# Patient Record
Sex: FEMALE | Employment: OTHER | URBAN - METROPOLITAN AREA
[De-identification: names, ages, dates, MRNs, and addresses within clinical notes are randomized per-mention and may not be internally consistent; named-entity substitution may affect disease eponyms.]

---

## 2017-02-02 ENCOUNTER — OFFICE VISIT (OUTPATIENT)
Dept: FAMILY MEDICINE | Facility: CLINIC | Age: 52
End: 2017-02-02

## 2017-02-02 VITALS
HEART RATE: 80 BPM | SYSTOLIC BLOOD PRESSURE: 135 MMHG | TEMPERATURE: 98.3 F | DIASTOLIC BLOOD PRESSURE: 85 MMHG | OXYGEN SATURATION: 97 % | WEIGHT: 197 LBS | BODY MASS INDEX: 32.78 KG/M2

## 2017-02-02 DIAGNOSIS — R53.83 PROFOUND FATIGUE: ICD-10-CM

## 2017-02-02 DIAGNOSIS — R68.89 FEELING UNWELL: Primary | ICD-10-CM

## 2017-02-02 DIAGNOSIS — L91.0 KELOID SCAR: ICD-10-CM

## 2017-02-02 DIAGNOSIS — T78.40XD ALLERGIC STATE, SUBSEQUENT ENCOUNTER: ICD-10-CM

## 2017-02-02 DIAGNOSIS — Z13.220 SCREENING FOR HYPERLIPIDEMIA: ICD-10-CM

## 2017-02-02 DIAGNOSIS — G89.29 OTHER CHRONIC PAIN: ICD-10-CM

## 2017-02-02 LAB
ALBUMIN SERPL-MCNC: 4.6 G/DL (ref 3.3–4.6)
ALP SERPL-CCNC: 51 U/L (ref 40–150)
ALT SERPL-CCNC: 33 U/L (ref 0–50)
AST SERPL-CCNC: 27 U/L (ref 0–45)
BASOPHILS # BLD AUTO: 0 10E9/L (ref 0–0.2)
BASOPHILS NFR BLD AUTO: 0.2 %
BILIRUB SERPL-MCNC: 0.7 MG/DL (ref 0.2–1.3)
BUN SERPL-MCNC: 7 MG/DL (ref 7–30)
CALCIUM SERPL-MCNC: 9.8 MG/DL (ref 8.5–10.4)
CHLORIDE SERPLBLD-SCNC: 101 MMOL/L (ref 94–109)
CHOLEST SERPL-MCNC: 293 MG/DL (ref 0–200)
CHOLEST/HDLC SERPL: 4 {RATIO} (ref 0–5)
CO2 SERPL-SCNC: 29 MMOL/L (ref 20–32)
CORTIS SERPL-MCNC: 18.2 UG/DL (ref 4–22)
CREAT SERPL-MCNC: 0.6 MG/DL (ref 0.6–1.3)
DEPRECATED CALCIDIOL+CALCIFEROL SERPL-MC: 105 UG/L (ref 20–75)
DIFFERENTIAL METHOD BLD: ABNORMAL
EGFR CALCULATED (BLACK REFERENCE): 135
EGFR CALCULATED (NON BLACK REFERENCE): 111.6
EOSINOPHIL # BLD AUTO: 0.2 10E9/L (ref 0–0.7)
EOSINOPHIL NFR BLD AUTO: 3.9 %
ERYTHROCYTE [DISTWIDTH] IN BLOOD BY AUTOMATED COUNT: 13.6 % (ref 10–15)
ESTRADIOL SERPL-MCNC: NORMAL PG/ML
FASTING SPECIMEN: YES
FOLATE SERPL-MCNC: 84.8 NG/ML
FSH SERPL-ACNC: 43.4 IU/L
GLUCOSE SERPL-MCNC: 105 MG/DL (ref 60–109)
HCT VFR BLD AUTO: 39.5 % (ref 35–47)
HDLC SERPL-MCNC: 72 MG/DL
HGB BLD-MCNC: 13.5 G/DL (ref 11.7–15.7)
IMM GRANULOCYTES # BLD: 0 10E9/L (ref 0–0.4)
IMM GRANULOCYTES NFR BLD: 0.2 %
INSULIN SERPL-ACNC: 7.9 MU/L (ref 3–25)
LDLC SERPL CALC-MCNC: 199 MG/DL (ref 0–129)
LH SERPL-ACNC: 12.5 IU/L
LYMPHOCYTES # BLD AUTO: 1.3 10E9/L (ref 0.8–5.3)
LYMPHOCYTES NFR BLD AUTO: 25.4 %
MCH RBC QN AUTO: 29.3 PG (ref 26.5–33)
MCHC RBC AUTO-ENTMCNC: 34.2 G/DL (ref 31.5–36.5)
MCV RBC AUTO: 86 FL (ref 78–100)
MONOCYTES # BLD AUTO: 0.5 10E9/L (ref 0–1.3)
MONOCYTES NFR BLD AUTO: 9 %
NEUTROPHILS # BLD AUTO: 3.1 10E9/L (ref 1.6–8.3)
NEUTROPHILS NFR BLD AUTO: 61.3 %
NRBC # BLD AUTO: 0 10*3/UL
NRBC BLD AUTO-RTO: 1 /100
PLATELET # BLD AUTO: 344 10E9/L (ref 150–450)
POTASSIUM SERPL-SCNC: 3.8 MMOL/L (ref 3.4–5.3)
PROGEST SERPL-MCNC: 1.2 NG/ML
PROLACTIN SERPL-MCNC: 6 UG/L (ref 3–27)
PROT SERPL-MCNC: 7.6 G/DL (ref 6.8–8.8)
RBC # BLD AUTO: 4.6 10E12/L (ref 3.8–5.2)
SODIUM SERPL-SCNC: 140 MMOL/L (ref 133–144)
T3 SERPL-MCNC: 106 NG/DL (ref 60–181)
T4 FREE SERPL-MCNC: 1.03 NG/DL (ref 0.76–1.46)
TRIGL SERPL-MCNC: 108 MG/DL (ref 0–150)
TSH SERPL DL<=0.05 MIU/L-ACNC: 2.52 MU/L (ref 0.4–4)
VIT B12 SERPL-MCNC: 2668 PG/ML (ref 193–986)
VLDL-CHOLESTEROL: 22 (ref 7–32)
WBC # BLD AUTO: 5.1 10E9/L (ref 4–11)

## 2017-02-02 RX ORDER — DESONIDE 0.5 MG/G
OINTMENT TOPICAL 2 TIMES DAILY
Qty: 60 G | Refills: 1 | Status: SHIPPED | OUTPATIENT
Start: 2017-02-02 | End: 2017-03-02

## 2017-02-02 ASSESSMENT — PAIN SCALES - GENERAL: PAINLEVEL: NO PAIN (0)

## 2017-02-02 NOTE — MR AVS SNAPSHOT
After Visit Summary   2/2/2017    Fannie Jeter    MRN: [id number]           Patient Information     Date Of Birth          1965        Visit Information        Provider Department      2/2/2017 9:40 AM Vikas Acharya MD [facility]        Today's Diagnoses     Feeling unwell    -  1     Profound fatigue         Other chronic pain         Allergic state, subsequent encounter         Screening for hyperlipidemia         Keloid scar            Follow-ups after your visit        Who to contact     Please call your clinic at [phone] to:    Ask questions about your health    Make or cancel appointments    Discuss your medicines    Learn about your test results    Speak to your doctor   If you have compliments or concerns about an experience at your clinic, or if you wish to file a complaint, please contact [facility] Patient Relations at [phone] or email us at [email or web address]         Additional Information About Your Visit        MyChart Information     [employer] gives you secure access to your electronic health record. If you see a primary care provider, you can also send messages to your care team and make appointments. If you have questions, please call your primary care clinic.  If you do not have a primary care provider, please call [phone] and they will assist you.      Highlight is an electronic gateway that provides easy, online access to your medical records. With High[employer], you can request a clinic appointment, read your test results, renew a prescription or communicate with your care team.     To access your existing account, please contact your [facility] or call [phone] for assistance.        Care EveryWhere ID     This is your Care EveryWhere ID. This could be used by other organizations to access your [place] medical records  [id number]        Your Vitals Were     Pulse Temperature  Pulse Oximetry Last Period          80 98.3  F (36.8  C) (Oral) 97% 07/03/2014         Blood Pressure from Last 3 Encounters:   02/02/17 135/85   03/03/16 120/84   02/12/16 114/80    Weight from Last 3 Encounters:   02/02/17 197 lb (89.359 kg)   03/03/16 213 lb (96.616 kg)   02/12/16 215 lb 1.9 oz (97.578 kg)              We Performed the Following     Anti thyroglobulin antibody     CBC with platelets differential     Comprehensive Metabolic Panel ([place])     Cortisol     Dehydroepiandrosterone     Estradiol     Folate     Follicle stimulating hormone     HCL VITAMIN B-1/THIAMINE     Immunoglobulins A/E/G/M  Serum ([facility])     Insulin level     Lipid Panel ([place])     Lutropin     Nuclear Antibody BOB by IFA IgG     Progesterone     Prolactin     T3 total     T3 uptake     T4 free     Testosterone Free and Total     Thyroid stimulating immunoglobulin     TSH     Vitamin B12     Vitamin B6     Vitamin D Deficiency     Vitamin K          Today's Medication Changes          These changes are accurate as of: 2/2/17  1:13 PM.  If you have any questions, ask your nurse or doctor.               Start taking these medicines.        Dose/Directions    desonide 0.05 % ointment   Commonly known as:  DESOWEN   Used for:  Keloid scar   Started by:  Vikas Acharya MD        Apply topically 2 times daily   Quantity:  60 g   Refills:  1            Where to get your medicines      These medications were sent to [id number] - [place] - [address]  [address] [address]     Phone:  [phone]    - desonide 0.05 % ointment             Primary Care Provider Office Phone # Fax #    Vikas Acharya [phone]       [address] [facility] [address]        Thank you!     Thank you for choosing [facility]  for your care. Our goal is always to provide you with excellent care. Hearing back from our patients is one way we can continue to improve our  services. Please take a few minutes to complete the written survey that you may receive in the mail after your visit with us. Thank you!             Your Updated Medication List - Protect others around you: Learn how to safely use, store and throw away your medicines at www.disposemymeds.org.          This list is accurate as of: 2/2/17  1:13 PM.  Always use your most recent med list.                   Brand Name Dispense Instructions for use    ACETAMINOPHEN PO      Take 600 mg by mouth 3 times daily       albuterol 108 (90 BASE) MCG/ACT Inhaler   Generic drug:  albuterol          aspirin 81 MG tablet      Take 1 tablet by mouth daily       AVEENO ECZEMA THERAPY 1 % Crea   Generic drug:  Colloidal Oatmeal      Externally apply topically daily       BLACK COHOSH PO      Take by mouth daily       carboxymethylcellulose 1 % ophthalmic solution    CELLUVISC/REFRESH LIQUIGEL    90 each    Place 1 drop Into the left eye 4 times daily Dispose of dropperette within 24 hours after opening or if the tip is contaminated.       cefUROXime 500 MG tablet    CEFTIN         cetirizine 10 MG tablet    zyrTEC     Take 5 mg by mouth daily       chlorophyll 100 MG Tabs tablet      Take 3,000 mg by mouth daily       CoQ10 100 MG Caps      Take by mouth daily       desonide 0.05 % ointment    DESOWEN    60 g    Apply topically 2 times daily       dimethicone 1.3 % Lotn lotion          FIBER PO          klonoPIN 0.5 MG tablet   Generic drug:  clonazePAM      1 TABLET 3 TIMES DAILY       lidocaine 5 % Patch    LIDODERM    30 patch    Cut patch to size and apply to affected skin.  Remove after 12 hours.       melatonin 3 MG tablet      Take by mouth nightly as needed       MULTIVITAMINS PO      Take 1 tablet by mouth daily       OMEGA-3 FISH OIL PO      Take 1,000 mg by mouth daily       prednisoLONE acetate 1 % ophthalmic susp    PRED FORTE    1 Bottle    Place 1 drop Into the left eye daily       PROBIOTIC DAILY PO      Take by mouth  daily       PROGESTERONE 100MG/G CREAM      Apply 0.5 g topically 2 times daily       PROGESTERONE EX          RITALIN 20 MG tablet   Generic drug:  methylphenidate      Take 20 mg by mouth 2 times daily       valACYclovir 1000 mg tablet    VALTREX    30 tablet    Take 1 tablet (1,000 mg) by mouth daily       vitamin B complex with vitamin C Tabs tablet      Take 1 tablet by mouth daily       VITAMIN C PO      Take 1,000 mg by mouth       VITAMIN D (CHOLECALCIFEROL) PO      Take 5,000 Units by mouth daily

## 2017-02-02 NOTE — NURSING NOTE
Chief Complaint   Patient presents with     Results     Patient would like to discuss lab test.     51 year old      Blood pressure 135/85, pulse 80, temperature 98.3  F (36.8  C), temperature source Oral, weight 197 lb (89.359 kg), last menstrual period 07/03/2014, SpO2 97 %. Body mass index is 32.78 kg/(m^2).    Wt Readings from Last 2 Encounters:   02/02/17 197 lb (89.359 kg)   03/03/16 213 lb (96.616 kg)     BP Readings from Last 3 Encounters:   02/02/17 135/85   03/03/16 120/84   02/12/16 114/80       Patient Active Problem List   Diagnosis     Scoliosis     Dysfunctional uterine bleeding     Central serous retinopathy     Autism spectrum disorder     Adult ADHD       Current Outpatient Prescriptions   Medication Sig Dispense Refill     PROGESTERONE 100MG/G CREAM Apply 0.5 g topically 2 times daily       Dimethicone 1.3 % LOTN        lidocaine (LIDODERM) 5 % patch Cut patch to size and apply to affected skin.  Remove after 12 hours. 30 patch 0     ALBUTEROL 108 (90 BASE) MCG/ACT inhaler   0     cefUROXime (CEFTIN) 500 MG tablet   0     FIBER PO        Misc Natural Products (PROGESTERONE EX)        valACYclovir (VALTREX) 1000 mg tablet Take 1 tablet (1,000 mg) by mouth daily 30 tablet 0     carboxymethylcellulose (CELLUVISC/REFRESH LIQUIGEL) 1 % ophthalmic solution Place 1 drop Into the left eye 4 times daily Dispose of dropperette within 24 hours after opening or if the tip is contaminated. 90 each 4     prednisoLONE acetate (PRED FORTE) 1 % ophthalmic suspension Place 1 drop Into the left eye daily 1 Bottle 11     Ascorbic Acid (VITAMIN C PO) Take 1,000 mg by mouth       cetirizine (ZYRTEC) 10 MG tablet Take 5 mg by mouth daily       vitamin  B complex with vitamin C (VITAMIN  B COMPLEX) TABS Take 1 tablet by mouth daily       Probiotic Product (PROBIOTIC DAILY PO) Take by mouth daily       melatonin 3 MG tablet Take by mouth nightly as needed       methylphenidate (RITALIN) 20 MG tablet Take 20 mg by  mouth 2 times daily       Colloidal Oatmeal (AVEENO ECZEMA THERAPY) 1 % CREA Externally apply topically daily        ACETAMINOPHEN PO Take 600 mg by mouth 3 times daily        BLACK COHOSH PO Take by mouth daily        Omega-3 Fatty Acids (OMEGA-3 FISH OIL PO) Take 1,000 mg by mouth daily       chlorophyll 100 MG TABS Take 3,000 mg by mouth daily       VITAMIN D, CHOLECALCIFEROL, PO Take 5,000 Units by mouth daily       Coenzyme Q10 (COQ10) 100 MG CAPS Take by mouth daily       aspirin 81 MG tablet Take 1 tablet by mouth daily       Multiple Vitamin (MULTIVITAMINS PO) Take 1 tablet by mouth daily       KLONOPIN 0.5 MG PO TABS 1 TABLET 3 TIMES DAILY         Social History   Substance Use Topics     Smoking status: Former Smoker     Types: Cigarettes     Quit date: 08/11/2009     Smokeless tobacco: Never Used     Alcohol Use: No         Health Maintenance Due   Topic Date Due     LIPID MONITORING Q5 YEARS (NO INBASKET)  10/25/1965     HEPATITIS C SCREENING  10/25/1982     MAMMO Q1 YR INBASKET MESSAGE  10/03/2014     COLON CANCER SCREEN (SYSTEM ASSIGNED)  10/25/2014     PAP Q3 YR  08/15/2016     INFLUENZA VACCINE (SYSTEM ASSIGNED)  09/01/2016       KIM QUILES CMA  February 2, 2017 9:35 AM

## 2017-02-03 LAB
T3RU NFR SERPL: 35 %
THYROGLOB AB SERPL IA-ACNC: <20 IU/ML (ref 0–40)

## 2017-02-04 LAB
SHBG SERPL-SCNC: 43 NMOL/L (ref 30–135)
TESTOST FREE SERPL-MCNC: 0.23 NG/DL (ref 0.06–0.38)
TESTOST SERPL-MCNC: 16 NG/DL (ref 8–60)

## 2017-02-05 LAB — DHEA SERPL-MCNC: 3.83

## 2017-02-06 DIAGNOSIS — R68.89 FEELING UNWELL: Primary | ICD-10-CM

## 2017-02-06 DIAGNOSIS — R53.83 PROFOUND FATIGUE: ICD-10-CM

## 2017-02-06 DIAGNOSIS — T78.40XD ALLERGY, UNSPECIFIED, SUBSEQUENT ENCOUNTER: ICD-10-CM

## 2017-02-06 LAB — TSI SER-ACNC: NORMAL

## 2017-02-06 PROCEDURE — 99000 SPECIMEN HANDLING OFFICE-LAB: CPT | Performed by: FAMILY MEDICINE

## 2017-02-06 PROCEDURE — 82784 ASSAY IGA/IGD/IGG/IGM EACH: CPT | Performed by: FAMILY MEDICINE

## 2017-02-06 PROCEDURE — 84207 ASSAY OF VITAMIN B-6: CPT | Mod: 90 | Performed by: FAMILY MEDICINE

## 2017-02-06 PROCEDURE — 86039 ANTINUCLEAR ANTIBODIES (ANA): CPT | Mod: 90 | Performed by: FAMILY MEDICINE

## 2017-02-06 PROCEDURE — 84597 ASSAY OF VITAMIN K: CPT | Mod: 90 | Performed by: FAMILY MEDICINE

## 2017-02-06 PROCEDURE — 84425 ASSAY OF VITAMIN B-1: CPT | Mod: 90 | Performed by: FAMILY MEDICINE

## 2017-02-06 PROCEDURE — 82785 ASSAY OF IGE: CPT | Performed by: FAMILY MEDICINE

## 2017-02-06 PROCEDURE — 36415 COLL VENOUS BLD VENIPUNCTURE: CPT | Performed by: FAMILY MEDICINE

## 2017-02-07 LAB
IGA SERPL-MCNC: 100 MG/DL (ref 70–380)
IGG SERPL-MCNC: 720 MG/DL (ref 695–1620)
IGM SERPL-MCNC: 60 MG/DL (ref 60–265)

## 2017-02-08 LAB
IGE SERPL-ACNC: 39 KIU/L (ref 0–114)
NUCLEAR IGG TITR SER IF: NORMAL {TITER}

## 2017-02-09 LAB
VIT B1 BLD-MCNC: 206 UG/DL
VIT B6 SERPL-MCNC: 346.1 NG/ML

## 2017-02-09 NOTE — PROGRESS NOTES
"CHIEF COMPLAINT:  Discussion of results and feeling unwell.      HISTORY OF PRESENT ILLNESS:  Fannie is a 51-year-old who has been feeling unwell for 20 years.  She states that she is \"sick of being sick\".  With this in mind, she has taken it upon herself to pursue things on her own.      [facility] and through something called [facility]\" and genetic testing performed.  As a result, she is concerned that she has \"active mutations\" for at least 2 dozen different conditions including [place]'s, [place]'s, celiac disease, Crohn's disease, Hashimoto's, lupus, MS, myasthenia gravis, rheumatoid arthritis, systemic sclerosis, type 1 and type 2 diabetes, vitiligo, ALS, autosomal recessive polycystic kidney disease, breast cancer, bladder cancer, cystic fibrosis, hypothyroidism, lung cancer, neuroblastoma, ovarian cancer, pancreatic cancer, Parkinson's disease, psoriasis, irritable bowel syndrome, thyroid cancer, ulcerative colitis, vitamin D deficiency, chronic fatigue syndrome and fibromyalgia.  She acknowledges that most of these are unlikely, but she is worried that she might have any one of those conditions.  At this point in time, she is taking 38 different supplements.  She gave me a list and they are too numerous to mention here.  They range from vitamin C to kelp to lithium orotate to colloidal copper as needed.        She has been monitoring her morning body temperature since 12/30.  She has calculated her normal body temperature to be 97.8 to 98.2.  Six days during the month she was checking she was in the normal range, 4 days she was above and 22 days she was below 97.8.  She feels that all of these are signs that she has hypothyroidism that we have checked in the past.      She has tried over 20 different modalities to try and help her feel better.  These have ranged from a total body cleanse, parasite cleanse, switching vapor to home distilled water, multiple dietary changes, exercise, " "meditation, fasting, cool mist humidifier, essential oils, multiple combinations of dietary supplements, an air purifier, retina exam, optometrist exam, genetic testing and counseling (via phone), starting to remove mercury fillings from her teeth, going to a chiropractor, cutting out TV and decreasing negativity and listening to soothing music only, using ceramic cookware but no Joshua, wooden spoons (no plastic), BPA free plastics when avoidable, glass containers for storage, not using a microwave, wearing a magnetic bracelet for pain management and doing only organic GMO free food.      Her symptoms have included everything from whiplash, swollen lymph nodes in her neck and underarms, pain in her lungs and chronic cough, scar on breast reduction tissue site on the right, breast pain, bladder urgency, chronic swelling and cold hands and feet, eye dryness and blurred vision, crusty eyes, chronically getting pain, feeling tired, not having much sleep, chronic insomnia, severe stiffness, trouble regulating her temperature, TMJ, GI disturbances, shifting numbness, chronic inflammation, food sensitivities, light sensitivity, sound sensitivity, tinnitus (\"really bad\"), sinus pain, migraines, running/stuffy nose, sore/scratchy throat almost all the time, multiple skin issues (dryness, adult acne, cradle cap), heat rash with slight perspiration, painful scar from breast surgery, raw skin on face and her eyes, hemorrhoids, easy bruisability (lump on her left leg), low body temperature, persistent weight issue, brain fog and restless legs symptoms.  With all that in mind, Jessica gave me a list of all the labs that she would like (i.e., is insisting upon) getting.  I will lay these out below.  It was hard today to add more to the conversation as she was just very frustrated and concerned.  Though she appreciates my care, she is frustrated that we have not been able to figure out what is wrong with her.      OBJECTIVE:  " "Fannie is in no distress.  Good eye contact.  I can see that she is frustrated.  No diaphoresis.  She is not tremulous.  Speech is of normal rate and quality.  /85 with a pulse of 80 and regular.  Temperature is afebrile at 98.3.  She He weighs 197 pounds, giving her a BMI of 32.78.  Weight is down from 213 a year ago.  Certainly heading in the right direction.  O2 sats are 97% on room air.  Physical exam was minimal today.  She does have a little keloid formation on the lateral aspect of her right breast where the breast reduction surgery was done.  She really does not want to go back and see her plastic surgeon.  We certainly talked about the fact that she could see a different plastic surgeon for a possible intralesional steroid injection to see if that would help \"melt\" that down a bit.      LABORATORY DATA:  Labs today will consist of the following:  TSH, T3, total T4, thyroid stimulating immunoglobulin, T3 uptake, antithyroglobulin antibody, CBC with differential, comprehensive metabolic panel, lipid panel, cortisol level, folate, B12, vitamin D, testosterone (free and total), estradiol, DHEA, FSH, LH, prolactin, insulin level, progesterone, vitamin K, antinuclear antibody, vitamin B6, immunoglobulin levels and B1-thiamin.  It is possible that some of these will not be covered by insurance.  I did my best to link them to her symptoms that would be appropriate.      ASSESSMENT AND PLAN:   1. Feeling unwell and experiencing chronic/profound fatigue as well as chronic pain.  She has a bit of a keloid scar.  I am not going to reiterate everything here that was presented above.   2. Our plan at this point is to simply get the lab results.  She will come back for a followup appointment.  We can try to make sense of everything.  Anything that is abnormal will be addressed.  It is quite possible that everything will be fine.  If that is the case, then dealing with some kind of a chronic condition such as " fibromyalgia or chronic fatigue syndrome is likely the way to go.  We can make some recommendations of people to see.  I am happy to work with her if she is willing to try a medication like nortriptyline at bedtime and take one thing at a time and go slow.  I will certainly do everything I can to support her.     3. For the keloid scar formation, given that there is some pain and erythema (but no sign of infection) she can try some desonide 0.05% ointment applied twice daily to the area.  We will follow up with either Plastic Surgery or Dermatology.     4. Call with any problems or questions.  The total time spent with Fannie was over 45 minutes.  almost all that time was spent in care coordination and counseling.

## 2017-02-10 LAB — PHYTONADIONE SERPL-MCNC: 0.43 NG/L

## 2017-03-02 ENCOUNTER — OFFICE VISIT (OUTPATIENT)
Dept: FAMILY MEDICINE | Facility: CLINIC | Age: 52
End: 2017-03-02

## 2017-03-02 VITALS
HEIGHT: 65 IN | HEART RATE: 87 BPM | SYSTOLIC BLOOD PRESSURE: 126 MMHG | BODY MASS INDEX: 32.99 KG/M2 | OXYGEN SATURATION: 95 % | DIASTOLIC BLOOD PRESSURE: 89 MMHG | TEMPERATURE: 99.7 F | WEIGHT: 198 LBS

## 2017-03-02 DIAGNOSIS — R82.998 DARK URINE: ICD-10-CM

## 2017-03-02 DIAGNOSIS — J32.4 CHRONIC PANSINUSITIS: ICD-10-CM

## 2017-03-02 DIAGNOSIS — M79.7 FIBROMYALGIA: ICD-10-CM

## 2017-03-02 DIAGNOSIS — R05.3 CHRONIC COUGH: Primary | ICD-10-CM

## 2017-03-02 DIAGNOSIS — M65.311 TRIGGER FINGER OF RIGHT THUMB: ICD-10-CM

## 2017-03-02 LAB
BILIRUBIN UR: NEGATIVE
BLOOD UR: NEGATIVE
GLUCOSE URINE: NEGATIVE
KETONES UR QL: NEGATIVE
LEUKOCYTE ESTERASE UR: NEGATIVE
NITRITE UR QL STRIP: NEGATIVE
PH UR STRIP: 5 [PH] (ref 5–7)
PROTEIN UR: NEGATIVE
SP GR UR STRIP: 1.01
UROBILINOGEN UR STRIP-ACNC: NORMAL

## 2017-03-02 RX ORDER — NORTRIPTYLINE HCL 10 MG
10 CAPSULE ORAL AT BEDTIME
Qty: 30 CAPSULE | Refills: 1 | Status: SHIPPED | OUTPATIENT
Start: 2017-03-02 | End: 2018-03-11

## 2017-03-02 NOTE — PROGRESS NOTES
"CHIEF COMPLAINT:  Followup for symptoms of feeling unwell.      HISTORY OF PRESENT ILLNESS:  Fannie is a 51-year-old who was here recently to have multiple tests run.  Please refer to my previous note from 1 month ago, 02/02, for details.  We checked nearly 2 dozen different labs due to the way that she had been feeling.  Everything came back either negative or normal.  She is a bit frustrated by the fact that nothing showed up indicating an obvious cause for some of her symptoms.  She is willing to entertain diagnoses like fibromyalgia, which do not really have any positive tests associated with them.  We had a good conversation about that today.      Today, she is especially bothered by the fact that she has had a cough that has been present since 2014.  As a little context, she smoked for 25 years.  She quit in 2009 and did some vaping for about a year before officially quitting.  When she was a child, she was diagnosed with different forms of asthma and has a long bronchitis history.  She is frustrated and states that \"no one helps\" to try and figure out her situation.  At the same time, she is adamant about not using any steroid inhalers.  Albuterol is listed as one of hers and she thought that it was a steroid inhaler and I reminded her that it is not.  We also talked about the difference between anatomy (what shows up a chest x-ray) and her physiology (which is not working quite right).  Therefore, in that spirit, she agrees to go see someone like Dr. Fab Emery who can do testing for allergies and also come up with some useful information and plans for her.  She can also make it very clear that she is not interested in being on steroids and we can work around that.      She had a chest x-ray in 12/2015 which was clear.  She understands that she will likely need a new chest x-ray which she goes to his office.      Related to this is that she has had some significant sinus symptoms for weeks.  She has " "been using 2 homeopathic medications including Sinusalia sinus homeopathic medication and Boira herbal medication.  She has thought about doing a neti pot but actually has not done that.  She is not interested in antibiotics at this point.  Of course, the two could be intimately related.      She has some thumb discomfort on the right.  Pain, at its worst, can be approximately 10/10.  She can hear it and feel it \"snap.\"  It seems to get stuck.  She is right-hand dominant.  She has been doing a lot of online research and is worried about autoimmune conditions and inflammation.  However, I pointed out that we had checked a number of inflammatory markers and markers for autoimmune disease and none were present.  She is not diabetic.  She took some comfort in that.      ACTIVE MEDICAL PROBLEMS:  Reviewed.      MEDICATIONS:  Medications list was extensive and we have pared this down to all but the few that she is currently on.  Her list now accurately reflects what she is on.      OBJECTIVE:  Fannie is in no distress.  She did have a productive sounding cough that occurred in occasional spasms during our encounter today.  Vital signs are as follows:  /89 with a pulse of 87 and regular.  Temperature is 99.7.  She is 5 feet 5 and weighs 198, essentially the same as a month ago.  O2 sats are 95% on room air.  BMI 32.95.  She is moving air well.  Cough, as mentioned.  She has no reproducible pain with palpation of the frontal or maxillary sinuses.  Eyes are free of any discharge.  No icterus or conjunctivitis seen.  Nose is normal in appearance.  Ears look fine.  There is no cerumen in the canals.  The TMs look good.  Throat looks benign.  No tonsillar hypertrophy or exudate.  No obvious postnasal drip.  Palpation of the neck reveals no lymphadenopathy anteriorly or posteriorly.  Auscultation of the lungs reveals \"musical\" breath sounds throughout lung fields.  No crackles are heard, however.  Rather, it sounds like " she has just a lot of mucus in her bronchial tubes.  This cleared a little bit with coughing.  Chest x-ray not obtained by me today.  Examination of the right thumb shows no erythema, increased warmth or deformity.  Muscle strength is 5/5.  Negative de Quervain's.  I could not reproduce any of the clicking or sticking of the thumb today.        Finally, she had reached out to me previously through a [employer] message that her morning urine looked a little bit darker than it does the rest of the day.  I pointed out this is likely just a physiologic response.  She possibly over hydrates during the course of the day, but I checked a comprehensive metabolic panel which is entirely normal.  We will go ahead and check a urinalysis today just to make sure that there is no microscopic hematuria present that would give it a darker color.  This result has been run and was completely normal top to bottom.      ASSESSMENT AND PLAN:   1.  Years of feeling unwell.  Multiple laboratory results were reviewed.  I think fibromyalgia is very likely somewhere along the spectrum of what she is suffering from.  She has non-refreshing sleep and has had this for years.  Therefore, I strongly recommend that she consider taking a medication like nortriptyline 10 mg nightly.  She is slightly willing to do so.  I printed off a prescription and handed to her.  She is going to do some research and decide if this is something she might want to try.   2.  Chronic cough for at least 3 years.  She has had negative chest x-rays.  She does have a history of childhood asthma, adult bronchitis, 25 years of smoking, having quit in 2009.  Therefore, she could have some chronic bronchitis.  However, given the asthma history, I am going to refer her to Dr. Fab Emery and [facility] for a full and comprehensive workup.  The last time she had patch testing done was as a child.  I suspect she has an allergy trigger of some kind.   3.   "Possible sinusitis.  This seems to be a pansinusitis.  Though I might recommend antibiotics for her, she is not interested.  Strongly recommended that she do a neti pot and continue with her homeopathic medications.   4.  Dark urine in the morning.  Likely slightly concentrated compared to the way it is the rest of the day.  She reports drinking \"a gallon\" of water a day.  I would back off that.  When we did her comprehensive metabolic panel, her BUN and creatinine were at the very low end of normal.  Her GFR was nearly 120.  She can certainly drink fewer fluids.  Again, I suspect that she is just noticing some mild concentration to her urine in the morning.  The urinalysis was completely normal and very reassuring.     5.  Probable fibromyalgia.  Options were discussed.  I am going to recommend that she try nortriptyline 10 mg at night.  Printed out the prescription.  She will do some research on this and potentially try it.     She will call with any problems or questions.      Total time spent with Jessica, 45 minutes, almost all that time was spent in care, coordination and counseling.       "

## 2017-03-02 NOTE — MR AVS SNAPSHOT
After Visit Summary   3/2/2017    Fannie Jeter    MRN: [id number]           Patient Information     Date Of Birth          1965        Visit Information        Provider Department      3/2/2017 11:20 AM Vikas Acharya MD [facility]        Today's Diagnoses     Chronic cough    -  1    Trigger finger of right thumb        Fibromyalgia        Dark urine           Follow-ups after your visit        Additional Services     Allergy/Asthma Adult Referral - [facility]       Your provider has referred you to:     [facility] - Dr. Fab Emery - [phone]    Please be aware that coverage of these services is subject to the terms and limitations of your health insurance plan.  Call member services at your health plan with any benefit or coverage questions.      Please bring the following to your appointment:  >>   Any x-rays, CTs or MRIs which have been performed.  Contact the facility where they were done to arrange for  prior to your scheduled appointment.  Any new CT, MRI or other procedures ordered by your specialist must be performed at a [facility] facility or coordinated by your clinic's referral office.  >>   List of current medications  >>   This referral request   >>   Any documents/labs given to you for this referral            [facility] PT, HAND, AND CHIROPRACTIC REFERRAL       **This order will print in the [facility] Scheduling Office**    Physical Therapy, Hand Therapy and Chiropractic Care are available through:    *[facility]  *[facility]  *[facility]    Call one number to schedule at any of the above locations: [phone].    Your provider has referred you to: Hand Therapy    Indication/Reason for Referral: Right trigger thumb  Onset of Illness: ~1 month  Therapy Orders: Evaluate and Treat  Special Programs: None  Special Request: None    Lisa Gonzalez   "    Additional Comments for the Therapist or Chiropractor:     Please be aware that coverage of these services is subject to the terms and limitations of your health insurance plan.  Call member services at your health plan with any benefit or coverage questions.      Please bring the following to your appointment:    *Your personal calendar for scheduling future appointments  *Comfortable clothing                  Who to contact     Please call your clinic at [phone] to:    Ask questions about your health    Make or cancel appointments    Discuss your medicines    Learn about your test results    Speak to your doctor   If you have compliments or concerns about an experience at your clinic, or if you wish to file a complaint, please contact [facility] Patient Relations at [phone] or email us at [email or web address]         Additional Information About Your Visit        [employer] Interactive Information     [employer] gives you secure access to your electronic health record. If you see a primary care provider, you can also send messages to your care team and make appointments. If you have questions, please call your primary care clinic.  If you do not have a primary care provider, please call [phone] and they will assist you.      [employer] is an electronic gateway that provides easy, online access to your medical records. With [employer], you can request a clinic appointment, read your test results, renew a prescription or communicate with your care team.     To access your existing account, please contact your [facility] or call [phone] for assistance.        Care EveryWhere ID     This is your Care EveryWhere ID. This could be used by other organizations to access your [place] medical records  [id number]        Your Vitals Were     Pulse Temperature Height Last Period Pulse Oximetry BMI (Body Mass Index)    87 99.7  F (37.6  C) 5' 5\" (165.1 cm) " 07/03/2014 95% 32.95 kg/m2       Blood Pressure from Last 3 Encounters:   03/02/17 126/89   02/02/17 135/85   03/03/16 120/84    Weight from Last 3 Encounters:   03/02/17 198 lb (89.8 kg)   02/02/17 197 lb (89.4 kg)   03/03/16 213 lb (96.6 kg)              We Performed the Following     Allergy/Asthma Adult Referral - [facility]     [facility] PT, HAND, AND CHIROPRACTIC REFERRAL     Urinalysis ([place])          Today's Medication Changes          These changes are accurate as of: 3/2/17 12:44 PM.  If you have any questions, ask your nurse or doctor.               Start taking these medicines.        Dose/Directions    nortriptyline 10 MG capsule   Commonly known as:  PAMELOR   Used for:  Fibromyalgia   Started by:  Vikas Acharya MD        Dose:  10 mg   Take 1 capsule (10 mg) by mouth At Bedtime   Quantity:  30 capsule   Refills:  1            Where to get your medicines      Some of these will need a paper prescription and others can be bought over the counter.  Ask your nurse if you have questions.     Bring a paper prescription for each of these medications     nortriptyline 10 MG capsule                Primary Care Provider Office Phone # Fax #    Vikas Acharya [phone]       [address] [address]        Thank you!     Thank you for choosing [facility]  for your care. Our goal is always to provide you with excellent care. Hearing back from our patients is one way we can continue to improve our services. Please take a few minutes to complete the written survey that you may receive in the mail after your visit with us. Thank you!             Your Updated Medication List - Protect others around you: Learn how to safely use, store and throw away your medicines at www.disposemymeds.org.          This list is accurate as of: 3/2/17 12:44 PM.  Always use your most recent med list.                   Brand Name Dispense  Instructions for use    nortriptyline 10 MG capsule    PAMELOR    30 capsule    Take 1 capsule (10 mg) by mouth At Bedtime

## 2017-03-02 NOTE — NURSING NOTE
"51 year old  Chief Complaint   Patient presents with     Follow Up For     cough that hasn't gone away since 2014.     Thumb Discomfort     right thumb pain. Pain scale is about a 10.        Blood pressure 126/89, pulse 87, temperature 99.7  F (37.6  C), height 5' 5\" (165.1 cm), weight 198 lb (89.8 kg), last menstrual period 07/03/2014, SpO2 95 %. Body mass index is 32.95 kg/(m^2).  Patient Active Problem List   Diagnosis     Scoliosis     Dysfunctional uterine bleeding     Central serous retinopathy     Autism spectrum disorder     Adult ADHD       Wt Readings from Last 2 Encounters:   03/02/17 198 lb (89.8 kg)   02/02/17 197 lb (89.4 kg)     BP Readings from Last 3 Encounters:   03/02/17 126/89   02/02/17 135/85   03/03/16 120/84         Current Outpatient Prescriptions   Medication     desonide (DESOWEN) 0.05 % ointment     PROGESTERONE 100MG/G CREAM     Dimethicone 1.3 % LOTN     lidocaine (LIDODERM) 5 % patch     ALBUTEROL 108 (90 BASE) MCG/ACT inhaler     cefUROXime (CEFTIN) 500 MG tablet     FIBER PO     Misc Natural Products (PROGESTERONE EX)     valACYclovir (VALTREX) 1000 mg tablet     carboxymethylcellulose (CELLUVISC/REFRESH LIQUIGEL) 1 % ophthalmic solution     prednisoLONE acetate (PRED FORTE) 1 % ophthalmic suspension     Ascorbic Acid (VITAMIN C PO)     cetirizine (ZYRTEC) 10 MG tablet     vitamin  B complex with vitamin C (VITAMIN  B COMPLEX) TABS     Probiotic Product (PROBIOTIC DAILY PO)     melatonin 3 MG tablet     methylphenidate (RITALIN) 20 MG tablet     Colloidal Oatmeal (AVEENO ECZEMA THERAPY) 1 % CREA     ACETAMINOPHEN PO     BLACK COHOSH PO     Omega-3 Fatty Acids (OMEGA-3 FISH OIL PO)     chlorophyll 100 MG TABS     VITAMIN D, CHOLECALCIFEROL, PO     Coenzyme Q10 (COQ10) 100 MG CAPS     aspirin 81 MG tablet     Multiple Vitamin (MULTIVITAMINS PO)     KLONOPIN 0.5 MG PO TABS     No current facility-administered medications for this visit.        Social History   Substance Use Topics     " Smoking status: Former Smoker     Types: Cigarettes     Quit date: 8/11/2009     Smokeless tobacco: Never Used     Alcohol use No       There are no preventive care reminders to display for this patient.    Lab Results   Component Value Date    PAP ASC-US 08/15/2013         Judi Hernandez CMA  March 2, 2017 11:20 AM

## 2017-03-10 ENCOUNTER — THERAPY VISIT (OUTPATIENT)
Dept: OCCUPATIONAL THERAPY | Facility: CLINIC | Age: 52
End: 2017-03-10
Payer: MEDICARE

## 2017-03-10 DIAGNOSIS — M65.311 TRIGGER THUMB OF RIGHT HAND: Primary | ICD-10-CM

## 2017-03-10 PROCEDURE — G8984 CARRY CURRENT STATUS: HCPCS | Mod: GO | Performed by: OCCUPATIONAL THERAPIST

## 2017-03-10 PROCEDURE — 97165 OT EVAL LOW COMPLEX 30 MIN: CPT | Mod: GO | Performed by: OCCUPATIONAL THERAPIST

## 2017-03-10 PROCEDURE — 97110 THERAPEUTIC EXERCISES: CPT | Mod: GO | Performed by: OCCUPATIONAL THERAPIST

## 2017-03-10 PROCEDURE — 97140 MANUAL THERAPY 1/> REGIONS: CPT | Mod: GO | Performed by: OCCUPATIONAL THERAPIST

## 2017-03-10 PROCEDURE — G8985 CARRY GOAL STATUS: HCPCS | Mod: GO | Performed by: OCCUPATIONAL THERAPIST

## 2017-03-10 PROCEDURE — 29130 APPL FINGER SPLINT STATIC: CPT | Mod: GO | Performed by: OCCUPATIONAL THERAPIST

## 2017-03-10 NOTE — LETTER
DEPARTMENT OF HEALTH AND HUMAN SERVICES  CENTERS FOR MEDICARE & MEDICAID SERVICES    PLAN/UPDATED PLAN OF PROGRESS FOR OUTPATIENT REHABILITATION    PATIENTS NAME:  Fannie Jeter   : 1965  PROVIDER NUMBER:  [id number]  [facility]  [id number]  PROVIDER NAME: [facility]  MEDICAL RECORD NUMBER: [id number]   START OF CARE DATE:    SOC Date: 03/10/17   TYPE:  OT  PRIMARY/TREATMENT DIAGNOSIS: (Pertinent Medical Diagnosis)  Trigger thumb of right hand  VISITS FROM START OF CARE: 1 Rxs Used: 1     Hand Therapy Initial Evaluation  Current Date:  3/10/2017  Diagnosis:  Right trigger thumb  DOI: 2017    Subjective:  Fannie Jeter is a 51 year old right hand dominant female.  Patient reports symptoms of pain, stiffness/loss of motion, weakness/loss of strength and triggering of the right thumb which occurred due to unknown. Since onset symptoms are Gradually getting worse.  Special tests:  none.  Previous treatment: none.    General health as reported by patient is good.  Pertinent medical history includes:osteoarthritis, history of fractures, overweight, fibromyalgia, thyroid problems, asthma, migraines/headaches, smoking, menopausal, numbness/tingling, pain at rest/night  Medical allergies:penicillian and Haldol.  Surgical history: other: na.  Medication history: none listed.  Current occupation is none  Currently not working due to being on disability  Job Tasks: computer work  Barriers include:none  Prior functional level:  independent-shared household chores  Additional Occupational Profile Information (patterns of daily living, interests, values and needs): computer  Upper Extremity Functional Index Score:  SCORE:   Column Totals: /80: 5   (A lower score indicates greater disability.)    Objective:  Pain Report:  VAS(0-10) 3/10/2017   At Rest: 10/10   With Use: 10/10   Location:  thumb  PATIENTS NAME:  Fannie Jeter   : 1965    Pain Quality:  Burning and Shooting  Frequency: constant     Pain is worst:  daytime  Exacerbated by:  use  Relieved by:  stretch  Progression:  worsening  ROM:  Pain Report:  - none    + mild    ++ moderate    +++ severe   Right Thumb 3/10/17    (PROM) Left Right   MP /50 /45   IP /80 /80     Stage of Stenosing Tenosynovitis (SST):   3/10/2017   Triggering of right thumb 4   Stage 1:  Normal  Stage 2:  Uneven motion of tendon  Stage 3:  Triggering, clicking, catching  Stage 4:  Locking in extension or flexion; unlocked by active motion  Stage 5:  Locking in extension or flexion; unlocked by passive motion  Stage 6:  Finger locked in extension or flexion  Palpation:   3/10/17   A1 pulley right finger +++   Sensation:  WNL throughout all nerve distributions; per patient report  Strength:   (Measured in pounds)  Pain Report:  - none    + mild    ++ moderate    +++ severe   NT due to trigger    Assessment:  Patient presents with symptoms consistent with diagnosis of right trigger finger,  with conservative intervention.   Patient's limitations or Problem List includes:  Pain, Decreased ROM/motion, Weakness and Adherence in connective tissue of the right thumb which interferes with the patient's ability to perform Self Care Tasks (dressing, eating, bathing, hygiene/toileting), Work Tasks, Sleep Patterns, Recreational Activities, Household Chores and Driving  as compared to previous level of function.  Rehab Potential:  Good - Return to full activity, some limitations  Patient will benefit from skilled Occupational Therapy to increase flexibility and overall strength and decrease pain, edema and adherence of scarring to return to previous activity level and resume normal daily tasks and to reach their rehab potential.  Barriers to Learning:  No barrier  Communication Issues:  Patient appears to be able to clearly communicate and understand verbal and written communication and follow directions correctly.        PATIENTS NAME:  Fannie Jeter   : 1965    Assessment of  "Occupational Performance:  5 or more Performance Deficits  Identified Performance Deficits: bathing/showering, toileting, dressing, feeding, health management and maintenance, home establishment and management, meal  preparation and cleanup, shopping, sleep and leisure activities    Clinical Decision Making (Complexity): Low complexity  Treatment Explanation:  The following has been discussed with the patient:  RX ordered/plan of care  Anticipated outcomes  Possible risks and side effects    Plan:  Frequency:  2 X week, once daily  Duration:  for 3 months  Treatment Plan:    Modalities:  US  Therapeutic Exercise:  PROM  Neuromuscular re-education:  Nerve Gliding  Manual Techniques:  Joint mobilization, Friction massage and Myofascial release  Orthotic Fabrication:  Hand based orthosis  Discharge Plan:  Achieve all LTG.  Independent in home treatment program.  Reach maximal therapeutic benefit.  Home Exercise Program:  Warmth for stiffness  Ice to A1 pulley/palm for inflammation  Decongestive and TFM to palm and A1 pulley  PROM finger flexion  [place] massage to thenar  Small ball massage to volar fa  HBTS with IP included    Next Visit:  US  MFR  FM  PROM   Stretches      Caregiver Signature/Credentials ______________________________ Date ________       Treating Provider: Nenita Castellon, SHONDAR, CHT    I have reviewed and certified the need for these services and plan of treatment while under my care.        PHYSICIAN'S SIGNATURE:   ____________________________________  Date___________      Vikas Acharya     PATIENTS NAME:  Fannie Jeter   : 1965      Certification period: Beginning of Cert date period: 03/10/17 End of Cert period date: 17     Functional Level Progress Report: Please see attached \"Goal Flow sheet for Functional level.\"    ___X_____ Continue Services or       ________ DC Services                Service dates: SOC Date: 03/10/17  to present                                               "

## 2017-03-10 NOTE — PROGRESS NOTES
Hand Therapy Initial Evaluation    Current Date:  3/10/2017    Diagnosis:  Right trigger thumb  DOI: February 9, 2017    Subjective:  Fannie Jeter is a 51 year old right hand dominant female.    Patient reports symptoms of pain, stiffness/loss of motion, weakness/loss of strength and triggering of the right thumb which occurred due to unknown. Since onset symptoms are Gradually getting worse.  Special tests:  none.  Previous treatment: none.    General health as reported by patient is good.  Pertinent medical history includes:osteoarthritis, history of fractures, overweight, fibromyalgia, thyroid problems, asthma, migraines/headaches, smoking, menopausal, numbness/tingling, pain at rest/night  Medical allergies:penicillian and Haldol.  Surgical history: other: na.  Medication history: none listed.    Current occupation is none  Currently not working due to being on disability  Job Tasks: computer work  Barriers include:none  Prior functional level:  independent-shared household chores    Additional Occupational Profile Information (patterns of daily living, interests, values and needs): [employer]    Upper Extremity Functional Index Score:  SCORE:   Column Totals: /80: 5   (A lower score indicates greater disability.)    Objective:  Pain Report:  VAS(0-10) 3/10/2017   At Rest: 10/10   With Use: 10/10   Location:  thumb  Pain Quality:  Burning and Shooting  Frequency: constant    Pain is worst:  daytime  Exacerbated by:  use  Relieved by:  stretch  Progression:  worsening  ROM:  Pain Report:  - none    + mild    ++ moderate    +++ severe   Right Thumb 3/10/17    (PROM) Left Right   MP /50 /45   IP /80 /80       Stage of Stenosing Tenosynovitis (SST):   3/10/2017   Triggering of right thumb 4   Stage 1:  Normal  Stage 2:  Uneven motion of tendon  Stage 3:  Triggering, clicking, catching  Stage 4:  Locking in extension or flexion; unlocked by active motion  Stage 5:  Locking in extension or flexion; unlocked by passive  motion  Stage 6:  Finger locked in extension or flexion    Palpation:   3/10/17   A1 pulley right finger +++     Sensation:  WNL throughout all nerve distributions; per patient report    Strength:   (Measured in pounds)  Pain Report:  - none    + mild    ++ moderate    +++ severe     NT due to trigger    Assessment:  Patient presents with symptoms consistent with diagnosis of right trigger finger,  with conservative intervention.     Patient's limitations or Problem List includes:  Pain, Decreased ROM/motion, Weakness and Adherence in connective tissue of the right thumb which interferes with the patient's ability to perform Self Care Tasks (dressing, eating, bathing, hygiene/toileting), Work Tasks, Sleep Patterns, Recreational Activities, Household Chores and Driving  as compared to previous level of function.    Rehab Potential:  Good - Return to full activity, some limitations    Patient will benefit from skilled Occupational Therapy to increase flexibility and overall strength and decrease pain, edema and adherence of scarring to return to previous activity level and resume normal daily tasks and to reach their rehab potential.    Barriers to Learning:  No barrier    Communication Issues:  Patient appears to be able to clearly communicate and understand verbal and written communication and follow directions correctly.    Assessment of Occupational Performance:  5 or more Performance Deficits  Identified Performance Deficits: bathing/showering, toileting, dressing, feeding, health management and maintenance, home establishment and management, meal preparation and cleanup, shopping, sleep and leisure activities      Clinical Decision Making (Complexity): Low complexity    Treatment Explanation:  The following has been discussed with the patient:  RX ordered/plan of care  Anticipated outcomes  Possible risks and side effects    Plan:  Frequency:  2 X week, once daily  Duration:  for 3 months    Treatment Plan:     Modalities:  US  Therapeutic Exercise:  PROM  Neuromuscular re-education:  Nerve Gliding  Manual Techniques:  Joint mobilization, Friction massage and Myofascial release  Orthotic Fabrication:  Hand based orthosis    Discharge Plan:  Achieve all LTG.  Independent in home treatment program.  Reach maximal therapeutic benefit.    Home Exercise Program:  Warmth for stiffness  Ice to A1 pulley/palm for inflammation  Decongestive and TFM to palm and A1 pulley  PROM finger flexion  [place] massage to thenar  Small ball massage to volar fa  HBTS with IP included    Next Visit:  US  MFR  FM  PROM   Stretches

## 2017-03-10 NOTE — MR AVS SNAPSHOT
After Visit Summary   3/10/2017    Fannie Jeter    MRN: [id number]           Patient Information     Date Of Birth          1965        Visit Information        Provider Department      3/10/2017 9:00 AM Shari Castellon [facility] [place]        Today's Diagnoses     Trigger thumb of right hand    -  1       Follow-ups after your visit        Your next 10 appointments already scheduled     Mar 15, 2017  2:30 PM CDT   [facility] with Nurys E Snnitin, OT   [facility] ([facility])    [address] [address] [phone]            Mar 22, 2017  2:30 PM CDT   [facility] with Nurys E Snorteland, OT   [facility]    [address] [id number] [phone]            Mar 29, 2017  2:30 PM CDT   [facility] with Nurys E Snortruss, OT   [facility] ([facility])    [address] [id number] [phone]              Who to contact     If you have questions or need follow up information about today's clinic visit or your schedule please contact [facility] directly at [phone].  Normal or non-critical lab and imaging results will be communicated to you by [employer], letter or phone within 4 business days after the clinic has received the results. If you do not hear from us within 7 days, please contact the clinic through [employer] or phone. If you have a critical or abnormal lab result, we will notify you by phone as soon as possible.  Submit refill requests through [employer] or call your pharmacy and they will forward the refill request to us. Please allow 3 business days for your refill to be completed.          Additional Information About Your Visit        [employer] Information     [employer] gives you secure access to your electronic health record. If you see a primary care provider, you can also send messages to your care team and make appointments. If  you have questions, please call your primary care clinic.  If you do not have a primary care provider, please call [phone] and they will assist you.        Care EveryWhere ID     This is your Care EveryWhere ID. This could be used by other organizations to access your [place] medical records  [id number]        Your Vitals Were     Last Period                   07/03/2014            Blood Pressure from Last 3 Encounters:   03/02/17 126/89   02/02/17 135/85   03/03/16 120/84    Weight from Last 3 Encounters:   03/02/17 89.8 kg (198 lb)   02/02/17 89.4 kg (197 lb)   03/03/16 96.6 kg (213 lb)              We Performed the Following     APPLY FINGER SPLINT STATIC     HC OT EVAL, LOW COMPLEXITY     [facility] CERT REPORT     MANUAL THER TECH,1+REGIONS,EA 15 MIN     THERAPEUTIC EXERCISES        Primary Care Provider Office Phone # Fax #    Vikas Acharya [phone]       [address] [address]        Thank you!     Thank you for choosing [facility]  for your care. Our goal is always to provide you with excellent care. Hearing back from our patients is one way we can continue to improve our services. Please take a few minutes to complete the written survey that you may receive in the mail after your visit with us. Thank you!             Your Updated Medication List - Protect others around you: Learn how to safely use, store and throw away your medicines at www.disposemymeds.org.          This list is accurate as of: 3/10/17  2:35 PM.  Always use your most recent med list.                   Brand Name Dispense Instructions for use    nortriptyline 10 MG capsule    PAMELOR    30 capsule    Take 1 capsule (10 mg) by mouth At Bedtime

## 2017-03-16 DIAGNOSIS — R05.9 COUGH: Primary | ICD-10-CM

## 2017-03-16 RX ORDER — ALBUTEROL SULFATE 90 UG/1
2 AEROSOL, METERED RESPIRATORY (INHALATION) EVERY 6 HOURS PRN
Qty: 1 INHALER | Refills: 1 | Status: SHIPPED | OUTPATIENT
Start: 2017-03-16 | End: 2018-03-11

## 2017-04-06 DIAGNOSIS — Z12.39 SCREENING FOR BREAST CANCER: Primary | ICD-10-CM

## 2017-04-28 PROBLEM — M65.311 TRIGGER THUMB OF RIGHT HAND: Status: RESOLVED | Noted: 2017-03-10 | Resolved: 2017-04-28

## 2017-04-28 NOTE — PROGRESS NOTES
Discharge Note -Hand Therapy    Initial Evaluation Date:  3/10/17  Current Date: 4/28/2017  Number of Visits: 1    S:    Pt did not follow up for scheduled visits.    O:  Objective information is not available as pt has not returned for therapy.  Last visit or last objective information will serve as final entry.      A: Pt did not return for further treatment.    Status of goals is unknown due to lack of followup of patient.      P:  Discharge from Hand Therapy.

## 2017-05-10 DIAGNOSIS — M19.90 OSTEOARTHRITIS: Primary | ICD-10-CM

## 2017-05-10 DIAGNOSIS — J30.2 SEASONAL ALLERGIC RHINITIS, UNSPECIFIED ALLERGIC RHINITIS TRIGGER: ICD-10-CM

## 2017-05-10 RX ORDER — CETIRIZINE HYDROCHLORIDE 10 MG/1
10 TABLET ORAL DAILY
COMMUNITY
End: 2019-08-19

## 2017-05-10 RX ORDER — PETROLATUM,WHITE/LANOLIN
OINTMENT (GRAM) TOPICAL
COMMUNITY
Start: 2017-05-10 | End: 2018-03-11

## 2017-05-10 RX ORDER — ASPIRIN 325 MG
325 TABLET ORAL EVERY 6 HOURS PRN
Status: ON HOLD | COMMUNITY
End: 2020-06-24

## 2017-05-10 RX ORDER — SENNOSIDES 8.6 MG
650 CAPSULE ORAL EVERY 8 HOURS PRN
Status: ON HOLD | COMMUNITY
End: 2019-07-04

## 2017-05-10 RX ORDER — OMEGA-3-ACID ETHYL ESTERS 1 G/1
1 CAPSULE, LIQUID FILLED ORAL DAILY
COMMUNITY
End: 2018-03-11

## 2018-02-16 ENCOUNTER — HOSPITAL ENCOUNTER (INPATIENT)
Facility: CLINIC | Age: 53
LOS: 10 days | Discharge: HOME | DRG: 885 | End: 2018-02-26
Attending: EMERGENCY MEDICINE | Admitting: PSYCHIATRY & NEUROLOGY
Payer: MEDICARE

## 2018-02-16 ENCOUNTER — TELEPHONE (OUTPATIENT)
Dept: BEHAVIORAL HEALTH | Facility: CLINIC | Age: 53
End: 2018-02-16

## 2018-02-16 ENCOUNTER — APPOINTMENT (OUTPATIENT)
Dept: CT IMAGING | Facility: CLINIC | Age: 53
DRG: 885 | End: 2018-02-16
Attending: EMERGENCY MEDICINE
Payer: MEDICARE

## 2018-02-16 DIAGNOSIS — R46.89 DISORGANIZED BEHAVIOR: ICD-10-CM

## 2018-02-16 DIAGNOSIS — J45.20 MILD INTERMITTENT ASTHMA WITHOUT COMPLICATION: Primary | ICD-10-CM

## 2018-02-16 DIAGNOSIS — F30.10 MANIC BEHAVIOR (H): ICD-10-CM

## 2018-02-16 PROBLEM — F29 PSYCHOSIS (H): Status: ACTIVE | Noted: 2018-02-16

## 2018-02-16 LAB
AMPHETAMINES UR QL SCN: NEGATIVE
ANION GAP SERPL CALCULATED.3IONS-SCNC: 9 MMOL/L (ref 3–14)
APAP SERPL-MCNC: <2 MG/L (ref 10–20)
BARBITURATES UR QL: NEGATIVE
BASOPHILS # BLD AUTO: 0 10E9/L (ref 0–0.2)
BASOPHILS NFR BLD AUTO: 0.1 %
BENZODIAZ UR QL: NEGATIVE
BUN SERPL-MCNC: 10 MG/DL (ref 7–30)
CALCIUM SERPL-MCNC: 9.4 MG/DL (ref 8.5–10.1)
CANNABINOIDS UR QL SCN: NEGATIVE
CHLORIDE SERPL-SCNC: 103 MMOL/L (ref 94–109)
CO2 SERPL-SCNC: 27 MMOL/L (ref 20–32)
COCAINE UR QL: NEGATIVE
CREAT SERPL-MCNC: 0.56 MG/DL (ref 0.52–1.04)
DIFFERENTIAL METHOD BLD: NORMAL
EOSINOPHIL # BLD AUTO: 0 10E9/L (ref 0–0.7)
EOSINOPHIL NFR BLD AUTO: 0.4 %
ERYTHROCYTE [DISTWIDTH] IN BLOOD BY AUTOMATED COUNT: 14.1 % (ref 10–15)
ETHANOL SERPL-MCNC: <0.01 G/DL
GFR SERPL CREATININE-BSD FRML MDRD: >90 ML/MIN/1.7M2
GLUCOSE SERPL-MCNC: 111 MG/DL (ref 70–99)
HCT VFR BLD AUTO: 40.3 % (ref 35–47)
HGB BLD-MCNC: 14.1 G/DL (ref 11.7–15.7)
IMM GRANULOCYTES # BLD: 0 10E9/L (ref 0–0.4)
IMM GRANULOCYTES NFR BLD: 0.1 %
LYMPHOCYTES # BLD AUTO: 1.8 10E9/L (ref 0.8–5.3)
LYMPHOCYTES NFR BLD AUTO: 18.6 %
MAGNESIUM SERPL-MCNC: 2.3 MG/DL (ref 1.6–2.3)
MCH RBC QN AUTO: 30.1 PG (ref 26.5–33)
MCHC RBC AUTO-ENTMCNC: 35 G/DL (ref 31.5–36.5)
MCV RBC AUTO: 86 FL (ref 78–100)
MONOCYTES # BLD AUTO: 0.8 10E9/L (ref 0–1.3)
MONOCYTES NFR BLD AUTO: 7.9 %
NEUTROPHILS # BLD AUTO: 6.9 10E9/L (ref 1.6–8.3)
NEUTROPHILS NFR BLD AUTO: 72.9 %
NRBC # BLD AUTO: 0 10*3/UL
NRBC BLD AUTO-RTO: 0 /100
OPIATES UR QL SCN: NEGATIVE
PCP UR QL SCN: NEGATIVE
PLATELET # BLD AUTO: 359 10E9/L (ref 150–450)
POTASSIUM SERPL-SCNC: 3.1 MMOL/L (ref 3.4–5.3)
RBC # BLD AUTO: 4.69 10E12/L (ref 3.8–5.2)
SODIUM SERPL-SCNC: 139 MMOL/L (ref 133–144)
TROPONIN I SERPL-MCNC: <0.015 UG/L (ref 0–0.04)
TSH SERPL DL<=0.005 MIU/L-ACNC: 1.54 MU/L (ref 0.4–4)
WBC # BLD AUTO: 9.5 10E9/L (ref 4–11)

## 2018-02-16 PROCEDURE — 84484 ASSAY OF TROPONIN QUANT: CPT | Performed by: EMERGENCY MEDICINE

## 2018-02-16 PROCEDURE — 96372 THER/PROPH/DIAG INJ SC/IM: CPT

## 2018-02-16 PROCEDURE — 99285 EMERGENCY DEPT VISIT HI MDM: CPT | Mod: 25

## 2018-02-16 PROCEDURE — 25000132 ZZH RX MED GY IP 250 OP 250 PS 637: Mod: GY | Performed by: EMERGENCY MEDICINE

## 2018-02-16 PROCEDURE — 80048 BASIC METABOLIC PNL TOTAL CA: CPT | Performed by: EMERGENCY MEDICINE

## 2018-02-16 PROCEDURE — 12400006 ZZH R&B MH INTERMEDIATE

## 2018-02-16 PROCEDURE — 84443 ASSAY THYROID STIM HORMONE: CPT | Performed by: EMERGENCY MEDICINE

## 2018-02-16 PROCEDURE — 85025 COMPLETE CBC W/AUTO DIFF WBC: CPT | Performed by: EMERGENCY MEDICINE

## 2018-02-16 PROCEDURE — 70450 CT HEAD/BRAIN W/O DYE: CPT

## 2018-02-16 PROCEDURE — A9270 NON-COVERED ITEM OR SERVICE: HCPCS | Mod: GY | Performed by: EMERGENCY MEDICINE

## 2018-02-16 PROCEDURE — 83735 ASSAY OF MAGNESIUM: CPT | Performed by: EMERGENCY MEDICINE

## 2018-02-16 PROCEDURE — 25000125 ZZHC RX 250: Performed by: EMERGENCY MEDICINE

## 2018-02-16 PROCEDURE — 80329 ANALGESICS NON-OPIOID 1 OR 2: CPT | Performed by: EMERGENCY MEDICINE

## 2018-02-16 PROCEDURE — 90791 PSYCH DIAGNOSTIC EVALUATION: CPT

## 2018-02-16 PROCEDURE — 80320 DRUG SCREEN QUANTALCOHOLS: CPT | Performed by: EMERGENCY MEDICINE

## 2018-02-16 PROCEDURE — 80307 DRUG TEST PRSMV CHEM ANLYZR: CPT | Performed by: EMERGENCY MEDICINE

## 2018-02-16 RX ORDER — OLANZAPINE 10 MG/1
10 TABLET, ORALLY DISINTEGRATING ORAL EVERY 4 HOURS PRN
Status: DISCONTINUED | OUTPATIENT
Start: 2018-02-16 | End: 2018-02-26 | Stop reason: HOSPADM

## 2018-02-16 RX ORDER — OLANZAPINE 10 MG/1
10 TABLET, ORALLY DISINTEGRATING ORAL AT BEDTIME
Status: DISCONTINUED | OUTPATIENT
Start: 2018-02-16 | End: 2018-02-16

## 2018-02-16 RX ORDER — WATER 10 ML/10ML
INJECTION INTRAMUSCULAR; INTRAVENOUS; SUBCUTANEOUS
Status: DISCONTINUED
Start: 2018-02-16 | End: 2018-02-16 | Stop reason: HOSPADM

## 2018-02-16 RX ORDER — OLANZAPINE 10 MG/2ML
10 INJECTION, POWDER, FOR SOLUTION INTRAMUSCULAR EVERY 4 HOURS PRN
Status: DISCONTINUED | OUTPATIENT
Start: 2018-02-16 | End: 2018-02-26 | Stop reason: HOSPADM

## 2018-02-16 RX ORDER — LORAZEPAM 2 MG/ML
1 INJECTION INTRAMUSCULAR ONCE
Status: DISCONTINUED | OUTPATIENT
Start: 2018-02-16 | End: 2018-02-16

## 2018-02-16 RX ORDER — OLANZAPINE 10 MG/2ML
10 INJECTION, POWDER, FOR SOLUTION INTRAMUSCULAR ONCE
Status: COMPLETED | OUTPATIENT
Start: 2018-02-16 | End: 2018-02-16

## 2018-02-16 RX ORDER — HYDROXYZINE HYDROCHLORIDE 25 MG/1
25 TABLET, FILM COATED ORAL EVERY 4 HOURS PRN
Status: DISCONTINUED | OUTPATIENT
Start: 2018-02-16 | End: 2018-02-26 | Stop reason: HOSPADM

## 2018-02-16 RX ORDER — OLANZAPINE 10 MG/1
10 TABLET, ORALLY DISINTEGRATING ORAL ONCE
Status: COMPLETED | OUTPATIENT
Start: 2018-02-16 | End: 2018-02-16

## 2018-02-16 RX ORDER — HALOPERIDOL 5 MG/ML
5 INJECTION INTRAMUSCULAR ONCE
Status: DISCONTINUED | OUTPATIENT
Start: 2018-02-16 | End: 2018-02-16

## 2018-02-16 RX ADMIN — OLANZAPINE 10 MG: 10 TABLET, ORALLY DISINTEGRATING ORAL at 14:54

## 2018-02-16 RX ADMIN — OLANZAPINE 10 MG: 10 INJECTION, POWDER, FOR SOLUTION INTRAMUSCULAR at 05:12

## 2018-02-16 NOTE — ED NOTES
"ED course:  Patient received as a handoff from my partner Dr. Morrow.  On face-to-face evaluation patient remains manic and is singing \"purple rain\" and having racing/grandiose thoughts and pressured speech.  Evaluated by DEC. Placed on 72 hour hold.  Provided additional Zyprexa for manic behavior.  Will be admitted to [facility] unit.    Impression:    ICD-10-CM    1. Disorganized behavior R46.89 Basic metabolic panel     Drug abuse screen urine     Alcohol level blood     Acetaminophen level     Troponin I     Magnesium   2. Manic behavior (H) F30.10        Disposition:  Admit to inpatient psychiatry     [place],   02/16/18 1513    "

## 2018-02-16 NOTE — ED NOTES
Release of information completed and signed by patient to allow telephone and other communication to her mother and step-father.

## 2018-02-16 NOTE — ED NOTES
Bed: [facility]  Expected date:   Expected time:   Means of arrival:   Comments:  Triage

## 2018-02-16 NOTE — PLAN OF CARE
Problem: General Plan of Care (Inpatient Behavioral)  Goal: Individualization/Patient Specific Goal (IP Behavioral)  The patient and/or their representative will achieve their patient-specific goals related to the plan of care.    The patient-specific goals include:  1. Orientate self to place, date, and time.  2. Share with staff if feeling anxious  3. Work on developing some coping skills  4. Put new coping skill in to practice and demonstrate use  5. Attend some unit programing  6. Participate in unit unit programing  7. Compliant with ordered medications     Comments: Pt was initially withdrawn and made poor eye contact with staff but then she became very agitated and started to scream at staff.  She was making paranoid comments about the medications that ED staff was attempting to offer her,  there is a tracker in the med, I saw that shit on TV .   She ultimately compliant with staff and took PO pill after first saying  Just get the shot .  After giving her 10 min to calm she was then apologetic and cooperative with staff.  She was able to complete the unit 77 admission assessment to the best of her ability. Pt reports 20+ MH inpatient stays in the last 25years, she also reported to staff she was committed at one point but would not share more.  Patient also reports she has not been taking meds for the last 12 months or more.   Once on the unit she was calm and her 72hr hold papers were with patient, education was provided in the ED about her hold.  She was orientated to the unit and all questions answered and she was content resting in bed.  During visiting hours she did have visitors they looked to get along well and there were not behavioral outburst.

## 2018-02-16 NOTE — ED NOTES
Patient placed on 72 hour hold by Dr. Mills. Patient received a copy of her patient rights at this time,  witnessed by 2 RNs Faye and Shanae.

## 2018-02-16 NOTE — ED NOTES
Pt woke up when Mental health RN came to talk to her and was very agitated. MD notified and zyprexa ordered

## 2018-02-16 NOTE — ED NOTES
"Writer visualized patient walking into the bathroom with her blanket,  Writer approached patient and asked her to relinquish the blanket while in the bathroom.  Patient stated to writer standing at the threshold of the bathroom, \"Dismissed, Dismissed, you are dismissed.\"  Writer then entered bathroom and removed the blanket from the sink and put it on the couch in the commons area.  Pt verbalized anger at the action writer took however writer was able to give emotional support and patient calmed down  "

## 2018-02-16 NOTE — ED NOTES
"Went to give pt. Medications and she says \"I don't know what you have to give me, but I'm allergic to haldol\".    "

## 2018-02-16 NOTE — PROGRESS NOTES
02/16/18 1707   Patient Belongings   Did you bring any home meds/supplements to the hospital?  No   Disposition of Belongings Other (see comment)   Belongings Search Yes   Clothing Search Yes   Second Staff Lexy Peguero  Sweatpants  Sweater  Underwear    Admission:  I am responsible for any personal items that are not sent to the safe or pharmacy. [facility] is not responsible for loss, theft or damage of any property in my possession.        Patient Signature: ___________________________________________      Date/Time:__________________________     Staff Signature: __________________________________      Date/Time:__________________________     2nd Staff person, if patient is unable/unwilling to sign:      __________________________________________________________      Date/Time: __________________________        Discharge:  [facility] has returned all of my personal belongings:     Patient Signature: ________________________________________     Date/Time: ____________________________________     Staff Signature: ______________________________________     Date/Time:_____________________________________

## 2018-02-16 NOTE — ED NOTES
Pt. Did give us a name that happened to be an alias, and birth date, so registration was able to figure out her real name.

## 2018-02-16 NOTE — PROGRESS NOTES
Welcome packet reviewed with patient. Information reviewed includes getting emergency help, preventing infections, understanding your care, using medication safely, reducing falls, preventing pressure ulcers, smoking cessation, powerful choices and Patients Bill of Rights. Pt. given tour of the unit and instruction on use of facility including emergency call light. Program schedule reviewed with patient. Questions regarding the unit addressed. Pt. Search completed and belongings inventoried.        Nursing assessment complete including patient and medication profiles. Risk assessments completed addressing suicide,fall,skin,nutrition and safety issues. Care plan initiated. Assessments reviewed with physician and admit orders received.

## 2018-02-16 NOTE — TELEPHONE ENCOUNTER
S:  Pt seen in the [facility] ED due to manic sx and  Psychosis.    B:  All info per Joann [facility] DEC  :  Pt seen in the ED initially saying she was having a heart attack.  She had knocked on a stranger's window stating she was having a heart attack and requested a ride to the ED.  In the ED she is medically stable but quite manic, requiring meds to help calm her down.  She is very hyper verbal, tangential, flight of idea, very disorganized.  She is talking about satan, jenelle and Booth Moon.  She appears to be having visual hallucinations.  No aggression.  Poor historian.  Pt cooperative but yelling at one point.  Calmed down.  Mom confirmed hx of schizoaffective, bipolar.  States she's probably been off her meds for about 1 yr.  Pt was unclear about her ID upon admission... She has an older MRN of [id number].  Charts will be combined after DC.  Please see chart for further info.    A:  Needing hospitalization for safety and stabilization.    R:  Admit to [facility]    (accepted by  )       HOLD for emergency admit.    -  Andreina, Dania, informed 1250  -  ED, DEC Joann informed 12:55    Awaiting DC.   To check with unit around 2:30

## 2018-02-16 NOTE — ED NOTES
PT vitals were taken with the exception of temperature.  When asked to get a temperature, PT refused.

## 2018-02-16 NOTE — IP AVS SNAPSHOT
MRN:[id number]                      After Visit Summary   2/16/2018    Fannie Jeter    MRN: [id number]           Thank you!     Thank you for choosing [facility] for your care. Our goal is always to provide you with excellent care.        Patient Information     Date Of Birth          1965        Designated Caregiver       Most Recent Value    Caregiver    Will someone help with your care after discharge? no      About your hospital stay     You were admitted on:  February 16, 2018 You last received care in the:  [facility]    You were discharged on:  February 26, 2018       Who to Call     For medical emergencies, please call 911.  For non-urgent questions about your medical care, please call your primary care provider or clinic, [phone]          Attending Provider     Provider Specialty    Shari Morrow MD Emergency Medicine    Gene, Wilton Patricia DO Emergency Medicine    Nicole Lujan MD Psychiatry       Primary Care Provider Office Phone # Fax #    [facility] [phone]      Further instructions from your care team       Behavioral Discharge Planning and Instructions    Main Diagnosis: Unspecified bipolar and related disorder, rule out bipolar disorder type 1, current episode manic with psychotic features versus schizoaffective disorder, bipolar type, current episode manic with psychotic features.      Psychiatry Follow-up:     [facility]  [address]  [phone]  Fax [phone]  Intake appointment for Intensive Outpatient Program with Rola Ribeiro on Tuesday, February 27 at 1:30 PM, Please bring completed assessment packet with you to this appointment                       With PARISH Mckenzie, on Wednesday, March 7 at 3:30 PM    Resources:   Crisis Intervention: [phone] or [phone] (TTY: [phone]).  Call anytime for  "help.  National [place] on Mental Illness (www.[place].rula.org): 568.264.8094 or 198-936-8086.  Alcoholics Anonymous (www.alcoholics-anonymous.org): Check your phone book for your local chapter.  Suicide Awareness Voices of Education (SAVE) (www.save.org): 032-063-LXRV (0497)  National Suicide Prevention Line (www.mentalhealthmn.org): 365-359-IZTN (2328)  Mental Health Consumer/Survivor Network of [place] (www.mhcsn.net): 195.319.5817 or 152-274-5267  Mental Health Association of [place] (www.mentalhealth.org): 641.123.5220 or 213-066-4457    General Medication Instructions:   See your medication sheet(s) for instructions.   Take all medicines as directed.  Make no changes unless your doctor suggests them.   Go to all your doctor visits.  Be sure to have all your required lab tests. This way, your medicines can be refilled on time.  Do not use any drugs not prescribed by your doctor.  Avoid alcohol.      Pending Results     No orders found from 2/14/2018 to 2/17/2018.            Statement of Approval     Ordered          02/26/18 0929  I have reviewed and agree with all the recommendations and orders detailed in this document.  EFFECTIVE NOW     Approved and electronically signed by:  Nicole Lujan MD             Admission Information     Date & Time Provider Department Dept. Phone    2/16/2018 Nicole Lujan MD [facility] [phone]      Your Vitals Were     Blood Pressure Pulse Temperature Respirations Height Weight    129/77 100 99.8  F (37.7  C) (Oral) 16 1.676 m (5' 6\") 87.2 kg (192 lb 3.2 oz)    Pulse Oximetry BMI (Body Mass Index)                95% 31.02 kg/m2          MyChart Information     [employer] lets you send messages to your doctor, view your test results, renew your prescriptions, schedule appointments and more. To sign up, go to [email or web address] . Click on \"Log in\" on the left side of the screen, which will take you to the Welcome page. Then click on " "\"Sign up Now\" on the right side of the page.     You will be asked to enter the access code listed below, as well as some personal information. Please follow the directions to create your username and password.     Your access code is: [email or web address]  Expires: 2018  2:12 PM     Your access code will  in 90 days. If you need help or a new code, please call your [facility] clinic or [phone].        Care EveryWhere ID     This is your Care EveryWhere ID. This could be used by other organizations to access your [facility] medical records  [id number]        Equal Access to Services     [facility]: Al Richter, whit west, dewayne mera, nelsy robledo . So [facility] [phone].    ATENCIÓN: Si habla español, tiene a lucas disposición servicios gratuitos de asistencia lingüística. Llame al [phone].    We comply with applicable federal civil rights laws and [place] laws. We do not discriminate on the basis of race, color, national origin, age, disability, sex, sexual orientation, or gender identity.               Review of your medicines      START taking        Dose / Directions    albuterol 108 (90 BASE) MCG/ACT Inhaler   Commonly known as:  PROAIR HFA/PROVENTIL HFA/VENTOLIN HFA   Used for:  Mild intermittent asthma without complication        Dose:  2 puff   Inhale 2 puffs into the lungs 4 times daily as needed for other (dyspnea)   Quantity:  1 Inhaler   Refills:  0       divalproex sodium extended-release 500 MG 24 hr tablet   Commonly known as:  DEPAKOTE ER        Dose:  1000 mg   Take 2 tablets (1,000 mg) by mouth At Bedtime   Quantity:  60 tablet   Refills:  0       LORazepam 0.5 MG tablet   Commonly known as:  ATIVAN        Dose:  1 mg   Take 2 tablets (1 mg) by mouth 3 times daily as needed for anxiety (Agitation, or insomnia)   Quantity:  30 tablet   Refills:  0       OLANZapine 20 MG tablet   Commonly known as:  zyPREXA        Dose:  20 " mg   Take 1 tablet (20 mg) by mouth At Bedtime   Quantity:  30 tablet   Refills:  0            Where to get your medicines      These medications were sent to [facility] [place], [place] - [address]  [address] [place] [address]     Phone:  [phone]     albuterol 108 (90 BASE) MCG/ACT Inhaler    divalproex sodium extended-release 500 MG 24 hr tablet    OLANZapine 20 MG tablet         Some of these will need a paper prescription and others can be bought over the counter. Ask your nurse if you have questions.     Bring a paper prescription for each of these medications     LORazepam 0.5 MG tablet                Protect others around you: Learn how to safely use, store and throw away your medicines at www.disposemymeds.org.             Medication List: This is a list of all your medications and when to take them. Check marks below indicate your daily home schedule. Keep this list as a reference.      Medications           Morning Afternoon Evening Bedtime As Needed    albuterol 108 (90 BASE) MCG/ACT Inhaler   Commonly known as:  PROAIR HFA/PROVENTIL HFA/VENTOLIN HFA   Inhale 2 puffs into the lungs 4 times daily as needed for other (dyspnea)   Last time this was given:  2 puffs on 2/18/2018  1:19 PM                                   divalproex sodium extended-release 500 MG 24 hr tablet   Commonly known as:  DEPAKOTE ER   Take 2 tablets (1,000 mg) by mouth At Bedtime   Last time this was given:  500 mg on 2/26/2018  7:49 AM                                   LORazepam 0.5 MG tablet   Commonly known as:  ATIVAN   Take 2 tablets (1 mg) by mouth 3 times daily as needed for anxiety (Agitation, or insomnia)   Last time this was given:  1 mg on 2/18/2018  1:41 AM                                   OLANZapine 20 MG tablet   Commonly known as:  zyPREXA   Take 1 tablet (20 mg) by mouth At Bedtime

## 2018-02-16 NOTE — ED NOTES
PT requested water.  PT claimed they were pregnant and began to cry.  PT was told that some water would be brought to them.  Upon returning with the water, PT didn't acknowledge.

## 2018-02-16 NOTE — ED NOTES
Pt. Sitting in hallway of common area in behavioral health by exit door.  When asked to walk back to room she does not comply.

## 2018-02-16 NOTE — ED PROVIDER NOTES
"  History     Chief Complaint:  Manic Behavior    HPI   The patient presents to the ED with manic behavior and is unable to participate in the history secondary to her chief complaint.    Lilly Sewell is a 52 year old female who presents with manic behavior. The patient was dropped off by a stranger at the ED after she knocked on his car window and said she was having a heart attack, so he drove her herre and dropped her off. She presented to the  hyperventilating and pulling at her mouth with her fingers. She is unable to answer questions and provide her name or birth date on examination. She states having a concern for cardiac arrest because she had a tickle but is otherwise speaking nonsensically. She began tapping on the walls with her fingers and screaming at her reflection in the window.    Allergies:  No known drug allergies      Medications:    The patient is currently on no known regular medications.      Past Medical History:    No known past medical history    Past Surgical History:    History reviewed. No pertinent surgical history.     Family History:    History review. No contributing family history.     Social History:  PCP: No primary care provider on file.   Patient presents alone     Review of Systems   Unable to perform ROS: Mental status change     Physical Exam     Patient Vitals for the past 24 hrs:   BP Heart Rate Resp SpO2 Height Weight   02/16/18 0535 133/70 85 16 95 % 1.676 m (5' 6\") 92.5 kg (204 lb)      Physical Exam  Physical Exam   General:  Sitting on window sill  Head:  No obvious trauma to head  Ears, Nose:  External ears normal.  Nose normal.   Eyes:  Conjunctivae clear.  Pupils round and symmetry.    Neck:  Normal range of motion. Neck supple and symmetric.    Pulm/Chest:  No respiratory distress.  Breathing comfortably.    CV: Regular rate and rhythm.    MSK:  Normal range of motion.   Neuro:  Alert. Moving all extremities.    Skin:  Skin is warm and dry.    Psych: Labile " affect, depressed appearing mood.  Patient is very erratic and disorganized.  She has some Islam fixation.  Unable to determine if having suicidal or homicidal ideation but does appear to be responding to stimuli    Emergency Department Course     Imaging:  Radiographic findings were communicated with the patient who voiced understanding of the findings.    CT-scan Head w/o contrast:  pending  Result per radiology.      Laboratory:  Magnesium: pending  Troponin: pending  CBC: pending  BMP: pending  Alcohol level: pending  Acetaminophen: pending    Drug abuse: pending    Interventions:  0512: Zyprexa 10 mg IM    Emergency Department Course:  Past medical records, nursing notes, and vitals reviewed.  0423: I performed an exam of the patient and obtained history, as documented above. GCS 15.   IV inserted and blood drawn.   The patient was taken for CT, see imaging results above.   0710: I signed the patient out to my colleague, Dr. Mills, for further care and management.  The patient will be evaluated by DEC.    Impression & Plan      Medical Decision Making:  Fannie Jeter is a 52 year old female presenting with erratic behavior. Vital signs unremarkable. Broad differential diagnosis includes but not limited to ingestion, alcohol abuse, mental illness, etc. Overall, patient is very difficult historian. She is very erratic. In reviewing her chart once we were able to obtain her name, it appears she is quite eccentric in her normal life. There is no known mental health disorder but she is on some mental health medications including Klonopin and ritalin. She also has a known allergy to Haldol. In review of her records, though there is no known diagnoses of mental health. Tonight, she presents quite erratic, talking about Satan, Stoney, screaming at a window. She appears to be a threat to self and others. I fear she is unable to keep herself safe. Decision was made to medicte her with 10 mg Zyprexa. She did not  take this willingly but did not put up much of a fight either. After the medication, she was able to sleep somewhat soundly. EKG was obtained given her erratic behavior and the vague chest discomfort disclosure. It shows normal sinus rhythm, no acute ST wave changes, there are some new flipped non specific T waves in aVL in V2. Her QVC is mildly prolonged as well and we will avoid any further QTC prolonging agents. Otherwise, there are no acute changes in QRS morphology. Nothing to suggest ACS at this time. Labs were added on to further investigate her behaviors and odd chest discomfort that she stated to a stranger. Patient is otherwise ambulatory in the ED, there seems to be no evidence of trauma on examination. Attempts were made to get better history, but unable to. After the medication, patient will need DEC assessment. If this is a new onset of mental illness, patient will likely need some medical evaluation, therefore labs and imaging was obtained as well. Patient is otherwise well appearing and nontoxic. Patient was signed out to my partner Dr. Bower at 0700 pending DEC assessment and lab follow up. Patient was improved under my care.    Diagnosis:    ICD-10-CM    1. Disorganized behavior R46.89        Disposition:  Signed out to Dr. Cheli Read  2/16/2018    EMERGENCY DEPARTMENT  I, Aparna Read, am serving as a scribe at 5:02 AM on 2/16/2018 to document services personally performed by Shari Morrow MD based on my observations and the provider's statements to me.        Shari Morrow MD  02/16/18 1404       Shari Morrow MD  02/28/18 0040

## 2018-02-16 NOTE — ED NOTES
Pt. Apparently arrived at triage desk hyperventilating and pulling at mouth with fingers.  Would not answer questions.  Brought back to behavioral area in wheelchair.  She has had episodes of staring into space and at staff along with hollering out.  When asked questions she answers with ramblings not related to questioning.  She has made references to Satan, Trtom, Booth Moon, and Jasiel in her ranting.  She also uses many different sounding voices when speaking.  No ID in belongings and pt. Not giving her name and birth date when asked.

## 2018-02-16 NOTE — IP AVS SNAPSHOT
[place] [address] [place] [place] [address]    Phone:  [phone]                                       After Visit Summary   2/16/2018    Fannie Jeter    MRN: [id number]           After Visit Summary Signature Page     I have received my discharge instructions, and my questions have been answered. I have discussed any challenges I see with this plan with the nurse or doctor.    ..........................................................................................................................................  Patient/Patient Representative Signature      ..........................................................................................................................................  Patient Representative Print Name and Relationship to Patient    ..................................................               ................................................  Date                                            Time    ..........................................................................................................................................  Reviewed by Signature/Title    ...................................................              ..............................................  Date                                                            Time

## 2018-02-17 LAB
ANION GAP SERPL CALCULATED.3IONS-SCNC: 7 MMOL/L (ref 3–14)
BUN SERPL-MCNC: 6 MG/DL (ref 7–30)
CALCIUM SERPL-MCNC: 9.3 MG/DL (ref 8.5–10.1)
CHLORIDE SERPL-SCNC: 105 MMOL/L (ref 94–109)
CO2 SERPL-SCNC: 28 MMOL/L (ref 20–32)
CREAT SERPL-MCNC: 0.52 MG/DL (ref 0.52–1.04)
GFR SERPL CREATININE-BSD FRML MDRD: >90 ML/MIN/1.7M2
GLUCOSE SERPL-MCNC: 91 MG/DL (ref 70–99)
POTASSIUM SERPL-SCNC: 4 MMOL/L (ref 3.4–5.3)
SODIUM SERPL-SCNC: 140 MMOL/L (ref 133–144)

## 2018-02-17 PROCEDURE — A9270 NON-COVERED ITEM OR SERVICE: HCPCS | Mod: GY | Performed by: PSYCHIATRY & NEUROLOGY

## 2018-02-17 PROCEDURE — A9270 NON-COVERED ITEM OR SERVICE: HCPCS | Mod: GY | Performed by: INTERNAL MEDICINE

## 2018-02-17 PROCEDURE — 25000132 ZZH RX MED GY IP 250 OP 250 PS 637: Mod: GY | Performed by: PSYCHIATRY & NEUROLOGY

## 2018-02-17 PROCEDURE — 12400006 ZZH R&B MH INTERMEDIATE

## 2018-02-17 PROCEDURE — 36415 COLL VENOUS BLD VENIPUNCTURE: CPT | Performed by: PSYCHIATRY & NEUROLOGY

## 2018-02-17 PROCEDURE — 80048 BASIC METABOLIC PNL TOTAL CA: CPT | Performed by: PSYCHIATRY & NEUROLOGY

## 2018-02-17 PROCEDURE — 25000132 ZZH RX MED GY IP 250 OP 250 PS 637: Mod: GY | Performed by: INTERNAL MEDICINE

## 2018-02-17 RX ORDER — LORAZEPAM 1 MG/1
1 TABLET ORAL EVERY 4 HOURS PRN
Status: DISCONTINUED | OUTPATIENT
Start: 2018-02-17 | End: 2018-02-26 | Stop reason: HOSPADM

## 2018-02-17 RX ORDER — POTASSIUM CHLORIDE 1500 MG/1
40 TABLET, EXTENDED RELEASE ORAL ONCE
Status: COMPLETED | OUTPATIENT
Start: 2018-02-17 | End: 2018-02-17

## 2018-02-17 RX ORDER — OLANZAPINE 15 MG/1
15 TABLET, ORALLY DISINTEGRATING ORAL AT BEDTIME
Status: DISCONTINUED | OUTPATIENT
Start: 2018-02-17 | End: 2018-02-18

## 2018-02-17 RX ADMIN — POTASSIUM CHLORIDE 40 MEQ: 1500 TABLET, EXTENDED RELEASE ORAL at 05:02

## 2018-02-17 RX ADMIN — HYDROXYZINE HYDROCHLORIDE 25 MG: 25 TABLET ORAL at 05:02

## 2018-02-17 RX ADMIN — OLANZAPINE 10 MG: 10 TABLET, ORALLY DISINTEGRATING ORAL at 05:02

## 2018-02-17 RX ADMIN — HYDROXYZINE HYDROCHLORIDE 25 MG: 25 TABLET ORAL at 01:06

## 2018-02-17 RX ADMIN — OLANZAPINE 15 MG: 15 TABLET, ORALLY DISINTEGRATING ORAL at 21:49

## 2018-02-17 RX ADMIN — OLANZAPINE 10 MG: 10 TABLET, ORALLY DISINTEGRATING ORAL at 12:20

## 2018-02-17 RX ADMIN — OLANZAPINE 10 MG: 10 TABLET, ORALLY DISINTEGRATING ORAL at 01:06

## 2018-02-17 ASSESSMENT — ACTIVITIES OF DAILY LIVING (ADL)
LAUNDRY: WITH SUPERVISION
DRESS: STREET CLOTHES
GROOMING: INDEPENDENT;SHOWER
ORAL_HYGIENE: INDEPENDENT

## 2018-02-17 NOTE — PROGRESS NOTES
"[facility]  Hospitalist Progress Note  Jose Xiao MD 02/17/2018    Reason for Stay (Diagnosis): psychosis          Assessment and Plan:      Summary of Stay: Fannie Jeter is a 52 year old female admitted on 2/16/2018 with acute psycosis, cyndi     Problem List:   1. Presented with reported chest pain and acute cyndi .  Lab work is normal, except for hypokalemia, that has been corrected.   Admitted to psychiatry     No ongoing medical problems   Hospitalist will sign off         Interval History (Subjective):      No reported chest pain today                   Physical Exam:      Last Vital Signs:  /74  Temp 99  F (37.2  C) (Oral)  Resp 16  Ht 1.676 m (5' 6\")  Wt 87.2 kg (192 lb 3.2 oz)  SpO2 96%  BMI 31.02 kg/m2    No intake or output data in the 24 hours ending 02/17/18 1343    Constitutional: Awake, alert, cooperative, no apparent distress   Respiratory: Clear to auscultation bilaterally, no crackles or wheezing   Cardiovascular: Regular rate and rhythm, normal S1 and S2, and no murmur noted   Abdomen: Normal bowel sounds, soft, non-distended, non-tender   Skin: No rashes, no cyanosis, dry to touch   Neuro: Alert and oriented x3, no weakness, numbness, memory loss   Extremities: No edema, normal range of motion   Other(s):        All other systems: Negative          Medications:      All current medications were reviewed with changes reflected in problem list.         Data:      All new lab and imaging data was reviewed.   Labs:    Recent Labs  Lab 02/16/18  0739   WBC 9.5   HGB 14.1   HCT 40.3   MCV 86          Recent Labs  Lab 02/17/18  0935 02/16/18  0739    139   POTASSIUM 4.0 3.1*   CHLORIDE 105 103   CO2 28 27   ANIONGAP 7 9   GLC 91 111*   BUN 6* 10   CR 0.52 0.56   GFRESTIMATED >90 >90   GFRESTBLACK >90 >90   SARAY 9.3 9.4   MAG  --  2.3       Recent Labs  Lab 02/16/18  0739   TROPI <0.015      Imaging:   No results found for this or any previous visit " (from the past 24 hour(s)).

## 2018-02-17 NOTE — PLAN OF CARE
"Problem: General Plan of Care (Inpatient Behavioral)  Goal: Individualization/Patient Specific Goal (IP Behavioral)  The patient and/or their representative will achieve their patient-specific goals related to the plan of care.    The patient-specific goals include:     1.Provide safe and secure environment.  2.Encourage group attendance and milieu therapy.  3. Monitor medication compliance and adverse reactions.  4. Involve Pt in discharge planning.  5. Assess need for PRN medications.   6. Adherence to Dr's recommendations.   Pt did not sleep throughout the night. Mood is labile and thoughts are scattered and disorganized. One minute, she's listening to music and singing and another, she's crying often times very loudly. When writer checked on her following period of screaming, Pt states: \"I'm a big fan of late , I've not been able to function since he .\" Pt was visibly tearful following which Writer provided Kleenex to wipe her tears and Pt thanked Writer. PRN medications of Zyprexa and Vistaril given at 0100 and 0500 to help Pt calm down but little did the medications do to relieve Pt's symptoms. One time dose of Potassium ordered due to Low K of 3.1 was given orally and Pt was cooperative with taking meds. Will continue to monitor and document Pt's disorganized thought process.      "

## 2018-02-17 NOTE — H&P
"Admitted:     02/16/2018      TIME OF NOTE:  8:59 a.m.      CHIEF COMPLAINT:  Aixa.      HISTORY OF PRESENT ILLNESS:  Jessica Jeter is a 52-year-old woman with self-reported history of bipolar disorder, schizoaffective disorder, schizophrenia (states she has received all 3 diagnoses), who presented to the Emergency Department at [facility], reporting that she was believing that she was having a heart attack.  In the Emergency Department, she was observed to be demonstrating manic symptomatology.  After medical and psychiatric evaluation, she was approved for admission to [facility] on a 72-hour hold.  Last night, she started on Olanzapine Zydis at bedtime.      On interview this morning, Jessica is quite paranoid.  She was initially very resistant to psychiatric evaluation.  She is highly tangential, often getting off track in conversation.  She is essentially unable to fully cooperate with the psychiatric evaluation.  She does describe that she has been diagnosed with bipolar disorder, schizoaffective disorder and then later, schizophrenia.  She does not believe she has any mental illness.  She states that instead, \"I have developmental disability.\"  She denies that she has \"psychiatric\" issues.  She denies any mood issues.  She acknowledges she has not been sleeping well.  She acknowledges she has not been taking her medications.  In the EMR, it says she has not been taking her medications for perhaps the last year.  She states that her last psychiatrist was through [facility], although describes that \"she let me go\" and describes that she was let go without any diagnosis.      She denies any suicidal ideation or thoughts of harming others.  Denies any history of suicide attempts or history of being violent toward others.  Denies any anxiety.      PAST PSYCHIATRIC HISTORY:  She is unable to give a coherent psychiatric history.  She has had several " psychiatrists in the past; most recently, through [facility], per her report.  She reports that she has been on many antipsychotic medications, including typical antipsychotics and atypical antipsychotics.  She feels the best medication for her was Geodon, though cannot recall why she did not stay on it.  She has never tried Latuda.      PAST MEDICAL HISTORY:  We are unaware of any significant past medical history.  She reports that she is healthy.       In the Emergency Department, she was medically cleared after having her chest pain.  EKG had shown normal sinus rhythm, no acute ST changes with some nonspecific T waves in, aVL and V2.  She was ruled out for acute coronary syndrome.       PRIOR TO ADMISSION MEDICATIONS:  Unknown.  The patient reports she has not been taking her medications for psychiatric issues, perhaps in the last year.      REVIEW OF SYSTEMS:  Given the patient's lack of cooperation with evaluation, a full 10-point review of systems not possible at this time.  She denies current pain or chest pain or shortness of breath.  Denies any current physical issues.      SOCIAL HISTORY:  She reports that she lives in [place] alone in an apartment.  She reports that she is single.  Denies having any friends that are supportive or any close family members that are supportive.  Denies that she uses alcohol, recreational drugs or cigarettes.  She reports that she has been on disability since her 20s for mental illness.  She reports in the past, she was in a graduate program for psychology, though I was unable to follow her to determine if she ever completed it.  She is not working.        FAMILY HISTORY:  Not assessed today due to the patient's overall lack of cooperation with the psychiatric assessment.      PHYSICAL EXAMINATION:   VITAL SIGNS:  Temperature 99.0, heart rate 93, blood pressure 114/74, SpO2 96% on room air, respiratory rate 16.      MENTAL STATUS EXAMINATION:  She is dressed in hospital  scrubs, seated upright in the common area in the psychiatric hospital.  She is unwilling to go to her room for a private evaluation, stating that she had childhood trauma and is uncomfortable being alone in her room with a male.  She is highly suspicious and paranoid during the encounter.  Initially, she responded to most questions with paranoid responses.  During the course of the evaluation, she eventually settled in to some extent and was more cooperative by the completion of the interview.  She is highly tangential and thought disorganized.  She struggles to answer questions directly.  Speech is somewhat pressured, quick and voluminous.  Use of language is normal and articulate.  There are no involuntary movements appreciated.  No tremors.  Kinetics are a bit intense.  Thought content notable for absence of hallucinations or obsessions.  She has denied homicidal or suicidal ideation.  However, she has strong evidence of paranoia.  There is no fluctuation in cognition or deficits in cognitive processing noted.  Attention, concentration impaired due to her psychosis.  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  Insight is moderate, although she denied have a mental illness.  She is cooperating with his 72-hour hold evaluation and took medications last night.  Judgment poor, given her difficulty with maintaining social functioning in the midst of her current cyndi and psychosis.      ASSESSMENT:   1.  Unspecified bipolar and related disorder, rule out bipolar disorder type 1, current episode manic with psychotic features versus schizoaffective disorder, bipolar type, current episode manic with psychotic features.   2.  Formulation:  This is a 52-year-old woman with self-reported history of bipolar disorder or primary psychotic disorder, though has been off her medications for perhaps the last year and is no longer following with her  outpatient psychiatrist.  She presented in a psychotic and manic state to the Emergency Department, reporting chest pain.  She was medically cleared in the Emergency Department with no evidence of acute coronary syndrome.  She is currently admitted for stabilization of a manic episode on a 72-hour hold.  She did take Olanzapine Zydis last night, but despite that, did not sleep soundly.  Today, she is acknowledging a willingness to perhaps try Latuda as a long-term medicine for her bipolar disorder.      PLAN:   1.  For now, will optimize Zyprexa Zydis 15 mg at bedtime to try to reduce the acute cyndi at present.  Also add Ativan 1 mg q. 4 hours for agitation or insomnia in hopes of also reestablishing sleep as soon as possible.   2.  We will consider Latuda after her acute cyndi course is stabilized.  For now, again, we will focus on the Zyprexa Zydis to stabilize sleep and cyndi and agitation and psychosis, but Latuda likely will be a better long-term option for her and something that she is at least, at this point, willing to consider as a medication to remain on more long-term.   3.  The patient remains on a 72-hour hold and does meet criteria based on severity of her psychosis and cyndi with inability to care for herself at this time, putting herself in immediate danger if she were to leave the hospital.   4.  She did consent for neuroleptics today with review of risks of neuroleptics, including neuroleptic malignant syndrome and tardive dyskinesia as well as weight gain, diabetes, cholesterol changes.         AMY HENSLEY MD             D: 2018   T: 2018   MT: NTS      Name:     FLORY JAQUEZ   MRN:      [id number]        Account:      [id number]   :      1965        Admitted:     2018                   Document: [id number]       cc: Amy Hensley MD

## 2018-02-17 NOTE — PLAN OF CARE
"Problem: Manic Symptoms  Goal: Manic Symptoms  Signs and symptoms of listed problems will be absent or manageable.   Outcome: No Change  Pt was mostly calm and cooperative this evening. Declined to fill out menu, stating \"I have Aspergers which makes this very difficult to do.\" Also requested a weighted blanket as it \"helps individuals with my particular form of aspergers.\" Pt made some paranoid comments, saying she \"knows the staff is always watching her from behind the glass and it makes me anxious\" and that she \"heard a woman's voice talking to her in the radio headset and it was terrifying.\" PT had no outbursts on the unit.      "

## 2018-02-17 NOTE — PLAN OF CARE
Problem: Manic Symptoms  Goal: Manic Symptoms  Signs and symptoms of listed problems will be absent or manageable.   Outcome: Improving  Pt has been cooperative with staff and was able to make her needs known this morning.  She took interest in her personal hygiene and was looking more put togeather as the day progressed.  Speech is still rapid and jumps topic, she has spent a bulk of the day in her room or up watching some westerns in the lounge.    Around 1200 she was getting more worked up with the extra activity on the unit, she was accepting of the offered PRN medications.  This afternoon staff was able to clarify her diet allergies with family.

## 2018-02-17 NOTE — PLAN OF CARE
"Problem: Manic Symptoms  Goal: Manic Symptoms  Signs and symptoms of listed problems will be absent or manageable.   Outcome: Improving  Visible, social, pleasant and friendly. Could be hyperverbal at times when given attention and mildly tangential. No delusional or paranoid sounding talk. Relaxed and full range of affect .Was cooperative and would easily smile and engage in conversation. Stated \" I don't need to be here . I am not mentally ill but I am willing to stay until Tuesday . The food is good and it feels like a safe place to stay. \" . Chatted about various places that she has lived in the [place] , the change of seasons  That she enjoys.  visited for an hour and this went well.       "

## 2018-02-17 NOTE — PROGRESS NOTES
Writer paged hospitalist regarding Potassium which was Low at 3.1 yesterday but was not addressed. Dr Palumbo ordered Potassium 40mEq Once, will give medication as soon as it's verified by Pharmacy.

## 2018-02-18 LAB
ALBUMIN SERPL-MCNC: 4.1 G/DL (ref 3.4–5)
ALP SERPL-CCNC: 55 U/L (ref 40–150)
ALT SERPL W P-5'-P-CCNC: 35 U/L (ref 0–50)
AST SERPL W P-5'-P-CCNC: 20 U/L (ref 0–45)
BILIRUB DIRECT SERPL-MCNC: 0.1 MG/DL (ref 0–0.2)
BILIRUB SERPL-MCNC: 0.5 MG/DL (ref 0.2–1.3)
CHOLEST SERPL-MCNC: 204 MG/DL
GLUCOSE SERPL-MCNC: 167 MG/DL (ref 70–99)
HDLC SERPL-MCNC: 63 MG/DL
LDLC SERPL CALC-MCNC: 122 MG/DL
NONHDLC SERPL-MCNC: 141 MG/DL
PROT SERPL-MCNC: 7.1 G/DL (ref 6.8–8.8)
TRIGL SERPL-MCNC: 95 MG/DL

## 2018-02-18 PROCEDURE — 25000132 ZZH RX MED GY IP 250 OP 250 PS 637: Mod: GY | Performed by: PSYCHIATRY & NEUROLOGY

## 2018-02-18 PROCEDURE — A9270 NON-COVERED ITEM OR SERVICE: HCPCS | Mod: GY | Performed by: PSYCHIATRY & NEUROLOGY

## 2018-02-18 PROCEDURE — 12400006 ZZH R&B MH INTERMEDIATE

## 2018-02-18 PROCEDURE — 82947 ASSAY GLUCOSE BLOOD QUANT: CPT | Performed by: PSYCHIATRY & NEUROLOGY

## 2018-02-18 PROCEDURE — 36415 COLL VENOUS BLD VENIPUNCTURE: CPT | Performed by: PSYCHIATRY & NEUROLOGY

## 2018-02-18 PROCEDURE — 80076 HEPATIC FUNCTION PANEL: CPT | Performed by: PSYCHIATRY & NEUROLOGY

## 2018-02-18 PROCEDURE — 80061 LIPID PANEL: CPT | Performed by: PSYCHIATRY & NEUROLOGY

## 2018-02-18 RX ORDER — OLANZAPINE 10 MG/1
20 TABLET, ORALLY DISINTEGRATING ORAL AT BEDTIME
Status: DISCONTINUED | OUTPATIENT
Start: 2018-02-18 | End: 2018-02-26 | Stop reason: HOSPADM

## 2018-02-18 RX ORDER — ALBUTEROL SULFATE 90 UG/1
2 AEROSOL, METERED RESPIRATORY (INHALATION) 4 TIMES DAILY PRN
Status: DISCONTINUED | OUTPATIENT
Start: 2018-02-18 | End: 2018-02-26 | Stop reason: HOSPADM

## 2018-02-18 RX ORDER — DIVALPROEX SODIUM 500 MG/1
500 TABLET, EXTENDED RELEASE ORAL
Status: DISCONTINUED | OUTPATIENT
Start: 2018-02-18 | End: 2018-02-26 | Stop reason: HOSPADM

## 2018-02-18 RX ADMIN — OLANZAPINE 10 MG: 10 TABLET, ORALLY DISINTEGRATING ORAL at 00:48

## 2018-02-18 RX ADMIN — HYDROXYZINE HYDROCHLORIDE 25 MG: 25 TABLET ORAL at 00:39

## 2018-02-18 RX ADMIN — DIVALPROEX SODIUM 500 MG: 500 TABLET, EXTENDED RELEASE ORAL at 19:15

## 2018-02-18 RX ADMIN — ALBUTEROL SULFATE 2 PUFF: 90 AEROSOL, METERED RESPIRATORY (INHALATION) at 13:19

## 2018-02-18 RX ADMIN — LORAZEPAM 1 MG: 1 TABLET ORAL at 01:41

## 2018-02-18 RX ADMIN — OLANZAPINE 20 MG: 10 TABLET, ORALLY DISINTEGRATING ORAL at 21:34

## 2018-02-18 RX ADMIN — DIVALPROEX SODIUM 500 MG: 500 TABLET, EXTENDED RELEASE ORAL at 10:00

## 2018-02-18 ASSESSMENT — ACTIVITIES OF DAILY LIVING (ADL)
DRESS: INDEPENDENT
GROOMING: INDEPENDENT
LAUNDRY: WITH SUPERVISION
ORAL_HYGIENE: INDEPENDENT

## 2018-02-18 NOTE — PROGRESS NOTES
"At start of shift, patient was offered PRN Ativan but declined, saying she was allergic to it (not true). Patient was given PRN Zyprexa and PRN Atarax instead.  At around 0100, patient was heard coughing in her room and then loudly shrieking. Staff went to check on her and patient screamed \"Help me; I'm dying!\" Patient was given PRN Ativan.  Around an hour later, patient began attempting to open several locked doors and was mumbling to herself. Patient then went in the bathroom. Staff checked on patient and patient angrily told them to go away. 15 minutes later, patient was still in bathroom. Staff opened the door and patient was sitting on the floor, mumbling and digging through the garbage bag. Patient was assisted to her bed and finally went to sleep. Patient slept 3 hours total during the night.  "

## 2018-02-18 NOTE — PROGRESS NOTES
[facility] Psychiatric Progress Note      Interval History:   Pt seen, chart reviewed. Nursing reports she was labile yesterday, at times psychotic, manic, wailing out. She slept very poorly, becoming agitated overnight. She has been adherent with oral medications.     On interview, she reports that she continues to have racing thoughts and difficulty sleeping. Mood subjectively good. She appears in bright spirits despite not sleeping much. She is still tangential and disorganized albiet slightly improved from yesterday. She is denying safety concerns . Some acknowledged paranoia recalling fearing that staff might poison her, but she is able to reason through this today.     She remains motivated by desire to keep her admission brief. We reviewed options of optimizing medications and she is interested in trying Depakote.      Review of systems:   10 point Review of Systems conducted by Dr Dc is negative, other than noted in the HPI     Medications:       OLANZapine zydis  20 mg Oral At Bedtime     divalproex sodium extended-release  500 mg Oral BID IS     albuterol, LORazepam, hydrOXYzine, OLANZapine **OR** OLANZapine zydis, magnesium hydroxide    Mental Status Examination:     Appearance:  awake, alert and adequately groomed  Eye Contact:  good  Speech:  clear, coherent and pressured, quick  Language:Normal  Psychomotor Behavior:  no evidence of tardive dyskinesia, dystonia, or tics  Mood:  Expansive  Affect:  intensity is heightened  Thought Process:  tangental and circumstantial no loose associations  Thought Content:  no evidence of suicidal ideation or homicidal ideation and no auditory hallucinations present  Oriented to:  time, person, and place  Attention Span and Concentration:  limited  Recent and Remote Memory:  fair  Fund of Knowledge: appropriate  Muscle Strength and Tone: normal  Gait and Station: Normal  Insight:  fair  Judgment:  fair        Labs/Vitals:     Recent Results (from  the past 24 hour(s))   Glucose    Collection Time: 02/18/18  8:35 AM   Result Value Ref Range    Glucose 167 (H) 70 - 99 mg/dL   Lipid panel reflex to direct LDL    Collection Time: 02/18/18  8:35 AM   Result Value Ref Range    Cholesterol 204 (H) <200 mg/dL    Triglycerides 95 <150 mg/dL    HDL Cholesterol 63 >49 mg/dL    LDL Cholesterol Calculated 122 (H) <100 mg/dL    Non HDL Cholesterol 141 (H) <130 mg/dL   Hepatic panel    Collection Time: 02/18/18  8:35 AM   Result Value Ref Range    Bilirubin Direct 0.1 0.0 - 0.2 mg/dL    Bilirubin Total 0.5 0.2 - 1.3 mg/dL    Albumin 4.1 3.4 - 5.0 g/dL    Protein Total 7.1 6.8 - 8.8 g/dL    Alkaline Phosphatase 55 40 - 150 U/L    ALT 35 0 - 50 U/L    AST 20 0 - 45 U/L     B/P: 134/80, T: 97.9, P: 106, R: 17    Impression:   Formulation:  This is a 52-year-old woman with self-reported history of bipolar disorder or primary psychotic disorder, though has been off her medications for perhaps the last year and is no longer following with her outpatient psychiatrist.  She presented in a psychotic and manic state to the Emergency Department, reporting chest pain.  She was medically cleared in the Emergency Department with no evidence of acute coronary syndrome.  She is currently admitted for stabilization of a manic episode on a 72-hour hold.  She did take Olanzapine Zydis last night, but despite that, did not sleep soundly.  Today, she is acknowledging a willingness to perhaps try Latuda as a long-term medicine for her bipolar disorder.     2/18/18\  Still manic but improving a bit. She only slept a few hours and was agitated overnight with psychotic cyndi.            DIagnoses:   Unspecified bipolar and related disorder, rule out bipolar disorder type 1, current episode manic with psychotic features versus schizoaffective disorder, bipolar type, current episode manic with psychotic features.          Plan:   1. side effects, risks, benefits reviewed, she was warned of rare but  potentially life thretening risks of liver and pancreas damage. Nonetheless, she would like to try Depakote ER for conintued symptoms. We will start 500 mg BID. ALso will optimize olanzapine to 20 mg and continue Ativan PRN in hopes of getting her to get sufficient sleep   2. Continue admission in acute care area. Requires close monitoring of her behavior at this time given lability and continued cyndi    Jefferson Dc MD      Attestation:  Patient has been seen and evaluated by me,  Jefferson Dc MD

## 2018-02-18 NOTE — PLAN OF CARE
"Problem: Manic Symptoms  Goal: Manic Symptoms  Signs and symptoms of listed problems will be absent or manageable.   Outcome: No Change  Pt spent the beginning of the shift in her room, writing, reading, resting and listening to music. Pt was calm and cooperative throughout the evening, making few requests. Described herself as having \"difficulty socializing with others so I keep to myself.\" Despite being tense with this writer, the pt socialized, smiled and laughed periodically while filling out menu with staff assistance. Pt's room is very organized which she stated \"helps her mood and thoughts.\"    Pt showered this evening. Following shower, pt slipped and nearly fell while returning to room. Pt was visibly upset because she \"almost fell and  but nobody seemed to care.\" pt returned to her room and assured by this writer that staff takes falls and near falls very seriously and that patient safety is a top priority. Pt was able to calm down before turning in to sleep for the night.  "

## 2018-02-18 NOTE — PLAN OF CARE
Problem: Manic Symptoms  Goal: Manic Symptoms  Signs and symptoms of listed problems will be absent or manageable.   Outcome: No Change  Pt has been isolative to her room throughout the day shift in the [facility]. Respectful to peers and staff, but can become irritable if she feels she has been violated. Pt ate most of her meals for breakfast and lunch and has been present in the lounge listening to headphones and watching TV throughout the shift as well. Pt will go back to her room at certain points and bed rest. Pt had an asthma attack earlier in the shift and nursing staff ordered an inhaler for her. Wants to attend some groups, but was unable to due to her current situation.

## 2018-02-19 PROCEDURE — 12400006 ZZH R&B MH INTERMEDIATE

## 2018-02-19 PROCEDURE — A9270 NON-COVERED ITEM OR SERVICE: HCPCS | Mod: GY | Performed by: PSYCHIATRY & NEUROLOGY

## 2018-02-19 PROCEDURE — 25000132 ZZH RX MED GY IP 250 OP 250 PS 637: Mod: GY | Performed by: PSYCHIATRY & NEUROLOGY

## 2018-02-19 RX ADMIN — DIVALPROEX SODIUM 500 MG: 500 TABLET, EXTENDED RELEASE ORAL at 08:41

## 2018-02-19 RX ADMIN — DIVALPROEX SODIUM 500 MG: 500 TABLET, EXTENDED RELEASE ORAL at 17:33

## 2018-02-19 RX ADMIN — OLANZAPINE 20 MG: 10 TABLET, ORALLY DISINTEGRATING ORAL at 21:46

## 2018-02-19 NOTE — PROGRESS NOTES
Spoke with staff about meeting with patient for social history.  It was suggested to wait at least a day patient would not be able to complete an interview.
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
## 2018-02-19 NOTE — PLAN OF CARE
Problem: Patient Care Overview  Goal: Team Discussion  Team Plan:   Outcome: No Change  BEHAVIORAL TEAM DISCUSSION    Participants: Dr. Lujan, Nursing staff, Social workers and PA's  Progress: No change  Continued Stay Criteria/Rationale: Delusional, labile, disorganized, needs time to stabilize  Medical/Physical:   Precautions:   Behavioral Orders   Procedures     Code 1 - Restrict to Unit     Routine Programming     As clinically indicated     Status 15     Every 15 minutes.     Plan: See how she settles and responds to meds; needs to stabilize.   Rationale for change in precautions or plan: Not at baseline

## 2018-02-19 NOTE — PROGRESS NOTES
"[facility] Psychiatric Progress Note      Interval History:   Pt seen, chart reviewed. Nursing reports that she is hyperverbal and disorganized. She remains paranoid and believes she just reconnected with God and believes she is in the hospital because \"I chose the wrong path. I just need all the pain to end. I have had a rough life. When I was a child I overdosed on orange flavored baby aspirin and my parents did not take me to they hospital. They were just waiting for me to die. They were just kids themselves raising a baby. They were 17 & 18 living in grandma and grandpa's basement. I was first diagnosed with mental illness at 27 and I'm now 52, you do the math.\" She laughed inappropriately and also cried openly. She has been compliant with prescribed medications. Denies suicidal or homicidal ideation.      Review of systems:   10 point Review of Systems conducted by Dr Dc is negative, other than noted in the HPI     Medications:       OLANZapine zydis  20 mg Oral At Bedtime     divalproex sodium extended-release  500 mg Oral BID IS     albuterol, LORazepam, hydrOXYzine, OLANZapine **OR** OLANZapine zydis, magnesium hydroxide    Mental Status Examination:     Appearance:  awake, alert and adequately groomed  Eye Contact:  good  Speech:  clear, coherent and pressured speech  Language: Normal  Psychomotor Behavior:  no evidence of tardive dyskinesia, dystonia, or tics  Mood:  Expansive  Affect:  intensity is heightened and labile  Thought Process:  tangental and circumstantial loosening of associations present  Thought Content:  no evidence of suicidal ideation or homicidal ideation, no auditory hallucinations present and patient appears to be responding to internal stimuli  Oriented to:  time, person, and place  Attention Span and Concentration:  limited  Recent and Remote Memory:  fair  Fund of Knowledge: appropriate  Muscle Strength and Tone: normal  Gait and Station: Normal  Insight:  " fair  Judgment:  fair        Labs/Vitals:     No results found for this or any previous visit (from the past 24 hour(s)).  B/P: 134/80, T: 97.9, P: 106, R: 17    Impression:   Formulation:  This is a 52-year-old woman with self-reported history of bipolar disorder or primary psychotic disorder, though has been off her medications for perhaps the last year and is no longer following with her outpatient psychiatrist.  She presented in a psychotic and manic state to the Emergency Department, reporting chest pain.  She was medically cleared in the Emergency Department with no evidence of acute coronary syndrome.  She is currently admitted for stabilization of a manic episode on a 72-hour hold.  She did take Olanzapine Zydis last night, but despite that, did not sleep soundly.  Today, she is acknowledging a willingness to perhaps try Latuda as a long-term medicine for her bipolar disorder.     2/18/18  Still manic but improving a bit. She only slept a few hours and was agitated overnight with psychotic cyndi.            Diagnoses:   1. Unspecified bipolar and related disorder, rule out bipolar disorder type 1, current episode manic with psychotic features versus schizoaffective disorder,   bipolar type, current episode manic with psychotic features.          Plan:   1. Continue hospitalization.  2. Maintain current medications without changes.      Attestation:  Patient has been seen and evaluated by me,  Nicole Lujan MD

## 2018-02-19 NOTE — PLAN OF CARE
Problem: Manic Symptoms  Goal: Manic Symptoms  Signs and symptoms of listed problems will be absent or manageable.   Outcome: No Change  Pt has been present in the lounge of the ITC throughout the day shift. Ate most of her meals for breakfast and lunch. Presents a flat, tense affect and can become irritable depending on if staff has worked with her before or not. Pt can be bright upon approach and communication.

## 2018-02-20 PROCEDURE — 12400006 ZZH R&B MH INTERMEDIATE

## 2018-02-20 PROCEDURE — 25000132 ZZH RX MED GY IP 250 OP 250 PS 637: Mod: GY | Performed by: PSYCHIATRY & NEUROLOGY

## 2018-02-20 PROCEDURE — A9270 NON-COVERED ITEM OR SERVICE: HCPCS | Mod: GY | Performed by: PSYCHIATRY & NEUROLOGY

## 2018-02-20 RX ADMIN — DIVALPROEX SODIUM 500 MG: 500 TABLET, EXTENDED RELEASE ORAL at 08:03

## 2018-02-20 RX ADMIN — DIVALPROEX SODIUM 500 MG: 500 TABLET, EXTENDED RELEASE ORAL at 17:46

## 2018-02-20 RX ADMIN — OLANZAPINE 20 MG: 10 TABLET, ORALLY DISINTEGRATING ORAL at 21:29

## 2018-02-20 NOTE — PLAN OF CARE
Problem: General Rehab Plan of Care  Goal: Occupational Therapy Goals  The patient and/or their representative will achieve their patient-specific goals related to the plan of care.  The patient-specific goals include:  INITIAL O.T. ASSESSMENT   Details:  Pt declined invitation to OT group today. Affect was tense. Pt asked this writer if she could come to the group and only use the computer. She was informed that she could use the computer for part of the time. Pt was unable to accept the limits that were set. She became upset and tearful, refused to attend the group. Communication was concrete and guarded. Staff were alerted to pt's behavior.

## 2018-02-20 NOTE — PLAN OF CARE
Problem: Manic Symptoms  Goal: Manic Symptoms  Signs and symptoms of listed problems will be absent or manageable.   Outcome: No Change  Pt has been present and pleasant in the ITC and SDU throughout the evening shift. Ate most of her dinner. Presents an incongruent, flat, tense and irritable affect, but remains calm and respectful to peers and staff. Pt has some odd requests at some points of the shift, but nothing out of the ordinary. Pt was seen around 2145 PM without pants on her room and told staff that it was none of their business why she had no pants on in her room and to get out. Pt came out of her room shortly after with top and bottom on to go to the restroom.

## 2018-02-20 NOTE — PROGRESS NOTES
[facility] Psychiatric Progress Note      Interval History:   Pt seen, chart reviewed. Staff report that she is more organized. She states she is a little worried that she drinks an average of 6 L of water a day.  She states she is not trying to water down her medications but she wants to know how much water she can safely drink each day.  She denies experiencing any auditory or visual hallucinations.  Staff reports she is tolerating medications very side effects.  We will obtain her valproic acid level tomorrow morning along with her BMP.     Review of systems:   10 point Review of Systems conducted by Dr Lujan is negative, other than noted in the HPI     Medications:       OLANZapine zydis  20 mg Oral At Bedtime     divalproex sodium extended-release  500 mg Oral BID IS     albuterol, LORazepam, hydrOXYzine, OLANZapine **OR** OLANZapine zydis, magnesium hydroxide    Mental Status Examination:     Appearance:  awake, alert and adequately groomed  Eye Contact:  good  Speech:  clear, coherent and pressured speech  Language: Normal  Psychomotor Behavior:  no evidence of tardive dyskinesia, dystonia, or tics  Mood:  Expansive  Affect:  intensity is heightened and labile  Thought Process:  tangental and circumstantial loosening of associations present  Thought Content:  no evidence of suicidal ideation or homicidal ideation and no evidence of psychotic thought  Oriented to:  time, person, and place  Attention Span and Concentration:  limited  Recent and Remote Memory:  fair  Fund of Knowledge: appropriate  Muscle Strength and Tone: normal  Gait and Station: Normal  Insight:  fair  Judgment:  fair        Labs/Vitals:     No results found for this or any previous visit (from the past 24 hour(s)).  B/P: 134/80, T: 97.9, P: 106, R: 17    Impression:   Formulation:  This is a 52-year-old woman with self-reported history of bipolar disorder or primary psychotic disorder, though has been off her medications  for perhaps the last year and is no longer following with her outpatient psychiatrist.  She presented in a psychotic and manic state to the Emergency Department, reporting chest pain.  She was medically cleared in the Emergency Department with no evidence of acute coronary syndrome.  She is currently admitted for stabilization of a manic episode on a 72-hour hold.  She did take Olanzapine Zydis last night, but despite that, did not sleep soundly.  Today, she is acknowledging a willingness to perhaps try Latuda as a long-term medicine for her bipolar disorder.     2/18/18  Still manic but improving a bit. She only slept a few hours and was agitated overnight with psychotic cyndi.            Diagnoses:   1. Unspecified bipolar and related disorder, rule out bipolar disorder type 1, current episode manic with psychotic features versus schizoaffective disorder,   bipolar type, current episode manic with psychotic features.          Plan:   1. Continue hospitalization.  2. Maintain current medications without changes.      Attestation:  Patient has been seen and evaluated by me,  Nicole Lujan MD

## 2018-02-20 NOTE — PLAN OF CARE
"Problem: Manic Symptoms  Goal: Manic Symptoms  Signs and symptoms of listed problems will be absent or manageable.   Outcome: No Change  Pleasant and cooperative. Less hyperverbal and tangential, but still labile at times. During 1:1, pt apologized for \"inappropriate behavior at times while here\", admits to having some paranoia, especially about having people read things in her journal. Tearful at times but can't explain why. Did laundry, no groups. Med compliant      "

## 2018-02-21 LAB — VALPROATE SERPL-MCNC: 83 MG/L (ref 50–100)

## 2018-02-21 PROCEDURE — 36415 COLL VENOUS BLD VENIPUNCTURE: CPT | Performed by: PSYCHIATRY & NEUROLOGY

## 2018-02-21 PROCEDURE — 12400006 ZZH R&B MH INTERMEDIATE

## 2018-02-21 PROCEDURE — 25000132 ZZH RX MED GY IP 250 OP 250 PS 637: Mod: GY | Performed by: PSYCHIATRY & NEUROLOGY

## 2018-02-21 PROCEDURE — A9270 NON-COVERED ITEM OR SERVICE: HCPCS | Mod: GY | Performed by: PSYCHIATRY & NEUROLOGY

## 2018-02-21 PROCEDURE — 80164 ASSAY DIPROPYLACETIC ACD TOT: CPT | Performed by: PSYCHIATRY & NEUROLOGY

## 2018-02-21 PROCEDURE — 97150 GROUP THERAPEUTIC PROCEDURES: CPT | Mod: GO

## 2018-02-21 RX ADMIN — OLANZAPINE 20 MG: 10 TABLET, ORALLY DISINTEGRATING ORAL at 21:30

## 2018-02-21 RX ADMIN — DIVALPROEX SODIUM 500 MG: 500 TABLET, EXTENDED RELEASE ORAL at 08:02

## 2018-02-21 RX ADMIN — DIVALPROEX SODIUM 500 MG: 500 TABLET, EXTENDED RELEASE ORAL at 18:23

## 2018-02-21 ASSESSMENT — ACTIVITIES OF DAILY LIVING (ADL)
GROOMING: INDEPENDENT
ORAL_HYGIENE: INDEPENDENT
DRESS: STREET CLOTHES
LAUNDRY: WITH SUPERVISION

## 2018-02-21 NOTE — PLAN OF CARE
"Problem: General Plan of Care (Inpatient Behavioral)  Goal: Individualization/Patient Specific Goal (IP Behavioral)  The patient and/or their representative will achieve their patient-specific goals related to the plan of care.    The patient-specific goals include:     1.Provide safe and secure environment.  2.Encourage group attendance and milieu therapy.  3. Monitor medication compliance and adverse reactions.  4. Involve Pt in discharge planning.  5. Assess need for PRN medications.   6. Adherence to Dr's recommendations.   Outcome: No Change  Present on unit, hesitant to interact with staff. Did not offer much in verbal exchange. Voodoo delusions are voiced by patient. She is hesitant to talk about medications or their effects, simply states \"I take my medications and that is that\" -then walks away. Spending time pacing unit. Mood is tense, multiple minor verbal outbursts. She attended group but was not able to participate appropriately. Interacted with peers with no problems in ITC.       "

## 2018-02-21 NOTE — PROGRESS NOTES
[facility] Psychiatric Progress Note      Interval History:   Pt seen, chart reviewed. Staff report that she is more organized. She denies experiencing any auditory or visual hallucinations.  Staff reports she is tolerating medications very side effects.  She said she thought that group therapy was voluntary only, but agree to start participating at groups. She want to discharge home, because she is moving to second floor from seventh floor of the apartment building, and voiced interest in out patient care     Review of systems:   10 point Review of Systems conducted by Dr Lujan is negative, other than noted in the HPI     Medications:       OLANZapine zydis  20 mg Oral At Bedtime     divalproex sodium extended-release  500 mg Oral BID IS     albuterol, LORazepam, hydrOXYzine, OLANZapine **OR** OLANZapine zydis, magnesium hydroxide    Mental Status Examination:     Appearance:  awake, alert and adequately groomed  Eye Contact:  good  Speech:  clear, coherent and pressured speech  Language: Normal  Psychomotor Behavior:  no evidence of tardive dyskinesia, dystonia, or tics  Mood:  better  Affect:  mood congruent and intensity is dramatic  Thought Process:  logical, linear and goal oriented no loose associations  Thought Content:  no evidence of suicidal ideation or homicidal ideation and no evidence of psychotic thought  Oriented to:  time, person, and place  Attention Span and Concentration:  limited  Recent and Remote Memory:  fair  Fund of Knowledge: appropriate  Muscle Strength and Tone: normal  Gait and Station: Normal  Insight:  fair  Judgment:  fair        Labs/Vitals:     Recent Results (from the past 24 hour(s))   Valproic acid    Collection Time: 02/21/18  8:10 AM   Result Value Ref Range    Valproic Acid Level 83 50 - 100 mg/L     B/P: 134/80, T: 97.9, P: 106, R: 17    Impression:   Formulation:  This is a 52-year-old woman with self-reported history of bipolar disorder or primary  psychotic disorder, though has been off her medications for perhaps the last year and is no longer following with her outpatient psychiatrist.  She is currently admitted for stabilization of a manic episode on a 72-hour hold.  Valproic acid level therapeutic.    2/18/18  Still manic but improving a bit. She only slept a few hours and was agitated overnight with psychotic cyndi.            Diagnoses:   1. Unspecified bipolar and related disorder, rule out bipolar disorder type 1, current episode manic with psychotic features versus schizoaffective disorder,   bipolar type, current episode manic with psychotic features.          Plan:   1. Continue hospitalization.  2. Maintain current medications without changes.  Start participating in groups.      Attestation:  Patient has been seen and evaluated by me,  Nicole Lujan MD

## 2018-02-21 NOTE — PLAN OF CARE
Problem: Manic Symptoms  Goal: Manic Symptoms  Signs and symptoms of listed problems will be absent or manageable.   Pt. Slightly less labile in mood tonight. Slammed door a couple times for unknown reason but no irritable outbursts. More isolative tonight-less hyperverbal and intrusive with peers. Continues to negate the diagnosis of Bipolar disorder. Claims the only MH issue she struggles with is a mild form of autism. Compliant with medications. Attended 1/2 of OT.

## 2018-02-21 NOTE — PLAN OF CARE
"Problem: General Rehab Plan of Care  Goal: Occupational Therapy Goals  The patient and/or their representative will achieve their patient-specific goals related to the plan of care.  The patient-specific goals include:  Pt will engage in group activities to improve social skills.    Pt attended OT group today. She expressed interest in beginning an activity but refused offers to help her start. Pt found some yarn and spent time untangling it but did not initiate a project. She asked to use the computer. 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 Pt was observed watching videos on the computer intently. Behavior was disorganized and not goal directed. Pt asked this writer to use the yarn later . She was told the yarn was not permitted outside of the OT room and accepted that. Mood was more stable today as compared to yesterday.       "

## 2018-02-22 PROCEDURE — A9270 NON-COVERED ITEM OR SERVICE: HCPCS | Mod: GY | Performed by: PSYCHIATRY & NEUROLOGY

## 2018-02-22 PROCEDURE — 90853 GROUP PSYCHOTHERAPY: CPT

## 2018-02-22 PROCEDURE — 25000132 ZZH RX MED GY IP 250 OP 250 PS 637: Mod: GY | Performed by: PSYCHIATRY & NEUROLOGY

## 2018-02-22 PROCEDURE — 12400006 ZZH R&B MH INTERMEDIATE

## 2018-02-22 RX ORDER — ACETAMINOPHEN 325 MG/1
650 TABLET ORAL EVERY 4 HOURS PRN
Status: DISCONTINUED | OUTPATIENT
Start: 2018-02-22 | End: 2018-02-26 | Stop reason: HOSPADM

## 2018-02-22 RX ADMIN — DIVALPROEX SODIUM 500 MG: 500 TABLET, EXTENDED RELEASE ORAL at 07:46

## 2018-02-22 RX ADMIN — OLANZAPINE 20 MG: 10 TABLET, ORALLY DISINTEGRATING ORAL at 21:00

## 2018-02-22 RX ADMIN — DIVALPROEX SODIUM 500 MG: 500 TABLET, EXTENDED RELEASE ORAL at 17:55

## 2018-02-22 ASSESSMENT — ACTIVITIES OF DAILY LIVING (ADL)
LAUNDRY: WITH SUPERVISION
DRESS: INDEPENDENT
ORAL_HYGIENE: INDEPENDENT
GROOMING: INDEPENDENT;SHOWER

## 2018-02-22 NOTE — PLAN OF CARE
"Problem: Manic Symptoms  Goal: Manic Symptoms  Signs and symptoms of listed problems will be absent or manageable.   Outcome: No Change  Pleasant, cooperative, anxious and labile at times. Visible and social with staff and peers. Attended groups but was off topic and needed redirection. During 1:1 she admitted to still having paranoid thoughts, mentioned that she has been throwing away her toothbrush daily due to it being\" contaminated from the fluoride in the toothpaste\". When describing this, she started crying and stated that she knows that she shouldn't be doing it but she can't stop herself. Also spent some time in her room working on writing in her notebook and listening to headphones. Med compliant        "

## 2018-02-22 NOTE — PROGRESS NOTES
[facility] Psychiatric Progress Note      Interval History:   Pt seen, chart reviewed.  Staff report that the patient is displaying improved boundaries when relating with other patients.  She is appropriately groomed and her room is much better organized.  She states that Depakote is helping her and she is glad that it was restarted.  However, she still displays flight of ideas during conversations.  She spoke about wanting to start a video blog once she gets out of the hospital as a way of occupying herself.  She had different clippings of ideas that she wants to discuss and how video blog.  She had very bizarre ideas but she claims she was catering to different interests out in the community.  Otherwise she denies expressing any self-harm thoughts plans or intent.  She does not endorse any auditory or visual hallucinations.  She denies experiencing any medication side effects.     Review of systems:   10 point Review of Systems conducted by Dr Lujan is negative, other than noted in the HPI     Medications:       OLANZapine zydis  20 mg Oral At Bedtime     divalproex sodium extended-release  500 mg Oral BID IS     acetaminophen, albuterol, LORazepam, hydrOXYzine, OLANZapine **OR** OLANZapine zydis, magnesium hydroxide    Mental Status Examination:     Appearance:  awake, alert and adequately groomed  Eye Contact:  good  Speech:  clear, coherent and pressured speech  Language: Normal  Psychomotor Behavior:  no evidence of tardive dyskinesia, dystonia, or tics  Mood:  better  Affect:  mood congruent and intensity is dramatic  Thought Process:  circumstantial Displays flight of ideas  Thought Content:  no evidence of suicidal ideation or homicidal ideation  Oriented to:  time, person, and place  Attention Span and Concentration:  limited  Recent and Remote Memory:  fair  Fund of Knowledge: appropriate  Muscle Strength and Tone: normal  Gait and Station: Normal  Insight:  fair  Judgment:  fair         Labs/Vitals:     No results found for this or any previous visit (from the past 24 hour(s)).  B/P: 134/80, T: 97.9, P: 106, R: 17    Impression:   Formulation:  This is a 52-year-old woman with self-reported history of bipolar disorder or primary psychotic disorder, though has been off her medications for perhaps the last year and is no longer following with her outpatient psychiatrist.  She is currently admitted for stabilization of a manic episode on a 72-hour hold.  Valproic acid level therapeutic.    2/18/18  Still manic but improving a bit. She only slept a few hours and was agitated overnight with psychotic cyndi.            Diagnoses:   1. Unspecified bipolar and related disorder, rule out bipolar disorder type 1, current episode manic with psychotic features versus schizoaffective disorder,   bipolar type, current episode manic with psychotic features.          Plan:   1. Continue  current medications and hospitalization.  2. Move patient to stepdown unit when a bed becomes available.    Attestation:  Patient has been seen and evaluated by Nicole short MD

## 2018-02-23 ENCOUNTER — APPOINTMENT (OUTPATIENT)
Dept: GENERAL RADIOLOGY | Facility: CLINIC | Age: 53
DRG: 885 | End: 2018-02-23
Attending: PHYSICIAN ASSISTANT
Payer: MEDICARE

## 2018-02-23 PROCEDURE — A9270 NON-COVERED ITEM OR SERVICE: HCPCS | Mod: GY | Performed by: PSYCHIATRY & NEUROLOGY

## 2018-02-23 PROCEDURE — 90853 GROUP PSYCHOTHERAPY: CPT

## 2018-02-23 PROCEDURE — 12400006 ZZH R&B MH INTERMEDIATE

## 2018-02-23 PROCEDURE — 73630 X-RAY EXAM OF FOOT: CPT | Mod: LT

## 2018-02-23 PROCEDURE — 25000132 ZZH RX MED GY IP 250 OP 250 PS 637: Mod: GY | Performed by: PSYCHIATRY & NEUROLOGY

## 2018-02-23 RX ADMIN — ACETAMINOPHEN 650 MG: 325 TABLET, FILM COATED ORAL at 19:53

## 2018-02-23 RX ADMIN — DIVALPROEX SODIUM 500 MG: 500 TABLET, EXTENDED RELEASE ORAL at 19:18

## 2018-02-23 RX ADMIN — ACETAMINOPHEN 650 MG: 325 TABLET, FILM COATED ORAL at 05:27

## 2018-02-23 RX ADMIN — DIVALPROEX SODIUM 500 MG: 500 TABLET, EXTENDED RELEASE ORAL at 07:45

## 2018-02-23 RX ADMIN — OLANZAPINE 20 MG: 10 TABLET, ORALLY DISINTEGRATING ORAL at 21:10

## 2018-02-23 NOTE — PROGRESS NOTES
[facility] Psychiatric Progress Note      Interval History:   Pt seen, chart reviewed.  Staff report that the patient is displaying improved boundaries when relating with other patients.  She is nicely dressed and appropriately groomed and her room is much better organized.  She states that Depakote is helping her and she is glad that it was restarted.  She mentioned that she has a daughter who is in her thirties. She claims she does not talk about her because she had tried to harm her in the past. Otherwise she denies experiencing any self-harm thoughts plans or intent.  She does not endorse any auditory or visual hallucinations.  She denies experiencing any medication side effects.     Review of systems:   10 point Review of Systems conducted by Dr Lujan is negative, other than noted in the HPI     Medications:       OLANZapine zydis  20 mg Oral At Bedtime     divalproex sodium extended-release  500 mg Oral BID IS     acetaminophen, albuterol, LORazepam, hydrOXYzine, OLANZapine **OR** OLANZapine zydis, magnesium hydroxide    Mental Status Examination:     Appearance:  neatly groomed and casually dressed  Eye Contact:  good  Speech:  clear, coherent and normal prosody  Language: Normal  Psychomotor Behavior:  no evidence of tardive dyskinesia, dystonia, or tics  Mood:  better  Affect:  mood congruent and intensity is dramatic  Thought Process:  circumstantial no loose associations  Thought Content:  no evidence of suicidal ideation or homicidal ideation  Oriented to:  time, person, and place  Attention Span and Concentration:  limited  Recent and Remote Memory:  fair  Fund of Knowledge: appropriate  Muscle Strength and Tone: normal  Gait and Station: Normal  Insight:  fair  Judgment:  fair        Labs/Vitals:     No results found for this or any previous visit (from the past 24 hour(s)).  B/P: 134/80, T: 97.9, P: 106, R: 17    Impression:   Formulation:  This is a 52-year-old woman with  self-reported history of bipolar disorder or primary psychotic disorder, though has been off her medications for perhaps the last year and is no longer following with her outpatient psychiatrist.  She is currently admitted for stabilization of a manic episode on a 72-hour hold.  Valproic acid level therapeutic.    2/18/18  Still manic but improving a bit. She only slept a few hours and was agitated overnight with psychotic cyndi.            Diagnoses:   1. Unspecified bipolar and related disorder, rule out bipolar disorder type 1, current episode manic with psychotic features versus schizoaffective disorder,   bipolar type, current episode manic with psychotic features.          Plan:   1. Continue  current medications and hospitalization.  2. Move patient to stepdown unit when a bed becomes available.    Attestation:  Patient has been seen and evaluated by me,  Nicole Lujan MD

## 2018-02-23 NOTE — PLAN OF CARE
"Problem: Manic Symptoms  Goal: Manic Symptoms  Signs and symptoms of listed problems will be absent or manageable.   Pt is active and extremely labile in ITC and on SDU. Pt presents with a full range affect with an irritable, labile, depressed, calm, grandiose, and elated mood. Pt spent most of the day in the ITC lounge watching TV and interacting with staff and peers. Pt is pleasant and social, but will ramble tangentially during 1 to 1s. Pt stated she gets very worried about going home because she may not have the apartment she loves anymore and she wants to make sure her plants and cat are okay. Pt states she doesn't have any friends, which makes her very guarded and not very trusting in other people. Pt stated she got her drivers license in 2013 because she relied on men her whole life to take her places and take care of her. Pt is pleasant and warm upon approach, but due to her being labile, she can become tearful or irritated very easily. Pt stared down staff for giving her two pairs of underwear instead of five. Pt stated later, \"I'm treating myself to cereal right now because I did not blow up today. I mean I did stare down that nurse, but didn't blow up.\" Pt attended OT and was tearful that the computer was being used, but apologized to the staff member, conducting OT, later. Pt has a low patience and would not wait in wrap up group, while the staff tried had problems working the tv. Pt showered, ate all of her food, and was med compliant.      "

## 2018-02-23 NOTE — PLAN OF CARE
"Problem: Manic Symptoms  Goal: Manic Symptoms  Signs and symptoms of listed problems will be absent or manageable.   Outcome: Improving  Presents with a full range affect, can be tense and anxious at times.When asked if medication was helping racing thoughts and mood, pt replied that she didn't want to give the medication all the credit and that it was really her \"strenghtening relationship with God that was helping her more\". Pleasant and cooperative when interacting with staff. Social with peers, attended groups and OT and was appropriate. Med compliant      "

## 2018-02-24 PROCEDURE — 90853 GROUP PSYCHOTHERAPY: CPT

## 2018-02-24 PROCEDURE — 12400006 ZZH R&B MH INTERMEDIATE

## 2018-02-24 PROCEDURE — 25000132 ZZH RX MED GY IP 250 OP 250 PS 637: Mod: GY | Performed by: PSYCHIATRY & NEUROLOGY

## 2018-02-24 PROCEDURE — A9270 NON-COVERED ITEM OR SERVICE: HCPCS | Mod: GY | Performed by: PSYCHIATRY & NEUROLOGY

## 2018-02-24 RX ADMIN — OLANZAPINE 20 MG: 10 TABLET, ORALLY DISINTEGRATING ORAL at 21:56

## 2018-02-24 RX ADMIN — DIVALPROEX SODIUM 500 MG: 500 TABLET, EXTENDED RELEASE ORAL at 09:29

## 2018-02-24 RX ADMIN — DIVALPROEX SODIUM 500 MG: 500 TABLET, EXTENDED RELEASE ORAL at 17:34

## 2018-02-24 ASSESSMENT — ACTIVITIES OF DAILY LIVING (ADL)
LAUNDRY: WITH SUPERVISION
DRESS: INDEPENDENT
ORAL_HYGIENE: INDEPENDENT
GROOMING: INDEPENDENT

## 2018-02-24 NOTE — PROGRESS NOTES
Pt c/o left foot pain. She was dancing in her room 2days ago and introverted her foot. MIld swelling of the left foot noted and tender to plapation at the site. Xray foot was negative for any fractures. Tylenol PRN for pain and icing and elevation recommended when not walking on it. Hospitalist will sign off please call with any further Qs  Kerry Ortiz MD

## 2018-02-24 NOTE — PROGRESS NOTES
"Pt c/o swelling in her left and right lower extremity.  LLE is visibly swollen.  Appears to be edema, non-pitting.  Slightly red on the anterior surface, some bruising evident.  Initially denied any injuries, later stated in was from falling and running into things after being put on antipsychotic medications.  C/o pain that she describes as \"pinching, like sciatica,\" denied any shooting pains though.  She believes that her foot is broken but denied being aware of any injuries to her foot.  Crying.  Reports it gets worse later in the day and with walking.  Given ice, which has provided some relief.  Encouraged pt to elevate her leg.  Reports that she was aware this starting 3 days ago.  Progress note from internal medicine showed no edema present in her extremities on 2/17.  Hospitalist notified.  "

## 2018-02-24 NOTE — PLAN OF CARE
"Problem: Manic Symptoms  Goal: Manic Symptoms  Signs and symptoms of listed problems will be absent or manageable.     Pt presents with a full range affect with a labile mood, and flight of ideas.  Pt stated that she does not believe her diagnosis of bipolar disorder and stated that \"all symptoms could be explained by aspergers.\" Pt stated that she got her master's degree in Psychotherapy from [employer] in 2013.  In group settings, pt is pleasant and cooperative.  During 1:1, pt was irritable at times and rude to staff.  Pt expressed many complaints about staff and the unit during 1:1. Denies SI.       "

## 2018-02-24 NOTE — PLAN OF CARE
Problem: Manic Symptoms  Goal: Manic Symptoms  Signs and symptoms of listed problems will be absent or manageable.   Outcome: Improving  Patient complains of right foot swelling. Outer aspect of left foot swollen but did not show anything significant on  X-ray. Hospitalist here to evaluate foot and signed off. Pt instructed to elevate her foot and apply ice. Also pt requested ASA to be given at HS to help her sleep. Pt has tangential slightly pressured speech and talks about her Neighbor on the 7th floor would knock on her door after 10 pm and accuse her of abusing children.  This was very disturbing to her so she wanted to move to the second floor because a room became available. She also wanted to move to the second floor so she could be closer to the earth. She stated she has Autism and would feel better if her computer was in the locker so it would be near to her even thought she knows she cannot use it. Pt became teary when we had her friend take the computer home when it was brought in.  Pt Labile in mood but redirectable

## 2018-02-24 NOTE — PROGRESS NOTES
Asked to evaluate for left foot pain and swelling. Denies injury but was manic on presentation. No erythema or warmth. No calf pain or swelling.    Tylenol and ice for pain  Will get Xray to [facility]  Hospitalist to follow tomorrow      Justa Hall PA-C  Hospitalist Service  [phone]

## 2018-02-25 PROCEDURE — A9270 NON-COVERED ITEM OR SERVICE: HCPCS | Mod: GY | Performed by: PSYCHIATRY & NEUROLOGY

## 2018-02-25 PROCEDURE — 25000132 ZZH RX MED GY IP 250 OP 250 PS 637: Mod: GY | Performed by: PSYCHIATRY & NEUROLOGY

## 2018-02-25 PROCEDURE — 12400006 ZZH R&B MH INTERMEDIATE

## 2018-02-25 RX ADMIN — NICOTINE POLACRILEX 2 MG: 2 GUM, CHEWING ORAL at 06:44

## 2018-02-25 RX ADMIN — DIVALPROEX SODIUM 500 MG: 500 TABLET, EXTENDED RELEASE ORAL at 08:17

## 2018-02-25 RX ADMIN — DIVALPROEX SODIUM 500 MG: 500 TABLET, EXTENDED RELEASE ORAL at 17:25

## 2018-02-25 RX ADMIN — OLANZAPINE 20 MG: 10 TABLET, ORALLY DISINTEGRATING ORAL at 20:19

## 2018-02-25 RX ADMIN — NICOTINE POLACRILEX 2 MG: 2 GUM, CHEWING ORAL at 11:00

## 2018-02-25 RX ADMIN — ACETAMINOPHEN 650 MG: 325 TABLET, FILM COATED ORAL at 21:27

## 2018-02-25 ASSESSMENT — ACTIVITIES OF DAILY LIVING (ADL)
GROOMING: INDEPENDENT
LAUNDRY: WITH SUPERVISION
ORAL_HYGIENE: INDEPENDENT
DRESS: STREET CLOTHES

## 2018-02-25 NOTE — PROGRESS NOTES
HOSPITALIST CHART CHECK:      Hospitalist service asked to see pt for foot pain, left sided.    No prn doses acetaminophen required in last 24h.   Pt is Icing, elevating affected extremity as per psych associate notes.   Hospitalist team will sign off, no further chart checks will be done.   Any questions, please page hospitalist staff.    Tere Potter CNP hospitalist service  2/25/2018 929am  Pager [phone]

## 2018-02-25 NOTE — PLAN OF CARE
Problem: Manic Symptoms  Goal: Manic Symptoms  Signs and symptoms of listed problems will be absent or manageable.   Patient has a calmer affect today, less demands,appears less anxious. Denies SI. Pt states she talked to The MD and the plan is to go home tomorrow and then she will go to Out Pt . Pt states she is in process of moving to the second floor in her complex and feels good about this. She states she will sleep much better at home

## 2018-02-25 NOTE — PROGRESS NOTES
[facility] Psychiatric Progress Note      Interval History:   Pt seen on stepdown unit.  She was dysphoric and tearful on account of concerns about her utility bills.  However, on direct interview she reports that she was able to sort through the problems with her telephone and utilities and now has everything switched to a new apartment.  She had concerns about being discharged without appointments and was advised that she would benefit from participating in intensive outpatient program at [facility] which is only a block from her apartment.  She requested assistance with transportation back to her apartment.  She does not endorse the presence of auditory or visual hallucinations.    Review of systems:   10 point Review of Systems conducted by Dr Lujan is negative, other than noted in the HPI     Medications:       OLANZapine zydis  20 mg Oral At Bedtime     divalproex sodium extended-release  500 mg Oral BID IS     nicotine polacrilex, sore throat lozenge, acetaminophen, albuterol, LORazepam, hydrOXYzine, OLANZapine **OR** OLANZapine zydis, magnesium hydroxide    Mental Status Examination:     Appearance:  neatly groomed and casually dressed  Eye Contact:  good  Speech:  clear, coherent and normal prosody  Language: Normal  Psychomotor Behavior:  no evidence of tardive dyskinesia, dystonia, or tics  Mood:  better  Affect:  mood congruent and intensity is dramatic  Thought Process:  logical, linear and goal oriented no loose associations  Thought Content:  no evidence of suicidal ideation or homicidal ideation and no evidence of psychotic thought  Oriented to:  time, person, and place  Attention Span and Concentration:  limited  Recent and Remote Memory:  fair  Fund of Knowledge: appropriate  Muscle Strength and Tone: normal  Gait and Station: Normal  Insight:  fair  Judgment:  fair        Labs/Vitals:     No results found for this or any previous visit (from the past 24  hour(s)).  B/P: 134/80, T: 97.9, P: 106, R: 17    Impression:   Formulation:  This is a 52-year-old woman with self-reported history of bipolar disorder or primary psychotic disorder, though has been off her medications for perhaps the last year and is no longer following with her outpatient psychiatrist.  She is currently admitted for stabilization of a manic episode on a 72-hour hold.  Valproic acid level therapeutic.    2/18/18  Still manic but improving a bit. She only slept a few hours and was agitated overnight with psychotic cyndi.            Diagnoses:   1. Unspecified bipolar and related disorder, rule out bipolar disorder type 1, current episode manic with psychotic features versus schizoaffective disorder,   bipolar type, current episode manic with psychotic features.          Plan:   1. Continue  current medications and hospitalization.  2. Move patient to stepdown unit when a bed becomes available.    Attestation:  Patient has been seen and evaluated by Nicole short MD

## 2018-02-25 NOTE — PLAN OF CARE
Problem: Manic Symptoms  Goal: Manic Symptoms  Signs and symptoms of listed problems will be absent or manageable.   Pt has been labile in mood. Mainly pleasant, but can get irritated easily. She got upset over the Early Sunday paper having tomorrow's date printed on it. Elated in mood, talking about wanting to dance, singing loudly in OT, flight of ideas and tangential in speech. She iced and elevated her left foot as instructed.

## 2018-02-26 VITALS
OXYGEN SATURATION: 95 % | RESPIRATION RATE: 16 BRPM | WEIGHT: 192.2 LBS | BODY MASS INDEX: 30.89 KG/M2 | TEMPERATURE: 99.8 F | DIASTOLIC BLOOD PRESSURE: 77 MMHG | HEIGHT: 66 IN | HEART RATE: 100 BPM | SYSTOLIC BLOOD PRESSURE: 129 MMHG

## 2018-02-26 PROCEDURE — 90853 GROUP PSYCHOTHERAPY: CPT

## 2018-02-26 PROCEDURE — A9270 NON-COVERED ITEM OR SERVICE: HCPCS | Mod: GY | Performed by: PSYCHIATRY & NEUROLOGY

## 2018-02-26 PROCEDURE — 25000132 ZZH RX MED GY IP 250 OP 250 PS 637: Mod: GY | Performed by: PSYCHIATRY & NEUROLOGY

## 2018-02-26 RX ORDER — DIVALPROEX SODIUM 500 MG/1
1000 TABLET, EXTENDED RELEASE ORAL AT BEDTIME
Qty: 60 TABLET | Refills: 0 | Status: SHIPPED | OUTPATIENT
Start: 2018-02-26 | End: 2018-03-19

## 2018-02-26 RX ORDER — ALBUTEROL SULFATE 90 UG/1
2 AEROSOL, METERED RESPIRATORY (INHALATION) 4 TIMES DAILY PRN
Qty: 1 INHALER | Refills: 0 | Status: SHIPPED | OUTPATIENT
Start: 2018-02-26 | End: 2019-07-03

## 2018-02-26 RX ORDER — LORAZEPAM 0.5 MG/1
1 TABLET ORAL 3 TIMES DAILY PRN
Qty: 30 TABLET | Refills: 0 | Status: ON HOLD | OUTPATIENT
Start: 2018-02-26 | End: 2018-03-16

## 2018-02-26 RX ORDER — OLANZAPINE 20 MG/1
20 TABLET ORAL AT BEDTIME
Qty: 30 TABLET | Refills: 0 | Status: ON HOLD | OUTPATIENT
Start: 2018-02-26 | End: 2018-04-02

## 2018-02-26 RX ADMIN — ACETAMINOPHEN 650 MG: 325 TABLET, FILM COATED ORAL at 13:09

## 2018-02-26 RX ADMIN — DIVALPROEX SODIUM 500 MG: 500 TABLET, EXTENDED RELEASE ORAL at 07:49

## 2018-02-26 NOTE — PROGRESS NOTES
Discharge teaching done. Pt denied SI. Pt disagreed with some of the DX. Pt will discuss with her provider at next appointment. Pt verbalized understanding of medication and out pt f/u TX plan. Belongings returned to pt. Medication filled here and sent home with pt. Pt escorted to a taxi by [facility] staff, Pt discharged to home.

## 2018-02-26 NOTE — DISCHARGE INSTRUCTIONS
Behavioral Discharge Planning and Instructions    Main Diagnosis: Unspecified bipolar and related disorder, rule out bipolar disorder type 1, current episode manic with psychotic features versus schizoaffective disorder, bipolar type, current episode manic with psychotic features.      Psychiatry Follow-up:     [facility]  [address]  [place]  [phone]  Fax [phone]  Intake appointment for Intensive Outpatient Program with Rola Ribeiro on Tuesday, February 27 at 1:30 PM, Please bring completed assessment packet with you to this appointment                       With PARISH Mckenzie, on Wednesday, March 7 at 3:30 PM    Resources:   Crisis Intervention: 538.407.2168 or [phone] (TTY: [phone]).  Call anytime for help.  National [place] on Mental Illness (www.[place].rula.org): 196.482.3907 or 502-479-0969.  Alcoholics Anonymous (www.alcoholics-anonymous.org): Check your phone book for your local chapter.  Suicide Awareness Voices of Education (SAVE) (www.save.org): 359-549-DEOO (8983)  National Suicide Prevention Line (www.mentalhealthmn.org): 627-359-WWKA (6113)  Mental Health Consumer/Survivor Network of [place] (www.mhcsn.net): 574.690.6971 or 912-024-1794  Mental Health Association of [place] (www.mentalhealth.org): 101.581.8647 or 070-931-8253    General Medication Instructions:   See your medication sheet(s) for instructions.   Take all medicines as directed.  Make no changes unless your doctor suggests them.   Go to all your doctor visits.  Be sure to have all your required lab tests. This way, your medicines can be refilled on time.  Do not use any drugs not prescribed by your doctor.  Avoid alcohol.

## 2018-02-26 NOTE — PLAN OF CARE
Problem: Manic Symptoms  Goal: Manic Symptoms  Signs and symptoms of listed problems will be absent or manageable.   Pt presented as calm in mood for the most part. During wrap-up group, her mood was elated and slightly inappropriate. Her friend visited, and brought pt's apartment keys. During 1:1 pt made grandiose statements. Requested tylenol at bed time for foot pain.

## 2018-02-27 ENCOUNTER — TRANSFERRED RECORDS (OUTPATIENT)
Dept: HEALTH INFORMATION MANAGEMENT | Facility: CLINIC | Age: 53
End: 2018-02-27

## 2018-03-05 NOTE — DISCHARGE SUMMARY
Admit Date:     02/16/2018   Discharge Date:     02/26/2018      DISCHARGE DIAGNOSES:   Axis I:  Bipolar I disorder, most recent episode manic with psychotic features.      REASON FOR REFERRAL:  This is a 52-year-old woman with self-reported history of bipolar disorder who has been off medication for over a year and is no longer following with her outpatient psychiatrist.  She presented in a psychotic and manic state to the emergency department at [facility] reporting chest pain.  She was medically cleared in the emergency department with no evidence of acute coronary syndrome.  She was admitted for psychiatric stabilization on a 72-hour hold.      CHIEF COMPLAINT:  Aixa.      HISTORY OF PRESENT ILLNESS:  I refer the reader to the psychiatric evaluation documented by Jefferson Dc MD, on 02/16/2018.      HOSPITAL COURSE:  Following admission to the mental health unit, the patient was noted to be quite psychotic and was therefore admitted to the Intensive Treatment Center.  She was given information about the unit and introduced to the milieu.  She was encouraged to ventilate her stressors.  She was advised of her current clinical state and encouraged to take medications to address her symptoms.  She was offered Zyprexa Zydis in the ED and so based on her response was encouraged to continue Zyprexa Zydis at 15 mg at bedtime while utilizing p.r.n. lorazepam to address her agitation and insomnia.  In the course of her hospitalization the patient displayed bouts of disorganized behavior and made several delusional statements, but she was cooperative and help-seeking.  On account of her aixa, she was started on Depakote  mg b.i.d. and her Olanzapine Zydis dose was optimized to 20 mg daily.  She tolerated the medications without any untoward effects.  She did have some bouts of agitation but she was always redirectable.  She did not endorse any self-harm thoughts, plans or intent and once  her behavior became more organized she was moved down to the step-down unit.  Her valproic acid level drawn on 2018 and was therapeutic at 83 mg per liter.  Urine drug screen done on admission was negative.  She soon began to participate in individual milieu and group therapy and was able to articulate a desire for ongoing treatment on an outpatient basis.  She requested to be referred to psychiatric providers and requested individual psychotherapy, and these were facilitated by the unit .  At the point of discharge she was devoid of self-harm thoughts, plans or intent and was future oriented.      DISCHARGE MEDICATIONS:   1.  Lorazepam 0.5 mg 1 t.i.d. p.r.n.   2.  ProAir inhaler 2 puffs q.i.d. p.r.n.   3.  Depakote  mg 2 p.o. each day at bedtime.   4.  Zyprexa 20 mg p.o. each day at bedtime.      DISPOSITION:  The patient was discharged to follow up at [facility] in [place] with PARISH Mckenzie, on  at 3:30 p.m. and with KENYA Hobbs, [facility], on Tuesday,  at 1:30 p.m.      TIME SPENT:  35 minutes with more than 50% of the time spent in counseling and care coordination.         FLORESITA DENTON MD             D: 2018   T: 2018   MT: MEL      Name:     FLORY JAQUEZ   MRN:      [id number]        Account:        [id number]   :      1965           Admit Date:     2018                                  Discharge Date: 2018      Document: [id number]

## 2018-03-11 ENCOUNTER — HOSPITAL ENCOUNTER (INPATIENT)
Facility: CLINIC | Age: 53
LOS: 5 days | Discharge: HOME | DRG: 885 | End: 2018-03-16
Attending: EMERGENCY MEDICINE | Admitting: PSYCHIATRY & NEUROLOGY
Payer: MEDICARE

## 2018-03-11 ENCOUNTER — TELEPHONE (OUTPATIENT)
Dept: BEHAVIORAL HEALTH | Facility: CLINIC | Age: 53
End: 2018-03-11

## 2018-03-11 DIAGNOSIS — F25.0 SCHIZOAFFECTIVE DISORDER, BIPOLAR TYPE (H): Primary | ICD-10-CM

## 2018-03-11 DIAGNOSIS — F25.9 SCHIZOAFFECTIVE DISORDER (H): ICD-10-CM

## 2018-03-11 LAB
ALBUMIN UR-MCNC: 30 MG/DL
AMPHETAMINES UR QL SCN: NEGATIVE
ANION GAP SERPL CALCULATED.3IONS-SCNC: 9 MMOL/L (ref 3–14)
APPEARANCE UR: ABNORMAL
BARBITURATES UR QL: NEGATIVE
BASOPHILS # BLD AUTO: 0 10E9/L (ref 0–0.2)
BASOPHILS NFR BLD AUTO: 0.2 %
BENZODIAZ UR QL: NEGATIVE
BILIRUB UR QL STRIP: NEGATIVE
BUN SERPL-MCNC: 7 MG/DL (ref 7–30)
CALCIUM SERPL-MCNC: 8.7 MG/DL (ref 8.5–10.1)
CANNABINOIDS UR QL SCN: NEGATIVE
CHLORIDE SERPL-SCNC: 104 MMOL/L (ref 94–109)
CO2 SERPL-SCNC: 27 MMOL/L (ref 20–32)
COCAINE UR QL: NEGATIVE
COLOR UR AUTO: YELLOW
CREAT SERPL-MCNC: 0.58 MG/DL (ref 0.52–1.04)
DIFFERENTIAL METHOD BLD: NORMAL
EOSINOPHIL # BLD AUTO: 0.2 10E9/L (ref 0–0.7)
EOSINOPHIL NFR BLD AUTO: 2.3 %
ERYTHROCYTE [DISTWIDTH] IN BLOOD BY AUTOMATED COUNT: 14.7 % (ref 10–15)
GFR SERPL CREATININE-BSD FRML MDRD: >90 ML/MIN/1.7M2
GLUCOSE SERPL-MCNC: 108 MG/DL (ref 70–99)
GLUCOSE UR STRIP-MCNC: NEGATIVE MG/DL
HCT VFR BLD AUTO: 38.3 % (ref 35–47)
HGB BLD-MCNC: 12.7 G/DL (ref 11.7–15.7)
HGB UR QL STRIP: ABNORMAL
IMM GRANULOCYTES # BLD: 0 10E9/L (ref 0–0.4)
IMM GRANULOCYTES NFR BLD: 0.3 %
KETONES UR STRIP-MCNC: 10 MG/DL
LEUKOCYTE ESTERASE UR QL STRIP: ABNORMAL
LYMPHOCYTES # BLD AUTO: 1.7 10E9/L (ref 0.8–5.3)
LYMPHOCYTES NFR BLD AUTO: 26.4 %
MCH RBC QN AUTO: 29.6 PG (ref 26.5–33)
MCHC RBC AUTO-ENTMCNC: 33.2 G/DL (ref 31.5–36.5)
MCV RBC AUTO: 89 FL (ref 78–100)
MONOCYTES # BLD AUTO: 0.6 10E9/L (ref 0–1.3)
MONOCYTES NFR BLD AUTO: 9.7 %
MUCOUS THREADS #/AREA URNS LPF: PRESENT /LPF
NEUTROPHILS # BLD AUTO: 4 10E9/L (ref 1.6–8.3)
NEUTROPHILS NFR BLD AUTO: 61.1 %
NITRATE UR QL: NEGATIVE
NRBC # BLD AUTO: 0 10*3/UL
NRBC BLD AUTO-RTO: 0 /100
OPIATES UR QL SCN: NEGATIVE
PCP UR QL SCN: NEGATIVE
PH UR STRIP: 6.5 PH (ref 5–7)
PLATELET # BLD AUTO: 369 10E9/L (ref 150–450)
POTASSIUM SERPL-SCNC: 3.3 MMOL/L (ref 3.4–5.3)
RBC # BLD AUTO: 4.29 10E12/L (ref 3.8–5.2)
RBC #/AREA URNS AUTO: 23 /HPF (ref 0–2)
SODIUM SERPL-SCNC: 140 MMOL/L (ref 133–144)
SOURCE: ABNORMAL
SP GR UR STRIP: 1.01 (ref 1–1.03)
SQUAMOUS #/AREA URNS AUTO: 3 /HPF (ref 0–1)
TRANS CELLS #/AREA URNS HPF: 2 /HPF (ref 0–1)
UROBILINOGEN UR STRIP-MCNC: NORMAL MG/DL (ref 0–2)
VALPROATE SERPL-MCNC: 69 MG/L (ref 50–100)
WBC # BLD AUTO: 6.5 10E9/L (ref 4–11)
WBC #/AREA URNS AUTO: 28 /HPF (ref 0–5)

## 2018-03-11 PROCEDURE — 80164 ASSAY DIPROPYLACETIC ACD TOT: CPT | Performed by: EMERGENCY MEDICINE

## 2018-03-11 PROCEDURE — 99207 ZZC MOONLIGHTING INDICATOR: CPT | Performed by: PHYSICIAN ASSISTANT

## 2018-03-11 PROCEDURE — 99222 1ST HOSP IP/OBS MODERATE 55: CPT | Performed by: PHYSICIAN ASSISTANT

## 2018-03-11 PROCEDURE — 90791 PSYCH DIAGNOSTIC EVALUATION: CPT

## 2018-03-11 PROCEDURE — 12400006 ZZH R&B MH INTERMEDIATE

## 2018-03-11 PROCEDURE — 85025 COMPLETE CBC W/AUTO DIFF WBC: CPT | Performed by: EMERGENCY MEDICINE

## 2018-03-11 PROCEDURE — 80048 BASIC METABOLIC PNL TOTAL CA: CPT | Performed by: EMERGENCY MEDICINE

## 2018-03-11 PROCEDURE — A9270 NON-COVERED ITEM OR SERVICE: HCPCS | Mod: GY | Performed by: PSYCHIATRY & NEUROLOGY

## 2018-03-11 PROCEDURE — 81001 URINALYSIS AUTO W/SCOPE: CPT | Performed by: EMERGENCY MEDICINE

## 2018-03-11 PROCEDURE — 87086 URINE CULTURE/COLONY COUNT: CPT | Performed by: EMERGENCY MEDICINE

## 2018-03-11 PROCEDURE — 99285 EMERGENCY DEPT VISIT HI MDM: CPT | Mod: 25

## 2018-03-11 PROCEDURE — 99207 ZZC CDG-MDM COMPONENT: MEETS MODERATE - UP CODED: CPT | Performed by: PHYSICIAN ASSISTANT

## 2018-03-11 PROCEDURE — 80307 DRUG TEST PRSMV CHEM ANLYZR: CPT | Performed by: EMERGENCY MEDICINE

## 2018-03-11 PROCEDURE — 25000132 ZZH RX MED GY IP 250 OP 250 PS 637: Mod: GY | Performed by: EMERGENCY MEDICINE

## 2018-03-11 PROCEDURE — A9270 NON-COVERED ITEM OR SERVICE: HCPCS | Mod: GY | Performed by: EMERGENCY MEDICINE

## 2018-03-11 PROCEDURE — 25000132 ZZH RX MED GY IP 250 OP 250 PS 637: Mod: GY | Performed by: PSYCHIATRY & NEUROLOGY

## 2018-03-11 RX ORDER — OLANZAPINE 10 MG/1
10 TABLET, ORALLY DISINTEGRATING ORAL EVERY 4 HOURS PRN
Status: DISCONTINUED | OUTPATIENT
Start: 2018-03-11 | End: 2018-03-16 | Stop reason: HOSPADM

## 2018-03-11 RX ORDER — OLANZAPINE 10 MG/1
20 TABLET ORAL AT BEDTIME
Status: DISCONTINUED | OUTPATIENT
Start: 2018-03-11 | End: 2018-03-16 | Stop reason: HOSPADM

## 2018-03-11 RX ORDER — DIVALPROEX SODIUM 500 MG/1
500 TABLET, EXTENDED RELEASE ORAL ONCE
Status: COMPLETED | OUTPATIENT
Start: 2018-03-11 | End: 2018-03-11

## 2018-03-11 RX ORDER — ALBUTEROL SULFATE 90 UG/1
2 AEROSOL, METERED RESPIRATORY (INHALATION) 4 TIMES DAILY PRN
Status: DISCONTINUED | OUTPATIENT
Start: 2018-03-11 | End: 2018-03-16 | Stop reason: HOSPADM

## 2018-03-11 RX ORDER — ASPIRIN 325 MG
650 TABLET ORAL EVERY 6 HOURS PRN
Status: DISCONTINUED | OUTPATIENT
Start: 2018-03-11 | End: 2018-03-16 | Stop reason: HOSPADM

## 2018-03-11 RX ORDER — CETIRIZINE HYDROCHLORIDE 10 MG/1
10 TABLET ORAL DAILY PRN
Status: DISCONTINUED | OUTPATIENT
Start: 2018-03-11 | End: 2018-03-16 | Stop reason: HOSPADM

## 2018-03-11 RX ORDER — HYDROXYZINE HYDROCHLORIDE 25 MG/1
25 TABLET, FILM COATED ORAL EVERY 4 HOURS PRN
Status: DISCONTINUED | OUTPATIENT
Start: 2018-03-11 | End: 2018-03-16 | Stop reason: HOSPADM

## 2018-03-11 RX ORDER — POTASSIUM CHLORIDE 1.5 G/1.58G
20-40 POWDER, FOR SOLUTION ORAL
Status: DISCONTINUED | OUTPATIENT
Start: 2018-03-11 | End: 2018-03-16 | Stop reason: HOSPADM

## 2018-03-11 RX ORDER — OLANZAPINE 10 MG/2ML
10 INJECTION, POWDER, FOR SOLUTION INTRAMUSCULAR EVERY 4 HOURS PRN
Status: DISCONTINUED | OUTPATIENT
Start: 2018-03-11 | End: 2018-03-16 | Stop reason: HOSPADM

## 2018-03-11 RX ORDER — POTASSIUM CHLORIDE 29.8 MG/ML
20 INJECTION INTRAVENOUS
Status: DISCONTINUED | OUTPATIENT
Start: 2018-03-11 | End: 2018-03-16 | Stop reason: HOSPADM

## 2018-03-11 RX ORDER — DIVALPROEX SODIUM 500 MG/1
1000 TABLET, EXTENDED RELEASE ORAL AT BEDTIME
Status: DISCONTINUED | OUTPATIENT
Start: 2018-03-11 | End: 2018-03-13

## 2018-03-11 RX ORDER — POTASSIUM CHLORIDE 7.45 MG/ML
10 INJECTION INTRAVENOUS
Status: DISCONTINUED | OUTPATIENT
Start: 2018-03-11 | End: 2018-03-16 | Stop reason: HOSPADM

## 2018-03-11 RX ORDER — ACETAMINOPHEN 325 MG/1
650 TABLET ORAL EVERY 8 HOURS PRN
Status: DISCONTINUED | OUTPATIENT
Start: 2018-03-11 | End: 2018-03-16 | Stop reason: HOSPADM

## 2018-03-11 RX ORDER — NICOTINE 21 MG/24HR
1 PATCH, TRANSDERMAL 24 HOURS TRANSDERMAL DAILY
Status: DISCONTINUED | OUTPATIENT
Start: 2018-03-11 | End: 2018-03-12 | Stop reason: CLARIF

## 2018-03-11 RX ORDER — POTASSIUM CL/LIDO/0.9 % NACL 10MEQ/0.1L
10 INTRAVENOUS SOLUTION, PIGGYBACK (ML) INTRAVENOUS
Status: DISCONTINUED | OUTPATIENT
Start: 2018-03-11 | End: 2018-03-16 | Stop reason: HOSPADM

## 2018-03-11 RX ORDER — POTASSIUM CHLORIDE 1500 MG/1
20 TABLET, EXTENDED RELEASE ORAL ONCE
Status: COMPLETED | OUTPATIENT
Start: 2018-03-11 | End: 2018-03-11

## 2018-03-11 RX ORDER — POTASSIUM CHLORIDE 1500 MG/1
20-40 TABLET, EXTENDED RELEASE ORAL
Status: DISCONTINUED | OUTPATIENT
Start: 2018-03-11 | End: 2018-03-16 | Stop reason: HOSPADM

## 2018-03-11 RX ORDER — CEPHALEXIN 500 MG/1
500 CAPSULE ORAL ONCE
Status: COMPLETED | OUTPATIENT
Start: 2018-03-11 | End: 2018-03-11

## 2018-03-11 RX ADMIN — OLANZAPINE 10 MG: 10 TABLET, ORALLY DISINTEGRATING ORAL at 15:20

## 2018-03-11 RX ADMIN — CEPHALEXIN 500 MG: 500 CAPSULE ORAL at 14:09

## 2018-03-11 RX ADMIN — POTASSIUM CHLORIDE 20 MEQ: 1500 TABLET, EXTENDED RELEASE ORAL at 14:10

## 2018-03-11 RX ADMIN — DIVALPROEX SODIUM 500 MG: 500 TABLET, EXTENDED RELEASE ORAL at 15:24

## 2018-03-11 RX ADMIN — NICOTINE 1 PATCH: 21 PATCH, EXTENDED RELEASE TRANSDERMAL at 15:24

## 2018-03-11 RX ADMIN — OLANZAPINE 20 MG: 10 TABLET, FILM COATED ORAL at 20:50

## 2018-03-11 RX ADMIN — DIVALPROEX SODIUM 1000 MG: 500 TABLET, EXTENDED RELEASE ORAL at 20:50

## 2018-03-11 ASSESSMENT — ENCOUNTER SYMPTOMS
COUGH: 1
BACK PAIN: 1
HALLUCINATIONS: 1

## 2018-03-11 NOTE — IP AVS SNAPSHOT
[place] [address] [place] [address]    Phone:  [phone]                                       After Visit Summary   3/11/2018    Fannie Jeter    MRN: [id number]           After Visit Summary Signature Page     I have received my discharge instructions, and my questions have been answered. I have discussed any challenges I see with this plan with the nurse or doctor.    ..........................................................................................................................................  Patient/Patient Representative Signature      ..........................................................................................................................................  Patient Representative Print Name and Relationship to Patient    ..................................................               ................................................  Date                                            Time    ..........................................................................................................................................  Reviewed by Signature/Title    ...................................................              ..............................................  Date                                                            Time

## 2018-03-11 NOTE — TELEPHONE ENCOUNTER
"S:Pt is a 51 yo female BIB ambulance to  ED for SI and delusional statements.    B: Discharged on the 5th from [facility], seen by Dr. Lujan. [facility] on 72 hour peace and officer hold. \" lives inside her\". References jenelle or kemi in speech. States that theres a conflict with  on the one side of her body and Kemi on the other. Pt cannot maintain goal directed conversations. Disorganized, delusional, pressured and tangential speech. Reported to be suicidal but pt denies.     A:     R:Tai/77  "

## 2018-03-11 NOTE — PHARMACY-ADMISSION MEDICATION HISTORY
Admission medication history interview status for the 3/11/2018  admission is complete. See EPIC admission navigator for prior to admission medications     Medication history source reliability:Moderate    Actions taken by pharmacist (provider contacted, etc):Verified Depakote and Zyprexa with patient's retail pharmacy - [facility]. Patient was not a good historian at time of interview but did know that she was taking Zyprexa and Dapakote. She states that she flushed her lorazepam down the toilet recently as she didn't want any controlled substances. Otherwise, patient states she is not taking any other medications on a regular daily basis.      Additional medication history information not noted on PTA med list :None    Medication reconciliation/reorder completed by provider prior to medication history? No    Time spent in this activity: 20 minutes    Prior to Admission medications    Medication Sig Last Dose Taking? Auth Provider   divalproex sodium extended-release (DEPAKOTE ER) 500 MG 24 hr tablet Take 2 tablets (1,000 mg) by mouth At Bedtime Past Week at pm Yes Nicole Lujan MD   OLANZapine (ZYPREXA) 20 MG tablet Take 1 tablet (20 mg) by mouth At Bedtime Past Week at pm Yes Nicole Lujan MD   cetirizine (ZYRTEC) 10 MG tablet Take 10 mg by mouth daily as needed  prn med Yes Reported, Patient   Polyethyl Glycol-Propyl Glycol (LUBRICANT EYE DROPS, PF, OP) Apply 1 drop to eye every hour as needed prn med Yes Reported, Patient   sodium chloride-sodium bicarb (NETI POT SINUS WASH) 2300-700 MG KIT 2 times daily as needed sinus wash/allergy season prn med Yes Vikas Acharya MD   aspirin 325 MG tablet Take 2 tablets by mouth every 6 hours as needed pain prn med Yes Reported, Patient   acetaminophen (TYLENOL) 650 MG CR tablet Take 650 mg by mouth every 8 hours as needed prn med Yes Reported, Patient   LORazepam (ATIVAN) 0.5 MG tablet Take 2 tablets (1 mg) by mouth 3 times daily as needed  for anxiety (Agitation, or insomnia) Unknown  Nicole Lujan MD   albuterol (PROAIR HFA/PROVENTIL HFA/VENTOLIN HFA) 108 (90 BASE) MCG/ACT Inhaler Inhale 2 puffs into the lungs 4 times daily as needed for other (dyspnea) prn med  Nicole Lujan MD   PROGESTERONE 100MG/G CREAM Apply 0.5 g topically 2 times daily as needed  prn med  Reported, Patient

## 2018-03-11 NOTE — H&P
[facility]    History and Physical  Hospitalist       Date of Admission:  3/11/2018       Assessment & Plan   Fannie Jeter is a 52 year old female with past medical history of allergic rhinitis, schizoaffective disorder, ADHD, Asperger syndrome and central serous retinopathy who was admitted for evaluation of psychosis.  Summary:    -Schizoaffective Disorder  -ADHD  -Asperger syndrome  -Psychosis   -Per psychiatry   -Patient responded to me with nonverbal answers    -Allergic rhinitis: Continue PTA zyrtec  -Central Serous Retinopathy: Continue lubricant eye drops  -Hypokalemia: Replace per protocol, value 3.3 earlier today  -Abnormal UA, T max 99.7. Denies any symptoms of UTI, Culture pending      # Pain Assessment:   Current Pain Score 2/25/2018 2/23/2018 2/21/2018   Patient currently in pain? denies denies yes   Pain score (0-10) - 3 3   Pain location - - Foot   Pain descriptors - - Aching   - Fannie is unable to participate in a collaborative plan for pain management due to underlying psychosis.        DVT Prophylaxis: Ambulate every shift  Code Status: Full Code    Disposition: Expected discharge per psych.  Hospitalist will sign off. Reviewed with RN. Will call if urine culture returns abnormal.    Sonali Griffiths PA-C  Pager [phone]     This patient was discussed with Dr. KILLIAN Reed of the Hospitalist Service who agrees with current plans as outlined above.     Primary Care Physician   [facility]    Chief Complaint   Medical Clearance    History is obtained from the patient, nurse and chart review    History of Present Illness   Fannie Jeter is a 52 year old female with past medical history of allergic rhinitis, schizoaffective disorder, ADHD, Asperger syndrome and central serous retinopathy who was admitted for evaluation of psychosis.      Please see ER note about further details regarding admission as she was brought in by a peace  officer.    The patient opted for non-verbal communication with me during me the interview.  She denies an chest pain/pressure, sob, dizziness, dysuria, urinary urgency, urinary frequency, cough, runny nose of fevers. She indicates she smokes but the chart does not indicate she uses nicotine.    Past Medical History    I have reviewed this patient's medical history and updated it with pertinent information if needed.   Past Medical History:   Diagnosis Date     ADHD (attention deficit hyperactivity disorder)      Asperger syndrome      Central serous retinopathy      HZO (herpes zoster oticus)     Left eye     Osteoarthritis      Scoliosis        Past Surgical History   I have reviewed this patient's surgical history and updated it with pertinent information if needed.  Past Surgical History:   Procedure Laterality Date     GYN SURGERY      d and c       Prior to Admission Medications   Prior to Admission Medications   Prescriptions Last Dose Informant Patient Reported? Taking?   LORazepam (ATIVAN) 0.5 MG tablet Unknown  No No   Sig: Take 2 tablets (1 mg) by mouth 3 times daily as needed for anxiety (Agitation, or insomnia)   OLANZapine (ZYPREXA) 20 MG tablet Past Week at pm  No Yes   Sig: Take 1 tablet (20 mg) by mouth At Bedtime   PROGESTERONE 100MG/G CREAM prn med  Yes No   Sig: Apply 0.5 g topically 2 times daily as needed    Polyethyl Glycol-Propyl Glycol (LUBRICANT EYE DROPS, PF, OP) prn med  Yes Yes   Sig: Apply 1 drop to eye every hour as needed   acetaminophen (TYLENOL) 650 MG CR tablet prn med  Yes Yes   Sig: Take 650 mg by mouth every 8 hours as needed   albuterol (PROAIR HFA/PROVENTIL HFA/VENTOLIN HFA) 108 (90 BASE) MCG/ACT Inhaler prn med  No No   Sig: Inhale 2 puffs into the lungs 4 times daily as needed for other (dyspnea)   aspirin 325 MG tablet prn med  Yes Yes   Sig: Take 2 tablets by mouth every 6 hours as needed pain   cetirizine (ZYRTEC) 10 MG tablet prn med  Yes Yes   Sig: Take 10 mg by mouth  daily as needed    divalproex sodium extended-release (DEPAKOTE ER) 500 MG 24 hr tablet Past Week at pm  No Yes   Sig: Take 2 tablets (1,000 mg) by mouth At Bedtime   sodium chloride-sodium bicarb (NETI POT SINUS WASH) 2300-700 MG KIT prn med  Yes Yes   Si times daily as needed sinus wash/allergy season      Facility-Administered Medications: None     Allergies   Allergies   Allergen Reactions     Animal Dander      Gluten Meal Diarrhea     Gas or constipation      Haldol Difficulty breathing     Haldol [Haloperidol]      Milk Protein Extract      No Clinical Screening - See Comments      Casien ~ unsure of spelling ~ is milk protien     Penicillins      Unsure of what happens. Has been told she has allergies since she was a kid.     Penicillins      Seasonal Allergies        Social History   I have reviewed this patient's social history and updated it with pertinent information if needed. Fannie Jeter  reports that she quit smoking about 8 years ago. Her smoking use included Cigarettes. She has never used smokeless tobacco. She reports that she does not drink alcohol or use illicit drugs.    Family History   Unable to review as patient would not answer questions    Review of Systems   Review of systems not obtained due to patient factors - confusion    Physical Exam   Temp: 99.7  F (37.6  C) Temp src: Oral BP: 100/60 Pulse: 95   Resp: 16 SpO2: 95 % O2 Device: None (Room air)    Vital Signs with Ranges  Temp:  [98.7  F (37.1  C)-99.7  F (37.6  C)] 99.7  F (37.6  C)  Pulse:  [] 95  Resp:  [16-26] 16  BP: (100-123)/(60-93) 100/60  SpO2:  [95 %] 95 %  190 lbs 0 oz    Constitutional: Alert, no acute distress, non verbal  HEENT: patent nares, supple neck  Respiratory: clear to auscultation  Cardiovascular: regular rate and rhythm, no murmurs, no edema  GI: soft, non tender, non distended, bowel sounds present  Lymph/Hematologic: no evidence of jaundice or bruising  Genitourinary: no suprapubic tenderness.  Wound not sit up in bed to assess for CVA tenderness  Skin: warm and dry, no rashes  Musculoskeletal: able to move all extremities, turn herself in bed without issue  Neurologic: no focal neurologic deficits noted but exam limited. Gait not tested  Psychiatric: affect normal, answers questions appropriately    Data   Data reviewed today:  I personally reviewed no images or EKG's today.    Recent Labs  Lab 03/11/18  1250   WBC 6.5   HGB 12.7   MCV 89         POTASSIUM 3.3*   CHLORIDE 104   CO2 27   BUN 7   CR 0.58   ANIONGAP 9   SARAY 8.7   *       Imaging:  No results found for this or any previous visit (from the past 24 hour(s)).

## 2018-03-11 NOTE — ED PROVIDER NOTES
"  History     Chief Complaint:  Psychiatric Evaluation      HPI   Fannie Jeter is a 52 year old female with a history of psychosis and schizoaffective disorder who presents to the emergency department today via EMS evaluation of psychiatric evaluation. EMS reports the patient was threatening to commit suicide via pills when she was found prompting her visit to the emergency department. At arrival, the patient is speaking intermittently in first person and third person including statements such as \"we are shameful\", \"she wants it like nataliya\", \"it hurt mama\", and \"she is speaking in oliveira code and it means 'I am safe, help me, I am scared'\". When asked about medical concerns, she states she has \"a piece of glass\" in her left foot for \"her whole life\". Additionally, she has a notable cough and notes back pain. When asked about alcohol or drug use, she reports she did have a sip of her grandfather's beer last night, but when asked he is dead. She denies suicidal ideation to me.     Allergies:  Animal Dander  Gluten Meal  Haldol [Haloperidol]  Milk Protein Extract  Penicillins  Seasonal Allergies    Medications:    LORazepam (ATIVAN) 0.5 MG tablet  albuterol (PROAIR HFA/PROVENTIL HFA/VENTOLIN HFA) 108 (90 BASE) MCG/ACT Inhaler  divalproex sodium extended-release (DEPAKOTE ER) 500 MG 24 hr tablet  OLANZapine (ZYPREXA) 20 MG tablet  omega-3 acid ethyl esters (LOVAZA) 1 G capsule  Nutritional Supplements (SILICA) 12.5 MG CAPS  Misc Natural Products (GLUCOS-CHONDROIT-MSM COMPLEX) TABS  cetirizine (ZYRTEC) 10 MG tablet  PROGESTERONE 100MG/G CREAM  Probiotic Product (PROBIOTIC DAILY PO)  Polyethyl Glycol-Propyl Glycol (LUBRICANT EYE DROPS, PF, OP)  melatonin 3 MG CAPS  sodium chloride-sodium bicarb (NETI POT SINUS WASH) 2300-700 MG KIT  aspirin 81 MG tablet  aspirin 325 MG tablet  acetaminophen (TYLENOL) 650 MG CR tablet  albuterol (PROAIR HFA/PROVENTIL HFA/VENTOLIN HFA) 108 (90 BASE) MCG/ACT Inhaler  nortriptyline " "(PAMELOR) 10 MG capsule    Past Medical History:    ADHD   Asperger syndrome  Central serous retinopathy  HZO  Osteoarthritis  Scoliosis  Psychosis  Schizoaffective disorder    Past Surgical History:    History reviewed. No pertinent past surgical history.    Family History:    Arthritis  Cerebrovascular disease    Social History:  The patient was alone.  Smoking Status: Former  Smokeless Tobacco: Never  Alcohol Use: No  Marital Status:      Review of Systems   Respiratory: Positive for cough.    Musculoskeletal: Positive for back pain.   Psychiatric/Behavioral: Positive for hallucinations. Negative for suicidal ideas.   All other systems reviewed and are negative.      Physical Exam   First Vitals:  BP: (!) 123/93  Pulse: 104  Temp: 98.7  F (37.1  C)  Resp: 26  Height: 167.6 cm (5' 6\")  Weight: 86.2 kg (190 lb)  SpO2: 95 %      Physical Exam  Eyes:  The pupils are equal and round    Conjunctivae and sclerae are normal  ENT:    The nose is normal    Pinnae are normal    The oropharynx is normal  CV:  Regular rate and rhythm     No edema  Resp:  Lungs are clear    Non-labored    No rales    No wheezing   MS:  Normal muscular tone    No asymmetric leg swelling  Skin:  No rash or acute skin lesions noted  Neuro:   Awake, alert.      Speech is normal and fluent.    Face is symmetric.     Moves all extremities  Psych:  Pressured speech. Speaks to self in third person. Tangential thoughts. Delusions present. Denies suicidal ideation    Emergency Department Course   Laboratory:  Laboratory findings were communicated with the patient who voiced understanding of the findings.  CBC: WNL. (WBC 6.5, HGB 12.7, )   BMP: potassium 3.3 (L), glucose 108 (H) o/w WNL (Creatinine 0.58)  Drug abuse screen 77 urine: Negative  UA: slightly cloudy, yellow urine, ketones 10, blood trace, protein albumin 30, RBC 23 (H), WBC 28 (H), transitional epi 2 (H), mucous present o/w WNL  Valproic acid (Depakote level): 69    Urine " Culture Aerobic Bacterial: Pending    Interventions:  1409 Keflex 500 mg PO  1410 Potassium chloride 20 mEq PO    Emergency Department Course:  Nursing notes and vitals reviewed.  1230: I performed an exam of the patient as documented above.   1339: I spoke with DEC service regarding patient's presentation, findings, and plan of care.  Findings and plan explained to the Patient who consents to admission. Patient will be admitted to the mental health floor.  I personally reviewed the laboratory results with the Patient and answered all related questions prior to admission.    Impression & Plan    Medical Decision Making:  Fannie Jeter is a 52 year old female who presents to the emergency department with abnormal behavior. She was brought here after being called for a welfare check. She was talking in and out of third and first person. Her history is very difficult to follow. She is quite tangential and seems to have some delusions and odd behavior. Paramedics report that she had threatened to harm herself by taking pills, but she denies that here. She denies other concerns. She denies any drug or alcohol use except for drinking her grandfather's beer yesterday. Her grandfather has been  for quite some time. She had a laboratory work up obtained and it does show some evidence of possible urinary tract infection. We will treat with Keflex. This appears to be acute cystitis as her white blood cell count is normal and she is afebrile. Depakote level was obtained as well as urine drug screen which was negative. Patient is currently willing to stay in the hospital. So, we will hopefully be admitting her voluntarily. Admission to mental health facility is pending at time of sign out.     Diagnosis:    ICD-10-CM    1. Schizoaffective disorder (H) F25.9        Disposition:  Admitted to mental health bed bed with Dr. Millard    Scribnaina Disclosure:  Sinan EISENBERG, am serving as a scribe at 12:28 PM on  3/11/2018 to document services personally performed by Kurtis Castellano MD based on my observations and the provider's statements to me.     3/11/2018    EMERGENCY DEPARTMENT       Kurtsi Castellano MD  03/11/18 0304

## 2018-03-11 NOTE — PROGRESS NOTES
Patient is not currently on a 72 hold but was brought in on a  Hold by KILLIAN Morales file #[id number] of the [place] [employer] [phone]. This department will need to be called when a discharge order is placed and before the discharge and then fully documented in Epic.

## 2018-03-11 NOTE — ED NOTES
Bed: [facility]  Expected date:   Expected time:   Means of arrival:   Comments:  jennifer - 52 F psych eval on hold eta 1219

## 2018-03-11 NOTE — PROGRESS NOTES
"Fannie is yelling and screaming on arrival to the unit.  She tells me she is a reptilian and should be respected.   She is delusional , stated \" Someone called the police and my place is a mess, they do not understand I am grieving the death of my brother \" , he is living inside of her.  She can not concentrate, hyper verbal, tangential , flight of ideas.  Her speech fluctuates between the 1st and 3rd person \" we are going to hurt you\"  Denies any thoughts of suicide, contracts for safety.  She is concerned about her cat ' there is no one to take care of my cat\"  "

## 2018-03-11 NOTE — IP AVS SNAPSHOT
MRN:[id number]                      After Visit Summary   3/11/2018    Fannie Jeter    MRN: [id number]           Thank you!     Thank you for choosing [facility] for your care. Our goal is always to provide you with excellent care.        Patient Information     Date Of Birth          1965        Designated Caregiver       Most Recent Value    Caregiver    Will someone help with your care after discharge? no      About your hospital stay     You were admitted on:  March 11, 2018 You last received care in the:  [facility]    You were discharged on:  March 16, 2018       Who to Call     For medical emergencies, please call 911.  For non-urgent questions about your medical care, please call your primary care provider or clinic, [phone]          Attending Provider     Provider Specialty    Kurtis Castellano MD Emergency Medicine    [facility], Nicole Hall MD Psychiatry       Primary Care Provider Office Phone # Fax #    [facility] [phone]      Further instructions from your care team       Behavioral Discharge Planning and Instructions    Summary: Admitted to hospital with suicidal ideation and disorganized thought process.    Main Diagnosis:  Bipolar 1 Disorder, current episode manic; Rule out Schizoaffective disorder, bipolar type    Major Treatments, Procedures and Findings: Psychiatric assessment     Symptoms to Report: Feeling more agitated, Increased confusion, Losing more sleep, Mood getting worse or Thoughts of suicide    Lifestyle Adjustment: Develop and follow safety plan; follow up with therapy and medication management as directed.    Psychiatry Follow-up:     PARISH Mckenzie, for medication management- appointment on Tuesday 4/3/18 @11 am.  [facility]- appointment on Friday 3/30/18 @ 11 am.    [facility] [address], [address]  [place]   "[address]  Phone:  [phone]  Fax:  [phone]    May Aceves,  with [facility]- [phone]    Resources:    [facility]- [phone]   Crisis Intervention: [phone] or 205-925-3347 (TTY: [phone]).  Call anytime for help.  National [place] on Mental Illness (www.[place].rula.org): 108.989.2455 or 120-440-3651.  National Suicide Prevention Line (www.mentalhealthmn.org): 000-820-OXQE (2336)    General Medication Instructions:   See your medication sheet(s) for instructions.   Take all medicines as directed.  Make no changes unless your doctor suggests them.   Go to all your doctor visits.  Be sure to have all your required lab tests. This way, your medicines can be refilled on time.  Do not use any drugs not prescribed by your doctor.  Avoid alcohol.      Pending Results     No orders found from 3/9/2018 to 3/12/2018.            Statement of Approval     Ordered          03/16/18 1113  I have reviewed and agree with all the recommendations and orders detailed in this document.  EFFECTIVE NOW     Approved and electronically signed by:  Nicole Lujan MD             Admission Information     Date & Time Provider Department Dept. Phone    3/11/2018 Nicole Lujan MD [facility] [phone]      Your Vitals Were     Blood Pressure Pulse Temperature Respirations Height Weight    133/101 102 99.4  F (37.4  C) (Oral) 16 1.676 m (5' 6\") 87.4 kg (192 lb 11.2 oz)    Last Period Pulse Oximetry BMI (Body Mass Index)             07/03/2014 95% 31.1 kg/m2         MyChart Information     [employer] gives you secure access to your electronic health record. If you see a primary care provider, you can also send messages to your care team and make appointments. If you have questions, please call your primary care clinic.  If you do not have a primary care provider, please call [phone] and they will assist you.        Care EveryWhere ID     " This is your Care EveryWhere ID. This could be used by other organizations to access your [facility] medical records  [id number]        Equal Access to Services     BUDDY WATSON : Hadii yvonne Richter, wajakeda whitimanha, dewayne kaciroda ede, nelsy herbertin hayaacady leejoey maxwell [facility] [phone].    ATENCIÓN: Si habla español, tiene a lucas disposición servicios gratuitos de asistencia lingüística. Llame al [phone].    We comply with applicable federal civil rights laws and [place] laws. We do not discriminate on the basis of race, color, national origin, age, disability, sex, sexual orientation, or gender identity.               Review of your medicines      START taking        Dose / Directions    paliperidone 6 MG 24 hr tablet   Commonly known as:  INVEGA   Used for:  Schizoaffective disorder, bipolar type (H)        Dose:  6 mg   Start taking on:  3/17/2018   Take 1 tablet (6 mg) by mouth daily   Quantity:  30 tablet   Refills:  0         CONTINUE these medicines which may have CHANGED, or have new prescriptions. If we are uncertain of the size of tablets/capsules you have at home, strength may be listed as something that might have changed.        Dose / Directions    * divalproex sodium extended-release 500 MG 24 hr tablet   Commonly known as:  DEPAKOTE ER   This may have changed:  Another medication with the same name was added. Make sure you understand how and when to take each.        Dose:  1000 mg   Take 2 tablets (1,000 mg) by mouth At Bedtime   Quantity:  60 tablet   Refills:  0       * divalproex sodium extended-release 500 MG 24 hr tablet   Commonly known as:  DEPAKOTE ER   This may have changed:  You were already taking a medication with the same name, and this prescription was added. Make sure you understand how and when to take each.   Used for:  Schizoaffective disorder, bipolar type (H)        Dose:  500 mg   Start taking on:  3/17/2018   Take 1 tablet (500 mg) by mouth  daily   Quantity:  30 tablet   Refills:  0       * Notice:  This list has 2 medication(s) that are the same as other medications prescribed for you. Read the directions carefully, and ask your doctor or other care provider to review them with you.      CONTINUE these medicines which have NOT CHANGED        Dose / Directions    acetaminophen 650 MG CR tablet   Commonly known as:  TYLENOL        Dose:  650 mg   Take 650 mg by mouth every 8 hours as needed   Refills:  0       albuterol 108 (90 BASE) MCG/ACT Inhaler   Commonly known as:  PROAIR HFA/PROVENTIL HFA/VENTOLIN HFA   Used for:  Mild intermittent asthma without complication        Dose:  2 puff   Inhale 2 puffs into the lungs 4 times daily as needed for other (dyspnea)   Quantity:  1 Inhaler   Refills:  0       aspirin 325 MG tablet        Dose:  2 tablet   Take 2 tablets by mouth every 6 hours as needed pain   Refills:  0       cetirizine 10 MG tablet   Commonly known as:  zyrTEC        Dose:  10 mg   Take 10 mg by mouth daily as needed   Refills:  0       LUBRICANT EYE DROPS (PF) OP        Dose:  1 drop   Apply 1 drop to eye every hour as needed   Refills:  0       NETI POT SINUS WASH 2300-700 MG Kit   Used for:  Seasonal allergic rhinitis, unspecified allergic rhinitis trigger   Generic drug:  sodium chloride-sodium bicarb        2 times daily as needed sinus wash/allergy season   Refills:  0       OLANZapine 20 MG tablet   Commonly known as:  zyPREXA        Dose:  20 mg   Take 1 tablet (20 mg) by mouth At Bedtime   Quantity:  30 tablet   Refills:  0       PROGESTERONE 100MG/G CREAM        Dose:  0.5 g   Apply 0.5 g topically 2 times daily as needed   Refills:  0         STOP taking     LORazepam 0.5 MG tablet   Commonly known as:  ATIVAN                Where to get your medicines      These medications were sent to [place] [id number] [place] [facility] - [address]  [address] [place] [address]     Phone:  [phone]     divalproex sodium  extended-release 500 MG 24 hr tablet    paliperidone 6 MG 24 hr tablet                Protect others around you: Learn how to safely use, store and throw away your medicines at www.disposemymeds.org.             Medication List: This is a list of all your medications and when to take them. Check marks below indicate your daily home schedule. Keep this list as a reference.      Medications           Morning Afternoon Evening Bedtime As Needed    acetaminophen 650 MG CR tablet   Commonly known as:  TYLENOL   Take 650 mg by mouth every 8 hours as needed                                   albuterol 108 (90 BASE) MCG/ACT Inhaler   Commonly known as:  PROAIR HFA/PROVENTIL HFA/VENTOLIN HFA   Inhale 2 puffs into the lungs 4 times daily as needed for other (dyspnea)   Last time this was given:  2 puffs on 3/15/2018  3:19 AM                                   aspirin 325 MG tablet   Take 2 tablets by mouth every 6 hours as needed pain   Last time this was given:  650 mg on 3/12/2018  3:58 AM                                   cetirizine 10 MG tablet   Commonly known as:  zyrTEC   Take 10 mg by mouth daily as needed                                   * divalproex sodium extended-release 500 MG 24 hr tablet   Commonly known as:  DEPAKOTE ER   Take 2 tablets (1,000 mg) by mouth At Bedtime   Last time this was given:  500 mg on 3/16/2018  8:25 AM                                   * divalproex sodium extended-release 500 MG 24 hr tablet   Commonly known as:  DEPAKOTE ER   Take 1 tablet (500 mg) by mouth daily   Start taking on:  3/17/2018   Last time this was given:  500 mg on 3/16/2018  8:25 AM                                   LUBRICANT EYE DROPS (PF) OP   Apply 1 drop to eye every hour as needed                                   NETI POT SINUS WASH 2300-700 MG Kit   2 times daily as needed sinus wash/allergy season   Generic drug:  sodium chloride-sodium bicarb                                OLANZapine 20 MG tablet   Commonly known  as:  zyPREXA   Take 1 tablet (20 mg) by mouth At Bedtime   Last time this was given:  20 mg on 3/15/2018  8:44 PM                                   paliperidone 6 MG 24 hr tablet   Commonly known as:  INVEGA   Take 1 tablet (6 mg) by mouth daily   Start taking on:  3/17/2018   Last time this was given:  6 mg on 3/16/2018  8:25 AM                                   PROGESTERONE 100MG/G CREAM   Apply 0.5 g topically 2 times daily as needed                                   * Notice:  This list has 2 medication(s) that are the same as other medications prescribed for you. Read the directions carefully, and ask your doctor or other care provider to review them with you.

## 2018-03-12 LAB
BACTERIA SPEC CULT: NORMAL
Lab: NORMAL
POTASSIUM SERPL-SCNC: 4.2 MMOL/L (ref 3.4–5.3)
SPECIMEN SOURCE: NORMAL

## 2018-03-12 PROCEDURE — 12400006 ZZH R&B MH INTERMEDIATE

## 2018-03-12 PROCEDURE — A9270 NON-COVERED ITEM OR SERVICE: HCPCS | Mod: GY | Performed by: PSYCHIATRY & NEUROLOGY

## 2018-03-12 PROCEDURE — 25000132 ZZH RX MED GY IP 250 OP 250 PS 637: Mod: GY | Performed by: PSYCHIATRY & NEUROLOGY

## 2018-03-12 PROCEDURE — 84132 ASSAY OF SERUM POTASSIUM: CPT | Performed by: PSYCHIATRY & NEUROLOGY

## 2018-03-12 PROCEDURE — 36415 COLL VENOUS BLD VENIPUNCTURE: CPT | Performed by: PSYCHIATRY & NEUROLOGY

## 2018-03-12 PROCEDURE — 90791 PSYCH DIAGNOSTIC EVALUATION: CPT

## 2018-03-12 PROCEDURE — 99231 SBSQ HOSP IP/OBS SF/LOW 25: CPT | Performed by: NURSE PRACTITIONER

## 2018-03-12 RX ORDER — PALIPERIDONE 6 MG/1
6 TABLET, EXTENDED RELEASE ORAL DAILY
Status: DISCONTINUED | OUTPATIENT
Start: 2018-03-12 | End: 2018-03-16 | Stop reason: HOSPADM

## 2018-03-12 RX ADMIN — NICOTINE POLACRILEX 2 MG: 2 GUM, CHEWING ORAL at 13:57

## 2018-03-12 RX ADMIN — ACETAMINOPHEN 650 MG: 325 TABLET, FILM COATED ORAL at 03:57

## 2018-03-12 RX ADMIN — OLANZAPINE 20 MG: 10 TABLET, FILM COATED ORAL at 21:25

## 2018-03-12 RX ADMIN — NICOTINE POLACRILEX 4 MG: 2 GUM, CHEWING ORAL at 19:55

## 2018-03-12 RX ADMIN — OLANZAPINE 10 MG: 10 TABLET, ORALLY DISINTEGRATING ORAL at 16:13

## 2018-03-12 RX ADMIN — DIVALPROEX SODIUM 1000 MG: 500 TABLET, EXTENDED RELEASE ORAL at 21:25

## 2018-03-12 RX ADMIN — NICOTINE POLACRILEX 2 MG: 2 GUM, CHEWING ORAL at 14:02

## 2018-03-12 RX ADMIN — ASPIRIN 325 MG ORAL TABLET 650 MG: 325 PILL ORAL at 03:58

## 2018-03-12 RX ADMIN — HYDROXYZINE HYDROCHLORIDE 25 MG: 25 TABLET ORAL at 03:54

## 2018-03-12 RX ADMIN — OLANZAPINE 10 MG: 10 TABLET, ORALLY DISINTEGRATING ORAL at 03:54

## 2018-03-12 RX ADMIN — PALIPERIDONE 6 MG: 6 TABLET, EXTENDED RELEASE ORAL at 12:37

## 2018-03-12 ASSESSMENT — ACTIVITIES OF DAILY LIVING (ADL)
DRESS: INDEPENDENT
ORAL_HYGIENE: INDEPENDENT
LAUNDRY: WITH SUPERVISION
GROOMING: INDEPENDENT

## 2018-03-12 NOTE — PLAN OF CARE
Problem: Patient Care Overview  Goal: Team Discussion  Team Plan:   Outcome: No Change  BEHAVIORAL TEAM DISCUSSION    Participants: Dr. Lujan, Case management, Nursing staff, Psych associates  Progress: Patient remains very disorganized and displays psychotic thinking.   Continued Stay Criteria/Rationale: Patient needs further stabilization.  Medical/Physical: n/a  Precautions:   Behavioral Orders   Procedures     Code 1 - Restrict to Unit     Routine Programming     As clinically indicated     Status 15     Every 15 minutes.     Plan: Medication changes. Patient needs further stabilization.  Rationale for change in precautions or plan: Patient improving but needs further stabilization.

## 2018-03-12 NOTE — H&P
"Admitted:     03/11/2018      IDENTIFYING DATA AND REASON FOR REFERRAL:  Fannie Jeter is a 52-year-old woman who resides alone in an apartment here in [place].  She was just discharged from this unit on 02/26/2018 following a 10-day hospitalization on account of cyndi.  She presented to the ED here at [facility] on account of foot pain but was found to be very disorganized and considered a danger to herself.  She was admitted as a voluntary patient.  Information was gathered through direct patient contact as well as chart review.      CHIEF COMPLAINT:  \"My therapist said it was okay for me to have Prinzide at my side.\"      HISTORY OF PRESENT ILLNESS:  Fannie Jeter has an established history of mental illness.  She was last assessed as having a manic episode and was started on mood stabilizers and antipsychotics.  She was followed up at [facility] where she was seen by Nayely Anderson PA-C, and KENYA Hobbs, [facility].  She continued to present as manic and psychotic on an outpatient basis and her medications reportedly were adjusted.  On direct interview, the patient reports that she trashed her apartment because she wants to be a .  She tells me that her apartment looks like the hotel room of a .  She is very grandiose and very disorganized.  She states \"I am 52 and I don't have much time left.  I'm almost in the [employer] club and I want to be a .   was a god in this world and he swooped down and decided to give me his heart, so everyday I praise to him to help me get through each day.\"  The patient is very preoccupied with being a  and she makes very illogical statements.  She is grandiose but denies self-harm thoughts.  In the ED, she reportedly had mentioned thoughts of harming herself.  She was described as speaking both in third person and in first.  She made statements such as \"We are shameful,\" she \"wants it like " "[place],\" and she is picking a [place] code and it means \"I am safe, help me, I am scared.\"  When she was asked about her medical concerns, she reported that she has had a piece of glass in her left foot for her whole life.  On direct interview, the patient reports that she started dancing again and \"I get rid of the pain\" in her right foot and claims she has a stress fracture in her left foot.  She states \"Honestly I feel like people can see through my eyes.\"  She denies the presence of self-harm thoughts, plans, or intent.  She does not endorse any homicidal ideations.  She denies anxiety spectrum symptoms.  She does not endorse current use of alcohol or illicit chemicals, but she did report to the ED that she had a sip of her grandfather's beer last night.  Urine toxicology screen on admission was negative as it was during her last hospitalization.      PAST PSYCHIATRIC HISTORY:  The patient has had previous psychiatric hospitalizations and had been on different antipsychotic medications.  She was discharged from this unit on a combination of Depakote ER and Zyprexa with p.r.n. lorazepam.  It is unclear if she has ever been civilly committed.      CHEMICAL USE HISTORY:  None reported.      PAST MEDICAL HISTORY:  Allergic rhinitis, chronic serious retinopathy.      PAST SURGICAL HISTORY:  History of D and C at some point.      MEDICATIONS PRIOR TO ADMISSION:   1.  Depakote  mg 2 p.o. each day at bedtime.   2.  Zyprexa 20 mg p.o. each day at bedtime.   3.  Zyrtec 10 mg p.o. daily p.r.n.   4.  Artificial Tears 1 drop every hour as needed.   5.  Neti-Pot sinus wash 2 times daily as needed.   6.  Aspirin 325 mg 2 p.o. q.6 h. p.r.n.   7.  Tylenol 650 mg q.8 h. p.r.n.   8.  Lorazepam 0.5 mg 2 p.o. t.i.d. p.r.n. anxiety.   9.  Albuterol 2 puffs q.4 h daily as needed for dyspnea or shortness of breath.   10.  Progesterone 100 mg/gram apply 0.5 mg topically 2 times daily as needed.      ALLERGIES:  ANIMAL DANDER, " GLUTEN MEAL, HALOPERIDOL, MILK PROTEIN, PENICILLINS.      FAMILY PSYCHIATRIC HISTORY:  None reported.      SOCIAL HISTORY:  The patient reports she is single.  She grew up in [place].  She is currently unemployed.  She denies  or criminal history.      REVIEW OF SYSTEMS:  A 10-point review of systems was negative apart from the pertinent positives in the history of present illness.      VITAL SIGNS:  Blood pressure 120/66, pulse 120, respirations 17, temperature 97.9.  Weight 190 pounds.      MENTAL STATUS EXAMINATION:  This is a middle-aged woman who appears her stated age.  She is dressed in hospital scrubs and ambulates with the aid of a walker.  She makes good eye contact and speaks in a rapid manner but not pressured.  She is redirectable.  Her mood is expansive and her affect is labile.  Her thought process is loosely associated and she is very grandiose.  She is preoccupied with the idea of being a  and believes that she somehow has been impacted by Jasiel.  She talks about herself in the third person and believes she has special de la fuente.  She is also religiously focused.  Her gait and station are normal on account of reported pain in her foot.  She is alert and oriented to time, place and person.  Fund of knowledge is adequate.  Risk assessment at this time is considered high.  Her attention and concentration are poor.  She displays poor insight and judgment.      DIAGNOSTIC IMPRESSION:  Fannie is a 52-year-old single woman who presented to the hospital on account of foot pain and was evaluated as being psychotic and in need of hospitalization.  She is currently admitted as a voluntary patient.  She is very disorganized in her presentation, even though she claims she has been compliant with her prescribed medications.      ADMITTING DIAGNOSES:   1.  Bipolar I disorder, current episode manic with psychotic features.  2.  Rule out schizoaffective disorder, bipolar type in acute phase.   2.   Reactive airway disease, unspecified.      PLAN:  The patient will be [facility].  She will be recommenced on psychotropic medications.  It is unclear if she has been compliant with prescribed olanzapine.  She had never been on Latuda or Invega in the past.  Treatment options will be discussed with her and the goal will be to stabilize her mood and improve her thought process before she is discharged back to the community.  So far, she is cooperative and remains a voluntary patient.  Estimated length of stay 4-7 days.         FLORESITA DENTON MD             D: 2018   T: 2018   MT: CC      Name:     FLORY JAQUEZ   MRN:      [id number]        Account:      [id number]   :      1965        Admitted:     2018                   Document: [id number]       cc: Nayely Anderson PA-C

## 2018-03-12 NOTE — PROGRESS NOTES
Hospitalist Progress Note  3/12/2018  [id number]    Called to evaluate left heel pain.     A: No irregularities, swelling or calf pain. Difficult assessment, patient is psychotic. Fortunately, the patient has been worked up for this in the past (Feb 2018) as this is an ongoing issue. Imaging demonstrates a Benign traction spur noted off the plantar fascial attachment site. The patient has not been treated for this yet, which explains the patient's ongoing pain.     P: Recommend rest, ice, ibuprofen PRN, and PT consult to help patient develop an exercise program to help pain (if patient allows)  -- If the patient is not amenable, she should follow up at her own convenience in the outpatient setting.     Addendum: Ibuprofen is cautioned in combination with patient's psych med regimen. Please start with tylenol (which is ordered) and the above plan.       SAMUEL Landry [facility]  Hospitalist Service  Pager: [phone] (7s - 6p)

## 2018-03-12 NOTE — PROGRESS NOTES
Patient's mother called back regarding the care of the patient's cat. She stated that there is no one to take her cat for a prolonged period of time and that The cat would most likely have to be euthanized. She relayed this information to patient on the phone. At this point, the patient does not have a reaction to this news.

## 2018-03-12 NOTE — PROGRESS NOTES
Patient given first dose of Invega. Patient given education material and encouraged to read and ask questions. Patient reported she understood.

## 2018-03-12 NOTE — PLAN OF CARE
Problem: Psychotic Symptoms  Goal: Psychotic Symptoms  Signs and symptoms of listed problems will be absent or manageable.    Pt. remains very psychotic. Delusional-talking about people inside of her body talking for her. Rambling on about  people eating babies/human flesh. Very labile-screaming in fear to laughing uncontrollably. Very hyperactive- dancing and singing in lounge.  Anger outbursts which are short and she typically apologizes soon after.  Taking fluids and voiding. Ate dinner and snacks. Medication compliant.

## 2018-03-12 NOTE — PLAN OF CARE
"Problem: Psychotic Symptoms  Goal: Psychotic Symptoms  Signs and symptoms of listed problems will be absent or manageable.    Outcome: No Change  Presented as labile and pressured. She was rambling, tangential, and disorganized. Seemed to respond well to one on one attention. Was walking with a walker at the beginning of the shift. Stated that she had a piece of glass in her foot. Staff did not see any irregularity. Hopitalist consulted for this. Is worried about her cat. Staff called her mother about her concern. The mother said that she will try to organize something to take care of the cat. At one point, patient burst into tears and said, \"I need my inhaler, don't you people care?\" Albuterol inhaler was ordered but patient did not want it by that time. Took a short nap after lunch. Requested to leave.  Spoke with her and stated she was to be put on a hold if she demanded to leave. Took a shower, is eating and drinking well.       "

## 2018-03-13 PROCEDURE — 12400006 ZZH R&B MH INTERMEDIATE

## 2018-03-13 PROCEDURE — 25000132 ZZH RX MED GY IP 250 OP 250 PS 637: Mod: GY | Performed by: PSYCHIATRY & NEUROLOGY

## 2018-03-13 PROCEDURE — A9270 NON-COVERED ITEM OR SERVICE: HCPCS | Mod: GY | Performed by: PSYCHIATRY & NEUROLOGY

## 2018-03-13 PROCEDURE — 40000893 ZZH STATISTIC PT IP EVAL DEFER

## 2018-03-13 RX ORDER — DIVALPROEX SODIUM 500 MG/1
500 TABLET, EXTENDED RELEASE ORAL DAILY
Status: DISCONTINUED | OUTPATIENT
Start: 2018-03-13 | End: 2018-03-16 | Stop reason: HOSPADM

## 2018-03-13 RX ORDER — DIVALPROEX SODIUM 500 MG/1
1500 TABLET, EXTENDED RELEASE ORAL AT BEDTIME
Status: DISCONTINUED | OUTPATIENT
Start: 2018-03-13 | End: 2018-03-16 | Stop reason: HOSPADM

## 2018-03-13 RX ADMIN — HYDROXYZINE HYDROCHLORIDE 25 MG: 25 TABLET ORAL at 23:38

## 2018-03-13 RX ADMIN — DIVALPROEX SODIUM 500 MG: 500 TABLET, EXTENDED RELEASE ORAL at 11:42

## 2018-03-13 RX ADMIN — NICOTINE POLACRILEX 4 MG: 2 GUM, CHEWING ORAL at 13:51

## 2018-03-13 RX ADMIN — ALBUTEROL SULFATE 2 PUFF: 90 AEROSOL, METERED RESPIRATORY (INHALATION) at 09:42

## 2018-03-13 RX ADMIN — PALIPERIDONE 6 MG: 6 TABLET, EXTENDED RELEASE ORAL at 07:34

## 2018-03-13 RX ADMIN — OLANZAPINE 20 MG: 10 TABLET, FILM COATED ORAL at 22:13

## 2018-03-13 RX ADMIN — HYDROXYZINE HYDROCHLORIDE 25 MG: 25 TABLET ORAL at 02:19

## 2018-03-13 RX ADMIN — DIVALPROEX SODIUM 1500 MG: 500 TABLET, EXTENDED RELEASE ORAL at 22:13

## 2018-03-13 RX ADMIN — NICOTINE POLACRILEX 4 MG: 2 GUM, CHEWING ORAL at 11:31

## 2018-03-13 RX ADMIN — OLANZAPINE 10 MG: 10 TABLET, ORALLY DISINTEGRATING ORAL at 13:51

## 2018-03-13 ASSESSMENT — ACTIVITIES OF DAILY LIVING (ADL)
ORAL_HYGIENE: INDEPENDENT
GROOMING: INDEPENDENT
DRESS: INDEPENDENT
LAUNDRY: WITH SUPERVISION

## 2018-03-13 NOTE — PROGRESS NOTES
"[facility] Psychiatric Progress Note      Interval History:   Pt seen, team meeting held with , nursing staff, OT, and PA's to review diagnosis and treatment plan.  Patient remains very psychotic and disorganized.  She is emotionally labile.  She talks about herself in the third person and is very preoccupied with the  artist  whom she stays \"I have trapped in my heart and we have been having this tussle back and forth about who is going to say what.\"  Staff report that she has made a lot of demands.  The  at her [address] also called the unit to express concerns about patient being discharged prematurely as she is at risk of losing her apartment.  The patient is very restless in spite of high-dose medications.  She lacks insight into her current presentation and remains very grandiose she continues to request the use of a walker even though she is able to ambulate independently.  She denies current self-harm thoughts or plans.  She is tolerating medications without any side effects.     Review of systems:   The Review of Systems is negative other than noted in the HPI     Medications:       paliperidone  6 mg Oral Daily     divalproex sodium extended-release  1,000 mg Oral At Bedtime     OLANZapine  20 mg Oral At Bedtime     nicotine polacrilex, acetaminophen, albuterol, aspirin, cetirizine, hypromellose-dextran, OLANZapine zydis **OR** OLANZapine, hydrOXYzine, potassium chloride, potassium chloride, potassium chloride, potassium chloride with lidocaine, potassium chloride    Mental Status Examination:     Appearance:  awake, alert, adequately groomed and casually dressed  Eye Contact:  better  Speech:  clear, coherent  Psychomotor Behavior:  no evidence of tardive dyskinesia, dystonia, or tics and fidgeting  Mood:  Expansive  Affect:  Labile with exaggerated affect  Thought Process: Loosely associated with flight of ideas.  " Perseverative.  Thought Content:  no evidence of suicidal ideation or homicidal ideation.  Responding to internal stimuli with grandiose delusions   Oriented to:  time, person, and place  Attention Span and Concentration:  limited  Recent and Remote Memory:  limited  Fund of Knowledge: appropriate  Muscle Strength and Tone: normal  Gait and Station: Normal  Insight:  fair  Judgment:  limited          Labs/Vitals:   No results found for this or any previous visit (from the past 24 hour(s)).  B/P: 109/74, T: 98.4, P: 85, R: 20    Impression:   Fannie is a 52-year-old single woman who presented to the hospital on account of foot pain and was evaluated as being psychotic and in need of hospitalization.  She is currently admitted as a voluntary patient.  She will be allowed stepdown privileges so she can participate in the milieu.      DIagnoses:     1.  Schizoaffective disorder, bipolar type in acute phase  2.  Reactive airway disease, unspecified.           Plan:   1. Written information given on medications. Side effects, risks, benefits reviewed.  2.  Maintain current medications but increase Depakote ER to 1500 mg p.o. q.h.s.  3.  Keep on ITC with stepdown privileges.   4.  Continue hospitalization.      Attestation:  Patient has been seen and evaluated by Nicole short MD    PATIENT ID  Name: Fannie Jeter  MRN:[id number]  YOB: 1965

## 2018-03-13 NOTE — PROGRESS NOTES
Received call from May Aceves at [facility], where she is a  and where patient resides. She is concerned that patient not be discharged before reaching some improved level of stability, as she may get evicted if her recent behavior continues, as she is frightening other residents.

## 2018-03-13 NOTE — PLAN OF CARE
"Problem: Psychotic Symptoms  Goal: Psychotic Symptoms  Signs and symptoms of listed problems will be absent or manageable.    Pt. remains psychotic. Loud, hyperactive, labile and delusional. Talking to people who are not present. Talks about \"Jasiel\" living inside her. Dancing and singing in the lounge. Responsive to redirection when disrupts milieu. Attempted to work on a puzzle but can't focus- Began talking about her father making her help him put together \"x rated\" puzzles. Given a prn dose of zyprexa late afternoon due to agitation after telephone call. (caller did attempt to reassure patient that cat is being cared for)  Compliant with scheduled medication.  No angry outbursts tonight.       "

## 2018-03-13 NOTE — PLAN OF CARE
"Problem: Patient Care Overview  Goal: Plan of Care/Patient Progress Review  PT: Order received, \"Benign traction spur noted off the plantar fascial attachment site - left. Ongoing pain.\" Per discussion with RN on pt unit, do not anticipate pt to stay greater than 1 month and at this time pain is not a limiting factor on pt returning home, per note pt was \"Dancing and singing in the lounge\" and able to ambulate. Pt would be best served with OP referral to specialist and f/u after discharge from inpatient psych. For pain control recommend icing to site as tolerated, if available, pt could use tennis ball to sole of foot for stretching/massage (rolling from base of heel towards toes while sitting with foot on top of ball on floor). Discussed all above with RN on unit, pt without acute PT needs, will complete orders. Please re-order if pt's mobility status and ability to discharge home changes.      "

## 2018-03-13 NOTE — PLAN OF CARE
Problem: Psychotic Symptoms  Goal: Psychotic Symptoms  Signs and symptoms of listed problems will be absent or manageable.    Outcome: No Change  Emotionally labile, one minute she is crying and then she gets upset.   She is angry that we are not allowing her to smoke, she is also angry to the Obama care law because she needs Nicoderm minis from [employer] and she will contact Trump.  She still believes Jasiel is inside of her. Her behavior has been escalating slowly.   Depakote 500 mg started this morning.

## 2018-03-14 PROCEDURE — A9270 NON-COVERED ITEM OR SERVICE: HCPCS | Mod: GY | Performed by: PSYCHIATRY & NEUROLOGY

## 2018-03-14 PROCEDURE — 12400006 ZZH R&B MH INTERMEDIATE

## 2018-03-14 PROCEDURE — 25000132 ZZH RX MED GY IP 250 OP 250 PS 637: Mod: GY | Performed by: PSYCHIATRY & NEUROLOGY

## 2018-03-14 PROCEDURE — 90853 GROUP PSYCHOTHERAPY: CPT

## 2018-03-14 RX ADMIN — OLANZAPINE 20 MG: 10 TABLET, FILM COATED ORAL at 21:19

## 2018-03-14 RX ADMIN — HYDROXYZINE HYDROCHLORIDE 25 MG: 25 TABLET ORAL at 05:09

## 2018-03-14 RX ADMIN — OLANZAPINE 10 MG: 10 TABLET, ORALLY DISINTEGRATING ORAL at 02:13

## 2018-03-14 RX ADMIN — DIVALPROEX SODIUM 1500 MG: 500 TABLET, EXTENDED RELEASE ORAL at 21:19

## 2018-03-14 RX ADMIN — DIVALPROEX SODIUM 500 MG: 500 TABLET, EXTENDED RELEASE ORAL at 08:48

## 2018-03-14 RX ADMIN — PALIPERIDONE 6 MG: 6 TABLET, EXTENDED RELEASE ORAL at 08:48

## 2018-03-14 RX ADMIN — ACETAMINOPHEN 650 MG: 325 TABLET, FILM COATED ORAL at 14:41

## 2018-03-14 RX ADMIN — ALBUTEROL SULFATE 2 PUFF: 90 AEROSOL, METERED RESPIRATORY (INHALATION) at 10:09

## 2018-03-14 ASSESSMENT — ACTIVITIES OF DAILY LIVING (ADL)
LAUNDRY: WITH SUPERVISION
DRESS: INDEPENDENT
GROOMING: INDEPENDENT
ORAL_HYGIENE: INDEPENDENT

## 2018-03-14 NOTE — PLAN OF CARE
Problem: Psychotic Symptoms  Goal: Psychotic Symptoms  Signs and symptoms of listed problems will be absent or manageable.    At the start of shift, pt was labile in mood, and restless. She was singing in the lounge, and then crying because her size Medium scrub pants were too tight on her. Pt thinks she will be D/C'd tomorrow. Pt said she made an appointment with her general physician for Thursday. Pt still thinks she needs a wheel chair. Pt has been ambulating just fine on her own. She napped during the second half of the shift.

## 2018-03-14 NOTE — PLAN OF CARE
"Pt appeared anxious at beginning of the shift, stating \"I just keep fucking up\" and stating that she wanted to make a \"good impression\" on the doctor. Pt tangental in speech, behaviorally calm and controlled. Pt able to attend groups on SDU. Interactions with peers have been appropriate. Pt had coughing/SOB during shift and given Albuterol inhaler, reports relief of symptoms.  "

## 2018-03-14 NOTE — PLAN OF CARE
Problem: Psychotic Symptoms  Goal: Psychotic Symptoms  Signs and symptoms of listed problems will be absent or manageable.    Pt has calmer mood vs yesterday but states that she has cancer and nobody is doing anything about it. Pt asked to take a shower and was appropriate

## 2018-03-14 NOTE — PLAN OF CARE
Problem: Psychotic Symptoms  Intervention: Psychotic Symptoms  Pt remains appropriate in the milieu, tangental/anxious when talking to staff. Appropriate in interactions with peers. Pt reports feeling that day nurse was purposeful in not informing pt about group, somewhat labile, redirectable that this was likely unintentional and there would be other groups in the evening.

## 2018-03-14 NOTE — PROGRESS NOTES
"[facility] Psychiatric Progress Note      Interval History:   Pt seen, team meeting held with , nursing staff, OT, and PA's to review diagnosis and treatment plan.  Patient remains psychotic and disorganized.  She is emotionally labile.  She talks about herself in the third person and she is still preoccupied with the  artist . She asked \"what is wrong with it, if I believe that  is in me?\"  She complains of pain in the soles of her feet related to her history of dancing, and asked for a wheelchair or have the walker back. She declined offer for PT evaluation and treatment.  She made a list of questions to discuss during this encounter regarding discharge today, or discharge later.  She says she want to discharge today, but she is okay if she is not discharged today.  This writer informed her that the  at her [address] called the unit to express concerns about patient being discharged prematurely as she is at risk of losing her apartment.  She report having her menstrual period and asked for tampon.  The patient is very restless in spite of high-dose medications.  She lacks insight into her current presentation and remains very grandiose she continues to request the use of a walker even though she is able to ambulate independently.  She denies current self-harm thoughts or plans.  She is tolerating medications without any side effects.     Review of systems:   The Review of Systems is negative other than noted in the HPI     Medications:       divalproex sodium extended-release  1,500 mg Oral At Bedtime     divalproex sodium extended-release  500 mg Oral Daily     paliperidone  6 mg Oral Daily     OLANZapine  20 mg Oral At Bedtime     nicotine polacrilex, acetaminophen, albuterol, aspirin, cetirizine, hypromellose-dextran, OLANZapine zydis **OR** OLANZapine, hydrOXYzine, potassium chloride, potassium chloride, potassium chloride, potassium " chloride with lidocaine, potassium chloride    Mental Status Examination:     Appearance:  awake, alert, adequately groomed and casually dressed  Eye Contact:  better  Speech:  clear, coherent  Psychomotor Behavior:  no evidence of tardive dyskinesia, dystonia, or tics and fidgeting  Mood:  Expansive  Affect:  Labile with exaggerated affect, brief period of weeping  Thought Process: Loosely associated with flight of ideas.  Perseverative.  Thought Content:  no evidence of suicidal ideation or homicidal ideation.  Responding to internal stimuli with grandiose delusions   Oriented to:  time, person, and place  Attention Span and Concentration:  limited  Recent and Remote Memory:  limited  Fund of Knowledge: appropriate  Muscle Strength and Tone: normal  Gait and Station: Normal  Insight:  fair  Judgment:  limited          Labs/Vitals:   No results found for this or any previous visit (from the past 24 hour(s)).  B/P: 109/74, T: 98.4, P: 85, R: 20    Impression:   Fannie is a 52-year-old single woman who presented to the hospital on account of foot pain and was evaluated as being psychotic and in need of hospitalization.  She is currently admitted as a voluntary patient.  She will be allowed stepdown privileges so she can participate in the milieu.      DIagnoses:     1.  Schizoaffective disorder, bipolar type in acute phase  2.  Reactive airway disease, unspecified.           Plan:   1. Written information given on medications. Side effects, risks, benefits reviewed.  2.  Maintain current medications.  Check valproic acid level tomorrow  3.  Keep on ITC with stepdown privileges.   4.  Continue hospitalization.      Attestation:  Patient has been seen and evaluated by Nicole short MD    PATIENT ID  Name: Fannie Jeter  MRN:[id number]  YOB: 1965

## 2018-03-15 PROCEDURE — 12400006 ZZH R&B MH INTERMEDIATE

## 2018-03-15 PROCEDURE — A9270 NON-COVERED ITEM OR SERVICE: HCPCS | Mod: GY | Performed by: PSYCHIATRY & NEUROLOGY

## 2018-03-15 PROCEDURE — 90853 GROUP PSYCHOTHERAPY: CPT

## 2018-03-15 PROCEDURE — 25000132 ZZH RX MED GY IP 250 OP 250 PS 637: Mod: GY | Performed by: PSYCHIATRY & NEUROLOGY

## 2018-03-15 RX ADMIN — NICOTINE POLACRILEX 4 MG: 2 GUM, CHEWING ORAL at 14:36

## 2018-03-15 RX ADMIN — DIVALPROEX SODIUM 500 MG: 500 TABLET, EXTENDED RELEASE ORAL at 08:16

## 2018-03-15 RX ADMIN — DIVALPROEX SODIUM 1500 MG: 500 TABLET, EXTENDED RELEASE ORAL at 20:45

## 2018-03-15 RX ADMIN — PALIPERIDONE 6 MG: 6 TABLET, EXTENDED RELEASE ORAL at 08:16

## 2018-03-15 RX ADMIN — ALBUTEROL SULFATE 2 PUFF: 90 AEROSOL, METERED RESPIRATORY (INHALATION) at 03:19

## 2018-03-15 RX ADMIN — OLANZAPINE 20 MG: 10 TABLET, FILM COATED ORAL at 20:44

## 2018-03-15 ASSESSMENT — ACTIVITIES OF DAILY LIVING (ADL)
DRESS: STREET CLOTHES
LAUNDRY: WITH SUPERVISION
ORAL_HYGIENE: INDEPENDENT
GROOMING: INDEPENDENT

## 2018-03-15 NOTE — PROGRESS NOTES
[facility] Psychiatric Progress Note      Interval History:   Pt seen, team meeting held with , nursing staff, OT, and PA's to review diagnosis and treatment plan. She reports she is more organized today. She says the medications are helping her. She denies the presence of self harm thoughts or plans. She denies medication side effects. She says she is tolerating medications without any side effects. She is not as verbose as she had been and staff report that she has been more appropriate and attending groups. She was able to give a coherent psychosocial history as well. She says she would like to discharge tomorrow.     Review of systems:   The Review of Systems is negative other than noted in the HPI     Medications:       divalproex sodium extended-release  1,500 mg Oral At Bedtime     divalproex sodium extended-release  500 mg Oral Daily     paliperidone  6 mg Oral Daily     OLANZapine  20 mg Oral At Bedtime     hypromellose-dextran, nicotine polacrilex, acetaminophen, albuterol, aspirin, cetirizine, OLANZapine zydis **OR** OLANZapine, hydrOXYzine, potassium chloride, potassium chloride, potassium chloride, potassium chloride with lidocaine, potassium chloride    Mental Status Examination:     Appearance:  awake, alert, adequately groomed and casually dressed  Eye Contact:  better  Speech:  clear, coherent  Psychomotor Behavior:  no evidence of tardive dyskinesia, dystonia, or tics and fidgeting  Mood:  Better  Affect:  Mood congruent and organized  Thought Process: Goal directed and better organized.   Thought Content:  no evidence of suicidal ideation or homicidal ideation.  Denies the presence of auditory hallucinations. Mild paranoia persists.   Oriented to:  time, person, and place  Attention Span and Concentration:  Improved  Recent and Remote Memory:  limited  Fund of Knowledge: appropriate  Muscle Strength and Tone: normal  Gait and Station: Normal  Insight:  fair  Judgment:   limited          Labs/Vitals:   No results found for this or any previous visit (from the past 24 hour(s)).  B/P: 109/74, T: 98.4, P: 85, R: 20    Impression:   Fannie is a 52-year-old single woman who presented to the hospital on account of foot pain and was evaluated as being psychotic and in need of hospitalization.  She is currently admitted as a voluntary patient.  She will be allowed stepdown privileges so she can participate in the milieu.      DIagnoses:     1.  Schizoaffective disorder, bipolar type in acute phase  2.  Reactive airway disease, unspecified.           Plan:   1. Written information given on medications. Side effects, risks, benefits reviewed.  2.  Maintain current medications.  Check valproic acid level tomorrow  3.  Keep on ITC with stepdown privileges.   4.  Continue hospitalization.      Attestation:  Patient has been seen and evaluated by Nicole short MD    PATIENT ID  Name: Fannie Jeter  MRN:[id number]  YOB: 1965

## 2018-03-15 NOTE — PROGRESS NOTES
Met with patient to discuss discharge plan. She also completed her safety planning worksheet. Patient still rambles and shifts focus in discussing a subject, though is able to return to the topic with some redirection.

## 2018-03-15 NOTE — PLAN OF CARE
Problem: Psychotic Symptoms  Goal: Psychotic Symptoms  Signs and symptoms of listed problems will be absent or manageable.    Outcome: No Change  Patient presents with a more full range affect. She was visible and social on the unit. Patient stated that she feels much more clearer in her thought process. She spent majority of the shift in the lounge talking and interacting with other patients. She is hoping to be able to discharge tomorrow. Patient attended groups and participated appropriately. She was cooperative with medication and pleasant with staff and peers.

## 2018-03-15 NOTE — PROGRESS NOTES
Spoke with patient's  at [place], Annie. May is working with patient on a referral to [facility] services for increased level of support. She spoke on phone to patient today and reports paranoid themes persisting, as patient asked that  remove her crockpot from her apartment due to mold.   Discussed that patient is planned to discharge tomorrow at her request and follow up care has been set up. Will fax May copy of discharge paperwork.

## 2018-03-15 NOTE — PLAN OF CARE
"Problem: Patient Care Overview  Goal: Discharge Needs Assessment  Patient attended Process Group today but appeared to have difficulty processing the topic of group discussion. She was somewhat intrusive, raising her hand each time  or one of her peers spoke during group to interject a comment that was not necessarily relevant. At one point in the group patient stated that she needed to leave because she felt exhausted and described the group as making her feel as if \"she had been injected in the heart with cocaine.\" Patient left to walk for awhile and then returned for the remainder of group. Some of her responses made sense while others were quite tangential.       "

## 2018-03-15 NOTE — PLAN OF CARE
Problem: Patient Care Overview  Goal: Team Discussion  Team Plan:   Outcome: Improving  BEHAVIORAL TEAM DISCUSSION    Participants: Dr. Lujan, Case management, Nursing staff, Psych associates  Progress: Patient presents with a more full range affect. She expresses and appears more clear in though process. Patient will likely discharge tomorrow.  Continued Stay Criteria/Rationale: Patient further stabilize  Medical/Physical: n/a  Precautions:   Behavioral Orders   Procedures     Code 1 - Restrict to Unit     Routine Programming     As clinically indicated     Status 15     Every 15 minutes.     Plan: Patient further stabilize with possible discharge tomorrow.  Rationale for change in precautions or plan: Patient improving.
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
## 2018-03-16 VITALS
HEART RATE: 102 BPM | SYSTOLIC BLOOD PRESSURE: 133 MMHG | HEIGHT: 66 IN | TEMPERATURE: 99.4 F | RESPIRATION RATE: 16 BRPM | OXYGEN SATURATION: 95 % | DIASTOLIC BLOOD PRESSURE: 101 MMHG | BODY MASS INDEX: 30.97 KG/M2 | WEIGHT: 192.7 LBS

## 2018-03-16 LAB — VALPROATE SERPL-MCNC: 123 MG/L (ref 50–100)

## 2018-03-16 PROCEDURE — 36415 COLL VENOUS BLD VENIPUNCTURE: CPT | Performed by: PSYCHIATRY & NEUROLOGY

## 2018-03-16 PROCEDURE — A9270 NON-COVERED ITEM OR SERVICE: HCPCS | Mod: GY | Performed by: PSYCHIATRY & NEUROLOGY

## 2018-03-16 PROCEDURE — 80164 ASSAY DIPROPYLACETIC ACD TOT: CPT | Performed by: PSYCHIATRY & NEUROLOGY

## 2018-03-16 PROCEDURE — 25000132 ZZH RX MED GY IP 250 OP 250 PS 637: Mod: GY | Performed by: PSYCHIATRY & NEUROLOGY

## 2018-03-16 RX ORDER — PALIPERIDONE 6 MG/1
6 TABLET, EXTENDED RELEASE ORAL DAILY
Qty: 30 TABLET | Refills: 0 | Status: ON HOLD | OUTPATIENT
Start: 2018-03-17 | End: 2018-04-02

## 2018-03-16 RX ORDER — DIVALPROEX SODIUM 500 MG/1
500 TABLET, EXTENDED RELEASE ORAL DAILY
Qty: 30 TABLET | Refills: 0 | Status: ON HOLD | OUTPATIENT
Start: 2018-03-17 | End: 2018-04-02

## 2018-03-16 RX ADMIN — DIVALPROEX SODIUM 500 MG: 500 TABLET, EXTENDED RELEASE ORAL at 08:25

## 2018-03-16 RX ADMIN — PALIPERIDONE 6 MG: 6 TABLET, EXTENDED RELEASE ORAL at 08:25

## 2018-03-16 RX ADMIN — NICOTINE POLACRILEX 4 MG: 2 GUM, CHEWING ORAL at 15:09

## 2018-03-16 ASSESSMENT — ACTIVITIES OF DAILY LIVING (ADL)
ORAL_HYGIENE: INDEPENDENT
DRESS: STREET CLOTHES
GROOMING: INDEPENDENT
LAUNDRY: WITH SUPERVISION

## 2018-03-16 NOTE — PROGRESS NOTES
"Patient discharge plan reviewed again and medication education provided when staff when over her discharge medications.  Pharmacy was not able to fill her Zyprexa as it was just filled.  Pt was struggling to focus during education and then randomly in a whisper voice told staff \"I dumped all those pills in the toilet because they looked like little round ones that I shouldn't be taking\".  Staff encouraged her to use her resources if she ever has questions about her medications.  She also made a comment to staff about \"tossing my med organizer and getting one that looks like it should hold bullets\".  These comments were odd but she was able to give her complete address when asking staff to call a taxi.  Denies any thoughts of suicide.  Able to identify some copping skills to use.  All personal belongings collected and returned.  Reported she is looking forward to seeing the doctor outpatient.  At [address] staff escorted her to door 6 where her taxi was waiting.  "

## 2018-03-16 NOTE — DISCHARGE INSTRUCTIONS
Behavioral Discharge Planning and Instructions    Summary: Admitted to hospital with suicidal ideation and disorganized thought process.    Main Diagnosis:  Bipolar 1 Disorder, current episode manic; Rule out Schizoaffective disorder, bipolar type    Major Treatments, Procedures and Findings: Psychiatric assessment     Symptoms to Report: Feeling more agitated, Increased confusion, Losing more sleep, Mood getting worse or Thoughts of suicide    Lifestyle Adjustment: Develop and follow safety plan; follow up with therapy and medication management as directed.    Psychiatry Follow-up:     PARISH Mckenzie, for medication management- appointment on Tuesday 4/3/18 @11 am.  Rola Ribeiro [facility], for therapy- appointment on Friday 3/30/18 @ 11 am.    [facility]  [address] [place] [address], [address]  [place]  [address]  Phone:  [phone]  Fax:  [phone]    May Aceves,  with [facility] [phone]    Resources:    [facility]- [phone]   Crisis Intervention: 532.793.4300 or 630-255-0369 (TTY: 537.922.7746).  Call anytime for help.  National [place] on Mental Illness (www.mn.rula.org): 651.971.8541 or 930-052-6389.  National Suicide Prevention Line (www.mentalhealthmn.org): 363-369-EJIZ (9626)    General Medication Instructions:   See your medication sheet(s) for instructions.   Take all medicines as directed.  Make no changes unless your doctor suggests them.   Go to all your doctor visits.  Be sure to have all your required lab tests. This way, your medicines can be refilled on time.  Do not use any drugs not prescribed by your doctor.  Avoid alcohol.
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
## 2018-03-16 NOTE — PROGRESS NOTES
Patient had come to the hospital with a home medication that was discontinued.  She requested that staff disposes of her ativan 0.5mg tablets.  17 tablets were left in the bottle, per [place] in pharmacy they are to be flushed with two RN witnesses.

## 2018-03-19 ENCOUNTER — HOSPITAL ENCOUNTER (EMERGENCY)
Facility: CLINIC | Age: 53
Discharge: PSYCHIATRIC OR SUBSTANCE USE FACILITY | End: 2018-03-19
Attending: EMERGENCY MEDICINE | Admitting: EMERGENCY MEDICINE
Payer: MEDICARE

## 2018-03-19 ENCOUNTER — HOSPITAL ENCOUNTER (INPATIENT)
Facility: CLINIC | Age: 53
LOS: 14 days | Discharge: HOME | DRG: 885 | End: 2018-04-02
Attending: PSYCHIATRY & NEUROLOGY | Admitting: PSYCHIATRY & NEUROLOGY
Payer: MEDICARE

## 2018-03-19 VITALS
OXYGEN SATURATION: 100 % | SYSTOLIC BLOOD PRESSURE: 131 MMHG | HEART RATE: 90 BPM | RESPIRATION RATE: 12 BRPM | DIASTOLIC BLOOD PRESSURE: 76 MMHG

## 2018-03-19 DIAGNOSIS — F25.0 SCHIZOAFFECTIVE DISORDER, BIPOLAR TYPE (H): ICD-10-CM

## 2018-03-19 DIAGNOSIS — F29 PSYCHOSIS, UNSPECIFIED PSYCHOSIS TYPE (H): ICD-10-CM

## 2018-03-19 DIAGNOSIS — R30.0 DYSURIA: ICD-10-CM

## 2018-03-19 DIAGNOSIS — F30.10 MANIC BEHAVIOR (H): ICD-10-CM

## 2018-03-19 LAB
ALBUMIN SERPL-MCNC: 3.4 G/DL (ref 3.4–5)
ALBUMIN UR-MCNC: NEGATIVE MG/DL
ALP SERPL-CCNC: 58 U/L (ref 40–150)
ALT SERPL W P-5'-P-CCNC: 21 U/L (ref 0–50)
AMPHETAMINES UR QL SCN: NEGATIVE
ANION GAP SERPL CALCULATED.3IONS-SCNC: 7 MMOL/L (ref 3–14)
APAP SERPL-MCNC: 2 MG/L (ref 10–20)
APPEARANCE UR: CLEAR
AST SERPL W P-5'-P-CCNC: 14 U/L (ref 0–45)
BARBITURATES UR QL: NEGATIVE
BASOPHILS # BLD AUTO: 0 10E9/L (ref 0–0.2)
BASOPHILS NFR BLD AUTO: 0.2 %
BENZODIAZ UR QL: NEGATIVE
BILIRUB SERPL-MCNC: 0.2 MG/DL (ref 0.2–1.3)
BILIRUB UR QL STRIP: NEGATIVE
BUN SERPL-MCNC: 7 MG/DL (ref 7–30)
CALCIUM SERPL-MCNC: 8 MG/DL (ref 8.5–10.1)
CANNABINOIDS UR QL SCN: NEGATIVE
CHLORIDE SERPL-SCNC: 105 MMOL/L (ref 94–109)
CO2 SERPL-SCNC: 27 MMOL/L (ref 20–32)
COCAINE UR QL: NEGATIVE
COLOR UR AUTO: YELLOW
CREAT SERPL-MCNC: 0.48 MG/DL (ref 0.52–1.04)
DIFFERENTIAL METHOD BLD: ABNORMAL
EOSINOPHIL # BLD AUTO: 0.2 10E9/L (ref 0–0.7)
EOSINOPHIL NFR BLD AUTO: 3.4 %
ERYTHROCYTE [DISTWIDTH] IN BLOOD BY AUTOMATED COUNT: 14 % (ref 10–15)
ETHANOL SERPL-MCNC: <0.01 G/DL
GFR SERPL CREATININE-BSD FRML MDRD: >90 ML/MIN/1.7M2
GLUCOSE SERPL-MCNC: 100 MG/DL (ref 70–99)
GLUCOSE UR STRIP-MCNC: NEGATIVE MG/DL
HCT VFR BLD AUTO: 34.6 % (ref 35–47)
HGB BLD-MCNC: 11.5 G/DL (ref 11.7–15.7)
HGB UR QL STRIP: NEGATIVE
IMM GRANULOCYTES # BLD: 0 10E9/L (ref 0–0.4)
IMM GRANULOCYTES NFR BLD: 0.2 %
KETONES UR STRIP-MCNC: NEGATIVE MG/DL
LEUKOCYTE ESTERASE UR QL STRIP: ABNORMAL
LYMPHOCYTES # BLD AUTO: 1.7 10E9/L (ref 0.8–5.3)
LYMPHOCYTES NFR BLD AUTO: 27.1 %
MCH RBC QN AUTO: 29.8 PG (ref 26.5–33)
MCHC RBC AUTO-ENTMCNC: 33.2 G/DL (ref 31.5–36.5)
MCV RBC AUTO: 90 FL (ref 78–100)
MONOCYTES # BLD AUTO: 0.7 10E9/L (ref 0–1.3)
MONOCYTES NFR BLD AUTO: 10.7 %
MUCOUS THREADS #/AREA URNS LPF: PRESENT /LPF
NEUTROPHILS # BLD AUTO: 3.6 10E9/L (ref 1.6–8.3)
NEUTROPHILS NFR BLD AUTO: 58.4 %
NITRATE UR QL: NEGATIVE
NRBC # BLD AUTO: 0 10*3/UL
NRBC BLD AUTO-RTO: 0 /100
OPIATES UR QL SCN: NEGATIVE
PCP UR QL SCN: NEGATIVE
PH UR STRIP: 7.5 PH (ref 5–7)
PLATELET # BLD AUTO: 250 10E9/L (ref 150–450)
POTASSIUM SERPL-SCNC: 3.4 MMOL/L (ref 3.4–5.3)
PROT SERPL-MCNC: 6.1 G/DL (ref 6.8–8.8)
RBC # BLD AUTO: 3.86 10E12/L (ref 3.8–5.2)
RBC #/AREA URNS AUTO: 1 /HPF (ref 0–2)
SALICYLATES SERPL-MCNC: <2 MG/DL
SODIUM SERPL-SCNC: 139 MMOL/L (ref 133–144)
SOURCE: ABNORMAL
SP GR UR STRIP: 1.01 (ref 1–1.03)
SQUAMOUS #/AREA URNS AUTO: 1 /HPF (ref 0–1)
UROBILINOGEN UR STRIP-MCNC: NORMAL MG/DL (ref 0–2)
WBC # BLD AUTO: 6.2 10E9/L (ref 4–11)
WBC #/AREA URNS AUTO: 28 /HPF (ref 0–5)

## 2018-03-19 PROCEDURE — 96372 THER/PROPH/DIAG INJ SC/IM: CPT

## 2018-03-19 PROCEDURE — 80329 ANALGESICS NON-OPIOID 1 OR 2: CPT | Performed by: EMERGENCY MEDICINE

## 2018-03-19 PROCEDURE — 25000125 ZZHC RX 250

## 2018-03-19 PROCEDURE — 81001 URINALYSIS AUTO W/SCOPE: CPT | Mod: XU | Performed by: EMERGENCY MEDICINE

## 2018-03-19 PROCEDURE — 80307 DRUG TEST PRSMV CHEM ANLYZR: CPT | Performed by: EMERGENCY MEDICINE

## 2018-03-19 PROCEDURE — A9270 NON-COVERED ITEM OR SERVICE: HCPCS | Mod: GY | Performed by: EMERGENCY MEDICINE

## 2018-03-19 PROCEDURE — 80053 COMPREHEN METABOLIC PANEL: CPT | Performed by: EMERGENCY MEDICINE

## 2018-03-19 PROCEDURE — 99285 EMERGENCY DEPT VISIT HI MDM: CPT | Mod: 25

## 2018-03-19 PROCEDURE — 12400003 ZZH R&B MH CRITICAL UMMC

## 2018-03-19 PROCEDURE — 25000132 ZZH RX MED GY IP 250 OP 250 PS 637: Mod: GY | Performed by: EMERGENCY MEDICINE

## 2018-03-19 PROCEDURE — 80320 DRUG SCREEN QUANTALCOHOLS: CPT | Mod: 59 | Performed by: EMERGENCY MEDICINE

## 2018-03-19 PROCEDURE — 85025 COMPLETE CBC W/AUTO DIFF WBC: CPT | Performed by: EMERGENCY MEDICINE

## 2018-03-19 PROCEDURE — 90791 PSYCH DIAGNOSTIC EVALUATION: CPT

## 2018-03-19 RX ORDER — OLANZAPINE 5 MG/1
20 TABLET ORAL AT BEDTIME
Status: DISCONTINUED | OUTPATIENT
Start: 2018-03-19 | End: 2018-03-19 | Stop reason: HOSPADM

## 2018-03-19 RX ORDER — DIVALPROEX SODIUM 500 MG/1
1500 TABLET, EXTENDED RELEASE ORAL AT BEDTIME
COMMUNITY
End: 2019-07-03

## 2018-03-19 RX ORDER — OLANZAPINE 10 MG/2ML
10 INJECTION, POWDER, FOR SOLUTION INTRAMUSCULAR DAILY PRN
Status: DISCONTINUED | OUTPATIENT
Start: 2018-03-19 | End: 2018-03-19 | Stop reason: HOSPADM

## 2018-03-19 RX ORDER — OLANZAPINE 10 MG/2ML
10 INJECTION, POWDER, FOR SOLUTION INTRAMUSCULAR DAILY PRN
Status: DISCONTINUED | OUTPATIENT
Start: 2018-03-19 | End: 2018-03-19 | Stop reason: CLARIF

## 2018-03-19 RX ORDER — DIVALPROEX SODIUM 500 MG/1
1500 TABLET, EXTENDED RELEASE ORAL AT BEDTIME
Status: DISCONTINUED | OUTPATIENT
Start: 2018-03-19 | End: 2018-03-19 | Stop reason: HOSPADM

## 2018-03-19 RX ORDER — OLANZAPINE 10 MG/2ML
10 INJECTION, POWDER, FOR SOLUTION INTRAMUSCULAR EVERY 8 HOURS PRN
Status: DISCONTINUED | OUTPATIENT
Start: 2018-03-19 | End: 2018-03-19 | Stop reason: HOSPADM

## 2018-03-19 RX ORDER — WATER 10 ML/10ML
INJECTION INTRAMUSCULAR; INTRAVENOUS; SUBCUTANEOUS
Status: DISCONTINUED
Start: 2018-03-19 | End: 2018-03-19 | Stop reason: HOSPADM

## 2018-03-19 RX ORDER — OLANZAPINE 10 MG/2ML
INJECTION, POWDER, FOR SOLUTION INTRAMUSCULAR
Status: COMPLETED
Start: 2018-03-19 | End: 2018-03-19

## 2018-03-19 RX ORDER — CIPROFLOXACIN 500 MG/1
500 TABLET, FILM COATED ORAL 2 TIMES DAILY
Qty: 14 TABLET | Refills: 0 | Status: ON HOLD | OUTPATIENT
Start: 2018-03-19 | End: 2018-04-02

## 2018-03-19 RX ORDER — PALIPERIDONE 6 MG/1
6 TABLET, EXTENDED RELEASE ORAL DAILY
Status: DISCONTINUED | OUTPATIENT
Start: 2018-03-19 | End: 2018-03-19 | Stop reason: HOSPADM

## 2018-03-19 RX ORDER — DIVALPROEX SODIUM 500 MG/1
500 TABLET, EXTENDED RELEASE ORAL EVERY MORNING
Status: DISCONTINUED | OUTPATIENT
Start: 2018-03-20 | End: 2018-03-19 | Stop reason: HOSPADM

## 2018-03-19 RX ADMIN — OLANZAPINE 10 MG: 10 INJECTION, POWDER, LYOPHILIZED, FOR SOLUTION INTRAMUSCULAR at 14:38

## 2018-03-19 RX ADMIN — DIVALPROEX SODIUM 1500 MG: 500 TABLET, EXTENDED RELEASE ORAL at 21:35

## 2018-03-19 RX ADMIN — OLANZAPINE 20 MG: 5 TABLET, FILM COATED ORAL at 21:36

## 2018-03-19 RX ADMIN — OLANZAPINE 10 MG: 10 INJECTION, POWDER, FOR SOLUTION INTRAMUSCULAR at 14:38

## 2018-03-19 RX ADMIN — PALIPERIDONE 6 MG: 6 TABLET, EXTENDED RELEASE ORAL at 21:37

## 2018-03-19 NOTE — PHARMACY-ADMISSION MEDICATION HISTORY
Admission Medication History    Patient is not able to participate in medication history interview.    Patient was discharged from [facility] this past week however, After Visit Summary does not appear to be complete.    Scheduled medications represent patient's inpatient regimen through 3/16/2018 discharge.    Prior to Admission medications    Medication Sig Last Dose Taking? Auth Provider   divalproex sodium extended-release (DEPAKOTE ER) 500 MG 24 hr tablet Take 1,500 mg by mouth At Bedtime Past Week at HS Yes Unknown, Entered By History   divalproex sodium extended-release (DEPAKOTE ER) 500 MG 24 hr tablet Take 1 tablet (500 mg) by mouth daily Past Week at AM Yes Nicole Lujan MD   paliperidone (INVEGA) 6 MG 24 hr tablet Take 1 tablet (6 mg) by mouth daily Past Week at AM Yes Nicole Lujan MD   OLANZapine (ZYPREXA) 20 MG tablet Take 1 tablet (20 mg) by mouth At Bedtime Past Week at HS Yes Nicole Lujan MD   albuterol (PROAIR HFA/PROVENTIL HFA/VENTOLIN HFA) 108 (90 BASE) MCG/ACT Inhaler Inhale 2 puffs into the lungs 4 times daily as needed for other (dyspnea)  at PRN  Nicole Lujan MD   cetirizine (ZYRTEC) 10 MG tablet Take 10 mg by mouth daily as needed   at PRN  Reported, Patient   PROGESTERONE 100MG/G CREAM Apply 0.5 g topically 2 times daily as needed   at PRN  Reported, Patient   Polyethyl Glycol-Propyl Glycol (LUBRICANT EYE DROPS, PF, OP) Apply 1 drop to eye every hour as needed  at PRN  Reported, Patient   sodium chloride-sodium bicarb (NETI POT SINUS WASH) 2300-700 MG KIT 2 times daily as needed sinus wash/allergy season  at PRN  Vikas Acharya MD   aspirin 325 MG tablet Take 2 tablets by mouth every 6 hours as needed pain  at PRN  Reported, Patient   acetaminophen (TYLENOL) 650 MG CR tablet Take 650 mg by mouth every 8 hours as needed  at PRN  Reported, Patient       Babar Woody, PharmD

## 2018-03-19 NOTE — ED NOTES
PT ambulated in common area, PT steady gait. PT walking with blanket over her head. PT redirectable.
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
## 2018-03-19 NOTE — IP AVS SNAPSHOT
MRN:[id number]                      After Visit Summary   3/19/2018    Fannie Jeter    MRN: [id number]           Thank you!     Thank you for choosing [facility] for your care. Our goal is always to provide you with excellent care.        Patient Information     Date Of Birth          1965        About your hospital stay     You were admitted on:  March 19, 2018 You last received care in the:  [facility]    You were discharged on:  April 2, 2018       Who to Call     For medical emergencies, please call 911.  For non-urgent questions about your medical care, please call your primary care provider or clinic, [phone]          Attending Provider     Provider Specialty    Ceasar Allen MD Psychiatry    Elif, Ana Ramos MD Psychiatry       Primary Care Provider Office Phone # Fax #    Nayely Anderson PA-C [phone]      Further instructions from your care team        Behavioral Discharge Planning and Instructions      Summary:  You were admitted on 3/19/2018  due to Disorganized Thinking/Behaviors, Delusional Thoughts and Psychotic Symptomology.  You were treated by Dr. Ana Asencio MD and discharged on 04/02/2018 from [facility].    You were dually [facility] on 3/30/2018.  You are being discharged on a Provisional Discharge Agreement which shall remain in effect for the duration of the Commitment.  Your Commitment expires on September 28th, 2018.       Principal Diagnoses: Bipolar disorder, type I, currently manic with psychotic features    Health Care Follow-up Appointments:   - Psychiatrist:  Appointment: Friday, April 6th @ 11am w/ Nayely Anderson   [facility]  Address:  [address] [address]  Phone: [phone]  Fax: [phone]  HUC TO FAX AVS      - Therapy   [facility]  Appointment:  Friday, April 13th @ 11am w/ Rola  Qu[place]in  Address:  [address] [place] [address]  Phone: [phone]  Fax: [phone]    - [facility]   Kassi Gayle  She will be in contact with you following your discharge from the hospital  Phone: [phone]  Fax: [phone]  HUC TO FAX AVS    Attend all scheduled appointments with your outpatient providers. Call at least 24 hours in advance if you need to reschedule an appointment to ensure continued access to your outpatient providers.   Major Treatments, Procedures and Findings:  You were provided with: a psychiatric assessment, assessed for medical stability, medication evaluation and/or management, group therapy and milieu management    Symptoms to Report: Feeling more aggressive, increased confusion, losing more sleep, mood getting worse or thoughts of suicide    Early warning signs can include: Increased depression or anxiety sleep disturbances increased thoughts or behaviors of suicide or self-harm  increased unusual thinking, such as paranoia or hearing voices    Safety and Wellness:  Take all medicines as directed.  Make no changes unless your doctor suggests them.  Follow treatment recommendations.  Refrain from alcohol and non-prescribed drugs.  If there is a concern for safety, call 911.    Resources:   Crisis Intervention: 429.494.5971 or 827-617-4817 (TTY: 934.520.7974).  Call anytime for help.  National [place] on Mental Illness (www.mn.rula.org): 269.279.4454 or 714-714-9487.  [place] Association for Children's Mental Health (www.mac.org): 753.164.6107.  Alcoholics Anonymous (www.alcoholics-anonymous.org): Check your phone book for your local chapter.  Suicide Awareness Voices of Education (SAVE) (www.save.org): 610-119-JTQS (5665)  National Suicide Prevention Line (www.mentalhealthmn.org): 386-840-XUMG (9941)  Mental Health Consumer/Survivor Network of [place] (www.mhcsn.net): 983.660.7276 or 283-963-5816  Mental Health Association of [place] (www.mentalhealth.org):  "795.935.5845 or 338-374-9757  Self- Management and Recovery Training., [employer]-- Toll free: 356.849.8691  www.Nexvet  [facility] Crisis (COPE) Response - Adult 195 184-2071  [facility] Crisis Response - Adult 155 795-3578  Crisis text line: Text \"START\" to 387-800. Free, confidential, 24/7.  Crisis Intervention: 712.928.1146 or 863-077-1708. Call anytime for help.   [facility] Mental Health Crisis Team - Child: [phone]  [facility] Childrens Mental Health Crisis Response Team - Child: [phone]    The treatment team has appreciated the opportunity to work with you.  If you have any questions or concerns our unit number is [phone].        Pending Results     No orders found from 3/17/2018 to 3/20/2018.            Admission Information     Date & Time Provider Department Dept. Phone    3/19/2018 Ana Asencio MD [address] [phone]      Your Vitals Were     Blood Pressure Pulse Temperature Respirations Weight Last Period    102/63 (BP Location: Left arm) 95 98.4  F (36.9  C) (Tympanic) 16 88 kg (194 lb) 07/03/2014    Pulse Oximetry BMI (Body Mass Index)                99% 31.31 kg/m2          MyChart Information     [facility] gives you secure access to your electronic health record. If you see a primary care provider, you can also send messages to your care team and make appointments. If you have questions, please call your primary care clinic.  If you do not have a primary care provider, please call [phone] and they will assist you.        Care EveryWhere ID     This is your Care EveryWhere ID. This could be used by other organizations to access your [facility] medical records  [id number]        Equal Access to Services     BUDDY WATSON AH: Al Richter, whit west, nelsy toledo. So [facility] [phone].    ATENCIÓN: Si stephenie español, tiene a lucas disposición servicios gratuitos de " asistencia lingüística. Luis al [phone].    We comply with applicable federal civil rights laws and [place] laws. We do not discriminate on the basis of race, color, national origin, age, disability, sex, sexual orientation, or gender identity.               Review of your medicines      CONTINUE these medicines which may have CHANGED, or have new prescriptions. If we are uncertain of the size of tablets/capsules you have at home, strength may be listed as something that might have changed.        Dose / Directions    * divalproex sodium extended-release 500 MG 24 hr tablet   Commonly known as:  DEPAKOTE ER   This may have changed:  Another medication with the same name was changed. Make sure you understand how and when to take each.        Dose:  1500 mg   Take 1,500 mg by mouth At Bedtime   Refills:  0       * divalproex sodium extended-release 500 MG 24 hr tablet   Commonly known as:  DEPAKOTE ER   This may have changed:    - how much to take  - how to take this  - when to take this  - additional instructions   Used for:  Schizoaffective disorder, bipolar type (H)        Take 500 mg (1 tab) in morning and 1,500 mg (3 tabs) at bedtime   Quantity:  120 tablet   Refills:  1       paliperidone 9 MG 24 hr tablet   Commonly known as:  INVEGA   This may have changed:    - medication strength  - how much to take   Used for:  Schizoaffective disorder, bipolar type (H)        Dose:  9 mg   Take 1 tablet (9 mg) by mouth daily   Quantity:  30 tablet   Refills:  1       * Notice:  This list has 2 medication(s) that are the same as other medications prescribed for you. Read the directions carefully, and ask your doctor or other care provider to review them with you.      CONTINUE these medicines which have NOT CHANGED        Dose / Directions    acetaminophen 650 MG CR tablet   Commonly known as:  TYLENOL        Dose:  650 mg   Take 650 mg by mouth every 8 hours as needed   Refills:  0       albuterol 108 (90 BASE)  MCG/ACT Inhaler   Commonly known as:  PROAIR HFA/PROVENTIL HFA/VENTOLIN HFA   Used for:  Mild intermittent asthma without complication        Dose:  2 puff   Inhale 2 puffs into the lungs 4 times daily as needed for other (dyspnea)   Quantity:  1 Inhaler   Refills:  0       aspirin 325 MG tablet        Dose:  2 tablet   Take 2 tablets by mouth every 6 hours as needed pain   Refills:  0       cetirizine 10 MG tablet   Commonly known as:  zyrTEC        Dose:  10 mg   Take 10 mg by mouth daily as needed   Refills:  0       LUBRICANT EYE DROPS (PF) OP        Dose:  1 drop   Apply 1 drop to eye every hour as needed   Refills:  0       NETI POT SINUS WASH 2300-700 MG Kit   Used for:  Seasonal allergic rhinitis, unspecified allergic rhinitis trigger   Generic drug:  sodium chloride-sodium bicarb        2 times daily as needed sinus wash/allergy season   Refills:  0       OLANZapine 20 MG tablet   Commonly known as:  zyPREXA   Used for:  Manic behavior (H)        Dose:  20 mg   Take 1 tablet (20 mg) by mouth At Bedtime   Quantity:  30 tablet   Refills:  1       PROGESTERONE 100MG/G CREAM        Dose:  0.5 g   Apply 0.5 g topically 2 times daily as needed   Refills:  0         STOP taking     ciprofloxacin 500 MG tablet   Commonly known as:  CIPRO                Where to get your medicines      These medications were sent to [facility] [place], [place] - [address]  [address] [address]     Phone:  [phone]     divalproex sodium extended-release 500 MG 24 hr tablet    OLANZapine 20 MG tablet    paliperidone 9 MG 24 hr tablet                Protect others around you: Learn how to safely use, store and throw away your medicines at www.disposemymeds.org.             Medication List: This is a list of all your medications and when to take them. Check marks below indicate your daily home schedule. Keep this list as a reference.      Medications           Morning Afternoon Evening  Bedtime As Needed    acetaminophen 650 MG CR tablet   Commonly known as:  TYLENOL   Take 650 mg by mouth every 8 hours as needed                                albuterol 108 (90 BASE) MCG/ACT Inhaler   Commonly known as:  PROAIR HFA/PROVENTIL HFA/VENTOLIN HFA   Inhale 2 puffs into the lungs 4 times daily as needed for other (dyspnea)   Last time this was given:  2 puffs on 3/21/2018 12:15 PM                                aspirin 325 MG tablet   Take 2 tablets by mouth every 6 hours as needed pain                                cetirizine 10 MG tablet   Commonly known as:  zyrTEC   Take 10 mg by mouth daily as needed                                * divalproex sodium extended-release 500 MG 24 hr tablet   Commonly known as:  DEPAKOTE ER   Take 1,500 mg by mouth At Bedtime   Last time this was given:  500 mg on 4/2/2018 10:18 AM                                * divalproex sodium extended-release 500 MG 24 hr tablet   Commonly known as:  DEPAKOTE ER   Take 500 mg (1 tab) in morning and 1,500 mg (3 tabs) at bedtime   Last time this was given:  500 mg on 4/2/2018 10:18 AM                                LUBRICANT EYE DROPS (PF) OP   Apply 1 drop to eye every hour as needed                                NETI POT SINUS WASH 2300-700 MG Kit   2 times daily as needed sinus wash/allergy season   Generic drug:  sodium chloride-sodium bicarb                                OLANZapine 20 MG tablet   Commonly known as:  zyPREXA   Take 1 tablet (20 mg) by mouth At Bedtime   Last time this was given:  10 mg on 4/2/2018  1:18 AM                                paliperidone 9 MG 24 hr tablet   Commonly known as:  INVEGA   Take 1 tablet (9 mg) by mouth daily   Last time this was given:  9 mg on 4/2/2018 10:17 AM                                PROGESTERONE 100MG/G CREAM   Apply 0.5 g topically 2 times daily as needed                                * Notice:  This list has 2 medication(s) that are the same as other medications prescribed  for you. Read the directions carefully, and ask your doctor or other care provider to review them with you.
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
## 2018-03-19 NOTE — ED PROVIDER NOTES
History     Chief Complaint:  Psychiatric Evaluation      The history is limited by the condition of the patient.      Fannie Jeter is a 52 year old female who presents acutely psychotic. The patient is shouting sexually inappropriate phrases.  From EMS reports the patient was increasingly agitated at a care facility and at one point quite aggressive to a .  At arrival to the emergency department no further history could be obtained given the patient's acute agitation and apparent psychosis.  She repetitively shouts agitated, paranoid, sexually explicit phrases over and over.      Allergies:  Animal Dander  Gluten Meal  Haldol  Haldol [Haloperidol]  Milk Protein Extract  No Clinical Screening - See Comments  Penicillins  Penicillins  Seasonal Allergies    Medications:    Depakote ER  Paliperidone   Albuterol   Olanzapine  Cetirizine   Progesterone   Aspirin 325 MG     Past Medical History:    ADHD (attention deficit hyperactivity disorder)   Asperger syndrome   Central serous retinopathy   HZO (herpes zoster oticus)   Osteoarthritis   Scoliosis     Past Surgical History:    GYN Surgery    Family History:    Arthritis  Cerebrovascular Disease    Social History:  Marital Status:   [4]  Smoking status:Former Smoker  Alcohol use: No     Review of Systems   Unable to perform ROS: Psychiatric disorder       Physical Exam   First Vitals:  BP: 137/89  Heart Rate: 81  Resp: 15  SpO2: 99 %      Physical Exam  General: Alert, interactive in moderate distress  Head:  Scalp is atraumatic  Eyes:  The pupils are equal, round, and reactive to light    EOM's intact    No scleral icterus  ENT:      Nose:  The external nose is normal  Ears:  External ears are normal  Mouth/Throat: The oropharynx is normal    Mucus membranes are moist       Neck:  Normal range of motion.      There is no rigidity.    Trachea is in the midline         CV:  Regular rate and rhythm    No murmur   Resp:  Breath sounds are clear  bilaterally    Non-labored, no retractions or accessory muscle use      GI:  Abdomen is soft, no distension, no tenderness.       MS:  Normal strength in all 4 extremities, No midline cervical, thoracic, or lumbar tenderness  Skin:  Warm and dry, No rash or lesions noted.  Neuro: Strength 5/5 x4.    Psych:  Awake. Alert.  Agitated, appears to be hallucinating, not cooperative with examination.      Emergency Department Course     Laboratory:  Drug abuse screen 77 urine: All negative    UA with micro: pH 7.5, WBC/HPF 28, Mucous - Present, Leukocyte Esterase - Large, o/w negative    CBC: WBC: 6.2, HGB: 11.5, PLT: 250    CMP: Glucose 100, Calcium 8.0, Protein Total 6.1, Creatinine: 0.48, o/w WNL    Salicylate level: <2    Acetaminophen level: 2    Alcohol ethyl: <0.01      Interventions:  1438 Zyprexa 10 mg IM      Emergency Department Course:  Nursing notes and vitals reviewed.     1435 I performed an exam of the patient as documented above.     A social work consult was ordered and DEC met with the patient.      1600 I rechecked the patient and discussed the results of her workup thus far.     Findings and plan explained to the patient.    1452, restraints ordered/applied due to agitation  In person face to face assessment completed, including an evaluation of the patient's immediate reaction to the intervention, complete review of systems assessment, behavioral assessment and review/assessment of history, drugs and medications, recent labs, etc., and behavioral condition.  The patient experienced: No adverse physical outcome from seclusion/restraint initiation.  The intervention of restraint or seclusion needs to continue.        Impression & Plan      Medical Decision Making:  Fannie Jeter presents via EMS acutely psychotic, with acute agitation. She required five point restraints, and then subsequently Zyprexa, 10 mg IM for her acute agitation. Laboratory workup was subsequently obtained and was unremarkable.  The patient will require psychiatric admission, as she is acutely psychotic, can't care for herself, and is a danger to herself and others. Care was turned over to Dr. Correa, pending availability of a mental health bed.       Critical Care time:  was 30 minutes for this patient excluding procedures. During this time, there was bedside evaluation, review of previous records, management of restraints, and Zyprexa.    Impression: Acute psychosis.    Plan: Disposition to a mental health unit when one becomes available. Care was turned over to Dr. Correa.         Diagnosis:    ICD-10-CM    1. Psychosis, unspecified psychosis type F29 CBC with platelets differential     Comprehensive metabolic panel     Salicylate level     Acetaminophen level     Alcohol ethyl     Drug abuse screen 77 urine ([place],[facility],)     UA with Microscopic       Disposition:  Admit to a mental health bed pending availability    Discharge Medications:    I, Casey Beaulieu, am serving as a scribe on 3/19/2018 at 2:35 PM to personally document services performed by Elias Souza,* based on my observations and the provider's statements to me.       Casey Beaulieu  3/19/2018    EMERGENCY DEPARTMENT       Elias Souza MD  03/19/18 6776

## 2018-03-19 NOTE — ED NOTES
Bed: BH3  Expected date:   Expected time:   Means of arrival:   Comments:  916  62 F schizoaffective/hold/restrained  1746

## 2018-03-19 NOTE — IP AVS SNAPSHOT
[address]    [facility] [address]    Phone:  [phone]                                       After Visit Summary   3/19/2018    Fannie Jeter    MRN: [id number]           After Visit Summary Signature Page     I have received my discharge instructions, and my questions have been answered. I have discussed any challenges I see with this plan with the nurse or doctor.    ..........................................................................................................................................  Patient/Patient Representative Signature      ..........................................................................................................................................  Patient Representative Print Name and Relationship to Patient    ..................................................               ................................................  Date                                            Time    ..........................................................................................................................................  Reviewed by Signature/Title    ...................................................              ..............................................  Date                                                            Time

## 2018-03-20 PROCEDURE — A9270 NON-COVERED ITEM OR SERVICE: HCPCS | Mod: GY | Performed by: EMERGENCY MEDICINE

## 2018-03-20 PROCEDURE — 99223 1ST HOSP IP/OBS HIGH 75: CPT | Mod: AI | Performed by: PSYCHIATRY & NEUROLOGY

## 2018-03-20 PROCEDURE — 25000132 ZZH RX MED GY IP 250 OP 250 PS 637: Mod: GY | Performed by: CLINICAL NURSE SPECIALIST

## 2018-03-20 PROCEDURE — 12400003 ZZH R&B MH CRITICAL UMMC

## 2018-03-20 PROCEDURE — 25000132 ZZH RX MED GY IP 250 OP 250 PS 637: Mod: GY | Performed by: EMERGENCY MEDICINE

## 2018-03-20 PROCEDURE — A9270 NON-COVERED ITEM OR SERVICE: HCPCS | Mod: GY | Performed by: CLINICAL NURSE SPECIALIST

## 2018-03-20 RX ORDER — CIPROFLOXACIN 500 MG/1
500 TABLET, FILM COATED ORAL 2 TIMES DAILY
Status: COMPLETED | OUTPATIENT
Start: 2018-03-20 | End: 2018-03-26

## 2018-03-20 RX ORDER — OLANZAPINE 5 MG/1
5-10 TABLET ORAL
Status: DISCONTINUED | OUTPATIENT
Start: 2018-03-20 | End: 2018-04-02 | Stop reason: HOSPADM

## 2018-03-20 RX ORDER — TRAZODONE HYDROCHLORIDE 50 MG/1
50 TABLET, FILM COATED ORAL
Status: DISCONTINUED | OUTPATIENT
Start: 2018-03-20 | End: 2018-04-02 | Stop reason: HOSPADM

## 2018-03-20 RX ORDER — DIVALPROEX SODIUM 500 MG/1
500 TABLET, EXTENDED RELEASE ORAL DAILY
Status: DISCONTINUED | OUTPATIENT
Start: 2018-03-20 | End: 2018-04-02 | Stop reason: HOSPADM

## 2018-03-20 RX ORDER — CETIRIZINE HYDROCHLORIDE 10 MG/1
10 TABLET ORAL DAILY PRN
Status: DISCONTINUED | OUTPATIENT
Start: 2018-03-20 | End: 2018-04-02 | Stop reason: HOSPADM

## 2018-03-20 RX ORDER — ALUMINA, MAGNESIA, AND SIMETHICONE 2400; 2400; 240 MG/30ML; MG/30ML; MG/30ML
30 SUSPENSION ORAL EVERY 4 HOURS PRN
Status: DISCONTINUED | OUTPATIENT
Start: 2018-03-20 | End: 2018-04-02 | Stop reason: HOSPADM

## 2018-03-20 RX ORDER — DIVALPROEX SODIUM 500 MG/1
1500 TABLET, EXTENDED RELEASE ORAL AT BEDTIME
Status: DISCONTINUED | OUTPATIENT
Start: 2018-03-20 | End: 2018-04-02 | Stop reason: HOSPADM

## 2018-03-20 RX ORDER — ALBUTEROL SULFATE 90 UG/1
2 AEROSOL, METERED RESPIRATORY (INHALATION) 4 TIMES DAILY PRN
Status: DISCONTINUED | OUTPATIENT
Start: 2018-03-19 | End: 2018-04-02 | Stop reason: HOSPADM

## 2018-03-20 RX ORDER — OLANZAPINE 10 MG/2ML
5-10 INJECTION, POWDER, FOR SOLUTION INTRAMUSCULAR
Status: DISCONTINUED | OUTPATIENT
Start: 2018-03-20 | End: 2018-04-02 | Stop reason: HOSPADM

## 2018-03-20 RX ORDER — OLANZAPINE 10 MG/1
20 TABLET ORAL AT BEDTIME
Status: DISCONTINUED | OUTPATIENT
Start: 2018-03-20 | End: 2018-03-26

## 2018-03-20 RX ORDER — BISACODYL 10 MG
10 SUPPOSITORY, RECTAL RECTAL DAILY PRN
Status: DISCONTINUED | OUTPATIENT
Start: 2018-03-20 | End: 2018-04-02 | Stop reason: HOSPADM

## 2018-03-20 RX ORDER — PALIPERIDONE 6 MG/1
6 TABLET, EXTENDED RELEASE ORAL DAILY
Status: DISCONTINUED | OUTPATIENT
Start: 2018-03-20 | End: 2018-03-26

## 2018-03-20 RX ORDER — HYDROXYZINE HYDROCHLORIDE 25 MG/1
25 TABLET, FILM COATED ORAL EVERY 4 HOURS PRN
Status: DISCONTINUED | OUTPATIENT
Start: 2018-03-19 | End: 2018-04-02 | Stop reason: HOSPADM

## 2018-03-20 RX ORDER — ACETAMINOPHEN 325 MG/1
650 TABLET ORAL EVERY 4 HOURS PRN
Status: DISCONTINUED | OUTPATIENT
Start: 2018-03-20 | End: 2018-04-02 | Stop reason: HOSPADM

## 2018-03-20 RX ADMIN — CIPROFLOXACIN HYDROCHLORIDE 500 MG: 500 TABLET, FILM COATED ORAL at 20:50

## 2018-03-20 RX ADMIN — OLANZAPINE 20 MG: 10 TABLET, FILM COATED ORAL at 20:50

## 2018-03-20 RX ADMIN — DIVALPROEX SODIUM 1500 MG: 500 TABLET, EXTENDED RELEASE ORAL at 20:51

## 2018-03-20 RX ADMIN — CIPROFLOXACIN HYDROCHLORIDE 500 MG: 500 TABLET, FILM COATED ORAL at 09:26

## 2018-03-20 RX ADMIN — OLANZAPINE 10 MG: 5 TABLET, FILM COATED ORAL at 02:03

## 2018-03-20 RX ADMIN — PALIPERIDONE 6 MG: 6 TABLET, EXTENDED RELEASE ORAL at 16:03

## 2018-03-20 RX ADMIN — DIVALPROEX SODIUM 500 MG: 500 TABLET, EXTENDED RELEASE ORAL at 09:26

## 2018-03-20 ASSESSMENT — ACTIVITIES OF DAILY LIVING (ADL)
LAUNDRY: UNABLE TO COMPLETE
GROOMING: INDEPENDENT
GROOMING: INDEPENDENT
ORAL_HYGIENE: INDEPENDENT
GROOMING: INDEPENDENT
DRESS: SCRUBS (BEHAVIORAL HEALTH)
ORAL_HYGIENE: INDEPENDENT
DRESS: SCRUBS (BEHAVIORAL HEALTH)
LAUNDRY: UNABLE TO COMPLETE
DRESS: SCRUBS (BEHAVIORAL HEALTH)
ORAL_HYGIENE: INDEPENDENT

## 2018-03-20 NOTE — PROGRESS NOTES
03/20/18 0244   Restraint Type   Seclusion (BH) Discontinued   Debriefing   Debriefing DO   Does patient understand why the event happened? Yes   Does patient agree to safe behaviors? Yes   What can we do differently so this doesn't happen again? Other (comment)  (Give me a blanket)   Plan of care reviewed and modified Yes     Pt had been able to sit calmly in seclusion.  Pt stated she would be safe on the unit. She stated she would got to sleep in her room.  Pt did not make any delusional statements.  Her thinking and speech were more organized.  Writer assessed pt as no longer a threat to others and safe to reenter the milieu.  Seclusion was discontinued.    Pt ambulated to her room, where she accepted more blankets.  Pt continues on SIO for disorganization and agitation.    Will continue to monitor and assess.

## 2018-03-20 NOTE — PROGRESS NOTES
"Pt very disorganized. Told writer that she wanted help from writer to \"escape this time loop I'm trapped in.\" Irritable, labile. Nearly went into other pt's rooms multiple times by mistake.  "

## 2018-03-20 NOTE — PROGRESS NOTES
Patient can continue taking Invega per psychiatrist. Writer gave the medication this morning but pt refused it.

## 2018-03-20 NOTE — PLAN OF CARE
Problem: Patient Care Overview  Goal: Team Discussion  Team Plan:   BEHAVIORAL TEAM DISCUSSION    Participants: Dr. Ana Asencio, Naial Chase RN, Rand Bruno RN, GOLDY- Keisha PRADO, [facility]   Progress: continue to assess  Continued Stay Criteria/Rationale: pt requires inpatient hospitalizations  Medical/Physical: see medical chart  Precautions:   Behavioral Orders   Procedures     Assault precautions     Code 1 - Restrict to Unit     Elopement precautions     Fall precautions     Routine Programming     As clinically indicated     Sexual precautions     Single Room     Status 15     Every 15 minutes.     Status Individual Observation     For disorganization and intrusive behavior    !!----Pt on Suicide Precautions---!!     Order Specific Question:   CONTINUOUS 24 hours / day     Answer:   Distance and Exceptions     Order Specific Question:   Distance     Answer:   5 feet     Order Specific Question:   Exceptions     Answer:   none     Order Specific Question:   Exceptions     Answer:   bathroom and shower     Suicide precautions     Plan: conduct initial assessment, meet with psychiatrist and treatment team  Rationale for change in precautions or plan: no change

## 2018-03-20 NOTE — DISCHARGE SUMMARY
[facility]    Discharge Summary  Adult Psychiatry    Date of Admission:  3/11/2018  Date of Discharge:  3/16/2018  5:16 PM  Discharging Provider: Nicole Lujan MD  Date of Service (when I saw the patient): 03/16/18    Discharge Diagnoses   1.  Schizoaffective disorder, bipolar type in acute phase.   2.  Reactive airway disease, unspecified.     History of Present Illness   Fannie Jeter is a 52-year-old woman who resides alone in an apartment here in [place].  She was just discharged from this unit on 02/26/2018 following a 10-day hospitalization on account of cyndi.  She presented to the ED here at [facility] on account of foot pain but was found to be very disorganized and considered a danger to herself.  She was admitted as a voluntary patient.  Information was gathered through direct patient contact as well as chart review. For more details, I refer the reader to the initial psychiatric assessment documented on admission by Nicole Lujan MD in addition to the physical history and physical examination documented by Sonali Griffiths PA-C on 3/11/2018.    Hospital Course   Fannie Jeter was admitted on 3/11/2018 to the intensive treatment center at [facility] on account of a psychotic decompensation.  She had presented to the hospital on account of foot pain but was found to be grossly disorganized, delusional and experiencing perceptual distortions.  Staff provided reorientation to the mental health unit and given the opportunity to ventilate her stressors.  Stop provided a safe environment and validated her concerns.  The unit  was also contacted by the  at the patient's residence to express concerns about patient's behavior and the likelihood of losing her accommodation if she remains disorganized.  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  She requested to use a wheelchair or walker as she claims she could not bear weight on the foot.  She was initially provided with a walker but this became a fall risk and so had to be discontinued.  The patient's medications were optimize.  Her valproic acid level was adjusted by increasing the dose of Depakote ER to 2000 mg.  On account of her persistent psychotic symptoms, Invega was added to her antipsychotic medication regimen.  She slowly began to show improvement in her mood and function.  At some point she was speaking about herself in third person and was preoccupied with the  artist , whom she claimed she had trapped in her body so she could become better lozano.  Ultimately, she participated in individual, milieu and group therapy and was moved out to the stepdown unit.  She did not display any untoward effects to her prescribed medications.  Lorazepam was discontinued.  By 3/16/2018 the patient insisted on being discharged she did not want to spend another weekend in the hospital.  At the point of discharge she was not deemed a danger to herself or others and so she was not holdable even though she was still symptomatic. Her brother contacted the unit at discharged and suggested that patient had flushed her pills following her last discharge and so he did not believe she was going to be compliant following her discharge. To improve medication compliance, she was offered Invega Sustenna but she declined.  She claimed she did not have any problem with oral medications. She accepted after care recommendations and agreed to comply with her medications.    Nicole Lujan MD    Significant Results and Procedures   Please see below.    Unresulted Labs Ordered in the Past 30 Days of this Admission     No orders found from 1/10/2018 to 3/12/2018.          Code Status    Full Code    Primary Care Physician   [facility]    Physical Exam   Appearance:  awake, alert, adequately groomed and casually dressed  Attitude:  Cooperative but observed walking along the walls as if unable to see.  Eye Contact:  good  Mood:  Mildly dysphoric  Affect:  appropriate and in normal range and mood congruent  Speech:  clear, coherent and normal prosody  Psychomotor Behavior:  no evidence of tardive dyskinesia, dystonia, or tics and intact station, gait and muscle tone  Thought Process:  logical, linear and goal oriented  Associations:  no loose associations  Thought Content:  no evidence of suicidal ideation or homicidal ideation but persistent evidence of psychotic thought  Insight:  limited  Judgment:  limited  Oriented to:  time, person, and place  Attention Span and Concentration:  intact  Recent and Remote Memory:  intact  Language: Able to name objects and Able to repeat phrases  Fund of Knowledge: appropriate  Muscle Strength and Tone: normal  Gait and Station: Normal    Time Spent on this Encounter   INicole, personally saw the patient today and spent greater than 30 minutes discharging this patient.    Discharge Disposition   Discharged to home  Condition at discharge: Unstable.    Psychiatry Follow-up:      PARISH Mckenzie, for medication management- appointment on Tuesday 4/3/18 @11 am.  SHER Hobbs, for therapy- appointment on Friday 3/30/18 @ 11 am.     [facility] [address], [place] [address]  Phone:  [phone]  Fax:  [phone]     May Aceves  with [facility] [phone]      Consultations This Hospital Stay   HOSPITALIST IP CONSULT  HOSPITALIST IP CONSULT  PHYSICAL THERAPY ADULT IP CONSULT    Discharge Orders   No discharge procedures on file.  Discharge Medications   Discharge Medication List as of 3/16/2018  1:53 PM      START taking these medications     Details   !! divalproex sodium extended-release (DEPAKOTE ER) 500 MG 24 hr tablet Take 1 tablet (500 mg) by mouth daily, Disp-30 tablet, R-0, E-Prescribe      paliperidone (INVEGA) 6 MG 24 hr tablet Take 1 tablet (6 mg) by mouth daily, Disp-30 tablet, R-0, E-Prescribe       !! - Potential duplicate medications found. Please discuss with provider.      CONTINUE these medications which have NOT CHANGED    Details   OLANZapine (ZYPREXA) 20 MG tablet Take 1 tablet (20 mg) by mouth At Bedtime, Disp-30 tablet, R-0, E-Prescribe      !! divalproex sodium extended-release (DEPAKOTE ER) 500 MG 24 hr tablet Take 2 tablets (1,000 mg) by mouth At Bedtime, Disp-60 tablet, R-0, E-Prescribe      cetirizine (ZYRTEC) 10 MG tablet Take 10 mg by mouth daily as needed , Historical      Polyethyl Glycol-Propyl Glycol (LUBRICANT EYE DROPS, PF, OP) Apply 1 drop to eye every hour as needed, Historical      sodium chloride-sodium bicarb (NETI POT SINUS WASH) 2300-700 MG KIT 2 times daily as needed sinus wash/allergy season, Historical      aspirin 325 MG tablet Take 2 tablets by mouth every 6 hours as needed pain, Historical      acetaminophen (TYLENOL) 650 MG CR tablet Take 650 mg by mouth every 8 hours as needed, Historical      albuterol (PROAIR HFA/PROVENTIL HFA/VENTOLIN HFA) 108 (90 BASE) MCG/ACT Inhaler Inhale 2 puffs into the lungs 4 times daily as needed for other (dyspnea), Disp-1 Inhaler, R-0, E-Prescribe      PROGESTERONE 100MG/G CREAM Apply 0.5 g topically 2 times daily as needed Historical       !! - Potential duplicate medications found. Please discuss with provider.      STOP taking these medications       LORazepam (ATIVAN) 0.5 MG tablet Comments:   Reason for Stopping:             Allergies   Allergies   Allergen Reactions     Animal Dander      Gluten Meal Diarrhea     Gas or constipation      Haldol Difficulty breathing     Haldol [Haloperidol]      Milk Protein Extract      No Clinical Screening - See Comments       Casien ~ unsure of spelling ~ is milk protien     Penicillins      Unsure of what happens. Has been told she has allergies since she was a kid.     Penicillins      Seasonal Allergies      Data   Most Recent 3 CBC's:  Recent Labs   Lab Test  03/19/18   1513  03/11/18   1250  02/16/18   0739   WBC  6.2  6.5  9.5   HGB  11.5*  12.7  14.1   MCV  90  89  86   PLT  250  369  359      Most Recent 3 BMP's:  Recent Labs   Lab Test  03/19/18   1513  03/12/18   0735  03/11/18   1250  02/18/18   0835  02/17/18   0935   NA  139   --   140   --   140   POTASSIUM  3.4  4.2  3.3*   --   4.0   CHLORIDE  105   --   104   --   105   CO2  27   --   27   --   28   BUN  7   --   7   --   6*   CR  0.48*   --   0.58   --   0.52   ANIONGAP  7   --   9   --   7   SARAY  8.0*   --   8.7   --   9.3   GLC  100*   --   108*  167*  91     Most Recent 2 LFT's:  Recent Labs   Lab Test  03/19/18   1513  02/18/18   0835   AST  14  20   ALT  21  35   ALKPHOS  58  55   BILITOTAL  0.2  0.5      Panel:  Recent Labs   Lab Test  02/18/18   0835   CHOL  204*   LDL  122*   HDL  63   TRIG  95     Most Recent 6 Bacteria Isolates From Any Culture (See EPIC Reports for Culture Details):  Recent Labs   Lab Test  03/11/18   1255  12/11/15   1127  11/26/12   1018   CULT  10,000 to 50,000 colonies/mL  mixed urogenital belle  Susceptibility testing not routinely done    Normal belle  No Beta Streptococcus isolated     Most Recent TSH, T4 and A1c Labs:  Recent Labs   Lab Test  02/16/18   0739  02/02/17   1117  02/12/16   1039   TSH  1.54  2.52   --    T4   --   1.03   --    A1C   --    --   6.0*     Results for orders placed or performed during the hospital encounter of 02/16/18   Head CT w/o contrast    Narrative    CT SCAN OF THE HEAD WITHOUT CONTRAST   2/16/2018 11:30 AM     HISTORY: New psychosis.    TECHNIQUE:  Axial images of the head and coronal reformations without  IV contrast material. Radiation dose for this scan was reduced using  automated exposure  control, adjustment of the mA and/or kV according  to patient size, or iterative reconstruction technique.    COMPARISON: None.    FINDINGS: There is no evidence of intracranial hemorrhage, mass, acute  infarct or anomaly. The ventricles are normal in size, shape and  configuration. The brain parenchyma and subarachnoid spaces are  normal.     The visualized portions of the sinuses and mastoids appear normal. The  bony calvarium and bones of the skull base appear intact.       Impression    IMPRESSION:   No evidence of acute intracranial hemorrhage, mass, or  herniation.     LEOBARDO HALL MD   XR Foot Left G/E 3 Views    Narrative    LEFT FOOT THREE OR MORE VIEWS  2/23/2018 8:31 PM     HISTORY: Foot pain.    COMPARISON: None.      Impression    IMPRESSION: Negative for fracture or any intraosseous lesions. Benign  traction spur noted off the plantar fascial attachment site.    MICK MOCTEZUMA MD

## 2018-03-20 NOTE — PROGRESS NOTES
"At 0030, writer attempted to process pt out of seclusion.  She was grossly disorganized.  She appeared responding to IS, specifically talking to \"\".  Pt was taking off her clothing. Writer assessed pt as unsafe to reenter the therapeutic milieu, seclusion still indicated at this time.    At 0130, writer attempted to process pt out of seclusion.  She was lying on the seclusion floor mat, with the \"leg wrap\" as a blanket.  Pt's clothes were lying near the mat.  Writer asked pt if she would be safe on the unit and she mumbled an incoherent answer.  Pt is unable to process with writer about seclusion.  Due to pt's displayed agitation, impulsivity, aggression, and threatening behavior, writer assessed that pt is still a threat to others.  Writer will continue to monitor and assess pt to discontinue seclusion.    At 0200, pt started screaming at her SIO staff.  Writer attempted to process pt out of seclusion.  Pt was yelling for her clothes back.  She was stating that staff had hidden her clothes.  Writer asked pt if she would take PRN PO medications, pt stated \"yes\".  Pt got dressed with the help of two staff (\"I can't do it!\").  Once dressed, she was less agitated.  Pt accepted PO Zyprexa 10 mg.  Writer told pt seclusion discontinuation criteria (remaining calm for 30 minutes and then being able to process with writer), to which patient agreed. Of note: when asked about Jasiel and Robin Whyte, pt did not have any delusions related to them.  "

## 2018-03-20 NOTE — ED NOTES
Brief update-     Patient signed out to me by my colleague Dr. Souza, patient has since met with our mental health professional and has been deemed to be in psychosis, has been placed on a 72 hour hold, [facility].  No issues on my shift, has not required any additional sedation, resting comfortably on my evaluation, the per report is still uttering sexually inappropriate phrases and claiming to be Madonna or Jasiel.     Fab Correa MD  03/19/18 9807

## 2018-03-20 NOTE — PROGRESS NOTES
This writer called and spoke to Leigh Ann at [facility] Prepetition Screening.  We are filing an MI w/ mary on this pt.  The examiners statement, petition, mary petition and exhibit A were faxed to [employer].  They will follow-up on if they are screening the case.

## 2018-03-20 NOTE — PROGRESS NOTES
03/20/18 0353   Patient Belongings   Patient Belongings necklace;glasses;clothing;cell phone/electronics;money (see comment);wallet;shoes;ring;Mu-ism item;other (see comments)   Disposition of Belongings Locker;Sent to security per site process   Belongings Search Yes   Clothing Search Yes   Second Staff Jeromyemmy ANN   Update: 3/30-Pt went to court today with her reading glasses and they were misplaced while at court. Pt no longer has her reading glasses!!!    Belongings sent to security:  1 wallet containing  EBT card -[id number]  Visa - 1360  Visa - 2892  5 lighters  4 packs cigarettes  1 Mint  containter with $2.85 in change and 3 rings and 3 jewels  1 keyring with mace and 2 keys    Belongings in locker:  1 large tote bag  Paperwork  Garage   1 leather glove (R)  1 chapstick  1 sleep mask  2 pair headphones  1 silk pillowcase  3 pairs sunglasses  1 white iphone in case  1 herbal throat spray  2 car keys in ziploc  3 packs altoid mints  2 notebooks  1 gray zip up sweatshirt  1 pair Merrel boots (NOT matching)  1 long sleeve purple tshirt  1 gray sports bra  2 necklaces (1 with cross, 1 with crystal + capsule)  1 passport  2 social security cards (different names, matching numbers - [id number])  1 pair gray pants  1 pair black pants    In addition, there was one pair of heavily soiled disposable undergarments which were disposed of for health/safety reasons.        A               Admission:  I am responsible for any personal items that are not sent to the safe or pharmacy.  [facility] is not responsible for loss, theft or damage of any property in my possession.    Signature:  _________________________________ Date: _______  Time: _____                                              Staff Signature:  ____________________________ Date: ________  Time: _____      2nd Staff person, if patient is unable/unwilling to sign:    Signature: ________________________________ Date: ________  Time: _____     Discharge:  [facility]  has returned all of my personal belongings:    Signature: _________________________________ Date: ________  Time: _____                                          Staff Signature:  ____________________________ Date: ________  Time: _____

## 2018-03-20 NOTE — H&P
Psychiatry History and Physical    Fannie Jeter MRN# [id number]   Age: 52 year old YOB: 1965     Date of Admission:  3/19/2018          Assessment:   This patient is a 52 year old  female with history of bipolar disorder, type I who presents with symptoms of cyndi and psychosis, including severe agitation requiring seclusion and restraints.  Patient has been hospitalized a total of 3 times in the last month for symptoms of cyndi.  She was most recently hospitalized at [facility] from 3/11-3/16.  She was stabilized on Depakote and Zyprexa, though it is suspected that she has been medication nonadherence since recent discharge.  Due to significant concerns for her safety and the safety of others, will petition for MI commitment at this time. It is suspected that she has not remained medication adherent given rapid decompensation since very recent discharge. Will restart PTA medications at this time. Inpatient psychiatric hospitalization is warranted at this time for safety, stabilization, and possible adjustment in medications.         Diagnoses:     Bipolar disorder, type I, currently manic with psychotic features         Plan:   Psychiatric treatment/inteventions:  Medications:   Restart PTA medications, including:  Depakote 1500 mg nightly  Depakote 500 mg daily  Zyprexa 20 mg at bedtime  Invega 6 mg daily    Laboratory/Imaging: Of note, Depakote level was elevated at 123 on 3/16. Will attempt to obtain repeat Depakote Level in AM.  Consults: None indicated at this time  Patient will be treated in therapeutic milieu with appropriate individual and group therapies as described.    Medical treatment/interventions:  Medical concerns: UA on admission with evidence of UTI  Plan: Resume ciprofloxacin 500 mg twice daily  Consults: None indicated at this time    Legal Status: 72-h Hold signed on 3/19.  Will petition for MI commitment.    Safety Assessment:   Checks: Individual Observation  "Status for severe agitation  Precautions: Suicide  Sexual  Fall Risk  Elopement  Pt has required locked seclusion or restraints in the past 24 hours to maintain safety, please refer to RN documentation for further details.    The risks, benefits, alternatives and side effects have been discussed and are understood by the patient.    Disposition: Pending clinical stabilization and outcome of commitment process    Candie Asencio MD  [facility]iat[facility]         Chief Complaint:   History obtained from patient and EMR (DEC assessment completed by Karina Nieto on 3/19/2018)  Patient interviewed on station 12 in early afternoon    \" is going to do it all for me!\"         History of Present Illness:     Per DEC assessment, the patient is a 52-year-old female who presents to the emergency department with symptoms of psychosis.  It is reported that the patient was shouting sexually inappropriate phrases during the DEC interview.  When the  asked whether the patient is suicidal, she replied \"I am attractive, worthless, unsanitary, not pretty enough.  She stated that \" called the ambulance for her because they are in love.\"  Patient reports that she is also \"autistic today and it is a great accomplishment to stay a week in the psych van.\"  Patient also reported that she had been up for the past 24 hours.    Per D EC assessment, upon arrival to the ED, patient repetitively shouted agitated, paranoid, and sexually explicit phrases repeatedly.  She appeared to be responding to internal stimuli, and was experiencing v  isual and auditory hallucinations.  Patient exhibited delusional thought content as she believes she could communicate with Amy Weathers, and Alexis Whyte.    From EMS reports, the patient was increasingly agitated at a CARE facility and at one point became quite aggressive with the .  She arrived in the emergency department in restraints per chart review.  In " "ED, the patient was given Zyprexa 10 mg IM for acute agitation.  Patient was placed on a 72 hour hold.    Upon arrival to the unit, the patient was extremely disorganized, labile, and threatening.  The patient then became extremely agitated at her SI O staff and was screaming at her.  As staff approached the patient, she postured towards nursing staff, yelling \"I dear you to look me in the eyes I am autistic and I cannot be locked up, fuck me in the eyeball you know you want to!\"  She continued to escalate, and refused several offered interventions.  Code 21 was called and patient was walked to [facility].  Patient was then undressing in [facility].      During the interview with the patient, the patient said \" will come and bring it all for her.\"  When asked who 'her' is, she replied \"Fannie.\" Writer asked who she is, and she said a name of a famous person. She made several nonsensical statements. She asked to be reminded of Valentines day when stating that it was February rather than March. She said that she would like [employer] to notify her. When asked where she is, she said \"[employer] [facility] in [place].\" She stated it was February 18th, 2018. When asked about medications, she said \"I always take my medications. You can't make me; I only can make myself!\" She then began pulling her hair very hard, and said \"I will pull my hair out if you don't leave now!\" Writer then terminated the interview per patient's strong request.             Psychiatric Review of Systems:   Unable to obtain due to patient's disorganized thought process         Medical Review of Systems:     Unable to obtain due to patient's disorganized thought process. Pt did not respond when asked specifically about medical issues.           Psychiatric History:   Past diagnoses: Bipolar disorder, type I  Psychiatric Hospitalizations: Patient was hospitalized from 2/16/2018 to 2/26/2018 at [facility] and then again from " 3/11-3/16 for symptoms of cyndi.  History of Psychosis: Yes, in the context of cyndi  Prior ECT: None  Court Commitment: None  Suicide Attempts: Per chart review, patient denied any history of suicide attempts  Self-injurious Behavior: None per chart review  Violence toward others: posturing at staff and threatening toward  prior to admission         Substance Use History:   Alcohol: none  Cannabis: none  Nicotine: none  Stimulants: none  Diet Pills: none  Opium/Morphine/Narcotics: none  Sedatives: none  Hallucinogens: none  Inhalants: none  Other: none    Prior Chemical Dependency treatment: none          Social History:   Per chart review only:  The patient reports she is single.  She grew up in [place].  She is currently unemployed.  She denies  or criminal history.          Family History:   None per chart review         Past Medical History:     Past Medical History:   Diagnosis Date     ADHD (attention deficit hyperactivity disorder)      Asperger syndrome      Central serous retinopathy      HZO (herpes zoster oticus)     Left eye     Osteoarthritis      Scoliosis        History of seizures: None per chart review  History of Hepatitis, Head Trauma with or without loss of consciousness: unknown         Past Surgical History:     Past Surgical History:   Procedure Laterality Date     GYN SURGERY      d and c            Allergies:      Allergies   Allergen Reactions     Animal Dander      Gluten Meal Diarrhea     Gas or constipation      Haldol Difficulty breathing     Haldol [Haloperidol]      Milk Protein Extract      No Clinical Screening - See Comments      Casien ~ unsure of spelling ~ is milk protien     Penicillins      Unsure of what happens. Has been told she has allergies since she was a kid.     Penicillins      Seasonal Allergies               Medications:   I have reviewed this patient's current medications  Prescriptions Prior to Admission   Medication Sig Dispense Refill Last  Dose     divalproex sodium extended-release (DEPAKOTE ER) 500 MG 24 hr tablet Take 1,500 mg by mouth At Bedtime   3/19/2018 at 2100     divalproex sodium extended-release (DEPAKOTE ER) 500 MG 24 hr tablet Take 1 tablet (500 mg) by mouth daily 30 tablet 0 Past Week at Unknown time     paliperidone (INVEGA) 6 MG 24 hr tablet Take 1 tablet (6 mg) by mouth daily 30 tablet 0 Past Week at Unknown time     albuterol (PROAIR HFA/PROVENTIL HFA/VENTOLIN HFA) 108 (90 BASE) MCG/ACT Inhaler Inhale 2 puffs into the lungs 4 times daily as needed for other (dyspnea) 1 Inhaler 0 Past Week at Unknown time     OLANZapine (ZYPREXA) 20 MG tablet Take 1 tablet (20 mg) by mouth At Bedtime 30 tablet 0 3/19/2018 at 2100     cetirizine (ZYRTEC) 10 MG tablet Take 10 mg by mouth daily as needed    Past Week at Unknown time     PROGESTERONE 100MG/G CREAM Apply 0.5 g topically 2 times daily as needed    Past Week at Unknown time     Polyethyl Glycol-Propyl Glycol (LUBRICANT EYE DROPS, PF, OP) Apply 1 drop to eye every hour as needed   Past Week at Unknown time     sodium chloride-sodium bicarb (NETI POT SINUS WASH) 2300-700 MG KIT 2 times daily as needed sinus wash/allergy season   Past Week at Unknown time     aspirin 325 MG tablet Take 2 tablets by mouth every 6 hours as needed pain   Past Week at Unknown time     acetaminophen (TYLENOL) 650 MG CR tablet Take 650 mg by mouth every 8 hours as needed   Past Week at Unknown time     ciprofloxacin (CIPRO) 500 MG tablet Take 1 tablet (500 mg) by mouth 2 times daily for 7 days 14 tablet 0 Unknown at Unknown time             Labs:     Recent Results (from the past 24 hour(s))   CBC with platelets differential    Collection Time: 03/19/18  3:13 PM   Result Value Ref Range    WBC 6.2 4.0 - 11.0 10e9/L    RBC Count 3.86 3.8 - 5.2 10e12/L    Hemoglobin 11.5 (L) 11.7 - 15.7 g/dL    Hematocrit 34.6 (L) 35.0 - 47.0 %    MCV 90 78 - 100 fl    MCH 29.8 26.5 - 33.0 pg    MCHC 33.2 31.5 - 36.5 g/dL    RDW 14.0  10.0 - 15.0 %    Platelet Count 250 150 - 450 10e9/L    Diff Method Automated Method     % Neutrophils 58.4 %    % Lymphocytes 27.1 %    % Monocytes 10.7 %    % Eosinophils 3.4 %    % Basophils 0.2 %    % Immature Granulocytes 0.2 %    Nucleated RBCs 0 0 /100    Absolute Neutrophil 3.6 1.6 - 8.3 10e9/L    Absolute Lymphocytes 1.7 0.8 - 5.3 10e9/L    Absolute Monocytes 0.7 0.0 - 1.3 10e9/L    Absolute Eosinophils 0.2 0.0 - 0.7 10e9/L    Absolute Basophils 0.0 0.0 - 0.2 10e9/L    Abs Immature Granulocytes 0.0 0 - 0.4 10e9/L    Absolute Nucleated RBC 0.0    Comprehensive metabolic panel    Collection Time: 03/19/18  3:13 PM   Result Value Ref Range    Sodium 139 133 - 144 mmol/L    Potassium 3.4 3.4 - 5.3 mmol/L    Chloride 105 94 - 109 mmol/L    Carbon Dioxide 27 20 - 32 mmol/L    Anion Gap 7 3 - 14 mmol/L    Glucose 100 (H) 70 - 99 mg/dL    Urea Nitrogen 7 7 - 30 mg/dL    Creatinine 0.48 (L) 0.52 - 1.04 mg/dL    GFR Estimate >90 >60 mL/min/1.7m2    GFR Estimate If Black >90 >60 mL/min/1.7m2    Calcium 8.0 (L) 8.5 - 10.1 mg/dL    Bilirubin Total 0.2 0.2 - 1.3 mg/dL    Albumin 3.4 3.4 - 5.0 g/dL    Protein Total 6.1 (L) 6.8 - 8.8 g/dL    Alkaline Phosphatase 58 40 - 150 U/L    ALT 21 0 - 50 U/L    AST 14 0 - 45 U/L   Salicylate level    Collection Time: 03/19/18  3:13 PM   Result Value Ref Range    Salicylate Level <2 mg/dL   Acetaminophen level    Collection Time: 03/19/18  3:13 PM   Result Value Ref Range    Acetaminophen Level 2 mg/L   Alcohol ethyl    Collection Time: 03/19/18  3:13 PM   Result Value Ref Range    Ethanol g/dL <0.01 <0.01 g/dL   Drug abuse screen 77 urine ([place],SH)    Collection Time: 03/19/18  5:08 PM   Result Value Ref Range    Amphetamine Qual Urine Negative NEG^Negative    Barbiturates Qual Urine Negative NEG^Negative    Benzodiazepine Qual Urine Negative NEG^Negative    Cannabinoids Qual Urine Negative NEG^Negative    Cocaine Qual Urine Negative NEG^Negative    Opiates Qualitative Urine  Negative NEG^Negative    PCP Qual Urine Negative NEG^Negative   UA with Microscopic    Collection Time: 03/19/18  5:08 PM   Result Value Ref Range    Color Urine Yellow     Appearance Urine Clear     Glucose Urine Negative NEG^Negative mg/dL    Bilirubin Urine Negative NEG^Negative    Ketones Urine Negative NEG^Negative mg/dL    Specific Gravity Urine 1.006 1.003 - 1.035    Blood Urine Negative NEG^Negative    pH Urine 7.5 (H) 5.0 - 7.0 pH    Protein Albumin Urine Negative NEG^Negative mg/dL    Urobilinogen mg/dL Normal 0.0 - 2.0 mg/dL    Nitrite Urine Negative NEG^Negative    Leukocyte Esterase Urine Large (A) NEG^Negative    Source Midstream Urine     WBC Urine 28 (H) 0 - 5 /HPF    RBC Urine 1 0 - 2 /HPF    Squamous Epithelial /HPF Urine 1 0 - 1 /HPF    Mucous Urine Present (A) NEG^Negative /LPF       LMP 07/03/2014  Weight is 0 lbs 0 oz  There is no height or weight on file to calculate BMI.      Psychiatric Examination:   Appearance:  awake, alert, dressed in hospital scrubs, unkempt and disheveled   Attitude:  uncooperative  Eye Contact:  poor   Mood:  labile  Affect:  intensity is heightened, intensity is dramatic, labile and reactive  Speech:  pressured speech and rambling  Psychomotor Behavior:  no evidence of tardive dyskinesia, dystonia, or tics  Thought Process:  disorganized, illogical and tangental  Associations:  loosening of associations present  Thought Content:  no evidence of suicidal ideation or homicidal ideation and evidence of psychotic thought  Insight:  limited  Judgment:  poor  Oriented to:  self only  Attention Span and Concentration:  poor  Recent and Remote Memory:  poor  Language: grossly intact  Fund of Knowledge: appropriate  Muscle Strength and Tone: normal  Gait and Station: Could not assess

## 2018-03-20 NOTE — PLAN OF CARE
"Problem: Cognitive Impairment (Psychotic Signs/Symptoms) (Adult)  Goal: Improved Thought Clarity/Organization (Psychotic Signs/Symptoms)  Outcome: No Change    Nurse Admission Note:  Patient is a 52 year old female who was brought to [facility] for psychosis by EMS.  In the ED patient required IM medications (Zyprexa 10 mg) and 5 point restraints for agitation and threatening behavior.  Pt was making delusional statements \"Alexis Whyte is shooting heroin down from the [place]\" and \"Prince called the ambulance for me because he loves me\" Per report, patient was able to calm approx 2100 and was able to take HS medications of Zyprexa 20 mg, Depakote 1500 mg, and Invega 6 mg. VSS WNL.  Patient UA was abnormal and ED physician prescribed antibiotic (cipro) regimen.  Pt was otherwise medically stable and cleared.  Pt was placed on a 72 hour hold starting at 2205 on 3/19.    Pt arrived to Station 12 approx 2315.  Patient required seclusion almost immediately when she arrived on the unit for agitation and threatening behavior (see previous note).    Pt presents as disorganized, labile, and appears responding to IS.  Her speech is pressured and rambling.  She has no insight into her mental health.  Initially, she reported she did not feel safe on the unit because she was \"cold\", but when given a blanket, she felt safe.  Pt denied SI and SIB and all other mental health concerns \"Get me the fuck out of here!\".  Pt too disorganized to complete admission process, will notify oncoming shift.  Pt given her Admission packet, which contained the Patient Bill of Rights.    Pt refused vital signs on admission.  Most recent VS in EMS had been WNL.    Patient has had two recent mental health admission at [facility], with a recent discharge on 3/16/18.    On call provider notified of admission.  Writer alerted on-call provider that most recent Depakote level on 3/16 was elevated.  Admission orders placed.  Pt placed on an SIO for intrusive behaviors " and agitation.    Will continue to monitor and assess.

## 2018-03-20 NOTE — PROGRESS NOTES
03/19/18 2330   Seclusion or Restraint Order   In Person Face to Face Assessment Conducted Yes-Eval of pt's immediate situation, reaction to intervention, complete review of systems assessment, behavioral assessment & review/assessment of hx, drugs & meds, recent labs, etc, behavioral condition, need to continue/terminate restraint/seclusion   pt experienced no ill effect from seclusion process.  Physician aware of pt's status.
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
## 2018-03-20 NOTE — PROGRESS NOTES
Pt has yet to attend OT group.   Plan: Pt will be given a self assessment form, or writer will review this information verbally with patient. OT staff will explain the value of being included in treatment planning, and offer options to meet needs and identified goals.

## 2018-03-20 NOTE — PROGRESS NOTES
"   03/19/18 2330   Justification   Clinical Justification Others     Pt is a new admit to the unit.  She arrived to the unit approx 2315.  She was extremely disorganized, labile, and threatening.  When she first arrived on the unit, she was yelling \"Oh no, not another psych van, let me go!\".  Pt appeared groggy from the medications she had received in the [facility] ED.  Pt was in scrubs and initially accepted walking to her room to sleep (of note, pt was grabbing onto staff, was grossly disorganized and changed from yelling to crying, while she walked down the hallway).  Pt was placed on an SIO and notified of this, but she did not appear to process this information.  Pt laid down for 5 minutes, but was then back out in the hallway.  She became agitated that she had an SIO staff (\"quit following me!\").  The patient  asked to use the restroom, where the SIO staff (female) kept a foot in the door, because the pt had made suicidal statements in [facility] ED.  The patient became extremely agitated at her SIO staff and was screaming at her.  As staff approached the pt, she postured towards writer and the other RN, yelling \"I dare you to look me in the eyes, I am autistic and I can't be locked up, fuck me in the eye-ball I know you want to!\".  She continued to escalate, and refused all least-restrictive interventions.  Writer assessed pt as a threat to others.    Code 21 was paged.  Responding staff were able to walk pt to seclusion, using BCS technique.  Search was conducted in seclusion.  No medication was administered, because pt had just received Zyprexa 20 mg PO in [facility] ED.      On call provider was notified.  An order for seclusion was obtained, starting at 2330.  On call provider was informed that there was no apparent injury to patient, or to staff.    Will continue to monitor and assess.  "

## 2018-03-21 LAB
TSH SERPL DL<=0.005 MIU/L-ACNC: 1.89 MU/L (ref 0.4–4)
VALPROATE SERPL-MCNC: 103 MG/L (ref 50–100)

## 2018-03-21 PROCEDURE — A9270 NON-COVERED ITEM OR SERVICE: HCPCS | Mod: GY | Performed by: EMERGENCY MEDICINE

## 2018-03-21 PROCEDURE — 25000132 ZZH RX MED GY IP 250 OP 250 PS 637: Mod: GY | Performed by: CLINICAL NURSE SPECIALIST

## 2018-03-21 PROCEDURE — A9270 NON-COVERED ITEM OR SERVICE: HCPCS | Mod: GY | Performed by: CLINICAL NURSE SPECIALIST

## 2018-03-21 PROCEDURE — 36415 COLL VENOUS BLD VENIPUNCTURE: CPT | Performed by: CLINICAL NURSE SPECIALIST

## 2018-03-21 PROCEDURE — 25000132 ZZH RX MED GY IP 250 OP 250 PS 637: Mod: GY | Performed by: EMERGENCY MEDICINE

## 2018-03-21 PROCEDURE — 12400003 ZZH R&B MH CRITICAL UMMC

## 2018-03-21 PROCEDURE — 80164 ASSAY DIPROPYLACETIC ACD TOT: CPT | Performed by: CLINICAL NURSE SPECIALIST

## 2018-03-21 PROCEDURE — 84443 ASSAY THYROID STIM HORMONE: CPT | Performed by: CLINICAL NURSE SPECIALIST

## 2018-03-21 PROCEDURE — 90853 GROUP PSYCHOTHERAPY: CPT

## 2018-03-21 PROCEDURE — 97150 GROUP THERAPEUTIC PROCEDURES: CPT | Mod: GO

## 2018-03-21 RX ADMIN — OLANZAPINE 10 MG: 5 TABLET, FILM COATED ORAL at 02:30

## 2018-03-21 RX ADMIN — CIPROFLOXACIN HYDROCHLORIDE 500 MG: 500 TABLET, FILM COATED ORAL at 08:16

## 2018-03-21 RX ADMIN — PALIPERIDONE 6 MG: 6 TABLET, EXTENDED RELEASE ORAL at 08:16

## 2018-03-21 RX ADMIN — CIPROFLOXACIN HYDROCHLORIDE 500 MG: 500 TABLET, FILM COATED ORAL at 19:30

## 2018-03-21 RX ADMIN — DIVALPROEX SODIUM 1500 MG: 500 TABLET, EXTENDED RELEASE ORAL at 19:31

## 2018-03-21 RX ADMIN — NICOTINE POLACRILEX 2 MG: 2 GUM, CHEWING ORAL at 08:58

## 2018-03-21 RX ADMIN — OLANZAPINE 20 MG: 10 TABLET, FILM COATED ORAL at 19:30

## 2018-03-21 RX ADMIN — NICOTINE POLACRILEX 2 MG: 2 GUM, CHEWING ORAL at 10:23

## 2018-03-21 RX ADMIN — ALBUTEROL SULFATE 2 PUFF: 90 AEROSOL, METERED RESPIRATORY (INHALATION) at 12:15

## 2018-03-21 RX ADMIN — DIVALPROEX SODIUM 500 MG: 500 TABLET, EXTENDED RELEASE ORAL at 08:16

## 2018-03-21 ASSESSMENT — ACTIVITIES OF DAILY LIVING (ADL)
LAUNDRY: UNABLE TO COMPLETE
ORAL_HYGIENE: PROMPTS
ORAL_HYGIENE: PROMPTS
GROOMING: PROMPTS
DRESS: INDEPENDENT
LAUNDRY: UNABLE TO COMPLETE
DRESS: INDEPENDENT
HYGIENE/GROOMING: PROMPTS

## 2018-03-21 NOTE — PLAN OF CARE
"Problem: Patient Care Overview  Goal: Plan of Care/Patient Progress Review  Outcome: Improving  Fannie has had a much better shift than previous days. Her speech has become more linear and organized than yesterday. Patient has been calm and cooperative. She has been med compliant. She did start coughing so hard today that she ended up throwing up some of her lunch. Otherwise she talks about her life with autism and says she has Asperger's so no one really believes her when she says she is autistic. Patient says this is why she is unable to establish lasting relationships, friends or romantic stating \"I just talk so much and I never listen to other people. That's why I don't have anyone in my life. I forget to ask about them and how they're doing.\" She otherwise has had a good shift. Remains on an SIO. No SI/SIB stated or observed. Will continue to monitor and assess.      "

## 2018-03-21 NOTE — PROGRESS NOTES
Patient has 3 small cuts/scrapes on her inner left forearm/antecubital area. Could be from previous bandage. Cleaned and put bacitracin on cuts and covered with bandaid. Removed band-aid about 3 hours later. Appears intact with no drainage or swelling.

## 2018-03-21 NOTE — PROGRESS NOTES
"Pt spent the evening in her room. Upon asking her nurse questions about the status and meaning of her 72 hour hold, Pt became upset, saying she is autistic and \"has a card in her wallet that says autistic people get a free pass and can't get in trouble, so you have to let me go!\". Additionally, pt explained there is a nice man in her head that she talks to in order to keep herself calm and get advice. Pt was disorganized and easily distractible through out the shift, crying on and off, and sleeping in bed. Pt did not have visitors, did not shower, ate her dinner, and did not make any phone calls.          03/20/18 9996   Behavioral Health   Hallucinations appears responding   Thinking poor concentration;delusional;distractable   Orientation place: oriented;date: oriented   Memory other (see comment)  (MARISA)   Insight poor   Judgement impaired   Eye Contact at examiner   Affect irritable;tense;sad   Mood irritable;anxious;labile   Physical Appearance/Attire disheveled   Hygiene neglected grooming - unclean body, hair, teeth   Suicidality other (see comments)  (None stated, none observed)   1. Wish to be Dead No   2. Non-Specific Active Suicidal Thoughts  No   Self Injury other (see comment)  (None stated, none observed)   Elopement Statements about wanting to leave;Distress about legal situation (holds for mental health or criminal)   Activity withdrawn;isolative   Speech clear;pressured;rambling   Medication Sensitivity no observed side effects;no stated side effects   Activities of Daily Living   Hygiene/Grooming independent   Oral Hygiene independent   Dress scrubs (behavioral health)   Room Organization independent   Activity   Activity Assistance Provided independent      "

## 2018-03-21 NOTE — PROGRESS NOTES
This writer had conversation with pt's Step-father (JANINE in chart) to provide information regarding care and commitment.     This writer received a call from Ingrid Hernandes (screener - [phone]); They supported the petition for comitment.  They will call in a court hold  tomorrow prior to the 72 hr hold expiring.

## 2018-03-21 NOTE — PROGRESS NOTES
Met with pt, she agreed to sign an JANINE or her Mother and Step father.  JANINE in chart  She also agreed to sign an JANINE for her assisted living facility/apartment for the CM that helps her there.  I left her a vm (chava Aceves); JANINE in chart.  She also is worried about her car so wants to make sure they are feeding it.

## 2018-03-21 NOTE — PROGRESS NOTES
This writer had a vm left on my phone from the pt's Step-father Gurvinder Cardenas.  He wanted to relay some information and concern for her.  He understands that there is not an JANINE on file and she is refusing to sign one at this point. But, he wanted to give me info.      He said that Geodon has been a medication that has been effective for her in the past.  He also provided the CM who is at the pt's home [place] [facility].  Phone: [phone].

## 2018-03-22 PROCEDURE — 97150 GROUP THERAPEUTIC PROCEDURES: CPT | Mod: GO

## 2018-03-22 PROCEDURE — A9270 NON-COVERED ITEM OR SERVICE: HCPCS | Mod: GY | Performed by: CLINICAL NURSE SPECIALIST

## 2018-03-22 PROCEDURE — 25000132 ZZH RX MED GY IP 250 OP 250 PS 637: Mod: GY | Performed by: EMERGENCY MEDICINE

## 2018-03-22 PROCEDURE — A9270 NON-COVERED ITEM OR SERVICE: HCPCS | Mod: GY | Performed by: EMERGENCY MEDICINE

## 2018-03-22 PROCEDURE — 25000132 ZZH RX MED GY IP 250 OP 250 PS 637: Mod: GY | Performed by: CLINICAL NURSE SPECIALIST

## 2018-03-22 PROCEDURE — 12400003 ZZH R&B MH CRITICAL UMMC

## 2018-03-22 PROCEDURE — H2032 ACTIVITY THERAPY, PER 15 MIN: HCPCS

## 2018-03-22 PROCEDURE — 99232 SBSQ HOSP IP/OBS MODERATE 35: CPT | Performed by: PSYCHIATRY & NEUROLOGY

## 2018-03-22 RX ADMIN — DIVALPROEX SODIUM 1500 MG: 500 TABLET, EXTENDED RELEASE ORAL at 20:06

## 2018-03-22 RX ADMIN — HYDROXYZINE HYDROCHLORIDE 25 MG: 25 TABLET, FILM COATED ORAL at 06:41

## 2018-03-22 RX ADMIN — CIPROFLOXACIN HYDROCHLORIDE 500 MG: 500 TABLET, FILM COATED ORAL at 20:06

## 2018-03-22 RX ADMIN — DIVALPROEX SODIUM 500 MG: 500 TABLET, EXTENDED RELEASE ORAL at 08:27

## 2018-03-22 RX ADMIN — NICOTINE POLACRILEX 2 MG: 2 GUM, CHEWING ORAL at 18:19

## 2018-03-22 RX ADMIN — OLANZAPINE 20 MG: 10 TABLET, FILM COATED ORAL at 20:06

## 2018-03-22 RX ADMIN — CIPROFLOXACIN HYDROCHLORIDE 500 MG: 500 TABLET, FILM COATED ORAL at 08:27

## 2018-03-22 RX ADMIN — NICOTINE POLACRILEX 4 MG: 2 GUM, CHEWING ORAL at 06:41

## 2018-03-22 RX ADMIN — PALIPERIDONE 6 MG: 6 TABLET, EXTENDED RELEASE ORAL at 08:27

## 2018-03-22 ASSESSMENT — ACTIVITIES OF DAILY LIVING (ADL)
GROOMING: HANDWASHING
GROOMING: INDEPENDENT
DRESS: SCRUBS (BEHAVIORAL HEALTH)
ORAL_HYGIENE: INDEPENDENT
DRESS: SCRUBS (BEHAVIORAL HEALTH)
ORAL_HYGIENE: INDEPENDENT

## 2018-03-22 NOTE — PROGRESS NOTES
"Pt was isolative to room most of evening shift, resting/napping.  Pt was less irritable today.  Appears responding, observed talking to self.  Also made delusional comments to writer, at one point explaining how she needed her \"new neck and voice to match her new body\" and that \"its March 18, I know that because I needed at least three days for my hair to grow and to really make sense of this\".  No behavioral concerns this shift     03/21/18 2200   Behavioral Health   Hallucinations appears responding   Thinking distractable;delusional;poor concentration   Orientation person: oriented   Insight poor   Judgement impaired   Affect blunted, flat;irritable   Mood mood is calm   Physical Appearance/Attire disheveled   Hygiene neglected grooming - unclean body, hair, teeth   Suicidality other (see comments)  (MARISA)   Self Injury other (see comment)  (MARISA)   Activity isolative   Speech rambling   Medication Sensitivity no observed side effects   Psychomotor / Gait slow   Activities of Daily Living   Hygiene/Grooming prompts   Oral Hygiene prompts   Dress independent   Laundry unable to complete   Room Organization prompts     "

## 2018-03-22 NOTE — PROGRESS NOTES
Hellen from [facility] called to inform writer that Fannie was placed on a court hold as of 1614 today, 3/22/2018.

## 2018-03-22 NOTE — PROGRESS NOTES
"[facility], [place]   Psychiatric Progress Note  Hospital Day: 3        Interim History:   The patient's care was discussed with the treatment team during the daily team meeting and/or staff's chart notes were reviewed.  Staff report patient remains labile and disorganized in her thought process though has recently been adherent with medications, more cooperative, and without significant behavioral disturbances overnight.     Upon interview, the patient states that the reason she was brought into the hospital is because she was tricking her  into believing she was unstable because \"I have a masters in therapy and I knew my  needs to work on just letting go. She is good at what she does but she worries too much.\" She states that she was medication adherent, though later stated that she believed her \"pellet pill [Invega]\" was \"prn.\" She also states that she does not wish to remain in the hospital any longer because she has autism and feels \"terrified\" here. She did agree to inform staff if ongoing safety concerns. She was more accepting of ongoing hospitalization when writer stated that transfer to less restrictive unit may be possible in the near future. She denied side effects to medications. She denies SI, HI, and SIB. She had no further requests or concerns.          Medications:       divalproex sodium extended-release  500 mg Oral Daily     divalproex sodium extended-release  1,500 mg Oral At Bedtime     OLANZapine  20 mg Oral At Bedtime     paliperidone  6 mg Oral Daily     ciprofloxacin  500 mg Oral BID          Allergies:     Allergies   Allergen Reactions     Animal Dander      Gluten Meal Diarrhea     Gas or constipation      Haldol Difficulty breathing     Haldol [Haloperidol]      Milk Protein Extract      No Clinical Screening - See Comments      Casien ~ unsure of spelling ~ is milk protien     Penicillins      Unsure of what happens. Has been told " she has allergies since she was a kid.     Penicillins      Seasonal Allergies           Labs:   No results found for this or any previous visit (from the past 24 hour(s)).       Psychiatric Examination:     /72  Pulse 110  Temp 98.6  F (37  C) (Tympanic)  LMP 07/03/2014  SpO2 100%  Weight is 0 lbs 0 oz  There is no height or weight on file to calculate BMI.  Lying Orthostatic BP: 111/62      Lying Orthostatic Pulse: 74 bpm      Sitting Orthostatic BP: 106/66      Sitting Orthostatic Pulse: 96 bpm      Standing Orthostatic BP: 97/61      Standing Orthostatic Pulse: 103 bpm       Appearance: awake, alert, unkempt and disheveled   Attitude:  somewhat cooperative  Eye Contact:  fair  Mood:  anxious and labile  Affect:  mood congruent, intensity is heightened and labile  Speech:  pressured speech and rambling  Language: fluent and intact in English  Psychomotor, Gait, Musculoskeletal:  no evidence of tardive dyskinesia, dystonia, or tics  Throught Process:  linear, goal oriented and circumstantial  Associations:  no loose associations  Thought Content:  no evidence of suicidal ideation or homicidal ideation  Insight:  fair  Judgement:  fair  Oriented to:  time, person, and place  Attention Span and Concentration:  fair  Recent and Remote Memory:  fair  Fund of Knowledge:  appropriate         Precautions:     Behavioral Orders   Procedures     Assault precautions     Code 1 - Restrict to Unit     Elopement precautions     Fall precautions     Routine Programming     As clinically indicated     Sexual precautions     Single Room     Status 15     Every 15 minutes.     Status Individual Observation     For disorganization and intrusive behavior    !!----Pt on Suicide Precautions---!!     Order Specific Question:   CONTINUOUS 24 hours / day     Answer:   Distance and Exceptions     Order Specific Question:   Distance     Answer:   5 feet     Order Specific Question:   Exceptions     Answer:   bathroom and shower      Suicide precautions          Diagnoses:     Bipolar disorder, type I, currently manic with psychotic features  History of Autism Spectrum Disorder per patient         Assessment & Plan:     Assessment and hospital summary:  This patient is a 52 year old  female with history of bipolar disorder, type I who presents with symptoms of cyndi and psychosis, including severe agitation requiring seclusion and restraints.  Patient has been hospitalized a total of 3 times in the last month for symptoms of cyndi.  She was most recently hospitalized at [facility] from 3/11-3/16.  She was stabilized on Depakote and Zyprexa, though it is suspected that she has been medication nonadherent since recent discharge, which is also evidenced by her overall improvement since hospitalization.  Due to significant concerns for her safety and the safety of others, will petition for MI commitment at this time. It is suspected that she has not remained medication adherent given rapid decompensation since very recent discharge. Will restart PTA medications at this time. Inpatient psychiatric hospitalization is warranted at this time for safety, stabilization, and possible adjustment in medications.     Psychiatric treatment/inteventions:  Medications:   Continue medications without changes  Depakote 1500 mg nightly  Depakote 500 mg daily  Zyprexa 20 mg at bedtime  Invega 6 mg daily. May consider transition to TAVERAS     Laboratory/Imaging: Of note, Depakote level was elevated at 123 on 3/16. Repeat on 3/21 was therapeutic at 103    Consults: None indicated at this time  Patient will be treated in therapeutic milieu with appropriate individual and group therapies as described.     Medical treatment/interventions:  Medical concerns: UA on admission with evidence of UTI  Plan: Resume ciprofloxacin 500 mg twice daily  Consults: None indicated at this time     Legal Status: 72-h Hold signed on 3/19.  MI commitment was supported. On  court hold.     Safety Assessment:   Checks: DC SIO due to absence of aggressive behaviors in past 48 hours  Precautions: Suicide  Sexual  Fall Risk  Elopement  Pt has not required locked seclusion or restraints in the past 24 hours to maintain safety, please refer to RN documentation for further details.    The risks, benefits, alternatives and side effects have been discussed and are understood by the patient.     Disposition: Pending clinical stabilization and outcome of commitment process. May consider transfer to less restrictive unit given improvement in agitation and aggression. She will likely be discharged back to home with more intensive outpatient services in place.     Candie Asencio MD  [facility]

## 2018-03-22 NOTE — PROGRESS NOTES
03/22/18 1421   Behavioral Health   Hallucinations denies / not responding to hallucinations   Thinking poor concentration   Orientation date: oriented;time: oriented;place: oriented;person: oriented   Memory baseline memory   Insight poor   Judgement impaired   Eye Contact at examiner   Affect blunted, flat   Mood mood is calm   Physical Appearance/Attire attire appropriate to age and situation   Hygiene neglected grooming - unclean body, hair, teeth   Suicidality other (see comments)  (denied)   1. Wish to be Dead No   2. Non-Specific Active Suicidal Thoughts  No   Self Injury other (see comment)  (denied)   Elopement (none observed )   Activity other (see comment)  (viaible in the milieu )   Speech coherent;clear   Medication Sensitivity no observed side effects;no stated side effects   Psychomotor / Gait balanced;steady   Psycho Education   Type of Intervention 1:1 intervention   Response participates, initiates socially appropriate   Hours 0.5   Treatment Detail check in   Activities of Daily Living   Hygiene/Grooming handwashing   Oral Hygiene independent   Dress scrubs (behavioral health)   Room Organization independent   Activity   Activity Assistance Provided independent     Pt was visible in the milieu and attended groups. Pt denied any SI/SIB this morning. Pt was pleasant with staff and peers.

## 2018-03-23 LAB — HCG UR QL: NEGATIVE

## 2018-03-23 PROCEDURE — 81025 URINE PREGNANCY TEST: CPT | Performed by: PSYCHIATRY & NEUROLOGY

## 2018-03-23 PROCEDURE — A9270 NON-COVERED ITEM OR SERVICE: HCPCS | Mod: GY | Performed by: CLINICAL NURSE SPECIALIST

## 2018-03-23 PROCEDURE — 25000132 ZZH RX MED GY IP 250 OP 250 PS 637: Mod: GY | Performed by: CLINICAL NURSE SPECIALIST

## 2018-03-23 PROCEDURE — 12400003 ZZH R&B MH CRITICAL UMMC

## 2018-03-23 PROCEDURE — 90853 GROUP PSYCHOTHERAPY: CPT

## 2018-03-23 PROCEDURE — 25000132 ZZH RX MED GY IP 250 OP 250 PS 637: Mod: GY | Performed by: EMERGENCY MEDICINE

## 2018-03-23 PROCEDURE — A9270 NON-COVERED ITEM OR SERVICE: HCPCS | Mod: GY | Performed by: EMERGENCY MEDICINE

## 2018-03-23 PROCEDURE — 99232 SBSQ HOSP IP/OBS MODERATE 35: CPT | Performed by: PSYCHIATRY & NEUROLOGY

## 2018-03-23 PROCEDURE — 97150 GROUP THERAPEUTIC PROCEDURES: CPT | Mod: GO

## 2018-03-23 RX ADMIN — CIPROFLOXACIN HYDROCHLORIDE 500 MG: 500 TABLET, FILM COATED ORAL at 08:30

## 2018-03-23 RX ADMIN — DIVALPROEX SODIUM 500 MG: 500 TABLET, EXTENDED RELEASE ORAL at 08:31

## 2018-03-23 RX ADMIN — PALIPERIDONE 6 MG: 6 TABLET, EXTENDED RELEASE ORAL at 08:31

## 2018-03-23 RX ADMIN — NICOTINE POLACRILEX 4 MG: 2 GUM, CHEWING ORAL at 08:55

## 2018-03-23 RX ADMIN — NICOTINE POLACRILEX 2 MG: 2 GUM, CHEWING ORAL at 22:13

## 2018-03-23 RX ADMIN — CIPROFLOXACIN HYDROCHLORIDE 500 MG: 500 TABLET, FILM COATED ORAL at 22:00

## 2018-03-23 RX ADMIN — NICOTINE POLACRILEX 2 MG: 2 GUM, CHEWING ORAL at 17:25

## 2018-03-23 RX ADMIN — DIVALPROEX SODIUM 1500 MG: 500 TABLET, EXTENDED RELEASE ORAL at 22:00

## 2018-03-23 RX ADMIN — OLANZAPINE 20 MG: 10 TABLET, FILM COATED ORAL at 22:00

## 2018-03-23 ASSESSMENT — ACTIVITIES OF DAILY LIVING (ADL)
DRESS: SCRUBS (BEHAVIORAL HEALTH)
GROOMING: HANDWASHING
DRESS: SCRUBS (BEHAVIORAL HEALTH)
ORAL_HYGIENE: INDEPENDENT
GROOMING: INDEPENDENT
LAUNDRY: UNABLE TO COMPLETE
ORAL_HYGIENE: INDEPENDENT

## 2018-03-23 NOTE — PROGRESS NOTES
"[facility], [place]   Psychiatric Progress Note  Hospital Day: 4        Interim History:   The patient's care was discussed with the treatment team during the daily team meeting and/or staff's chart notes were reviewed.  Staff report that patient remains disorganized with ongoing delusional thought content. She appears to perseverate on her \"diagnosis of autism.\" She expressed concerns about being pregnant. No significant behavioral concerns. She has been adherent with medications.     Upon interview, the patient inquires about commitment paperwork, stating \"Why have I been served. Who wants to take custody of me?\" We again discussed the commitment process at length. She expressed understanding. She was cooperative and pleasant. She states that she believes she flushed her Zyprexa down the toilet because \"it probably said neuroleptic instead of narcotic but I thought it said narcotic so I got rid of it. Narcotics killed Jasiel.\" Psychoeducation provided. She does acknowledge that medication adherence has been a challenge for her though does express strong desire to continue to take her medications consistently upon discharge to prevent recurrent episodes of cyndi and hospitalization. She had no further requests or concerns. She denies SI, SIB, and HI.          Medications:       divalproex sodium extended-release  500 mg Oral Daily     divalproex sodium extended-release  1,500 mg Oral At Bedtime     OLANZapine  20 mg Oral At Bedtime     paliperidone  6 mg Oral Daily     ciprofloxacin  500 mg Oral BID          Allergies:     Allergies   Allergen Reactions     Animal Dander      Gluten Meal Diarrhea     Gas or constipation      Haldol Difficulty breathing     Haldol [Haloperidol]      Milk Protein Extract      No Clinical Screening - See Comments      Casien ~ unsure of spelling ~ is milk protien     Penicillins      Unsure of what happens. Has been told she has allergies since she was a " kid.     Penicillins      Seasonal Allergies           Labs:   No results found for this or any previous visit (from the past 24 hour(s)).       Psychiatric Examination:     /73 (BP Location: Left arm)  Pulse 85  Temp 98.1  F (36.7  C) (Tympanic)  LMP 07/03/2014  SpO2 100%  Weight is 0 lbs 0 oz  There is no height or weight on file to calculate BMI.  Lying Orthostatic BP: 111/62      Lying Orthostatic Pulse: 74 bpm      Sitting Orthostatic BP: 106/66      Sitting Orthostatic Pulse: 96 bpm      Standing Orthostatic BP: 97/61      Standing Orthostatic Pulse: 103 bpm       Appearance: awake, alert, unkempt and disheveled   Attitude:  somewhat cooperative  Eye Contact:  fair  Mood:  anxious and labile  Affect:  mood congruent, intensity is heightened and labile  Speech:  pressured speech and rambling  Language: fluent and intact in English  Psychomotor, Gait, Musculoskeletal:  no evidence of tardive dyskinesia, dystonia, or tics  Throught Process:  linear, goal oriented and circumstantial  Associations:  no loose associations  Thought Content:  no evidence of suicidal ideation or homicidal ideation  Insight:  fair  Judgement:  fair  Oriented to:  time, person, and place  Attention Span and Concentration:  fair  Recent and Remote Memory:  fair  Fund of Knowledge:  appropriate         Precautions:     Behavioral Orders   Procedures     Assault precautions     Code 1 - Restrict to Unit     Elopement precautions     Fall precautions     Routine Programming     As clinically indicated     Sexual precautions     Single Room     Status 15     Every 15 minutes.     Status Individual Observation     For disorganization and intrusive behavior    !!----Pt on Suicide Precautions---!!     Order Specific Question:   CONTINUOUS 24 hours / day     Answer:   Distance and Exceptions     Order Specific Question:   Distance     Answer:   5 feet     Order Specific Question:   Exceptions     Answer:   bathroom and shower      Suicide precautions          Diagnoses:     Bipolar disorder, type I, currently manic with psychotic features  History of Autism Spectrum Disorder per patient         Assessment & Plan:     Assessment and hospital summary:  This patient is a 52 year old  female with history of bipolar disorder, type I who presents with symptoms of cyndi and psychosis, including severe agitation requiring seclusion and restraints.  Patient has been hospitalized a total of 3 times in the last month for symptoms of cyndi.  She was most recently hospitalized at [facility] from 3/11-3/16.  She was stabilized on Depakote and Zyprexa, though it is suspected that she has been medication nonadherent since recent discharge, which is also evidenced by her overall improvement since hospitalization.  Due to significant concerns for her safety and the safety of others, will petition for MI commitment at this time. It is suspected that she has not remained medication adherent given rapid decompensation since very recent discharge. Will restart PTA medications at this time. Inpatient psychiatric hospitalization is warranted at this time for safety, stabilization, and possible adjustment in medications.     Psychiatric treatment/inteventions:  Medications:   Continue medications without changes  Depakote 1500 mg nightly  Depakote 500 mg daily  Zyprexa 20 mg at bedtime  Invega 6 mg daily. May consider transition to TAVERAS.     Laboratory/Imaging: Of note, Depakote level was elevated at 123 on 3/16. Repeat on 3/21 was therapeutic at 103    Consults: None indicated at this time  Patient will be treated in therapeutic milieu with appropriate individual and group therapies as described.     Medical treatment/interventions:  Medical concerns: UA on admission with evidence of UTI  Plan: Continue ciprofloxacin 500 mg twice daily  Consults: None indicated at this time     Legal Status: 72-h Hold signed on 3/19.  MI commitment was supported. On  court hold.     Safety Assessment:   Checks: DC SIO due to absence of aggressive behaviors in past 48 hours  Precautions: Suicide  Sexual  Fall Risk  Elopement  Pt has not required locked seclusion or restraints in the past 24 hours to maintain safety, please refer to RN documentation for further details.    The risks, benefits, alternatives and side effects have been discussed and are understood by the patient.     Disposition: Pending clinical stabilization and outcome of commitment process. May consider transfer to less restrictive unit given improvement in agitation and aggression. She will likely be discharged back to home with more intensive outpatient services in place.     Candie Asencio MD  [facility]

## 2018-03-23 NOTE — PROGRESS NOTES
Paperwork received regarding this patient's upcoming court dates for the MI petition.    Preliminary: 3/27 @ 10:30am  Final: 3/30 @ TBD    This writer also spoke to pt's supervisor/CM at the Apartment/assisted living she resides in. Informed her that this pt will be hospitalized at least through the court dates.

## 2018-03-23 NOTE — PLAN OF CARE
Problem: Cognitive Impairment (Psychotic Signs/Symptoms) (Adult)  Goal: Improved Thought Clarity/Organization (Psychotic Signs/Symptoms)  Outcome: No Change  Pt requested to talk with writer to discuss her current thought process.  Pt went into great detail about how she doesn't think she has multiple personalities, but that she finds herself thinking like she's different celebrities.  She mentioned Erick Padilla and [place].  She said she just likes to think about how they would handle different situations and think like they think.   She said that she doesn't believe she is someone else, but that she is having difficulty not thinking this way.  Pt had a difficult time verbalizing how she felt and how her thoughts are getting away from her.   Pt also talked at length about how she is Autistic, and how that is impacting her negatively, particularly on this unit.   She wanted to emphasize that she's trying to figure things out, that she has a masters degree in a behavioral field, and that she knows how this all works.  She was extremely calm and cooperative with staff.  She was medication compliant and reports that she is not suicidal and was only suicidal once in her life, a long time ago.

## 2018-03-23 NOTE — PROGRESS NOTES
Patient's step dad called to find out if patient is taking lithium.  Based on what she told them last night.  Patient already signed the JANINE for mom and step dad.  Staff told step dad patient not on lithium, he felt relieved to hear this.  Patient current medications was reviewed with step sridhar.  Wondered why Jass not listed as patient takes it prior to admission. Step sridhar would like the doctor to call him on ([phone]) regarding this.

## 2018-03-23 NOTE — PLAN OF CARE
Problem: Cognitive Impairment (Psychotic Signs/Symptoms) (Adult)  Intervention: Promote Thought Clarity/Organization    Pt attended OT groups.  Continues to be confused why she is in the hospital and how long she will be here.  Had some difficulty organizing work during OT clinic, asking writer how to go about painting the wood snake she selected, then suddenly just started coloring it with a marker, after being set up with paint.  A few minutes later, pt said she just wanted to work on a collage.

## 2018-03-23 NOTE — PROGRESS NOTES
"Pt initiated conversation with writer. Pt made a statement that she had gone through menopause several times with intermittently having her menses. She stated there is a \"small possibility of being pregnant\" and would like to take a pregnancy test. Pt appeared to be confused with her legal status and mentioned \"I don't want them to file on me. I want to stay here voluntary until the doctor feels I am ok to discharge.\"   "

## 2018-03-23 NOTE — PROGRESS NOTES
03/23/18 1412   Behavioral Health   Hallucinations denies / not responding to hallucinations   Thinking poor concentration   Orientation time: oriented;date: oriented;place: oriented;person: oriented   Memory baseline memory   Insight poor   Judgement impaired   Eye Contact at examiner   Affect blunted, flat   Mood mood is calm   Physical Appearance/Attire attire appropriate to age and situation   Hygiene neglected grooming - unclean body, hair, teeth   Suicidality other (see comments)  (denied)   1. Wish to be Dead No   2. Non-Specific Active Suicidal Thoughts  No   Self Injury other (see comment)  (denied)   Elopement (none observed )   Activity other (see comment)  (visible in the milieu )   Speech coherent;clear   Medication Sensitivity no stated side effects;no observed side effects   Psychomotor / Gait balanced;steady   Psycho Education   Type of Intervention 1:1 intervention   Response participates, initiates socially appropriate   Hours 0.5   Treatment Detail check in    Activities of Daily Living   Hygiene/Grooming handwashing   Oral Hygiene independent   Dress scrubs (behavioral health)   Room Organization independent   Activity   Activity Assistance Provided independent     Pt was visible in the milieu and attended groups. Pt denied any SI/SIB this morning. Pt was pleasant with peers and stuff.

## 2018-03-24 PROCEDURE — A9270 NON-COVERED ITEM OR SERVICE: HCPCS | Mod: GY | Performed by: CLINICAL NURSE SPECIALIST

## 2018-03-24 PROCEDURE — 36415 COLL VENOUS BLD VENIPUNCTURE: CPT | Performed by: PSYCHIATRY & NEUROLOGY

## 2018-03-24 PROCEDURE — 87389 HIV-1 AG W/HIV-1&-2 AB AG IA: CPT | Performed by: PSYCHIATRY & NEUROLOGY

## 2018-03-24 PROCEDURE — 25000132 ZZH RX MED GY IP 250 OP 250 PS 637: Mod: GY | Performed by: EMERGENCY MEDICINE

## 2018-03-24 PROCEDURE — 12400003 ZZH R&B MH CRITICAL UMMC

## 2018-03-24 PROCEDURE — 25000132 ZZH RX MED GY IP 250 OP 250 PS 637: Mod: GY | Performed by: CLINICAL NURSE SPECIALIST

## 2018-03-24 PROCEDURE — G0472 HEP C SCREEN HIGH RISK/OTHER: HCPCS | Performed by: PSYCHIATRY & NEUROLOGY

## 2018-03-24 PROCEDURE — A9270 NON-COVERED ITEM OR SERVICE: HCPCS | Mod: GY | Performed by: EMERGENCY MEDICINE

## 2018-03-24 RX ADMIN — PALIPERIDONE 6 MG: 6 TABLET, EXTENDED RELEASE ORAL at 07:59

## 2018-03-24 RX ADMIN — CIPROFLOXACIN HYDROCHLORIDE 500 MG: 500 TABLET, FILM COATED ORAL at 20:01

## 2018-03-24 RX ADMIN — CIPROFLOXACIN HYDROCHLORIDE 500 MG: 500 TABLET, FILM COATED ORAL at 07:59

## 2018-03-24 RX ADMIN — DIVALPROEX SODIUM 500 MG: 500 TABLET, EXTENDED RELEASE ORAL at 07:59

## 2018-03-24 RX ADMIN — NICOTINE POLACRILEX 4 MG: 2 GUM, CHEWING ORAL at 07:59

## 2018-03-24 RX ADMIN — DIVALPROEX SODIUM 1500 MG: 500 TABLET, EXTENDED RELEASE ORAL at 20:01

## 2018-03-24 RX ADMIN — OLANZAPINE 20 MG: 10 TABLET, FILM COATED ORAL at 20:01

## 2018-03-24 RX ADMIN — NICOTINE POLACRILEX 2 MG: 2 GUM, CHEWING ORAL at 21:47

## 2018-03-24 RX ADMIN — NICOTINE POLACRILEX 2 MG: 2 GUM, CHEWING ORAL at 14:04

## 2018-03-24 RX ADMIN — NICOTINE POLACRILEX 2 MG: 2 GUM, CHEWING ORAL at 20:08

## 2018-03-24 ASSESSMENT — ACTIVITIES OF DAILY LIVING (ADL)
DRESS: SCRUBS (BEHAVIORAL HEALTH)
ORAL_HYGIENE: INDEPENDENT
GROOMING: INDEPENDENT
LAUNDRY: WITH SUPERVISION
HYGIENE/GROOMING: INDEPENDENT
ORAL_HYGIENE: INDEPENDENT
DRESS: INDEPENDENT
LAUNDRY: UNABLE TO COMPLETE

## 2018-03-24 NOTE — PLAN OF CARE
"Problem: Cognitive Impairment (Psychotic Signs/Symptoms) (Adult)  Goal: Improved Thought Clarity/Organization (Psychotic Signs/Symptoms)  Outcome: Therapy, progress toward functional goals is gradual  Pt continues to make progress towards therapeutic goals this shift. Demonstrates some insight into symptoms and role of medication in managing illness; feels her medications are helpful in \"getting me thinking clearer\" but expresses desire to \"get off of them eventually.\" Patient lists a number of disorders she feels contributed to her presentation including \"Autism stuff and then the Asperger's... And bipolar or something.\" Patient is otherwise receptive to discussing symptoms, denies AH/VH, SI and SIB. Reports anxiety r/t court process but appears calm and is able to appropriately cope with her anxiety without PRN medication this shift. Reports sedation as SE to medication. See writer's previous note for physical health concerns. Denies acute issues or pain. Will continue to monitor closely and reassess.       "

## 2018-03-24 NOTE — PROVIDER NOTIFICATION
Pt reports concern r/t possible pregnancy. States she has not gone through menopause (although periods have been lighter and less regular). Has had unprotected sex multiple times prior to admission and currently reports breast tenderness, and fatigue, and requests HcG urine to rule our pregnancy. On Call psychiatrist notified, who ordered HcG urine, HIV and Hep C screening.

## 2018-03-25 PROCEDURE — A9270 NON-COVERED ITEM OR SERVICE: HCPCS | Mod: GY | Performed by: CLINICAL NURSE SPECIALIST

## 2018-03-25 PROCEDURE — 25000132 ZZH RX MED GY IP 250 OP 250 PS 637: Mod: GY | Performed by: EMERGENCY MEDICINE

## 2018-03-25 PROCEDURE — 25000132 ZZH RX MED GY IP 250 OP 250 PS 637: Mod: GY | Performed by: CLINICAL NURSE SPECIALIST

## 2018-03-25 PROCEDURE — A9270 NON-COVERED ITEM OR SERVICE: HCPCS | Mod: GY | Performed by: EMERGENCY MEDICINE

## 2018-03-25 PROCEDURE — 12400003 ZZH R&B MH CRITICAL UMMC

## 2018-03-25 RX ADMIN — CIPROFLOXACIN HYDROCHLORIDE 500 MG: 500 TABLET, FILM COATED ORAL at 07:02

## 2018-03-25 RX ADMIN — NICOTINE POLACRILEX 2 MG: 2 GUM, CHEWING ORAL at 18:41

## 2018-03-25 RX ADMIN — OLANZAPINE 20 MG: 10 TABLET, FILM COATED ORAL at 20:55

## 2018-03-25 RX ADMIN — DIVALPROEX SODIUM 500 MG: 500 TABLET, EXTENDED RELEASE ORAL at 07:03

## 2018-03-25 RX ADMIN — PALIPERIDONE 6 MG: 6 TABLET, EXTENDED RELEASE ORAL at 07:02

## 2018-03-25 RX ADMIN — DIVALPROEX SODIUM 1500 MG: 500 TABLET, EXTENDED RELEASE ORAL at 20:55

## 2018-03-25 RX ADMIN — NICOTINE POLACRILEX 4 MG: 2 GUM, CHEWING ORAL at 07:20

## 2018-03-25 RX ADMIN — CIPROFLOXACIN HYDROCHLORIDE 500 MG: 500 TABLET, FILM COATED ORAL at 20:55

## 2018-03-25 ASSESSMENT — ACTIVITIES OF DAILY LIVING (ADL)
LAUNDRY: UNABLE TO COMPLETE
DRESS: INDEPENDENT
GROOMING: INDEPENDENT
ORAL_HYGIENE: INDEPENDENT
HYGIENE/GROOMING: INDEPENDENT
DRESS: SCRUBS (BEHAVIORAL HEALTH)
ORAL_HYGIENE: INDEPENDENT
LAUNDRY: UNABLE TO COMPLETE

## 2018-03-25 NOTE — PROGRESS NOTES
"   03/24/18 2100   Behavioral Health   Hallucinations denies / not responding to hallucinations   Thinking distractable   Orientation person: oriented;place: oriented   Memory baseline memory   Insight poor   Judgement impaired   Eye Contact at examiner   Affect full range affect   Mood mood is calm   Physical Appearance/Attire neat   Hygiene well groomed   Suicidality other (see comments)   1. Wish to be Dead No   2. Non-Specific Active Suicidal Thoughts  No   3. Active Sucidal Ideation with any Methods (Not Plan) Without Intent to Act  No   4. Active Suicidal Ideation with Some Intent to Act, Without Specific Plan  No   5. Active Suicidal Ideation with Specific Plan and Intent  No   Self Injury other (see comment)   Elopement (Pt is on elopement precaution )   Activity other (see comment)  (visible in the milieu and socialized with others )   Speech clear;coherent   Activities of Daily Living   Hygiene/Grooming independent   Oral Hygiene independent   Dress independent   Laundry with supervision   Room Organization independent       Pt denied SI and SIB.  Pt reported feeling \" no I'm not feeling depressed I'm feeling determined.\"  Pt seems calm, ate supper, visible in the milieu and socialized with others.  Pt daily goal \" to see my mom.\"  Pt goal after discharge \" I need to get my finances together.\"  Pt reported good appetite.  Pt is independent with ADL's.  Pt reported shoulder stiffness.  Pt reported \" I don't sleep very good anywhere.\"  Pt pt reported having racing thoughts \" yes this is natural for me\" and denied having any psychotic symptoms.  Pt pt reported feeling hopeful.  Pt reported SE's \" I don't know sometimes I feel woozy.\"  Pt wants visitors.    "

## 2018-03-25 NOTE — PLAN OF CARE
Problem: Cognitive Impairment (Psychotic Signs/Symptoms) (Adult)  Goal: Improved Thought Clarity/Organization (Psychotic Signs/Symptoms)  Outcome: Therapy, progress toward functional goals is gradual  Pt continues to demonstrate progress towards achievement of therapeutic goals this shift. Mood is improving, is less labile, and thinking appears more organized. Affect is congruent with mood. Denies AH/VH and SI/SIB. Denies acute physical health concerns. Denies SEs to medication. Will continue to monitor closely.

## 2018-03-26 LAB
HCV AB SERPL QL IA: NONREACTIVE
HIV 1+2 AB+HIV1 P24 AG SERPL QL IA: NONREACTIVE

## 2018-03-26 PROCEDURE — 12400003 ZZH R&B MH CRITICAL UMMC

## 2018-03-26 PROCEDURE — 25000132 ZZH RX MED GY IP 250 OP 250 PS 637: Mod: GY

## 2018-03-26 PROCEDURE — A9270 NON-COVERED ITEM OR SERVICE: HCPCS | Mod: GY | Performed by: PSYCHIATRY & NEUROLOGY

## 2018-03-26 PROCEDURE — 25000132 ZZH RX MED GY IP 250 OP 250 PS 637: Mod: GY | Performed by: PSYCHIATRY & NEUROLOGY

## 2018-03-26 PROCEDURE — 99232 SBSQ HOSP IP/OBS MODERATE 35: CPT | Performed by: PSYCHIATRY & NEUROLOGY

## 2018-03-26 PROCEDURE — 25000132 ZZH RX MED GY IP 250 OP 250 PS 637: Mod: GY | Performed by: CLINICAL NURSE SPECIALIST

## 2018-03-26 PROCEDURE — A9270 NON-COVERED ITEM OR SERVICE: HCPCS | Mod: GY | Performed by: EMERGENCY MEDICINE

## 2018-03-26 PROCEDURE — A9270 NON-COVERED ITEM OR SERVICE: HCPCS | Mod: GY | Performed by: CLINICAL NURSE SPECIALIST

## 2018-03-26 PROCEDURE — A9270 NON-COVERED ITEM OR SERVICE: HCPCS | Mod: GY

## 2018-03-26 PROCEDURE — 25000132 ZZH RX MED GY IP 250 OP 250 PS 637: Mod: GY | Performed by: EMERGENCY MEDICINE

## 2018-03-26 RX ORDER — OLANZAPINE 15 MG/1
15 TABLET ORAL AT BEDTIME
Status: DISCONTINUED | OUTPATIENT
Start: 2018-03-26 | End: 2018-03-29

## 2018-03-26 RX ADMIN — DIVALPROEX SODIUM 1500 MG: 500 TABLET, EXTENDED RELEASE ORAL at 20:13

## 2018-03-26 RX ADMIN — DIVALPROEX SODIUM 500 MG: 500 TABLET, EXTENDED RELEASE ORAL at 10:06

## 2018-03-26 RX ADMIN — NICOTINE POLACRILEX 4 MG: 2 GUM, CHEWING ORAL at 21:04

## 2018-03-26 RX ADMIN — NICOTINE POLACRILEX 2 MG: 2 GUM, CHEWING ORAL at 15:50

## 2018-03-26 RX ADMIN — PALIPERIDONE 6 MG: 6 TABLET, EXTENDED RELEASE ORAL at 10:06

## 2018-03-26 RX ADMIN — CIPROFLOXACIN HYDROCHLORIDE 500 MG: 500 TABLET, FILM COATED ORAL at 10:06

## 2018-03-26 RX ADMIN — OLANZAPINE 15 MG: 15 TABLET, FILM COATED ORAL at 20:13

## 2018-03-26 RX ADMIN — CIPROFLOXACIN HYDROCHLORIDE 500 MG: 500 TABLET, FILM COATED ORAL at 20:13

## 2018-03-26 ASSESSMENT — ACTIVITIES OF DAILY LIVING (ADL)
ORAL_HYGIENE: INDEPENDENT
GROOMING: INDEPENDENT
DRESS: SCRUBS (BEHAVIORAL HEALTH)

## 2018-03-26 NOTE — PROGRESS NOTES
Re: Day Treatment Referral    Writer attempted to schedule patient at IOP at [facility] where she is followed by psychiatry and requested to be scheduled at. The individual who schedules intakes, Jazmine is out of the office. Message left for her to call to schedule tomorrow Tuesday 3/27/18. Primary CTC to follow up with scheduling IOP intake if no call is returned. Psychiatry appointment is in place for 4/3/18. [facility]: [phone]    Skylar Mcclain, LPCC, LADC

## 2018-03-26 NOTE — PROGRESS NOTES
"[facility], [place]   Psychiatric Progress Note  Hospital Day: 7        Interim History:   The patient's care was discussed with the treatment team during the daily team meeting and/or staff's chart notes were reviewed.  Staff report that patient denies SI and SIB. She denies depressive symptoms. Pt is independent with ADLs. She denies side effects.    Upon interview, the patient states that she is feeling better. She notes improvement in racing thoughts. She no longer endorses AH and did exhibit evidence of delusional thought content today. She believes that her medication regimen has been helpful. She does report side effect of \"sleepiness.\" She notes feeling groggy in the mornings especially. We discussed my recommendation to reduce Zyprexa while increasing Invega. She agreed with this plan.          Medications:       divalproex sodium extended-release  500 mg Oral Daily     divalproex sodium extended-release  1,500 mg Oral At Bedtime     OLANZapine  20 mg Oral At Bedtime     paliperidone  6 mg Oral Daily     ciprofloxacin  500 mg Oral BID          Allergies:     Allergies   Allergen Reactions     Animal Dander      Gluten Meal Diarrhea     Gas or constipation      Haldol Difficulty breathing     Haldol [Haloperidol]      Milk Protein Extract      No Clinical Screening - See Comments      Casien ~ unsure of spelling ~ is milk protien     Penicillins      Unsure of what happens. Has been told she has allergies since she was a kid.     Penicillins      Seasonal Allergies           Labs:   No results found for this or any previous visit (from the past 24 hour(s)).       Psychiatric Examination:     /64 (BP Location: Left arm)  Pulse 95  Temp 99.5  F (37.5  C) (Tympanic)  Resp 16  LMP 07/03/2014  SpO2 96%  Weight is 0 lbs 0 oz  There is no height or weight on file to calculate BMI.  Lying Orthostatic BP: 111/62      Lying Orthostatic Pulse: 74 bpm      Sitting Orthostatic BP: " "106/66      Sitting Orthostatic Pulse: 96 bpm      Standing Orthostatic BP: 97/61      Standing Orthostatic Pulse: 103 bpm       Appearance: awake, alert  Attitude:  cooperative  Eye Contact:  fair  Mood:  \"better\"  Affect:  mood congruent, less intense and less labile  Speech:  pressured speech and rambling  Language: fluent and intact in English  Psychomotor, Gait, Musculoskeletal:  no evidence of tardive dyskinesia, dystonia, or tics  Throught Process:  linear, goal oriented and circumstantial  Associations:  no loose associations  Thought Content:  no evidence of suicidal ideation or homicidal ideation  Insight:  fair  Judgement:  fair  Oriented to:  time, person, and place  Attention Span and Concentration:  fair  Recent and Remote Memory:  fair  Fund of Knowledge:  appropriate         Precautions:     Behavioral Orders   Procedures     Assault precautions     Code 1 - Restrict to Unit     Elopement precautions     Fall precautions     Routine Programming     As clinically indicated     Sexual precautions     Single Room     Status 15     Every 15 minutes.     Status Individual Observation     For disorganization and intrusive behavior    !!----Pt on Suicide Precautions---!!     Order Specific Question:   CONTINUOUS 24 hours / day     Answer:   Distance and Exceptions     Order Specific Question:   Distance     Answer:   5 feet     Order Specific Question:   Exceptions     Answer:   bathroom and shower     Suicide precautions          Diagnoses:     Bipolar disorder, type I, currently manic with psychotic features  History of Autism Spectrum Disorder per patient         Assessment & Plan:     Assessment and hospital summary:  This patient is a 52 year old  female with history of bipolar disorder, type I who presents with symptoms of cyndi and psychosis, including severe agitation requiring seclusion and restraints.  Patient has been hospitalized a total of 3 times in the last month for symptoms of " cyndi.  She was most recently hospitalized at [facility] from 3/11-3/16.  She was stabilized on Depakote and Zyprexa, though it is suspected that she has been medication nonadherent since recent discharge, which is also evidenced by her overall improvement since hospitalization.  Due to significant concerns for her safety and the safety of others, will petition for MI commitment at this time. It is suspected that she has not remained medication adherent given rapid decompensation since very recent discharge. Will restart PTA medications at this time. Inpatient psychiatric hospitalization is warranted at this time for safety, stabilization, and possible adjustment in medications.     Psychiatric treatment/inteventions:  Medications:   Continue medications without changes  Depakote 1500 mg nightly  Depakote 500 mg daily  Reduce Zyprexa to 15 mg at bedtime  Increase Invega to 9 mg daily. May consider transition to TAVERAS.     Laboratory/Imaging: Of note, Depakote level was elevated at 123 on 3/16. Repeat on 3/21 was therapeutic at 103. Repeat Depakote level tomorrow AM.    Consults: None indicated at this time  Patient will be treated in therapeutic milieu with appropriate individual and group therapies as described.     Medical treatment/interventions:  Medical concerns: UA on admission with evidence of UTI  Plan: Continue ciprofloxacin 500 mg twice daily. Completed tomorrow.   Consults: None indicated at this time     Legal Status: 72-h Hold signed on 3/19.  MI commitment was supported. On court hold.     Safety Assessment:   Checks:  Status 15  Precautions: Suicide  Sexual  Fall Risk  Elopement  Pt has not required locked seclusion or restraints in the past 24 hours to maintain safety, please refer to RN documentation for further details.    The risks, benefits, alternatives and side effects have been discussed and are understood by the patient.     Disposition: Pending clinical stabilization and outcome of  commitment process. May consider transfer to less restrictive unit given improvement in agitation and aggression. She will likely be discharged back to home with more intensive outpatient services in place.     Candie Asencio MD  [facility] Psychiatry

## 2018-03-27 LAB — VALPROATE SERPL-MCNC: 114 MG/L (ref 50–100)

## 2018-03-27 PROCEDURE — 80164 ASSAY DIPROPYLACETIC ACD TOT: CPT | Performed by: PSYCHIATRY & NEUROLOGY

## 2018-03-27 PROCEDURE — A9270 NON-COVERED ITEM OR SERVICE: HCPCS | Mod: GY | Performed by: PSYCHIATRY & NEUROLOGY

## 2018-03-27 PROCEDURE — 25000132 ZZH RX MED GY IP 250 OP 250 PS 637: Mod: GY | Performed by: CLINICAL NURSE SPECIALIST

## 2018-03-27 PROCEDURE — 36415 COLL VENOUS BLD VENIPUNCTURE: CPT | Performed by: PSYCHIATRY & NEUROLOGY

## 2018-03-27 PROCEDURE — 25000132 ZZH RX MED GY IP 250 OP 250 PS 637: Mod: GY | Performed by: PSYCHIATRY & NEUROLOGY

## 2018-03-27 PROCEDURE — 12400003 ZZH R&B MH CRITICAL UMMC

## 2018-03-27 PROCEDURE — A9270 NON-COVERED ITEM OR SERVICE: HCPCS | Mod: GY | Performed by: CLINICAL NURSE SPECIALIST

## 2018-03-27 RX ADMIN — DIVALPROEX SODIUM 1500 MG: 500 TABLET, EXTENDED RELEASE ORAL at 20:00

## 2018-03-27 RX ADMIN — OLANZAPINE 15 MG: 15 TABLET, FILM COATED ORAL at 20:00

## 2018-03-27 RX ADMIN — NICOTINE POLACRILEX 2 MG: 2 GUM, CHEWING ORAL at 12:35

## 2018-03-27 RX ADMIN — HYDROXYZINE HYDROCHLORIDE 25 MG: 25 TABLET, FILM COATED ORAL at 18:51

## 2018-03-27 RX ADMIN — HYDROXYZINE HYDROCHLORIDE 25 MG: 25 TABLET, FILM COATED ORAL at 12:36

## 2018-03-27 RX ADMIN — NICOTINE POLACRILEX 4 MG: 2 GUM, CHEWING ORAL at 14:49

## 2018-03-27 RX ADMIN — PALIPERIDONE 9 MG: 6 TABLET, EXTENDED RELEASE ORAL at 08:28

## 2018-03-27 RX ADMIN — NICOTINE POLACRILEX 4 MG: 2 GUM, CHEWING ORAL at 08:28

## 2018-03-27 RX ADMIN — DIVALPROEX SODIUM 500 MG: 500 TABLET, EXTENDED RELEASE ORAL at 08:28

## 2018-03-27 RX ADMIN — ACETAMINOPHEN 650 MG: 325 TABLET ORAL at 12:36

## 2018-03-27 ASSESSMENT — ACTIVITIES OF DAILY LIVING (ADL)
ORAL_HYGIENE: INDEPENDENT
LAUNDRY: UNABLE TO COMPLETE
DRESS: SCRUBS (BEHAVIORAL HEALTH)
DRESS: SCRUBS (BEHAVIORAL HEALTH)
GROOMING: INDEPENDENT
GROOMING: INDEPENDENT
ORAL_HYGIENE: INDEPENDENT
DRESS: INDEPENDENT
ORAL_HYGIENE: INDEPENDENT
HYGIENE/GROOMING: INDEPENDENT

## 2018-03-27 NOTE — PROGRESS NOTES
This writer met with pt and provided her with CM phone number and  number.  She will call them and talk to them about the apartment/housing situation.

## 2018-03-27 NOTE — PROGRESS NOTES
This writer met with pt, explained the circumstances with apartment and provided her a copy of what they sent.

## 2018-03-27 NOTE — PROGRESS NOTES
"   03/27/18 1414   Behavioral Health   Hallucinations denies / not responding to hallucinations   Thinking distractable;poor concentration   Orientation person: oriented;place: oriented;date: oriented;time: oriented   Memory baseline memory   Insight poor   Judgement impaired   Eye Contact at examiner   Affect full range affect   Mood anxious;mood is calm   Physical Appearance/Attire attire appropriate to age and situation   Suicidality other (see comments)  (pt denies )   1. Wish to be Dead No   2. Non-Specific Active Suicidal Thoughts  No   3. Active Sucidal Ideation with any Methods (Not Plan) Without Intent to Act  No   4. Active Suicidal Ideation with Some Intent to Act, Without Specific Plan  No   5. Active Suicidal Ideation with Specific Plan and Intent  No   Self Injury (pt denies )   Elopement (nothing observed )   Activity (pt was active in the milieu)   Speech clear;coherent   Medication Sensitivity no observed side effects   Psychomotor / Gait balanced;steady   Activities of Daily Living   Hygiene/Grooming independent   Oral Hygiene independent   Dress independent   Laundry unable to complete   Room Organization independent   Pt was clam and cooperative this shift. Pt was social with select peers and staff. Pt went to court today. She said, it went well. \" I answer all the question I was asked\". Pt reported of feeling anxious because of going court waiting on discharge date. Pt denies other concern.     "

## 2018-03-27 NOTE — PLAN OF CARE
Problem: Mental State/Mood Impairment (Psychotic Signs/Symptoms) (Adult)  Goal: Improved Mental State/Mood (Psychotic Signs/Symptoms)  Outcome: No Change  Pt very anxious about going to court tomorrow morning.  Pt has been extremely focused on what she will wear, what she should do with her hair, and making sure she gets to use the shower before any other patients tomorrow morning. Pt's clothes were washed, folded, and placed back into her lockers to be clean and ready for court.  Pt had many many requests tonight, including looking up phone numbers, signing new JANINE's, finding out about getting walker after discharge, and how much her medications will cost.  Pt is extremely concerned about how she's been doing on the unit, and is worried she's in her head too much and that this will negatively impact her at court.  Pt was medication compliant and denies SI/SIB or any psychotic symptoms.

## 2018-03-27 NOTE — PROGRESS NOTES
This writer spoke to [facility] atty on this case.  I updated him, he is likely to have this go to final hearing.  He asked us to complete the Neuroleptic note and fax that to the cty atty office.

## 2018-03-27 NOTE — PROGRESS NOTES
Patient not seen by this writer today as she was at her preliminary hearing and thus unavailable to meet during usual rounding time.     Candie Asencio MD  [facility] Psychiatry
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
## 2018-03-28 PROCEDURE — A9270 NON-COVERED ITEM OR SERVICE: HCPCS | Mod: GY | Performed by: CLINICAL NURSE SPECIALIST

## 2018-03-28 PROCEDURE — 25000132 ZZH RX MED GY IP 250 OP 250 PS 637: Mod: GY | Performed by: PSYCHIATRY & NEUROLOGY

## 2018-03-28 PROCEDURE — A9270 NON-COVERED ITEM OR SERVICE: HCPCS | Mod: GY | Performed by: PSYCHIATRY & NEUROLOGY

## 2018-03-28 PROCEDURE — 25000132 ZZH RX MED GY IP 250 OP 250 PS 637: Mod: GY | Performed by: CLINICAL NURSE SPECIALIST

## 2018-03-28 PROCEDURE — 12400003 ZZH R&B MH CRITICAL UMMC

## 2018-03-28 PROCEDURE — 90853 GROUP PSYCHOTHERAPY: CPT

## 2018-03-28 PROCEDURE — 97150 GROUP THERAPEUTIC PROCEDURES: CPT | Mod: GO

## 2018-03-28 RX ADMIN — OLANZAPINE 15 MG: 15 TABLET, FILM COATED ORAL at 20:09

## 2018-03-28 RX ADMIN — NICOTINE POLACRILEX 4 MG: 2 GUM, CHEWING ORAL at 13:39

## 2018-03-28 RX ADMIN — NICOTINE POLACRILEX 4 MG: 2 GUM, CHEWING ORAL at 08:58

## 2018-03-28 RX ADMIN — ACETAMINOPHEN 650 MG: 325 TABLET ORAL at 18:47

## 2018-03-28 RX ADMIN — HYDROXYZINE HYDROCHLORIDE 25 MG: 25 TABLET, FILM COATED ORAL at 22:21

## 2018-03-28 RX ADMIN — DIVALPROEX SODIUM 500 MG: 500 TABLET, EXTENDED RELEASE ORAL at 08:37

## 2018-03-28 RX ADMIN — NICOTINE POLACRILEX 4 MG: 2 GUM, CHEWING ORAL at 18:46

## 2018-03-28 RX ADMIN — PALIPERIDONE 9 MG: 6 TABLET, EXTENDED RELEASE ORAL at 08:36

## 2018-03-28 RX ADMIN — DIVALPROEX SODIUM 1500 MG: 500 TABLET, EXTENDED RELEASE ORAL at 20:09

## 2018-03-28 ASSESSMENT — ACTIVITIES OF DAILY LIVING (ADL)
GROOMING: INDEPENDENT
DRESS: SCRUBS (BEHAVIORAL HEALTH);INDEPENDENT
HYGIENE/GROOMING: INDEPENDENT
DRESS: SCRUBS (BEHAVIORAL HEALTH)
ORAL_HYGIENE: INDEPENDENT
ORAL_HYGIENE: INDEPENDENT
LAUNDRY: UNABLE TO COMPLETE

## 2018-03-28 NOTE — PLAN OF CARE
Problem: Overarching Goals (Adult)  Goal: Optimized Coping Skills in Response to Life Stressors    Intervention: Promote Effective Coping Strategies    Pt attended 3 of 3 OT groups today.  Attended group on stress management.  Explored ways to be proactive in preventing stress, benefits of relaxation, coping skills, and how stress can be positive and motivating to make change.  Made several insightful comments as to how stress can be positive and motivating.  Spoke a lot about healing touch/reike, and other alternative therapies.  Concerned today about eviction notice and needing to give her cat up to a rescue - has had reasonable and appropriate reactions to these things.  Does not understand reason for eviction, does not recall concerning behaviors she had that led to this outcome.    Goal: Work with pt to increase awareness of how symptoms can impact performance on goal-directed tasks and life management skills. Will provide opportunities to build on coping strategies to manage symptoms during hospitalization and after d/c.

## 2018-03-28 NOTE — PLAN OF CARE
"Problem: Mental State/Mood Impairment (Psychotic Signs/Symptoms) (Adult)  Goal: Improved Mental State/Mood (Psychotic Signs/Symptoms)  Outcome: Improving  Pt is present and social in the milieu.  She continues to need frequent redirection for swearing, being intrusive, and having inappropriate conversations.  She has extremely poor boundaries with other patients, and constantly tries to touch staff.  Despite all of this, she is making major improvements in frustration tolerance, and her ability to communicate how she's feeling.  Pt is now able to explain to writer when she is \"frustrated\" in stead up immediately becoming agitated or aggressive as she has in the past. Pt became extremely tearful with writer at one point, saying that her guardian told her mother that she wants to give up her baby, and all her rights.  Pt said that she isn't sure this is what she wants, but that she should have a voice regarding this decision.  She still struggles with phone restrictions and has difficulty taking redirection from staff that have not developed a rapport.  Pt is disheveled and her room is extremely disorganized and dirty, despite staff help and encouragement.  Pt makes suicidal and homicidal statements during moments of frustration, but then quickly says that she doesn't mean it and that she's just sad and frustrated.  Staff will continue to monitor patient to determine if closer observation (SIO) becomes necessary.        "

## 2018-03-28 NOTE — PLAN OF CARE
Problem: Patient Care Overview  Goal: Team Discussion  Team Plan:   BEHAVIORAL TEAM DISCUSSION    Participants: Elliot Bennett RN, Keisha Cristina LMFT, [facility]   Progress: Pt is starting to stabilize.    Continued Stay Criteria/Rationale: Pt has upcoming final hearing on 3/30   Medical/Physical: see medical chart  Precautions:   Behavioral Orders   Procedures     Assault precautions     Code 1 - Restrict to Unit     Elopement precautions     Fall precautions     Routine Programming     As clinically indicated     Sexual precautions     Single Room     Status 15     Every 15 minutes.     Status Individual Observation     For disorganization and intrusive behavior    !!----Pt on Suicide Precautions---!!     Order Specific Question:   CONTINUOUS 24 hours / day     Answer:   Distance and Exceptions     Order Specific Question:   Distance     Answer:   5 feet     Order Specific Question:   Exceptions     Answer:   bathroom and shower     Suicide precautions     Plan: pt is going to be evicted from her apt due to behavior etc. She will have CM services through the [facility] now and I have provided her with that phone number along with the number for  to discuss her apartment issues.   Rationale for change in precautions or plan: no change

## 2018-03-28 NOTE — PROGRESS NOTES
This writer returned phone call from pt's Step-Father Gurvinder Cardenas.  I explained the situation with court and her apartment on his vm and provided my number is he has additional questions.  Her final hearing is on the 30th.

## 2018-03-28 NOTE — PROGRESS NOTES
This writer spoke to [facility] and moved Psychiatric appointment back to end of next week. Also, this writer inquired about their Adult IOP program.  They told me they have discussed and they do not feel she would be appropriate for their group and encouraged her to do individual therapy instead.  They would see her 2x/week if need be.

## 2018-03-28 NOTE — PROGRESS NOTES
"Pt had a good evening. Spent some time out in the milieu socializing w/ peers. Pt would randomly announce to pts in the milieu that she \"got evicted\" out of her home. Overall pt was calm and cooperative and showed no SI/SIB.       03/27/18 2100   Behavioral Health   Hallucinations denies / not responding to hallucinations   Thinking distractable;poor concentration   Orientation person: oriented;place: oriented   Memory baseline memory   Insight poor   Judgement impaired   Eye Contact at examiner   Affect full range affect   Mood anxious;mood is calm   Physical Appearance/Attire attire appropriate to age and situation   Hygiene well groomed   Activities of Daily Living   Hygiene/Grooming independent   Oral Hygiene independent   Dress scrubs (behavioral health)   Room Organization independent       "

## 2018-03-29 LAB
ALBUMIN UR-MCNC: ABNORMAL MG/DL
ALBUMIN UR-MCNC: NEGATIVE MG/DL
APPEARANCE UR: ABNORMAL
APPEARANCE UR: CLEAR
BILIRUB UR QL STRIP: ABNORMAL
BILIRUB UR QL STRIP: NEGATIVE
COLOR UR AUTO: ABNORMAL
COLOR UR AUTO: ABNORMAL
GLUCOSE UR STRIP-MCNC: ABNORMAL MG/DL
GLUCOSE UR STRIP-MCNC: NEGATIVE MG/DL
HGB UR QL STRIP: ABNORMAL
HGB UR QL STRIP: NEGATIVE
KETONES UR STRIP-MCNC: ABNORMAL MG/DL
KETONES UR STRIP-MCNC: NEGATIVE MG/DL
LEUKOCYTE ESTERASE UR QL STRIP: ABNORMAL
LEUKOCYTE ESTERASE UR QL STRIP: ABNORMAL
NITRATE UR QL: ABNORMAL
NITRATE UR QL: NEGATIVE
PH UR STRIP: 7 PH (ref 5–7)
PH UR STRIP: ABNORMAL PH (ref 5–7)
RBC #/AREA URNS AUTO: 1 /HPF (ref 0–2)
RBC #/AREA URNS AUTO: ABNORMAL /HPF (ref 0–2)
SOURCE: ABNORMAL
SOURCE: ABNORMAL
SP GR UR STRIP: 1 (ref 1–1.03)
SP GR UR STRIP: ABNORMAL (ref 1–1.03)
SPECIMEN SOURCE: NORMAL
SQUAMOUS #/AREA URNS AUTO: <1 /HPF (ref 0–1)
TRANS CELLS #/AREA URNS HPF: 1 /HPF (ref 0–1)
UROBILINOGEN UR STRIP-MCNC: ABNORMAL MG/DL (ref 0–2)
UROBILINOGEN UR STRIP-MCNC: NORMAL MG/DL (ref 0–2)
WBC #/AREA URNS AUTO: 7 /HPF (ref 0–5)
WBC #/AREA URNS AUTO: ABNORMAL /HPF
WET PREP SPEC: NORMAL

## 2018-03-29 PROCEDURE — 25000132 ZZH RX MED GY IP 250 OP 250 PS 637: Mod: GY | Performed by: PSYCHIATRY & NEUROLOGY

## 2018-03-29 PROCEDURE — 99233 SBSQ HOSP IP/OBS HIGH 50: CPT | Performed by: PSYCHIATRY & NEUROLOGY

## 2018-03-29 PROCEDURE — A9270 NON-COVERED ITEM OR SERVICE: HCPCS | Mod: GY | Performed by: CLINICAL NURSE SPECIALIST

## 2018-03-29 PROCEDURE — 81001 URINALYSIS AUTO W/SCOPE: CPT | Performed by: PHYSICIAN ASSISTANT

## 2018-03-29 PROCEDURE — 12400003 ZZH R&B MH CRITICAL UMMC

## 2018-03-29 PROCEDURE — 87210 SMEAR WET MOUNT SALINE/INK: CPT | Performed by: PHYSICIAN ASSISTANT

## 2018-03-29 PROCEDURE — A9270 NON-COVERED ITEM OR SERVICE: HCPCS | Mod: GY | Performed by: PSYCHIATRY & NEUROLOGY

## 2018-03-29 PROCEDURE — 87086 URINE CULTURE/COLONY COUNT: CPT | Performed by: PSYCHIATRY & NEUROLOGY

## 2018-03-29 PROCEDURE — 25000132 ZZH RX MED GY IP 250 OP 250 PS 637: Mod: GY | Performed by: CLINICAL NURSE SPECIALIST

## 2018-03-29 RX ORDER — OLANZAPINE 10 MG/1
20 TABLET ORAL AT BEDTIME
Status: DISCONTINUED | OUTPATIENT
Start: 2018-03-29 | End: 2018-04-02 | Stop reason: HOSPADM

## 2018-03-29 RX ADMIN — NICOTINE POLACRILEX 4 MG: 2 GUM, CHEWING ORAL at 18:11

## 2018-03-29 RX ADMIN — NICOTINE POLACRILEX 4 MG: 2 GUM, CHEWING ORAL at 20:11

## 2018-03-29 RX ADMIN — ACETAMINOPHEN 650 MG: 325 TABLET ORAL at 12:39

## 2018-03-29 RX ADMIN — OLANZAPINE 20 MG: 10 TABLET, FILM COATED ORAL at 20:09

## 2018-03-29 RX ADMIN — PALIPERIDONE 9 MG: 6 TABLET, EXTENDED RELEASE ORAL at 07:49

## 2018-03-29 RX ADMIN — ACETAMINOPHEN 650 MG: 325 TABLET ORAL at 20:11

## 2018-03-29 RX ADMIN — NICOTINE POLACRILEX 4 MG: 2 GUM, CHEWING ORAL at 12:39

## 2018-03-29 RX ADMIN — ACETAMINOPHEN 650 MG: 325 TABLET ORAL at 08:16

## 2018-03-29 RX ADMIN — DIVALPROEX SODIUM 500 MG: 500 TABLET, EXTENDED RELEASE ORAL at 07:49

## 2018-03-29 RX ADMIN — DIVALPROEX SODIUM 1500 MG: 500 TABLET, EXTENDED RELEASE ORAL at 20:09

## 2018-03-29 ASSESSMENT — ACTIVITIES OF DAILY LIVING (ADL)
ORAL_HYGIENE: INDEPENDENT
DRESS: SCRUBS (BEHAVIORAL HEALTH)
LAUNDRY: UNABLE TO COMPLETE
HYGIENE/GROOMING: INDEPENDENT
GROOMING: INDEPENDENT
DRESS: SCRUBS (BEHAVIORAL HEALTH)
ORAL_HYGIENE: INDEPENDENT

## 2018-03-29 NOTE — PROGRESS NOTES
Melissa from [facility] attys office called.  They wanted to know which doctor would be testifying at the mary hearing tomorrow.  I provided them with Dr. Asencio's pager number for testimony.

## 2018-03-29 NOTE — PLAN OF CARE
Problem: Mental State/Mood Impairment (Psychotic Signs/Symptoms) (Adult)  Goal: Improved Mental State/Mood (Psychotic Signs/Symptoms)  Outcome: Improving  Pt was isolative and withdrawn. Pt spent most of the shift in her room watching tv. Pt was calm and cooperative. Her affect is full range. Pt denies depression, SI, SIB, auditory hallucinations, and visual hallucinations. Pt reported feeling anxious because she is being evicted from her apartment. Pt was med compliant and received PRN Tylenol and nicotine this evening. Pt reported no concerns sleep, nutrition, and medication side effects. Will continue to monitor.

## 2018-03-29 NOTE — PROGRESS NOTES
03/29/18 1502   Behavioral Health   Hallucinations denies / not responding to hallucinations   Thinking poor concentration;paranoid;distractable   Orientation place: oriented   Memory baseline memory   Insight poor   Judgement impaired   Affect irritable   Mood mood is calm;labile;irritable   1. Wish to be Dead No   Activities of Daily Living   Hygiene/Grooming independent   Oral Hygiene independent   Dress scrubs (behavioral health)   Laundry unable to complete   Room Organization independent   Pt was in and out of her room throughout this shift. She spent some time talking to staff about her mental illness, and some of things she likes to do etc. She made a few phone call, and ate all meals offered without any issues. She was overall calm and interacted with staff and others.
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
## 2018-03-29 NOTE — PROGRESS NOTES
Patient reports a return of itching and burning upon urination.  She was treated for a UTI that was identified on 3/19/18. She completed a 7-day course of Ciprofloxacin 500 mg tablet BID on 3/26/18.  RN writer contacted internal medicine consultant, PARISH Almonte to discuss next steps.  New orders include: UA with reflex to culture and wet prep.

## 2018-03-30 LAB
BACTERIA SPEC CULT: NORMAL
SPECIMEN SOURCE: NORMAL

## 2018-03-30 PROCEDURE — A9270 NON-COVERED ITEM OR SERVICE: HCPCS | Mod: GY | Performed by: PSYCHIATRY & NEUROLOGY

## 2018-03-30 PROCEDURE — 25000132 ZZH RX MED GY IP 250 OP 250 PS 637: Mod: GY | Performed by: CLINICAL NURSE SPECIALIST

## 2018-03-30 PROCEDURE — 12400003 ZZH R&B MH CRITICAL UMMC

## 2018-03-30 PROCEDURE — 99232 SBSQ HOSP IP/OBS MODERATE 35: CPT | Performed by: PSYCHIATRY & NEUROLOGY

## 2018-03-30 PROCEDURE — A9270 NON-COVERED ITEM OR SERVICE: HCPCS | Mod: GY | Performed by: CLINICAL NURSE SPECIALIST

## 2018-03-30 PROCEDURE — 25000132 ZZH RX MED GY IP 250 OP 250 PS 637: Mod: GY | Performed by: PSYCHIATRY & NEUROLOGY

## 2018-03-30 PROCEDURE — 90853 GROUP PSYCHOTHERAPY: CPT

## 2018-03-30 RX ADMIN — DIVALPROEX SODIUM 1500 MG: 500 TABLET, EXTENDED RELEASE ORAL at 19:57

## 2018-03-30 RX ADMIN — HYDROXYZINE HYDROCHLORIDE 25 MG: 25 TABLET, FILM COATED ORAL at 23:11

## 2018-03-30 RX ADMIN — NICOTINE POLACRILEX 2 MG: 2 GUM, CHEWING ORAL at 19:59

## 2018-03-30 RX ADMIN — NICOTINE POLACRILEX 4 MG: 2 GUM, CHEWING ORAL at 18:25

## 2018-03-30 RX ADMIN — PALIPERIDONE 9 MG: 6 TABLET, EXTENDED RELEASE ORAL at 08:48

## 2018-03-30 RX ADMIN — DIVALPROEX SODIUM 500 MG: 500 TABLET, EXTENDED RELEASE ORAL at 08:48

## 2018-03-30 RX ADMIN — OLANZAPINE 20 MG: 10 TABLET, FILM COATED ORAL at 19:57

## 2018-03-30 RX ADMIN — NICOTINE POLACRILEX 2 MG: 2 GUM, CHEWING ORAL at 20:00

## 2018-03-30 RX ADMIN — NICOTINE POLACRILEX 4 MG: 2 GUM, CHEWING ORAL at 12:15

## 2018-03-30 ASSESSMENT — ACTIVITIES OF DAILY LIVING (ADL)
GROOMING: INDEPENDENT
LAUNDRY: UNABLE TO COMPLETE
HYGIENE/GROOMING: INDEPENDENT
DRESS: SCRUBS (BEHAVIORAL HEALTH);INDEPENDENT
ORAL_HYGIENE: INDEPENDENT
DRESS: SCRUBS (BEHAVIORAL HEALTH);INDEPENDENT
ORAL_HYGIENE: INDEPENDENT
LAUNDRY: UNABLE TO COMPLETE

## 2018-03-30 NOTE — PROGRESS NOTES
03/30/18 1554   Behavioral Health   Hallucinations denies / not responding to hallucinations   Thinking intact   Orientation person: oriented;place: oriented   Memory baseline memory   Insight insight appropriate to situation;insight appropriate to events   Judgement impaired   Eye Contact at examiner   Affect full range affect   Mood mood is calm   Physical Appearance/Attire appears stated age;attire appropriate to age and situation;neat   Hygiene well groomed   Suicidality other (see comments)  (none noted)   Self Injury other (see comment)  (none noted)   Elopement (none noted)   Activity restless   Speech clear;coherent   Medication Sensitivity no stated side effects;no observed side effects   Psychomotor / Gait balanced;steady   Overt Aggression Scale   Verbal Aggression 0   Aggression against Property 0   Auto-Aggression 0   Physical Aggression 0   Overt Aggression Total Score 0   Activities of Daily Living   Hygiene/Grooming independent   Oral Hygiene independent   Dress scrubs (behavioral health);independent   Laundry unable to complete   Room Organization independent     Pt was present in the milieu for the first few hours of the shift, and left for court with no issue. Pt showered and was social with staff. No issues.

## 2018-03-30 NOTE — PROGRESS NOTES
Brief Medicine Note    Contacted by nursing regarding dysuria. Wet prep order, unremarkable. UA w/ moderate LE and 7 WBC, culture NGTD.  Will continue to follow culture and treat if indicated.     /70 (BP Location: Right arm)  Pulse 94  Temp 97.7  F (36.5  C) (Tympanic)  Resp 16  Wt 86.6 kg (191 lb)  LMP 07/03/2014  SpO2 99%  BMI 30.83 kg/m2      Megan Kim PA-C  Hospitalist Service  Pager [phone]

## 2018-03-30 NOTE — PROGRESS NOTES
"[facility], [place]   Psychiatric Progress Note  Hospital Day: 11        Interim History:   The patient's care was discussed with the treatment team during the daily team meeting and/or staff's chart notes were reviewed.  Staff report that patient notes that she feels much better compared to one week ago, though continues to endorse anxiety about the potential for her to be evicted from her apartment. She denies side effects from medications. She denies SI and SIB.    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 She believes that the medications have been helpful. She agreed to take them after discharge. She also denies side effects at this time. She is thankful that she is able to return to her apartment.     Spoke with patient's mother over the phone. Mother notes that \"nothing, no medication seemed to touch her except the Geodon.\" Mom also notes that patient had not been taking medications consistently prior to admission. She said that patient convinced her outpatient provider to take her off of medications. She was doing well for an extended period of time while taking Geodon. She \"came up with this story that she has autism, which is just not true.\" OP provider believed her, however, and he took her off of medications per mother. Mom said she has been \"just shocked by her behaviors.\" Mom is hoping that increased supervision by outpatient case management team will prevent " "her from decompensating. She does agree that an IRTS would not be appropriate at this time given that she had been to IRTS facilities in the past, and \"she couldn't even make friends with people because she was higher functioning than most of them.\"         Medications:       OLANZapine  20 mg Oral At Bedtime     paliperidone  9 mg Oral Daily     divalproex sodium extended-release  500 mg Oral Daily     divalproex sodium extended-release  1,500 mg Oral At Bedtime          Allergies:     Allergies   Allergen Reactions     Animal Dander      Gluten Meal Diarrhea     Gas or constipation      Haldol Difficulty breathing     Haldol [Haloperidol]      Milk Protein Extract      No Clinical Screening - See Comments      Casien ~ unsure of spelling ~ is milk protien     Penicillins      Unsure of what happens. Has been told she has allergies since she was a kid.     Penicillins      Seasonal Allergies           Labs:     Recent Results (from the past 24 hour(s))   UA with Microscopic reflex to Culture    Collection Time: 03/29/18  2:30 PM   Result Value Ref Range    Color Urine Canceled, Test credited     Appearance Urine Canceled, Test credited     Glucose Urine Canceled, Test credited (A) NEG^Negative mg/dL    Bilirubin Urine Canceled, Test credited (A) NEG^Negative    Ketones Urine Canceled, Test credited (A) NEG^Negative mg/dL    Specific Gravity Urine Canceled, Test credited 1.003 - 1.035    Blood Urine Canceled, Test credited (A) NEG^Negative    pH Urine Canceled, Test credited 5.0 - 7.0 pH    Protein Albumin Urine Canceled, Test credited (A) NEG^Negative mg/dL    Urobilinogen mg/dL Canceled, Test credited 0.0 - 2.0 mg/dL    Nitrite Urine Canceled, Test credited (A) NEG^Negative    Leukocyte Esterase Urine Canceled, Test credited (A) NEG^Negative    Source Canceled, Test credited     WBC Urine Canceled, Test credited (A) OTO5^0 - 5 /HPF    RBC Urine Canceled, Test credited 0 - 2 /HPF   Wet prep    Collection Time: " "03/29/18  3:15 PM   Result Value Ref Range    Specimen Description Vagina     Wet Prep Moderate  PMNs seen       Wet Prep No Trichomonas seen     Wet Prep No clue cells seen     Wet Prep No yeast seen    UA with Microscopic reflex to Culture    Collection Time: 03/29/18  6:11 PM   Result Value Ref Range    Color Urine Straw     Appearance Urine Clear     Glucose Urine Negative NEG^Negative mg/dL    Bilirubin Urine Negative NEG^Negative    Ketones Urine Negative NEG^Negative mg/dL    Specific Gravity Urine 1.004 1.003 - 1.035    Blood Urine Negative NEG^Negative    pH Urine 7.0 5.0 - 7.0 pH    Protein Albumin Urine Negative NEG^Negative mg/dL    Urobilinogen mg/dL Normal 0.0 - 2.0 mg/dL    Nitrite Urine Negative NEG^Negative    Leukocyte Esterase Urine Moderate (A) NEG^Negative    Source Midstream Urine     WBC Urine 7 (H) 0 - 5 /HPF    RBC Urine 1 0 - 2 /HPF    Squamous Epithelial /HPF Urine <1 0 - 1 /HPF    Transitional Epi 1 0 - 1 /HPF   Urine Culture Aerobic Bacterial    Collection Time: 03/29/18  6:11 PM   Result Value Ref Range    Specimen Description Midstream Urine     Culture Micro Culture negative < 24 hours, reincubate           Psychiatric Examination:     /70 (BP Location: Right arm)  Pulse 94  Temp 97.7  F (36.5  C) (Tympanic)  Resp 16  Wt 86.6 kg (191 lb)  LMP 07/03/2014  SpO2 99%  BMI 30.83 kg/m2  Weight is 191 lbs 0 oz  Body mass index is 30.83 kg/(m^2).  Lying Orthostatic BP: 111/62      Lying Orthostatic Pulse: 74 bpm      Sitting Orthostatic BP: 106/66      Sitting Orthostatic Pulse: 96 bpm      Standing Orthostatic BP: 97/61      Standing Orthostatic Pulse: 103 bpm       Appearance: awake, alert  Attitude:  cooperative  Eye Contact:  fair  Mood:  \"overall better\"  Affect:  mood congruent, less intense and less labile  Speech:  Rapid though nonpressured speech though improved since admission  Language: fluent and intact in English  Psychomotor, Gait, Musculoskeletal:  no evidence of " tardive dyskinesia, dystonia, or tics  Throught Process:  linear, goal oriented and circumstantial  Associations:  no loose associations  Thought Content:  no evidence of suicidal ideation or homicidal ideation  Insight: intact  Judgement:  intact  Oriented to:  time, person, and place  Attention Span and Concentration:  fair  Recent and Remote Memory:  fair  Fund of Knowledge:  appropriate         Precautions:     Behavioral Orders   Procedures     Assault precautions     Code 1 - Restrict to Unit     Elopement precautions     Fall precautions     Routine Programming     As clinically indicated     Sexual precautions     Single Room     Status 15     Every 15 minutes.     Status Individual Observation     For disorganization and intrusive behavior    !!----Pt on Suicide Precautions---!!     Order Specific Question:   CONTINUOUS 24 hours / day     Answer:   Distance and Exceptions     Order Specific Question:   Distance     Answer:   5 feet     Order Specific Question:   Exceptions     Answer:   bathroom and shower     Suicide precautions          Diagnoses:     Bipolar disorder, type I, currently manic with psychotic features  History of Autism Spectrum Disorder (appears to be delusional in nature)         Assessment & Plan:     Assessment and hospital summary:  This patient is a 52 year old  female with history of bipolar disorder, type I who presents with symptoms of cyndi and psychosis, including severe agitation requiring seclusion and restraints.  Patient has been hospitalized a total of 3 times in the last month for symptoms of cyndi.  She was most recently hospitalized at [facility] from 3/11-3/16.  She was stabilized on Depakote and Zyprexa, though it is suspected that she has been medication nonadherent since recent discharge, which is also evidenced by her overall improvement since hospitalization.  Due to significant concerns for her safety and the safety of others, will petition for MI  commitment at this time. It is suspected that she has not remained medication adherent given rapid decompensation since very recent discharge. Will restart PTA medications at this time. Inpatient psychiatric hospitalization is warranted at this time for safety, stabilization, and possible adjustment in medications.     Psychiatric treatment/inteventions:  Medications:   Depakote 1500 mg nightly  Depakote 500 mg daily  Continue Zyprexa 20 mg at bedtime  Continue Invega 9 mg daily. OP provider may consider transition to TAVERAS.     Laboratory/Imaging: Of note, Depakote level was elevated at 123 on 3/16. Repeat on 3/27 was therapeutic at 114.     Consults: None indicated at this time  Patient will be treated in therapeutic milieu with appropriate individual and group therapies as described.     Medical treatment/interventions:  Medical concerns: UA on admission with evidence of UTI  Plan: Continue ciprofloxacin 500 mg twice daily. Completed.  Consults: IM informed that patient continues to report dysuria. Appreciate assistance.     Legal Status: 72-h Hold signed on 3/19.  MI commitment was supported. On court hold. Final hearing today    Safety Assessment:   Checks:  Status 15  Precautions: Suicide  Sexual  Fall Risk  Elopement  Pt has not required locked seclusion or restraints in the past 24 hours to maintain safety, please refer to RN documentation for further details.    The risks, benefits, alternatives and side effects have been discussed and are understood by the patient.     Disposition: Pending clinical stabilization and outcome of commitment process. Dispo unclear at this time as she may have lost her apartment.      Candie Asencio MD  [facility] Psychiatry

## 2018-03-30 NOTE — PLAN OF CARE
Problem: Cognitive Impairment (Psychotic Signs/Symptoms) (Adult)  Goal: Improved Thought Clarity/Organization (Psychotic Signs/Symptoms)    Pt attended morning groups before she left for court.  Excited to say she found out she wasn't being evicted, and is thankful.    Pt reflected on her mental health sx at time of admit, and reviewed areas where she believes she has made progress.  Pt states that being on a commitment will be good accountability that she makes all of her appointments, and she is pleased about the extra supports that will be available to her.

## 2018-03-30 NOTE — PROGRESS NOTES
Left another vm with May Aceves that manages the apartment she lives in.  According to the pt she was told last night that she can stay there and they cannot evict her.  I am waiting to speak to May regarding all of these housing concerns. Asked for return call.  Pt has final hearing today-

## 2018-03-30 NOTE — PROGRESS NOTES
"Pt expressed being anxiety most of this shift. States it is due to her being evicted and having [facility] cat [facility]. Pt wanted copies of her legal paperwork and organized nicely into piles on the box of the bed. Of note, she moved her mattress to the floor. Pt stated after she spoke with her , she is feeling less anxious because \"my  stated they could not evict me based on my mental health problems.\" Pt states \"I am doing much better than when I came in a week ago.\" Denies thoughts of SI/SIB.        03/29/18 2100   Behavioral Health   Hallucinations denies / not responding to hallucinations   Thinking intact;distractable   Orientation person: oriented;place: oriented;date: oriented;time: oriented   Memory baseline memory   Insight insight appropriate to situation;insight appropriate to events   Judgement impaired   Eye Contact at examiner   Affect full range affect   Mood mood is calm;anxious   Physical Appearance/Attire attire appropriate to age and situation   Hygiene well groomed   Suicidality other (see comments)  (denies)   1. Wish to be Dead No   Wish to be Dead Description No   2. Non-Specific Active Suicidal Thoughts  No   Non-Specific Active Suicidal Thought Description No   3. Active Sucidal Ideation with any Methods (Not Plan) Without Intent to Act  No   4. Active Suicidal Ideation with Some Intent to Act, Without Specific Plan  No   5. Active Suicidal Ideation with Specific Plan and Intent  No   Duration (Lifetime) NA   Change in Protective Factors? No   Enviromental Risk Factors None   Self Injury other (see comment)  (denies)   Elopement (NONE)   Activity restless   Speech pressured;coherent;clear   Medication Sensitivity no stated side effects;no observed side effects   Psychomotor / Gait balanced;steady   Overt Aggression Scale   Verbal Aggression 0   Aggression against Property 0   Auto-Aggression 0   Physical Aggression 0   Overt Aggression Total Score 0   Activities of " Daily Living   Hygiene/Grooming independent   Oral Hygiene independent   Dress scrubs (behavioral health)   Room Organization independent

## 2018-03-31 PROCEDURE — 25000132 ZZH RX MED GY IP 250 OP 250 PS 637: Mod: GY | Performed by: CLINICAL NURSE SPECIALIST

## 2018-03-31 PROCEDURE — A9270 NON-COVERED ITEM OR SERVICE: HCPCS | Mod: GY | Performed by: CLINICAL NURSE SPECIALIST

## 2018-03-31 PROCEDURE — 12400003 ZZH R&B MH CRITICAL UMMC

## 2018-03-31 PROCEDURE — 25000132 ZZH RX MED GY IP 250 OP 250 PS 637: Mod: GY | Performed by: PSYCHIATRY & NEUROLOGY

## 2018-03-31 PROCEDURE — A9270 NON-COVERED ITEM OR SERVICE: HCPCS | Mod: GY | Performed by: PSYCHIATRY & NEUROLOGY

## 2018-03-31 RX ORDER — POLYETHYLENE GLYCOL 3350 17 G
2-4 POWDER IN PACKET (EA) ORAL
Status: DISCONTINUED | OUTPATIENT
Start: 2018-03-31 | End: 2018-04-02 | Stop reason: HOSPADM

## 2018-03-31 RX ADMIN — HYDROXYZINE HYDROCHLORIDE 25 MG: 25 TABLET, FILM COATED ORAL at 23:34

## 2018-03-31 RX ADMIN — NICOTINE POLACRILEX 4 MG: 2 LOZENGE ORAL at 20:13

## 2018-03-31 RX ADMIN — NICOTINE POLACRILEX 4 MG: 2 GUM, CHEWING ORAL at 12:39

## 2018-03-31 RX ADMIN — ACETAMINOPHEN 650 MG: 325 TABLET ORAL at 08:38

## 2018-03-31 RX ADMIN — NICOTINE POLACRILEX 4 MG: 2 GUM, CHEWING ORAL at 09:46

## 2018-03-31 RX ADMIN — OLANZAPINE 20 MG: 10 TABLET, FILM COATED ORAL at 20:13

## 2018-03-31 RX ADMIN — DIVALPROEX SODIUM 500 MG: 500 TABLET, EXTENDED RELEASE ORAL at 08:36

## 2018-03-31 RX ADMIN — PALIPERIDONE 9 MG: 6 TABLET, EXTENDED RELEASE ORAL at 08:36

## 2018-03-31 RX ADMIN — NICOTINE POLACRILEX 4 MG: 2 GUM, CHEWING ORAL at 08:38

## 2018-03-31 RX ADMIN — ACETAMINOPHEN 650 MG: 325 TABLET ORAL at 12:39

## 2018-03-31 RX ADMIN — DIVALPROEX SODIUM 1500 MG: 500 TABLET, EXTENDED RELEASE ORAL at 20:12

## 2018-03-31 RX ADMIN — ACETAMINOPHEN 650 MG: 325 TABLET ORAL at 21:23

## 2018-03-31 RX ADMIN — NICOTINE POLACRILEX 4 MG: 2 LOZENGE ORAL at 21:23

## 2018-03-31 ASSESSMENT — ACTIVITIES OF DAILY LIVING (ADL)
FALL_HISTORY_WITHIN_LAST_SIX_MONTHS: NO
GROOMING: INDEPENDENT
DRESS: INDEPENDENT;SCRUBS (BEHAVIORAL HEALTH)
DRESS: SCRUBS (BEHAVIORAL HEALTH)
ORAL_HYGIENE: INDEPENDENT
ORAL_HYGIENE: INDEPENDENT
GROOMING: INDEPENDENT
LAUNDRY: UNABLE TO COMPLETE

## 2018-03-31 NOTE — PROGRESS NOTES
Pt appeared to have a positive shift. She spent time in her room and also out in the milieu. She had a bright affect on approach. Pt was pleasant and cooperative with staff and peers. No SI or SIB noted on this shift.        03/31/18 1427   Behavioral Health   Hallucinations denies / not responding to hallucinations   Thinking intact   Orientation person: oriented;place: oriented   Memory baseline memory   Insight insight appropriate to events   Judgement impaired   Eye Contact at examiner   Affect full range affect   Mood mood is calm   Physical Appearance/Attire appears stated age;attire appropriate to age and situation   Hygiene well groomed   Suicidality other (see comments)  (none stated or observed )   Self Injury other (see comment)  (none stated or observed )   Elopement (none stated or observed )   Activity other (see comment)  (present and social in milieu )   Speech clear;coherent   Medication Sensitivity no stated side effects;no observed side effects   Psychomotor / Gait balanced;steady   Activities of Daily Living   Hygiene/Grooming independent   Oral Hygiene independent   Dress independent;scrubs (behavioral health)   Laundry unable to complete   Room Organization independent

## 2018-03-31 NOTE — PROGRESS NOTES
Brief Medicine Note    Contacted by nursing regarding dysuria. Wet prep order, unremarkable. UA w/ moderate LE and 7 WBC, culture NGTD. No indication for treatment at this time.     Internal medicine will sign off. Please notify if acute medical questions or concerns arise.     /69 (BP Location: Right arm)  Pulse 99  Temp 99  F (37.2  C) (Tympanic)  Resp 16  Wt 88 kg (194 lb)  LMP 07/03/2014  SpO2 97%  BMI 31.31 kg/m2      Megan Kim PA-C  Hospitalist Service  Pager [phone]

## 2018-03-31 NOTE — PROGRESS NOTES
Pt spent time in room and in milieu throughout shift.  Pt was calm and cooperative with staff and peers, spent time writing in room and watching TV.  Pt had good hygiene, denied SI/SIB, ate dinner, and reported no other concerns to writer.       03/30/18 2202   Behavioral Health   Hallucinations denies / not responding to hallucinations   Thinking intact   Orientation person: oriented;place: oriented   Memory baseline memory   Insight insight appropriate to situation   Judgement impaired   Eye Contact at examiner   Affect full range affect   Mood mood is calm   Physical Appearance/Attire appears stated age;attire appropriate to age and situation   Hygiene well groomed   Suicidality other (see comments)  (denies)   1. Wish to be Dead No   2. Non-Specific Active Suicidal Thoughts  No   Self Injury other (see comment)  (denies)   Elopement (None stated)   Activity other (see comment)  (present and social in milieu)   Speech clear;coherent   Medication Sensitivity no stated side effects   Psychomotor / Gait balanced;steady   Safety   Suicidality Status 15   Fall fall (yellow) wristband;room close to team care station (desk)   Assault status 15   Elopement status 15   Sexual status 15   Psycho Education   Type of Intervention 1:1 intervention   Response unavailable   Activities of Daily Living   Hygiene/Grooming independent   Oral Hygiene independent   Dress scrubs (behavioral health);independent   Laundry unable to complete   Room Organization independent

## 2018-04-01 VITALS
WEIGHT: 194 LBS | RESPIRATION RATE: 16 BRPM | DIASTOLIC BLOOD PRESSURE: 63 MMHG | TEMPERATURE: 98.4 F | BODY MASS INDEX: 31.31 KG/M2 | OXYGEN SATURATION: 99 % | SYSTOLIC BLOOD PRESSURE: 102 MMHG | HEART RATE: 95 BPM

## 2018-04-01 PROCEDURE — 25000132 ZZH RX MED GY IP 250 OP 250 PS 637: Mod: GY | Performed by: CLINICAL NURSE SPECIALIST

## 2018-04-01 PROCEDURE — 12400003 ZZH R&B MH CRITICAL UMMC

## 2018-04-01 PROCEDURE — A9270 NON-COVERED ITEM OR SERVICE: HCPCS | Mod: GY | Performed by: CLINICAL NURSE SPECIALIST

## 2018-04-01 PROCEDURE — A9270 NON-COVERED ITEM OR SERVICE: HCPCS | Mod: GY | Performed by: PSYCHIATRY & NEUROLOGY

## 2018-04-01 PROCEDURE — 25000132 ZZH RX MED GY IP 250 OP 250 PS 637: Mod: GY | Performed by: PSYCHIATRY & NEUROLOGY

## 2018-04-01 RX ADMIN — DIVALPROEX SODIUM 1500 MG: 500 TABLET, EXTENDED RELEASE ORAL at 20:32

## 2018-04-01 RX ADMIN — NICOTINE POLACRILEX 4 MG: 2 LOZENGE ORAL at 15:41

## 2018-04-01 RX ADMIN — HYDROXYZINE HYDROCHLORIDE 25 MG: 25 TABLET, FILM COATED ORAL at 22:33

## 2018-04-01 RX ADMIN — NICOTINE POLACRILEX 4 MG: 2 LOZENGE ORAL at 08:44

## 2018-04-01 RX ADMIN — OLANZAPINE 20 MG: 10 TABLET, FILM COATED ORAL at 20:32

## 2018-04-01 RX ADMIN — ACETAMINOPHEN 650 MG: 325 TABLET ORAL at 21:20

## 2018-04-01 RX ADMIN — PALIPERIDONE 9 MG: 6 TABLET, EXTENDED RELEASE ORAL at 08:44

## 2018-04-01 RX ADMIN — ACETAMINOPHEN 650 MG: 325 TABLET ORAL at 15:41

## 2018-04-01 RX ADMIN — DIVALPROEX SODIUM 500 MG: 500 TABLET, EXTENDED RELEASE ORAL at 08:44

## 2018-04-01 RX ADMIN — NICOTINE POLACRILEX 4 MG: 2 LOZENGE ORAL at 21:21

## 2018-04-01 RX ADMIN — NICOTINE POLACRILEX 4 MG: 2 LOZENGE ORAL at 19:01

## 2018-04-01 ASSESSMENT — ACTIVITIES OF DAILY LIVING (ADL)
DRESS: INDEPENDENT
LAUNDRY: UNABLE TO COMPLETE
DRESS: SCRUBS (BEHAVIORAL HEALTH)
HYGIENE/GROOMING: INDEPENDENT
ORAL_HYGIENE: INDEPENDENT
ORAL_HYGIENE: INDEPENDENT
GROOMING: SHOWER;INDEPENDENT

## 2018-04-01 NOTE — PLAN OF CARE
"Problem: Mental State/Mood Impairment (Psychotic Signs/Symptoms) (Adult)  Goal: Improved Mental State/Mood (Psychotic Signs/Symptoms)  Outcome: Improving  Pt overall reports that her mood is improved. No agitation or aggressive behaviors. Continues to report anxiety related to uncertainty of discharge plans and if she has been evicted. Pt was napping this afternoon until visiting time, stating she needed extra sleep due to \"feeling stressed.\" Visited with family this evening; appeared brighter and more talkative during visit. Denies SI/SIB and HI. Denies hallucinations. Compliant with all scheduled medications and denies any adverse effects. Will continue to monitor closely.      "

## 2018-04-01 NOTE — PROGRESS NOTES
Jessica was quiet and withdrawn during the day shift, spending much of the day in her room, and seemed to be somewhat sad about seeing other patients with their family/visitors today (Josefa). She attributes her withdrawal and isolation to being physically tired rather than depressed or avoiding social interactions. Conversation with Jessica was comfortable, easy to follow, and required no redirection; she made no comments that sounded delusional. She denies any problems with her memory, concentration and appetite. She appears to have good insight about her MH.    Jessica denies any SI/SIB/HI and any AVH. There were no concerns regarding elopement nor aggression.      04/01/18 1437   Behavioral Health   Hallucinations denies / not responding to hallucinations   Thinking intact   Orientation person: oriented;place: oriented;date: oriented;time: oriented   Memory baseline memory   Insight admits / accepts   Judgement intact   Eye Contact at examiner   Affect blunted, flat   Mood mood is calm   Physical Appearance/Attire attire appropriate to age and situation;appears stated age   Hygiene well groomed   Suicidality other (see comments)  (denies)   1. Wish to be Dead No   2. Non-Specific Active Suicidal Thoughts  No   Self Injury other (see comment)  (denies)   Elopement (nothing to note)   Activity withdrawn;isolative   Speech clear;coherent   Medication Sensitivity no observed side effects;sedation   Psychomotor / Gait balanced;steady   Activities of Daily Living   Hygiene/Grooming shower;independent   Oral Hygiene independent   Dress scrubs (behavioral health)   Room Organization independent   Activity   Activity Assistance Provided independent

## 2018-04-02 PROCEDURE — 25000132 ZZH RX MED GY IP 250 OP 250 PS 637: Mod: GY | Performed by: CLINICAL NURSE SPECIALIST

## 2018-04-02 PROCEDURE — 99238 HOSP IP/OBS DSCHRG MGMT 30/<: CPT | Performed by: PSYCHIATRY & NEUROLOGY

## 2018-04-02 PROCEDURE — 97150 GROUP THERAPEUTIC PROCEDURES: CPT | Mod: GO

## 2018-04-02 PROCEDURE — A9270 NON-COVERED ITEM OR SERVICE: HCPCS | Mod: GY | Performed by: CLINICAL NURSE SPECIALIST

## 2018-04-02 PROCEDURE — 25000132 ZZH RX MED GY IP 250 OP 250 PS 637: Mod: GY | Performed by: PSYCHIATRY & NEUROLOGY

## 2018-04-02 PROCEDURE — A9270 NON-COVERED ITEM OR SERVICE: HCPCS | Mod: GY | Performed by: PSYCHIATRY & NEUROLOGY

## 2018-04-02 PROCEDURE — 90853 GROUP PSYCHOTHERAPY: CPT

## 2018-04-02 RX ORDER — PALIPERIDONE 9 MG/1
9 TABLET, EXTENDED RELEASE ORAL DAILY
Qty: 30 TABLET | Refills: 1 | Status: SHIPPED | OUTPATIENT
Start: 2018-04-02 | End: 2019-07-03

## 2018-04-02 RX ORDER — DIVALPROEX SODIUM 500 MG/1
TABLET, EXTENDED RELEASE ORAL
Qty: 120 TABLET | Refills: 1 | Status: SHIPPED | OUTPATIENT
Start: 2018-04-02 | End: 2019-07-03

## 2018-04-02 RX ORDER — OLANZAPINE 20 MG/1
20 TABLET ORAL AT BEDTIME
Qty: 30 TABLET | Refills: 1 | Status: SHIPPED | OUTPATIENT
Start: 2018-04-02 | End: 2019-07-03

## 2018-04-02 RX ADMIN — NICOTINE POLACRILEX 2 MG: 2 LOZENGE ORAL at 10:18

## 2018-04-02 RX ADMIN — NICOTINE POLACRILEX 4 MG: 2 LOZENGE ORAL at 13:20

## 2018-04-02 RX ADMIN — ACETAMINOPHEN 650 MG: 325 TABLET ORAL at 10:17

## 2018-04-02 RX ADMIN — DIVALPROEX SODIUM 500 MG: 500 TABLET, EXTENDED RELEASE ORAL at 10:18

## 2018-04-02 RX ADMIN — PALIPERIDONE 9 MG: 6 TABLET, EXTENDED RELEASE ORAL at 10:17

## 2018-04-02 RX ADMIN — OLANZAPINE 10 MG: 5 TABLET, FILM COATED ORAL at 01:18

## 2018-04-02 NOTE — PROGRESS NOTES
Pt was calm and appropriate in the milieu this evening. Pt spent majority of the shift watching television in her room. Pt approached staff a few times this evening and  was social and pleasant. No behavioral concerns to be noted this shift.       04/01/18 0749   Behavioral Health   Hallucinations denies / not responding to hallucinations   Thinking intact   Orientation person: oriented;place: oriented;date: oriented;time: oriented   Memory baseline memory   Insight admits / accepts   Judgement intact   Eye Contact at examiner   Affect blunted, flat   Mood mood is calm   Physical Appearance/Attire attire appropriate to age and situation   Hygiene well groomed   Suicidality other (see comments)  (none stated)   1. Wish to be Dead No   2. Non-Specific Active Suicidal Thoughts  No   Self Injury other (see comment)  (none stated)   Activity isolative   Speech clear;coherent   Psychomotor / Gait balanced;steady   Activities of Daily Living   Hygiene/Grooming independent   Oral Hygiene independent   Dress independent   Laundry unable to complete   Room Organization independent

## 2018-04-02 NOTE — DISCHARGE SUMMARY
"Psychiatric Discharge Summary    Fannie Jeter MRN# [id number]   Age: 52 year old YOB: 1965     Date of Admission:  3/19/2018  Date of Discharge:  4/2/2018  2:41 PM  Admitting Physician:  Ana Asencio MD  Discharge Physician:  Ana Asencio MD (Contact: [phone])         Event Leading to Hospitalization:   Per DEC assessment, the patient is a 52-year-old female who presents to the emergency department with symptoms of psychosis.  It is reported that the patient was shouting sexually inappropriate phrases during the DEC interview.  When the  asked whether the patient is suicidal, she replied \"I am attractive, worthless, unsanitary, not pretty enough.  She stated that \" called the ambulance for her because they are in love.\"  Patient reports that she is also \"autistic today and it is a great accomplishment to stay a week in the psych van.\"  Patient also reported that she had been up for the past 24 hours.     Per D EC assessment, upon arrival to the ED, patient repetitively shouted agitated, paranoid, and sexually explicit phrases repeatedly.  She appeared to be responding to internal stimuli, and was experiencing v  isual and auditory hallucinations.  Patient exhibited delusional thought content as she believes she could communicate with Amy Weathers, and Alexis Whyte.     From EMS reports, the patient was increasingly agitated at a CARE facility and at one point became quite aggressive with the .  She arrived in the emergency department in restraints per chart review.  In ED, the patient was given Zyprexa 10 mg IM for acute agitation.  Patient was placed on a 72 hour hold.     Upon arrival to the unit, the patient was extremely disorganized, labile, and threatening.  The patient then became extremely agitated at her SI O staff and was screaming at her.  As staff approached the patient, she postured towards nursing staff, yelling \"I dear you to look me " "in the eyes I am autistic and I cannot be locked up, fuck me in the eyeball you know you want to!\"  She continued to escalate, and refused several offered interventions.  Code 21 was called and patient was walked to [facility].  Patient was then undressing in seclusion.       During the interview with the patient, the patient said \" will come and bring it all for her.\"  When asked who 'her' is, she replied \"Fannie.\" Writer asked who she is, and she said a name of a famous person. She made several nonsensical statements. She asked to be reminded of Valentines day when stating that it was February rather than March. She said that she would like [employer] to notify her. When asked where she is, she said \"[employer] [facility] in [place].\" She stated it was February 18th, 2018. When asked about medications, she said \"I always take my medications. You can't make me; I only can make myself!\" She then began pulling her hair very hard, and said \"I will pull my hair out if you don't leave now!\" Writer then terminated the interview per patient's strong request.        See Admission note by Ana Asencio MD on 3/20/18 for additional details.          Diagnoses:     Bipolar disorder, type I, currently manic with psychotic features         Labs:     Recent Results (from the past 336 hour(s))   Drug abuse screen 77 urine ([place],[facility])    Collection Time: 03/19/18  5:08 PM   Result Value Ref Range    Amphetamine Qual Urine Negative NEG^Negative    Barbiturates Qual Urine Negative NEG^Negative    Benzodiazepine Qual Urine Negative NEG^Negative    Cannabinoids Qual Urine Negative NEG^Negative    Cocaine Qual Urine Negative NEG^Negative    Opiates Qualitative Urine Negative NEG^Negative    PCP Qual Urine Negative NEG^Negative   UA with Microscopic    Collection Time: 03/19/18  5:08 PM   Result Value Ref Range    Color Urine Yellow     Appearance Urine Clear     Glucose Urine Negative NEG^Negative mg/dL    Bilirubin Urine " Negative NEG^Negative    Ketones Urine Negative NEG^Negative mg/dL    Specific Gravity Urine 1.006 1.003 - 1.035    Blood Urine Negative NEG^Negative    pH Urine 7.5 (H) 5.0 - 7.0 pH    Protein Albumin Urine Negative NEG^Negative mg/dL    Urobilinogen mg/dL Normal 0.0 - 2.0 mg/dL    Nitrite Urine Negative NEG^Negative    Leukocyte Esterase Urine Large (A) NEG^Negative    Source Midstream Urine     WBC Urine 28 (H) 0 - 5 /HPF    RBC Urine 1 0 - 2 /HPF    Squamous Epithelial /HPF Urine 1 0 - 1 /HPF    Mucous Urine Present (A) NEG^Negative /LPF   TSH with free T4 reflex    Collection Time: 03/21/18  7:43 AM   Result Value Ref Range    TSH 1.89 0.40 - 4.00 mU/L   Valproic acid    Collection Time: 03/21/18  7:43 AM   Result Value Ref Range    Valproic Acid Level 103 (H) 50 - 100 mg/L   HCG qualitative urine    Collection Time: 03/23/18  9:17 PM   Result Value Ref Range    HCG Qual Urine Negative NEG^Negative   HIV Antigen Antibody Combo    Collection Time: 03/24/18  8:12 AM   Result Value Ref Range    HIV Antigen Antibody Combo Nonreactive NR^Nonreactive       Hepatitis C antibody    Collection Time: 03/24/18  8:12 AM   Result Value Ref Range    Hepatitis C Antibody Nonreactive NR^Nonreactive   Valproic acid    Collection Time: 03/27/18  8:15 AM   Result Value Ref Range    Valproic Acid Level 114 (H) 50 - 100 mg/L   UA with Microscopic reflex to Culture    Collection Time: 03/29/18  2:30 PM   Result Value Ref Range    Color Urine Canceled, Test credited     Appearance Urine Canceled, Test credited     Glucose Urine Canceled, Test credited (A) NEG^Negative mg/dL    Bilirubin Urine Canceled, Test credited (A) NEG^Negative    Ketones Urine Canceled, Test credited (A) NEG^Negative mg/dL    Specific Gravity Urine Canceled, Test credited 1.003 - 1.035    Blood Urine Canceled, Test credited (A) NEG^Negative    pH Urine Canceled, Test credited 5.0 - 7.0 pH    Protein Albumin Urine Canceled, Test credited (A) NEG^Negative mg/dL     Urobilinogen mg/dL Canceled, Test credited 0.0 - 2.0 mg/dL    Nitrite Urine Canceled, Test credited (A) NEG^Negative    Leukocyte Esterase Urine Canceled, Test credited (A) NEG^Negative    Source Canceled, Test credited     WBC Urine Canceled, Test credited (A) OTO5^0 - 5 /HPF    RBC Urine Canceled, Test credited 0 - 2 /HPF   Wet prep    Collection Time: 03/29/18  3:15 PM   Result Value Ref Range    Specimen Description Vagina     Wet Prep Moderate  PMNs seen       Wet Prep No Trichomonas seen     Wet Prep No clue cells seen     Wet Prep No yeast seen    UA with Microscopic reflex to Culture    Collection Time: 03/29/18  6:11 PM   Result Value Ref Range    Color Urine Straw     Appearance Urine Clear     Glucose Urine Negative NEG^Negative mg/dL    Bilirubin Urine Negative NEG^Negative    Ketones Urine Negative NEG^Negative mg/dL    Specific Gravity Urine 1.004 1.003 - 1.035    Blood Urine Negative NEG^Negative    pH Urine 7.0 5.0 - 7.0 pH    Protein Albumin Urine Negative NEG^Negative mg/dL    Urobilinogen mg/dL Normal 0.0 - 2.0 mg/dL    Nitrite Urine Negative NEG^Negative    Leukocyte Esterase Urine Moderate (A) NEG^Negative    Source Midstream Urine     WBC Urine 7 (H) 0 - 5 /HPF    RBC Urine 1 0 - 2 /HPF    Squamous Epithelial /HPF Urine <1 0 - 1 /HPF    Transitional Epi 1 0 - 1 /HPF   Urine Culture Aerobic Bacterial    Collection Time: 03/29/18  6:11 PM   Result Value Ref Range    Specimen Description Midstream Urine     Culture Micro       <10,000 colonies/mL  urogenital belle  Susceptibility testing not routinely done            Consults:     Brief Medicine Note     Contacted by nursing regarding dysuria. Wet prep order, unremarkable. UA w/ moderate LE and 7 WBC, culture NGTD. No indication for treatment at this time.      Internal medicine will sign off. Please notify if acute medical questions or concerns arise.      /69 (BP Location: Right arm)  Pulse 99  Temp 99  F (37.2  C) (Tympanic)  Resp  "16  Wt 88 kg (194 lb)  LMP 07/03/2014  SpO2 97%  BMI 31.31 kg/m2        Megan Kim PA-C  Hospitalist Service         Hospital Course:   Fannie Jeter was admitted to [facility] with attending Ana Asencio MD on a 72 hour mental health hold. The patient was placed under status 15 (15 minute checks) to ensure patient safety.     This patient is a 52 year old  female with history of bipolar disorder, type I who presented on 3/19 with symptoms of cyndi and psychosis, including severe agitation requiring seclusion and restraints.  Patient has been hospitalized a total of 3 times in the last month for symptoms of cyndi.  She was most recently hospitalized at [facility] from 3/11-3/16.  She was stabilized on Depakote and Zyprexa, though it is suspected that she was medication nonadherent since recent discharge, which was also evidenced by her overall rapid improvement during hospitalization when PTA medications were restarted.  Due to significant concerns for her safety and the safety of others, treatment team petitioned for MI commitment, which was supported. Pt is now committed as MI through [facility] bar[facility].  They did not do a mary but \"the court may consider the issuance of an Order Authorizing Use of Neuroleptic Medication for the duration of the commitment based on the Neuroleptic Medication Authorization Basis Note provided by the petitioner, but without a hearing as provided by [place] Statutes 253B.092.\"    Over the course of hospitalization, Invega was increased to a total daily dose of 9 mg daily with recommended plan for outpatient psychiatric provider to transition to TAVERAS once discharged (scheduled appointment on 4/6). She tolerated her medications well without reported side effects. Symptoms of cyndi and psychosis rapidly improved. Patient was at her baseline approximately one week ago with no re-emergence of manic/psychotic symptoms since that time. She denied SI, SIB, and HI " "throughout hospitalization. Insight improved, and she expressed understanding of the importance of medication adherence. Depakote level last checked on 3/27 and was 114. Would recommend cautious dose reduction after period of stability.     I spoke with patient's mother over the phone on 3/30. Mother noted that patient had not been taking medications consistently prior to admission. She said that patient convinced her outpatient provider to take her off of all medications. She was doing well for an extended period of time while taking Geodon. She \"came up with this story that she has autism, which is just not true.\" OP provider believed her, however, and he took her off of medications per mother. Mom said she has been \"just shocked by her behaviors.\" She noted extended period of stability though recent decompensation. She believes that recent medication non-adherence and patient's hormonal changes related to menopause have been significant factors for decompensation. Mom is hoping that increased supervision by outpatient case management team will prevent patient from decompensating in the future. She did agree that an IRTS would not be appropriate for patient given that she had been to IRTS facilities in the past, and \"she couldn't even make friends with people because she was higher functioning than most of them.\" She was in agreement with plan for patient to return home once stable. Mom denied any imminent safety concerns. Of note, patient was referred for day treatment programming at [facility] though they did not feel patient would be appropriate for their program and thus recommended individual therapy. We explained this to patient, and strongly advised that she attend psychotherapy twice per week upon discharge. She agreed with this plan, which was outlined in her provisional discharge.    Fannie Jeter did participate in groups and was visible in the milieu. No additional violent or " aggressive behaviors since admission. Patient was cooperative and pleasant with staff in the days nearing her discharge.     # Discharge Pain Plan: Patient currently has NO PAIN and is not being prescribed pain medications on discharge.    Fannie Jeter was released to apartment (for individuals with disabilities with access to social workers and increased supports). At the time of discharge Fannie Jeter was determined to not be a danger to herself or others.     SUICIDE RISK ASSESSMENT:  Today Fannie Jeter denies SI, SIB, and HI.  She has notable risk factors for self-harm including recent medication non-adherence and impulsive behaviors in context of cyndi.  However, risk is mitigated by no h/o suicide attempt, no plan or intent, no access to lethal means, describes a safety plan, h/o seeking help when needed, symptom improvement, future oriented, feeling hopeful, none to minimal alcohol use , commitment to family, good social support  , Ep[employer] beliefs, stable housing and participation in psychotherapy twice weekly.  Based on all available evidence she does not appear to be at imminent risk for self-harm therefore does not meet criteria for a 72-hr hold/ involuntary hospitalization.  However, based on degree of symptoms therapy and close psych FU was recommended which the pt did agree to. Patient also agreed to call 911/present to ED if any imminent safety concerns arise, including re-emergence of SI. Patient was provided with crisis resources at the time of discharge. Patient agreed to further reduce risk of self-harm by completely abstaining from illicit substances and alcohol, and agreed to remain medication adherent. Expressed understanding of the risks associated with alcohol use, illicit drug use, and medication non-adherence.             Discharge Medications:     Discharge Medication List as of 4/2/2018  2:20 PM      CONTINUE these medications which have CHANGED    Details   !! divalproex sodium  extended-release (DEPAKOTE ER) 500 MG 24 hr tablet Take 500 mg (1 tab) in morning and 1,500 mg (3 tabs) at bedtime, Disp-120 tablet, R-1, E-Prescribe      OLANZapine (ZYPREXA) 20 MG tablet Take 1 tablet (20 mg) by mouth At Bedtime, Disp-30 tablet, R-1, E-Prescribe      paliperidone (INVEGA) 9 MG 24 hr tablet Take 1 tablet (9 mg) by mouth daily, Disp-30 tablet, R-1, E-Prescribe       !! - Potential duplicate medications found. Please discuss with provider.      CONTINUE these medications which have NOT CHANGED    Details   !! divalproex sodium extended-release (DEPAKOTE ER) 500 MG 24 hr tablet Take 1,500 mg by mouth At Bedtime, Historical      albuterol (PROAIR HFA/PROVENTIL HFA/VENTOLIN HFA) 108 (90 BASE) MCG/ACT Inhaler Inhale 2 puffs into the lungs 4 times daily as needed for other (dyspnea), Disp-1 Inhaler, R-0, E-Prescribe      cetirizine (ZYRTEC) 10 MG tablet Take 10 mg by mouth daily as needed , Historical      PROGESTERONE 100MG/G CREAM Apply 0.5 g topically 2 times daily as needed Historical      Polyethyl Glycol-Propyl Glycol (LUBRICANT EYE DROPS, PF, OP) Apply 1 drop to eye every hour as needed, Historical      sodium chloride-sodium bicarb (NETI POT SINUS WASH) 2300-700 MG KIT 2 times daily as needed sinus wash/allergy season, Historical      aspirin 325 MG tablet Take 2 tablets by mouth every 6 hours as needed pain, Historical      acetaminophen (TYLENOL) 650 MG CR tablet Take 650 mg by mouth every 8 hours as needed, Historical       !! - Potential duplicate medications found. Please discuss with provider.      STOP taking these medications       ciprofloxacin (CIPRO) 500 MG tablet Comments:   Reason for Stopping:                  Psychiatric Examination:   Appearance:  awake, alert and adequately groomed  Attitude:  cooperative  Eye Contact:  good  Mood:  better  Affect:  appropriate and in normal range and mood congruent  Speech:  clear, coherent  Psychomotor Behavior:  no evidence of tardive  dyskinesia, dystonia, or tics  Thought Process:  logical, linear and goal oriented  Associations:  no loose associations  Thought Content:  no evidence of suicidal ideation or homicidal ideation and no evidence of psychotic thought  Insight:  good  Judgment:  intact  Oriented to:  time, person, and place  Attention Span and Concentration:  intact  Recent and Remote Memory:  intact  Language: Able to name objects, Able to repeat phrases and Able to read and write  Fund of Knowledge: appropriate  Muscle Strength and Tone: normal  Gait and Station: Normal         Discharge Plan:      Principal Diagnoses: Bipolar disorder, type I, currently manic with psychotic features     Health Care Follow-up Appointments:   - Psychiatrist:  Appointment: Friday, April 6th @ 11am w/ Nayely Anderson   [facility]  Address:  [address] [address]  Phone: [phone]  Fax: [phone]  [facility] TO FAX AVS       - Therapy   [facility]  Appointment:  Friday, April 13th @ 11am w/ Rola Ribeiro  Address:  [address] [address]  Phone: [phone]  Fax: [phone]     - [facility]   Kassi Gayle  She will be in contact with you following your discharge from the hospital  Phone: [phone]  Fax: [phone]  [facility] TO FAX AVS     Attend all scheduled appointments with your outpatient providers. Call at least 24 hours in advance if you need to reschedule an appointment to ensure continued access to your outpatient providers.   Major Treatments, Procedures and Findings:  You were provided with: a psychiatric assessment, assessed for medical stability, medication evaluation and/or management, group therapy and milieu management     Symptoms to Report: Feeling more aggressive, increased confusion, losing more sleep, mood getting worse or thoughts of suicide     Early warning signs can include: Increased depression or anxiety sleep disturbances increased thoughts or  "behaviors of suicide or self-harm  increased unusual thinking, such as paranoia or hearing voices     Safety and Wellness:  Take all medicines as directed.  Make no changes unless your doctor suggests them.  Follow treatment recommendations.  Refrain from alcohol and non-prescribed drugs.  If there is a concern for safety, call 911.     Resources:   Crisis Intervention: 308.668.8700 or 213-152-4854 (TTY: 756.957.6956).  Call anytime for help.  [place] on Mental Illness (www.mn.rula.org): 278.553.2898 or 134-263-3372.  [place] Association for Children's Mental Health (www.mac.org): 542.130.3199.  Alcoholics Anonymous (www.alcoholics-anonymous.org): Check your phone book for your local chapter.  Suicide Awareness Voices of Education (SAVE) (www.save.org): 965-864-HMZS (4362)  National Suicide Prevention Line (www.mentalhealthmn.org): 973-749-UZYI (4023)  Mental Health Consumer/Survivor Network of [place] (www.mhcsn.net): 574.559.1957 or 769-380-3416  Mental Health Association of [place] (www.mentalhealth.org): 677.640.3470 or 391-933-1915  Self- Management and Recovery Training., SMART-- Toll free: 169.271.1131  www.Servoy.org  [facility] Crisis (COPE) Response - Adult 135 354-1505  [facility] Crisis Response - Adult 881 878-8015  Crisis text line: Text \"START\" to 102-567. Free, confidential, 24/7.  Crisis Intervention: 158.341.1400 or 836-237-1418. Call anytime for help.   [facility] Mental Health Crisis Team - Child: [phone]  [facility] Mental Health Crisis Response Team - Child: [phone]     Attestation:  The patient has been seen and evaluated by me,  Ana Asencio MD  "

## 2018-04-02 NOTE — PROGRESS NOTES
Participated in communication skills discussion and structured activity focused on identification of feelings through creative self-expression.   She did not identify feelings but did identify a a musical selection she liked.

## 2018-04-02 NOTE — PROGRESS NOTES
"Received court order- pt is committed as MI through [facility] barajas.  They did not do a mary but \"the court may consider the issuance of an Order Authorizing Use of Neuroleptic Medication for the duration of the commitment based on the Neuroleptic Medication Authorization Basis Note provided by the petitioner, but without a hearing as provided by [place] Statutes 253B.092\"    Pt signed Provisional Discharge - this was faxed to Kassi Partida pt's OP CM for signature.   "

## 2018-04-02 NOTE — PROGRESS NOTES
"Pt actively participated in a structured occupational therapy group with a focus on facilitating communication skills. Pt engaged in a discussion activity with topics including good work habits, social and leisure activities, self-esteem, and time management. Pt offered particularly thoughtful responses throughout group, and was respectful in listening and responding to peers. Shared that \"Oprah\" is one of her role models. Pleasant, cooperative, engaged, and social throughout group.    "

## 2018-04-02 NOTE — DISCHARGE INSTRUCTIONS
Behavioral Discharge Planning and Instructions      Summary:  You were admitted on 3/19/2018  due to Disorganized Thinking/Behaviors, Delusional Thoughts and Psychotic Symptomology.  You were treated by Dr. Ana Asencio MD and discharged on 04/02/2018 from [facility] to Home.    You were dually committed to the [facility] and the [facility] on 3/30/2018.  You are being discharged on a Provisional Discharge Agreement which shall remain in effect for the duration of the Commitment.  Your Commitment expires on September 28th, 2018.       Principal Diagnoses: Bipolar disorder, type I, currently manic with psychotic features    Health Care Follow-up Appointments:   - Psychiatrist:  Appointment: Friday, April 6th @ 11am w/ Nayely Anderson   [facility]  Address:  [address] [address]  Phone: [phone]  Fax: [phone]  [facility] TO FAX AVS      - Therapy   [facility]  Appointment:  Friday, April 13th @ 11am w/ Rola Ribeiro  Address:  [address] [address]  Phone: [phone]  Fax: [phone]    - [facility]   Kassi Gayle  She will be in contact with you following your discharge from the hospital  Phone: [phone]  Fax: [phone]  [facility] TO FAX AVS    Attend all scheduled appointments with your outpatient providers. Call at least 24 hours in advance if you need to reschedule an appointment to ensure continued access to your outpatient providers.   Major Treatments, Procedures and Findings:  You were provided with: a psychiatric assessment, assessed for medical stability, medication evaluation and/or management, group therapy and milieu management    Symptoms to Report: Feeling more aggressive, increased confusion, losing more sleep, mood getting worse or thoughts of suicide    Early warning signs can include: Increased depression or anxiety sleep disturbances increased thoughts or  "behaviors of suicide or self-harm  increased unusual thinking, such as paranoia or hearing voices    Safety and Wellness:  Take all medicines as directed.  Make no changes unless your doctor suggests them.  Follow treatment recommendations.  Refrain from alcohol and non-prescribed drugs.  If there is a concern for safety, call 911.    Resources:   Crisis Intervention: 779.747.7805 or 726-736-3876 (TTY: 517.796.2610).  Call anytime for help.  National [place] on Mental Illness (www.mn.rula.org): 809.257.6677 or 338-915-1439.  [place] Association for Children's Mental Health (www.mac.org): 799.763.9613.  Alcoholics Anonymous (www.alcoholics-anonymous.org): Check your phone book for your local chapter.  Suicide Awareness Voices of Education (SAVE) (www.save.org): 673-830-DRWR (9367)  National Suicide Prevention Line (www.mentalhealthmn.org): 603-723-IALP (2303)  Mental Health Consumer/Survivor Network of [place] (www.mhcsn.net): 217.764.2211 or 850-813-2843  Mental Health Association of [place] (www.mentalhealth.org): 108.750.1701 or 776-943-0995  Self- Management and Recovery Training., SMART-- Toll free: 607.775.7828  www.Help Scout.org  [facility] Crisis (COPE) Response - Adult 642 688-2896  [facility] Crisis Response - Adult 432 589-1989  Crisis text line: Text \"START\" to 996-116. Free, confidential, 24/7.  Crisis Intervention: 712.818.6029 or 208-011-6010. Call anytime for help.   [facility] Mental Health Crisis Team - Child: [phone]  [facility] Mental Health Crisis Response Team - Child: [phone]    The treatment team has appreciated the opportunity to work with you.  If you have any questions or concerns our unit number is [phone].      "

## 2018-04-02 NOTE — PLAN OF CARE
Problem: Cognitive Impairment (Psychotic Signs/Symptoms) (Adult)  Goal: Improved Thought Clarity/Organization (Psychotic Signs/Symptoms)  Outcome: Adequate for Discharge Date Met: 04/02/18  Discharge Note    Patient's d/c instructions reviewed. Patient understands all instructions. Denies depression/SI. Denies AH/VH. Able to make all needs known. Patient is happy and ready to go home today. 30 day supply of medications sent. All belongings returned. Patient will take a cab home. Appropriate for discharge today.

## 2018-04-03 NOTE — PROGRESS NOTES
This writer faxed Provisional Discharge to [facility] court - Fax: [phone]  Court File  #: [id number]    Copy placed on scanning board to be scanned into medical chart

## 2018-04-07 ENCOUNTER — HEALTH MAINTENANCE LETTER (OUTPATIENT)
Age: 53
End: 2018-04-07

## 2018-05-15 ENCOUNTER — TRANSFERRED RECORDS (OUTPATIENT)
Dept: HEALTH INFORMATION MANAGEMENT | Facility: CLINIC | Age: 53
End: 2018-05-15

## 2018-05-15 LAB
ALT SERPL-CCNC: 29 IU/L (ref 8–45)
AST SERPL-CCNC: 29 IU/L (ref 2–40)
CREAT SERPL-MCNC: 0.61 MG/DL (ref 0.57–1.11)
GFR SERPL CREATININE-BSD FRML MDRD: >60 ML/MIN/1.73M2
GLUCOSE SERPL-MCNC: 129 MG/DL (ref 65–100)
POTASSIUM SERPL-SCNC: 3.8 MMOL/L (ref 3.5–5)
TSH SERPL-ACNC: 1.66 UIU/ML (ref 0.35–4.94)

## 2019-04-17 NOTE — PLAN OF CARE
Problem: Manic Symptoms  Goal: Manic Symptoms  Signs and symptoms of listed problems will be absent or manageable.   Outcome: Improving  Pt was active on both the ITC and SDU during groups.  She is demonstrating some better boundaries when talking with her peers in the unit.  During 1:1 she was still making some odd comments about having to crawl around her apartment because of the radio signals at this time her speech was rapid and jumped topic after.  Eating >75% of supper.  Spent some down time in her room working on a notebook and listening to headphones.  She was accepting of her offered HS medication but did ask questions.         weight-bearing as tolerated/L LE

## 2019-04-19 ENCOUNTER — HEALTH MAINTENANCE LETTER (OUTPATIENT)
Age: 54
End: 2019-04-19

## 2019-07-03 ENCOUNTER — HOSPITAL ENCOUNTER (INPATIENT)
Facility: CLINIC | Age: 54
LOS: 7 days | Discharge: HOME | DRG: 885 | End: 2019-07-10
Attending: EMERGENCY MEDICINE | Admitting: PSYCHIATRY & NEUROLOGY
Payer: MEDICARE

## 2019-07-03 DIAGNOSIS — F20.0 PARANOID SCHIZOPHRENIA (H): ICD-10-CM

## 2019-07-03 DIAGNOSIS — J45.21 MILD INTERMITTENT EXACERBATION OF REACTIVE AIRWAY DISEASE: Primary | ICD-10-CM

## 2019-07-03 DIAGNOSIS — F84.0 AUTISM: ICD-10-CM

## 2019-07-03 DIAGNOSIS — F29 PSYCHOSIS, UNSPECIFIED PSYCHOSIS TYPE (H): ICD-10-CM

## 2019-07-03 LAB
AMPHETAMINES UR QL SCN: NEGATIVE
BARBITURATES UR QL: NEGATIVE
BENZODIAZ UR QL: NEGATIVE
CANNABINOIDS UR QL SCN: NEGATIVE
COCAINE UR QL: NEGATIVE
OPIATES UR QL SCN: NEGATIVE
PCP UR QL SCN: NEGATIVE

## 2019-07-03 PROCEDURE — 25000132 ZZH RX MED GY IP 250 OP 250 PS 637: Mod: GY | Performed by: PSYCHIATRY & NEUROLOGY

## 2019-07-03 PROCEDURE — 80307 DRUG TEST PRSMV CHEM ANLYZR: CPT | Performed by: EMERGENCY MEDICINE

## 2019-07-03 PROCEDURE — 90791 PSYCH DIAGNOSTIC EVALUATION: CPT

## 2019-07-03 PROCEDURE — 25000132 ZZH RX MED GY IP 250 OP 250 PS 637: Mod: GY | Performed by: EMERGENCY MEDICINE

## 2019-07-03 PROCEDURE — 12400000 ZZH R&B MH

## 2019-07-03 PROCEDURE — 99285 EMERGENCY DEPT VISIT HI MDM: CPT | Mod: 25

## 2019-07-03 RX ORDER — ZIPRASIDONE HYDROCHLORIDE 40 MG/1
40 CAPSULE ORAL DAILY
Status: ON HOLD | COMMUNITY
End: 2019-07-10

## 2019-07-03 RX ORDER — LAMOTRIGINE 100 MG/1
100 TABLET ORAL DAILY
Status: ON HOLD | COMMUNITY
End: 2019-07-04

## 2019-07-03 RX ORDER — HYDROXYZINE HYDROCHLORIDE 25 MG/1
25 TABLET, FILM COATED ORAL EVERY 4 HOURS PRN
Status: DISCONTINUED | OUTPATIENT
Start: 2019-07-03 | End: 2019-07-10 | Stop reason: HOSPADM

## 2019-07-03 RX ORDER — LAMOTRIGINE 25 MG/1
25 TABLET ORAL DAILY
Status: ON HOLD | COMMUNITY
End: 2019-07-04

## 2019-07-03 RX ORDER — OLANZAPINE 5 MG/1
5-10 TABLET, ORALLY DISINTEGRATING ORAL EVERY 6 HOURS PRN
Status: DISCONTINUED | OUTPATIENT
Start: 2019-07-03 | End: 2019-07-10 | Stop reason: HOSPADM

## 2019-07-03 RX ORDER — LORAZEPAM 1 MG/1
1-2 TABLET ORAL EVERY 6 HOURS PRN
Status: DISCONTINUED | OUTPATIENT
Start: 2019-07-03 | End: 2019-07-10 | Stop reason: HOSPADM

## 2019-07-03 RX ORDER — LORAZEPAM 1 MG/1
1 TABLET ORAL ONCE
Status: COMPLETED | OUTPATIENT
Start: 2019-07-03 | End: 2019-07-03

## 2019-07-03 RX ORDER — OLANZAPINE 10 MG/2ML
5-10 INJECTION, POWDER, FOR SOLUTION INTRAMUSCULAR EVERY 6 HOURS PRN
Status: DISCONTINUED | OUTPATIENT
Start: 2019-07-03 | End: 2019-07-10 | Stop reason: HOSPADM

## 2019-07-03 RX ORDER — LORAZEPAM 2 MG/ML
1-2 INJECTION INTRAMUSCULAR EVERY 6 HOURS PRN
Status: DISCONTINUED | OUTPATIENT
Start: 2019-07-03 | End: 2019-07-10 | Stop reason: HOSPADM

## 2019-07-03 RX ADMIN — LORAZEPAM 1 MG: 1 TABLET ORAL at 17:50

## 2019-07-03 RX ADMIN — OLANZAPINE 10 MG: 5 TABLET, ORALLY DISINTEGRATING ORAL at 22:11

## 2019-07-03 ASSESSMENT — ACTIVITIES OF DAILY LIVING (ADL)
DRESS: 0-->INDEPENDENT
BATHING: 0-->INDEPENDENT
TOILETING: 0-->INDEPENDENT
FALL_HISTORY_WITHIN_LAST_SIX_MONTHS: NO
RETIRED_COMMUNICATION: 0-->UNDERSTANDS/COMMUNICATES WITHOUT DIFFICULTY
SWALLOWING: 0-->SWALLOWS FOODS/LIQUIDS WITHOUT DIFFICULTY
TRANSFERRING: 0-->INDEPENDENT
AMBULATION: 0-->INDEPENDENT
COGNITION: 0 - NO COGNITION ISSUES REPORTED
RETIRED_EATING: 0-->INDEPENDENT

## 2019-07-03 ASSESSMENT — ENCOUNTER SYMPTOMS
ABDOMINAL PAIN: 0
SHORTNESS OF BREATH: 0

## 2019-07-03 ASSESSMENT — MIFFLIN-ST. JEOR: SCORE: 1401.47

## 2019-07-03 NOTE — ED PROVIDER NOTES
History     Chief Complaint:  Psychiatric Evaluation      HPI   Fannie Jeter is a 53 year old female who presents by ambulance after she was found outside of her apartment not making sense and being psychotic.  She apparently was agitated.  She is making statements such as Jasiel's heart is inside of her and she can feel his pain.  She was clearly delusional according to the paramedics.  She denies physical complaints at this time.  She does have autism and a history of psychosis and she has been admitted to mental health in the past.  She is not very helpful with her history as she has no insight into why she is at the hospital and just seems very scared.  She did not make a lot of sense when I was talking to her although it is not slurred speech or word salad but rather disconnected thoughts.    Allergies:  Penicillins    Medications:    Klonopin  Ritalin  Aspirin 81 mg  Acetaminophen  advil  B complex  Vitamin D3  Fish oil  Vitamin C  Chlorophyllin  Progesterone  Melatonin  Coenzyme Q  Albuterol  Multivitamin    Past Medical History:    ADHD  Asperger syndrome  Central serious retinopathy  HZO  Osteoarthritis  Scoliosis    Past Surgical History:    GYN surgery    Family History:    Arthritis: Mother  Cerebrovascular Disease: Father    Social History:  Smoking status: Current Every Day Smoker  Alcohol use: No  Marital Status:   [4]    Review of Systems   Unable to perform ROS: Psychiatric disorder   Respiratory: Negative for shortness of breath.    Cardiovascular: Negative for chest pain.   Gastrointestinal: Negative for abdominal pain.       Physical Exam   First Vitals:  Patient Vitals for the past 24 hrs:   BP Temp Temp src Heart Rate Resp SpO2   07/03/19 1241 -- -- -- -- -- 93 %   07/03/19 1221 123/77 98.6  F (37  C) Temporal 75 14 --     Physical Exam    Nursing note and vitals reviewed.    Constitutional:  Appears somewhat scared, laying in bed.  HENT:    Nose normal.  No discharge.       Oropharynx is moist.  Eyes:    Conjunctivae are normal without injection.   Lymph:  No enlarged or tender cervical or submandibular lymph nodes.   Cardiovascular:  Normal rate, regular rhythm with normal S1 and S2.      Normal heart sounds and peripheral pulses 2+ and equal.       No murmur or cece.  Pulmonary:  Effort normal and breath sounds clear to auscultation bilaterally   No respiratory distress.  No stridor.     No wheezes. No rales.     GI:    Soft. No distension and no mass. No tenderness.   Musculoskeletal:  Normal range of motion. No extremity deformity.  Neurological:   Alert and but not oriented. No focal weakness or facial droop.     Exhibits good muscle tone. Coordination normal.      GCS eye subscore is 4. GCS verbal subscore is 5.      GCS motor subscore is 6.   Skin:    Skin is warm and dry. No rash noted. No diaphoresis.      No erythema.  She has fairly pale skin.  No lesions.  Psychiatric:   Patient seems somewhat scared, poor eye contact, speech is very quiet.  She seems delusional and not in touch with reality of why she is here and not sure if she understands that she is at the hospital at this time.    Emergency Department Course     Emergency Department Course:  Past medical records, nursing notes, and vitals reviewed.  1250: I performed an exam of the patient and obtained history, as documented above.    Signed off to Dr. Correa     Impression & Plan      Medical Decision Making:  Patient comes in with delusional and psychotic thoughts.  I had gone from DEC see the patient and she agrees that this patient's thought processes are not normal at this time and she needs admission.  I do not feel it safe for her to go back to her apartment.  She will need psychiatric evaluation.  She is on a  hold at this time and will be placed on a 72-hour hold when a bed becomes available.  Boarding orders are written at this time and hopefully we can get her transferred today to an  inpatient mental health bed.  I will sign her out to my partner.  I did order a urine drug screen but she has not given a urine to this point.    Diagnosis:    ICD-10-CM    1. Psychosis, unspecified psychosis type (H) F29    2. Autism F84.0        Disposition:  Signed out to Dr. Correa pending bed placement.  She will need a 72-hour hold upon admission.  Urine drug screen pending.    Preet Garcia  7/3/2019    EMERGENCY DEPARTMENT  Scribe Disclosure:  I, Preet Garcia, am serving as a scribe at 12:50 PM on 7/3/2019 to document services personally performed by Shayy Don MD based on my observations and the provider's statements to me.        Shayy Don MD  07/03/19 2853

## 2019-07-03 NOTE — ED NOTES
Bed: [facility]  Expected date: 7/3/19  Expected time: 12:04 PM  Means of arrival: Ambulance  Comments:  Jagdish 53f psych, off meds, HOLD  ETA 1200

## 2019-07-03 NOTE — ED NOTES
Fannie Jeter, brought to ED by EMS, after she was found outside her apartment not making any sense and being psychotic.  Was agitated and making statements that 's heart is inside of her. During this interview, appears scare, she is alert, oriented x 2. She know she is at the Hospital,   Apparently she has stopped taking Geodon for about 6 months, according to the patient she was feeling better.  She has slurred speech and disorganized thoughts.  Denies any suicidal ideation.
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
## 2019-07-03 NOTE — ED TRIAGE NOTES
Pt lives independently and called the cope counselor but they were not able to come fast enough. Pt became belligerent therefore EMS called. Pt states she has Jasiel's heart inside her.

## 2019-07-03 NOTE — ED NOTES
Pt awake slightly agitated, crying stating she betrayed people.  Pt given courtesy meal.  Has bed on 77.  MD informed of agitation and increase sadness

## 2019-07-03 NOTE — PROGRESS NOTES
07/03/19 8640   Patient Belongings   Did you bring any home meds/supplements to the hospital?  No   Patient Belongings locker   Patient Belongings Remaining with Patient clothing;other (see comments)   Patient Belongings Put in Hospital Secure Location (Security or Locker, etc.) other (see comments);crutches   Belongings Search Yes   Clothing Search Yes   Second Staff Lexy   Comment All belongings searched and placed in locker     Sandals  Black Jacket  Hair Ties  Hair Pins  Keys x4  Sports Bra  T-Shirt  Leggings  Sunglasses  Mace    Admission:  I am responsible for any personal items that are not sent to the safe or pharmacy. [facility] is not responsible for loss, theft or damage of any property in my possession.        Patient Signature: ___________________________________________      Date/Time:__________________________     Staff Signature: __________________________________      Date/Time:__________________________     [facility] Staff person, if patient is unable/unwilling to sign:      __________________________________________________________      Date/Time: __________________________        Discharge:  [facility] has returned all of my personal belongings:     Patient Signature: ________________________________________     Date/Time: ____________________________________     Staff Signature: ______________________________________     Date/Time:_____________________________________

## 2019-07-04 LAB
ANION GAP SERPL CALCULATED.3IONS-SCNC: 8 MMOL/L (ref 3–14)
BASOPHILS # BLD AUTO: 0 10E9/L (ref 0–0.2)
BASOPHILS NFR BLD AUTO: 0.2 %
BUN SERPL-MCNC: 8 MG/DL (ref 7–30)
CALCIUM SERPL-MCNC: 9.2 MG/DL (ref 8.5–10.1)
CHLORIDE SERPL-SCNC: 105 MMOL/L (ref 94–109)
CO2 SERPL-SCNC: 29 MMOL/L (ref 20–32)
CREAT SERPL-MCNC: 0.47 MG/DL (ref 0.52–1.04)
DIFFERENTIAL METHOD BLD: NORMAL
EOSINOPHIL # BLD AUTO: 0.1 10E9/L (ref 0–0.7)
EOSINOPHIL NFR BLD AUTO: 2.1 %
ERYTHROCYTE [DISTWIDTH] IN BLOOD BY AUTOMATED COUNT: 12.9 % (ref 10–15)
GFR SERPL CREATININE-BSD FRML MDRD: >90 ML/MIN/{1.73_M2}
GLUCOSE SERPL-MCNC: 114 MG/DL (ref 70–99)
HCT VFR BLD AUTO: 39 % (ref 35–47)
HGB BLD-MCNC: 13.5 G/DL (ref 11.7–15.7)
IMM GRANULOCYTES # BLD: 0 10E9/L (ref 0–0.4)
IMM GRANULOCYTES NFR BLD: 0.2 %
LYMPHOCYTES # BLD AUTO: 1.6 10E9/L (ref 0.8–5.3)
LYMPHOCYTES NFR BLD AUTO: 30.4 %
MAGNESIUM SERPL-MCNC: 2.5 MG/DL (ref 1.6–2.3)
MCH RBC QN AUTO: 30.1 PG (ref 26.5–33)
MCHC RBC AUTO-ENTMCNC: 34.6 G/DL (ref 31.5–36.5)
MCV RBC AUTO: 87 FL (ref 78–100)
MONOCYTES # BLD AUTO: 0.5 10E9/L (ref 0–1.3)
MONOCYTES NFR BLD AUTO: 9.5 %
NEUTROPHILS # BLD AUTO: 3.1 10E9/L (ref 1.6–8.3)
NEUTROPHILS NFR BLD AUTO: 57.6 %
NRBC # BLD AUTO: 0 10*3/UL
NRBC BLD AUTO-RTO: 0 /100
PLATELET # BLD AUTO: 311 10E9/L (ref 150–450)
POTASSIUM SERPL-SCNC: 3.6 MMOL/L (ref 3.4–5.3)
RBC # BLD AUTO: 4.48 10E12/L (ref 3.8–5.2)
SODIUM SERPL-SCNC: 142 MMOL/L (ref 133–144)
TSH SERPL DL<=0.005 MIU/L-ACNC: 2.07 MU/L (ref 0.4–4)
WBC # BLD AUTO: 5.3 10E9/L (ref 4–11)

## 2019-07-04 PROCEDURE — 25000128 H RX IP 250 OP 636: Performed by: PSYCHIATRY & NEUROLOGY

## 2019-07-04 PROCEDURE — 12400000 ZZH R&B MH

## 2019-07-04 PROCEDURE — 83735 ASSAY OF MAGNESIUM: CPT | Performed by: PSYCHIATRY & NEUROLOGY

## 2019-07-04 PROCEDURE — 85025 COMPLETE CBC W/AUTO DIFF WBC: CPT | Performed by: PSYCHIATRY & NEUROLOGY

## 2019-07-04 PROCEDURE — 25000132 ZZH RX MED GY IP 250 OP 250 PS 637: Mod: GY | Performed by: PSYCHIATRY & NEUROLOGY

## 2019-07-04 PROCEDURE — 93005 ELECTROCARDIOGRAM TRACING: CPT

## 2019-07-04 PROCEDURE — 99232 SBSQ HOSP IP/OBS MODERATE 35: CPT | Performed by: PHYSICIAN ASSISTANT

## 2019-07-04 PROCEDURE — 93010 ELECTROCARDIOGRAM REPORT: CPT | Performed by: INTERNAL MEDICINE

## 2019-07-04 PROCEDURE — 80048 BASIC METABOLIC PNL TOTAL CA: CPT | Performed by: PSYCHIATRY & NEUROLOGY

## 2019-07-04 PROCEDURE — 84443 ASSAY THYROID STIM HORMONE: CPT | Performed by: PSYCHIATRY & NEUROLOGY

## 2019-07-04 PROCEDURE — 36415 COLL VENOUS BLD VENIPUNCTURE: CPT | Performed by: PSYCHIATRY & NEUROLOGY

## 2019-07-04 PROCEDURE — 25000132 ZZH RX MED GY IP 250 OP 250 PS 637: Mod: GY | Performed by: PHYSICIAN ASSISTANT

## 2019-07-04 RX ORDER — POLYETHYLENE GLYCOL 3350 17 G
2 POWDER IN PACKET (EA) ORAL
Status: DISCONTINUED | OUTPATIENT
Start: 2019-07-04 | End: 2019-07-06 | Stop reason: ALTCHOICE

## 2019-07-04 RX ORDER — POTASSIUM CHLORIDE 1.5 G/1.58G
20 POWDER, FOR SOLUTION ORAL ONCE
Status: COMPLETED | OUTPATIENT
Start: 2019-07-04 | End: 2019-07-04

## 2019-07-04 RX ORDER — OLANZAPINE 10 MG/1
10 TABLET, ORALLY DISINTEGRATING ORAL AT BEDTIME
Status: DISCONTINUED | OUTPATIENT
Start: 2019-07-04 | End: 2019-07-08

## 2019-07-04 RX ORDER — ZIPRASIDONE HYDROCHLORIDE 40 MG/1
40 CAPSULE ORAL 2 TIMES DAILY WITH MEALS
Status: DISCONTINUED | OUTPATIENT
Start: 2019-07-04 | End: 2019-07-04

## 2019-07-04 RX ADMIN — ZIPRASIDONE HCL 40 MG: 40 CAPSULE ORAL at 14:21

## 2019-07-04 RX ADMIN — OLANZAPINE 5 MG: 10 INJECTION, POWDER, FOR SOLUTION INTRAMUSCULAR at 08:18

## 2019-07-04 RX ADMIN — OLANZAPINE 10 MG: 5 TABLET, ORALLY DISINTEGRATING ORAL at 03:51

## 2019-07-04 RX ADMIN — NICOTINE POLACRILEX 2 MG: 2 LOZENGE ORAL at 14:49

## 2019-07-04 RX ADMIN — POTASSIUM CHLORIDE 20 MEQ: 1.5 POWDER, FOR SOLUTION ORAL at 22:19

## 2019-07-04 RX ADMIN — NICOTINE POLACRILEX 2 MG: 2 LOZENGE ORAL at 18:14

## 2019-07-04 RX ADMIN — NICOTINE POLACRILEX 2 MG: 2 GUM, CHEWING ORAL at 08:15

## 2019-07-04 RX ADMIN — OLANZAPINE 10 MG: 10 TABLET, ORALLY DISINTEGRATING ORAL at 22:19

## 2019-07-04 ASSESSMENT — ACTIVITIES OF DAILY LIVING (ADL)
HYGIENE/GROOMING: INDEPENDENT
DRESS: INDEPENDENT
ORAL_HYGIENE: INDEPENDENT

## 2019-07-04 NOTE — PROGRESS NOTES
Home medications could not be verified by Pharmacist or this nurse because patient said she stopped taking all medications 6 months ago, and she is unable to recalled if they are the right medications, and she is psychotic.  According to pharmacy verification patient refilled Geodon and Lamictal 7/2/19 but she has not taken them.

## 2019-07-04 NOTE — PROGRESS NOTES
"At 0310 pt in [facility] monico asks to speak with staff because she feels, \"lonely.\" She is slow to respond and pauses periodically. Pt requests information about voluntary status and the procedure for if she requests to leave as well as who her doctor is. Then states, \"who in here gets to decide the level of love I get to receive?\" After this, pt asks if she could have decaf coffee, staff suggested chamomile tea since coffee is not available at night. Pt says she is allergic and asks if there is another kind of tea. After staff checks and informs her there is only chamomile, pt says she wants the tea. Staff advises pt that it may not be safe for her to drink if she is allergic. Pt states, \"why can't you just give me what I want instead of what you think is good for me? You're just following regulations. I just need love! You all only provide what God can't give.\" Pt went back to her room.  "

## 2019-07-04 NOTE — PHARMACY-ADMISSION MEDICATION HISTORY
Admission medication history interview status for the 7/3/2019  admission is complete. See EPIC admission navigator for prior to admission medications     Medication history source reliability:Moderate.    Actions taken by pharmacist (provider contacted, etc): Geodon and Lamictal recently filled at [facility]     Additional medication history information not noted on PTA med list :    Patient reports that she stopped taking her medications about 6 months ago, and just a couple days ago began taking them again. Patient stated the only prescription RX she takes is Geodon. She states that she has lamotrigine, however is not taking it right now. Pharmacy records (via [employer]) show last fill of Geodon 40 mg on 7/1/19 and of lamotrigine 100 mg & 25 mg on 7/1/19.     Medications discontinued on PTA med list:   Lamotrigine 25 mg tablet - Take 1 tablet by mouth once daily. Take along with 100 mg.   Lamotrigine 100 mg tablet - Take 1 tablet by mouth once daily. Take along with 25 mg.     Of note, patient states her eyes are very sensitive and thus the only eye drops she uses are holistic eye drops for dry eyes.     Medication reconciliation/reorder completed by provider prior to medication history? Yes    Time spent in this activity: 15 minutes     Prior to Admission medications    Medication Sig Last Dose Taking? Auth Provider   aspirin 325 MG tablet Take 325 mg by mouth every 6 hours as needed (for headache) pain PRN Yes Reported, Patient   cetirizine (ZYRTEC) 10 MG tablet Take 10 mg by mouth daily  Past Week Yes Reported, Patient   Homeopathic Products (SIMILASAN DRY EYE RELIEF OP) Place 1 drop into both eyes as needed (for dry eyes)  PRN Yes Unknown, Entered By History   ziprasidone (GEODON) 40 MG capsule Take 40 mg by mouth daily  7/3/2019 at AM Yes Unknown, Entered By History
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
## 2019-07-04 NOTE — PLAN OF CARE
Pt mood is anxious with poor affect and disorganized thought process. Pt is hyperverbal and stated that she is scared that she was going to commit errors. Pt is requesting the services of a  so they can forgive her sins. Denies SI and took Zyprexa for distorted mind.
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
## 2019-07-04 NOTE — PROGRESS NOTES
[facility]    History and Physical  Hospitalist       Date of Admission:  7/3/2019    Assessment & Plan   Fannie Jeter is a 53 year old female with a past medical history significant for allergic rhinitis, schizoaffective disorder, ADHD, and central serous retinopathy who was brought to the Emergency Department for psychiatric evaluation after being found agitated outside her apartment.    Schizoaffective disorder with acute psychosis.   ADHD  Patient has a history of prior psychiatric hospitalization in the setting of medication non-compliance. She is prescribed lamictal and geodon but reported in the ED she had been non-compliant with medications for 6 months. Presents with agitation, paranoia, and delusions. Laboratory evaluation is unremarkable.  --management per Psychiatry    Tobacco use. Reports vaping daily. Requesting nicotine lozenges which have been ordered.    Allergic rhinitis. Continue zyrtec prn    Central serous retinopathy. Continue lubricant eye drops    DVT Prophylaxis: Ambulate every shift    Disposition: Expected discharge is per Psychiatry     Patient is medically stable at this time. Hospitalist service will sign off. Please do not hesitate to call with any questions or concerns.     This patient was seen and examined with Dr. Osborne who agrees with the above plan.    Nicole Donis PA-C    Primary Care Physician   Nayely Anderson    Chief Complaint   psychiatric evaluation    History is obtained from the patient and from chart review    History of Present Illness   Fannie Jeter is a 53 year old female with a past medical history significant for allergic rhinitis, schizoaffective disorder, ADHD, and central serous retinopathy who was brought to the Emergency Department for psychiatric evaluation after being found agitated outside her apartment. EMS was called to the patient's apartment complex with reports of her being agitated wandering outside her apartment. They felt  her speech was nonsensical and were concerned she was delusional and brought her to the ED for evaluation. She was evaluated by DEC in the ED and inpatient admission was requested.     The patient is presently evaluated in the ITC portion of the inpatient psychiatry unit. She is calm and cooperative with interview. She reports she has had some pain in her bilateral feel due to arthritis as well as a little nausea earlier that is resolved. She otherwise denies complaints and states she has not had any recent illness of injury. She confirms she has not been taking her medications for 6 months. Currently denies chest pain, shortness of breath, nausea, vomiting. She reports she is due to an eye exam and due to see her primary care provider who she believes in through [employer] system.     Past Medical History    Past Medical History:   Diagnosis Date     ADHD (attention deficit hyperactivity disorder)      Asperger syndrome      Central serous retinopathy      HZO (herpes zoster oticus)     Left eye     Osteoarthritis      Scoliosis        Past Surgical History   Past Surgical History:   Procedure Laterality Date     GYN SURGERY      d and c       Prior to Admission Medications   Prior to Admission Medications   Prescriptions Last Dose Informant Patient Reported? Taking?   Polyethyl Glycol-Propyl Glycol (LUBRICANT EYE DROPS, PF, OP)   Yes No   Sig: Apply 1 drop to eye every hour as needed   acetaminophen (TYLENOL) 650 MG CR tablet   Yes No   Sig: Take 650 mg by mouth every 8 hours as needed   aspirin 325 MG tablet   Yes No   Sig: Take 2 tablets by mouth every 6 hours as needed pain   cetirizine (ZYRTEC) 10 MG tablet   Yes No   Sig: Take 10 mg by mouth daily as needed    lamoTRIgine (LAMICTAL) 100 MG tablet filled 1/3 # 30.  Yes No   Sig: Take 100 mg by mouth daily Last filled 1/3/19 for # 30 per [facility].   lamoTRIgine (LAMICTAL) 25 MG tablet Filled 7/1 # 90  Yes Yes   Sig: Take 25 mg by mouth daily Filled 7/1/19 at  [facility] for # 90 to give with 100 mg tabs   ziprasidone (GEODON) 40 MG capsule Filled 7/1 # 90.  Yes Yes   Sig: Take 40 mg by mouth At Bedtime Filled 7/1/9 for # 90.      Facility-Administered Medications: None     Allergies   Allergies   Allergen Reactions     Animal Dander      Gluten Meal Diarrhea     Gas or constipation      Haldol Difficulty breathing     Haldol [Haloperidol]      Milk Protein Extract      No Clinical Screening - See Comments      Casien ~ unsure of spelling ~ is milk protien     Penicillins      Unsure of what happens. Has been told she has allergies since she was a kid.     Penicillins      Seasonal Allergies        Social History   She denies alcohol or drug use. Vapes. Lives in an apartment.     Family History   Reports the men on her father's side of the family have history of stroke and heart disease.     Review of Systems   The 10 point Review of Systems is negative other than noted in the HPI or here.     Physical Exam   Temp: 99  F (37.2  C) Temp src: Oral BP: 124/85 Pulse: 82 Heart Rate: 75 Resp: 16 SpO2: 95 % O2 Device: None (Room air)    Vital Signs with Ranges  Temp:  [97.6  F (36.4  C)-99  F (37.2  C)] 99  F (37.2  C)  Pulse:  [] 82  Heart Rate:  [75] 75  Resp:  [14-18] 16  BP: (108-126)/(77-85) 124/85  SpO2:  [93 %-100 %] 95 %  173 lbs 0 oz    Temp: 99  F (37.2  C) Temp src: Oral BP: 124/85 Pulse: 82 Heart Rate: 75 Resp: 16 SpO2: 95 % O2 Device: None (Room air)    Vitals:    07/03/19 1838   Weight: 78.5 kg (173 lb)     Vital Signs with Ranges  Temp:  [97.6  F (36.4  C)-99  F (37.2  C)] 99  F (37.2  C)  Pulse:  [] 82  Heart Rate:  [75] 75  Resp:  [14-18] 16  BP: (108-126)/(77-85) 124/85  SpO2:  [93 %-100 %] 95 %  No intake/output data recorded.    Constitutional: Alert, calm, cooperative. Sitting up in bed. A little paranoid and anxious at times but overall is appropriately conversant.   ENT: normal cephalic, moist mucous membranes  Respiratory: Lungs clear to  auscultation bilaterally, no increased work of breathing or wheezing  Cardiovascular: Regular rate and rhythm, no murmur, no LE edema, distal pulses +2/4  GI:  active bowel sounds, abdomen soft, non-tender  Skin/Integumen: warm, dry  MSK:  Moves all four extremities spontaneously. No gross deformity of feet or otherwise.   Neuro:  Cranial nerves 2-12 grossly intact. No focal deficits.        Data   Data reviewed today:  I personally reviewed no images or EKG's today.  Recent Labs   Lab 07/04/19  0806   WBC 5.3   HGB 13.5   MCV 87         POTASSIUM 3.6   CHLORIDE 105   CO2 29   BUN 8   CR 0.47*   ANIONGAP 8   SARAY 9.2   *       No results found for this or any previous visit (from the past 24 hour(s)).

## 2019-07-04 NOTE — CONSULTS
"Consult Date:  07/04/2019      PSYCHIATRIC CONSULTATION      REFERRAL SOURCE.  [facility] Emergency Department.      CHIEF COMPLAINT:  \"I stopped taking my prescription.\"      HISTORY OF PRESENT ILLNESS:  Ms. Jessica Jeter is a 53-year-old  woman with history of bipolar disorder type 1 with psychotic features, with previous psychiatric admissions including 04/2018 and a separate admission as well in 02/2018 and 03/2018 at [facility].  She has a , apparently became agitated in the meeting with her  and needed to be taken in with EMS to the Emergency Department for stabilization.  She was floridly psychotic at the time of admission, believing that she was  to [place] and acknowledged she had not been taking her medication.  She was admitted to [facility] in [facility] for further stabilization.      Upon interview this morning, the patient remains quite disorganized.  She indicates that she stopped her lamotrigine at some point in the past and also had stopped her Geodon.  She then indicates that she was unable to get her Geodon from her doctors and is upset about this.  She is demanding that she restart her Geodon as soon as possible, believing that it was helpful in the past.  She very difficult to follow at this time.  She reports ongoing depression but struggles to articulate current symptoms in a coherent manner.  She acknowledges she has not been sleeping well.  She denies suicidal ideation or thoughts of harming others.  She denies any physical health issues at present.  She would like to know when she can leave the hospital, hoping to go by tomorrow if possible.      PAST PSYCHIATRIC HISTORY:  At least 3 prior admissions, all 3 in 2018, 2 of them at [facility] in 02/2018 and then 03/2018.  She reports that she does best on Geodon.  She reports that she self-tapered off lamotrigine recently.  Her thought is very disorganized at present, " "so it is hard to get a fully coherent psychiatric history including current providers.      PAST MEDICAL HISTORY:  Central serous retinopathy, herpes zoster ophthalmicus (HDO), osteoarthritis, scoliosis.      PAST SURGICAL HISTORY:  Gynecological surgery.      FAMILY HISTORY:  No EMR history indicated or past psychiatric disorders.      SOCIAL HISTORY:  Resides in an independent living facility apartment complex.  She was meeting with the facility , [facility], when she became agitated in the [facility].  The emergency medical provider took her to the hospital.  It is difficult to get a coherent social history at this time given her significant thought disorganization, but she is reportedly an everyday smoker without reported alcohol use or drug use.  Urine drug screen was negative on admission.  She is .      REVIEW OF SYSTEMS:  A 10-point review of systems completed and negative other than described in the HPI.      PHYSICAL EXAMINATION:   VITAL SIGNS:  Temperature 99, pulse 82, blood pressure 124/85, respiratory rate 16, SpO2 of 95% on room air.      MENTAL STATUS EXAMINATION:  She is dressed in hospital scrubs.  Appears slightly disheveled.  Eye contact is intense.  She is paranoid, indicating a fear that the provider is standing up and indicating that she wants to be \"on the same level.\"  She is exasperated why she has not gotten her Geodon yet this morning.  Speech is quick, at times loud and intense.  Mood is described as depressed.  Affect is intense and labile.  Thought form is very disorganized and difficult to follow, circumstantial.  Thought content notable for ongoing paranoia.  She is not responding to internal stimuli.  There is no fluctuation in cognition or obvious deficits in cognitive processing other than psychotic symptoms.  Short-term memory is somewhat impaired.  She at times struggles to provide a coherent recent account of the events prompting admission.  Long-term memory " appears reasonably intact in a conversation.  Insight very poor, although she did recognize psychiatric symptoms.  She has recently been very labile, and it is hard to understand what her level of cooperation truly is at this time.  Judgment has recently been very poor given agitation and required psychiatric admission for stabilization.      IMPRESSION/PLAN:   1.  Unspecified schizophrenia and other psychotic disorder.   2.  Bipolar disorder type 1 with psychotic features, mixed, episode versus schizoaffective disorder, bipolar type with psychotic features.      FORMULATION:  The patient is a 53-year-old woman with history of bipolar 1 disorder and several prior psychiatric admissions in 2018 for psychosis and cyndi.  She acknowledges that she stopped taking her medications sometime recently.  She would like to restart her Geodon, noting that has been the most effective medication.  She is admitted on a 72-hour hold into the [facility] for psychiatric stabilization.      PLAN:   1.  Resume Geodon 40 mg b.i.d. with meals for ongoing psychosis and cyndi.   2.  We will need to collaborate with outpatient provider to help with disposition planning.     3.  She will require social work evaluation and assistance.      RESULTS REVIEW:  A basic metabolic panel was unremarkable including creatinine 0.47, TSH 2.04, glucose 114.  CBC was unremarkable.  Urine drug screen negative.  We will obtain an EKG given she is on a second-generation antipsychotic including Geodon, which can have risk of QTc prolongation.         AMY HENSLEY MD             D: 2019   T: 2019   MT:       Name:     FLORY JAQUEZ   MRN:      [id number]        Account:       [id number]   :      1965           Consult Date:  2019      Document: [id number]        Addendum  QTc is prolonged. Will provide Potasium targeting level above 4.0 and check magnesium targeting level above 2.0. Will discontinue Geodon and start Zyprexa  tonight which is more neutral on QTc. Abilify could be a reasonable option as well with minimal effect on QTc.

## 2019-07-05 PROCEDURE — 12400000 ZZH R&B MH

## 2019-07-05 PROCEDURE — 25000132 ZZH RX MED GY IP 250 OP 250 PS 637: Mod: GY | Performed by: PSYCHIATRY & NEUROLOGY

## 2019-07-05 PROCEDURE — 93005 ELECTROCARDIOGRAM TRACING: CPT

## 2019-07-05 PROCEDURE — 25000132 ZZH RX MED GY IP 250 OP 250 PS 637: Mod: GY | Performed by: PHYSICIAN ASSISTANT

## 2019-07-05 PROCEDURE — 93010 ELECTROCARDIOGRAM REPORT: CPT | Performed by: INTERNAL MEDICINE

## 2019-07-05 RX ADMIN — NICOTINE POLACRILEX 2 MG: 2 LOZENGE ORAL at 17:32

## 2019-07-05 RX ADMIN — NICOTINE POLACRILEX 2 MG: 2 LOZENGE ORAL at 01:44

## 2019-07-05 RX ADMIN — NICOTINE POLACRILEX 2 MG: 2 LOZENGE ORAL at 20:48

## 2019-07-05 RX ADMIN — NICOTINE POLACRILEX 2 MG: 2 LOZENGE ORAL at 08:06

## 2019-07-05 RX ADMIN — OLANZAPINE 10 MG: 10 TABLET, ORALLY DISINTEGRATING ORAL at 20:42

## 2019-07-05 RX ADMIN — OLANZAPINE 10 MG: 5 TABLET, ORALLY DISINTEGRATING ORAL at 08:07

## 2019-07-05 RX ADMIN — NICOTINE POLACRILEX 2 MG: 2 LOZENGE ORAL at 13:17

## 2019-07-05 ASSESSMENT — ACTIVITIES OF DAILY LIVING (ADL)
DRESS: INDEPENDENT
HYGIENE/GROOMING: SHOWER;INDEPENDENT
HYGIENE/GROOMING: INDEPENDENT
DRESS: STREET CLOTHES
ORAL_HYGIENE: INDEPENDENT
ORAL_HYGIENE: INDEPENDENT
LAUNDRY: WITH SUPERVISION

## 2019-07-05 NOTE — PROGRESS NOTES
met with patient this afternoon to have her sign release of information forms for her medication management provider at [facility] as well for the , May Schuler ([phone]), who is at the complex where she lives. Patient appeared extremely guarded and paranoid. She refused to sign the JANINE forms and stated that she would never sign them. Case Management will attempt to meet with patient again next week to see if she would be willing to sign them at that time.

## 2019-07-05 NOTE — PROGRESS NOTES
[facility] Psychiatric Progress Note      Interval History:   Pt seen, team meeting held with , nursing staff, OT, and PA's to review diagnosis and treatment plan.  The preceding clinical notes documented by Jefferson Dc MD were reviewed and appreciated.  The circumstance of the patient's hospitalization was also reviewed with the patient.  She was advised that she could not be restarted on Geodon as earlier planned because of her prolonged QTC of 497ms. However, repeat ECG dondone today suggests that the ventricular rate has decreased by 40 bpm and the QT and QTC are about the same.  Patient was very vague about events that led to the hospitalization but she requested information about Zyprexa on account of information she claims she had acquired by the medication of the time.  She does not endorse the presence of auditory or visual hallucinations at this time and denies experiencing any self-harm thoughts plans or intent.  However staff report that she is still saliently delusional.     Review of systems:   The Review of Systems is negative other than noted in the HPI     Medications:       OLANZapine zydis  10 mg Oral At Bedtime     hydrOXYzine, LORazepam **OR** LORazepam, nicotine, OLANZapine zydis **OR** OLANZapine    Mental Status Examination:     Appearance:  awake, alert, adequately groomed and casually dressed  Eye Contact:  better  Speech:  clear, coherent  Psychomotor Behavior:  no evidence of tardive dyskinesia, dystonia, or tics and fidgeting  Mood:  better   Affect:  appropriate and in normal range and intensity is normal  Thought Process:  logical, linear and goal oriented no loose associations  Thought Content:  no evidence of suicidal ideation or homicidal ideation. Internally preoccupied but denies delusional themes or hallucinations.  Oriented to:  time, person, and place  Attention Span and Concentration:  limited  Recent and Remote Memory:  limited  Fund of  Knowledge: appropriate  Muscle Strength and Tone: normal  Gait and Station: Normal  Insight:  fair  Judgment:  limited          Labs/Vitals:     Recent Results (from the past 24 hour(s))   EKG 12-lead, tracing only    Collection Time: 07/04/19 11:50 AM   Result Value Ref Range    Interpretation ECG Click View Image link to view waveform and result    EKG 12-lead, complete    Collection Time: 07/05/19  8:33 AM   Result Value Ref Range    Interpretation ECG Click View Image link to view waveform and result      B/P: 111/63, T: 98.3, P: 82, R: 17    Impression:   The patient is a 53-year-old woman with history of bipolar 1 disorder and several prior psychiatric admissions in 2018 for psychosis and cyndi.  She acknowledges that she stopped taking her medications sometime recently.  She would like to restart her Geodon, noting that has been the most effective medication.  She is admitted on a 72-hour hold into the [facility] for psychiatric stabilization. She was not restarted on Geodon because her       DIagnoses:     1. Schizoaffective disorder, bipolar type, multiple episodes in acute phase.  2. ADHD.  3. Tobacco use disorder.  4. Allergic rhinitis.  5. Central serous retinopathy.           Plan:   1. Written information given on medications. Side effects, risks, benefits reviewed.  2. Continue Zyprexa Zydis 10 mg qhs.   3. Move to SDU when appropriate.  4. Continue hospitalization.      Attestation:  Patient has been seen and evaluated by Nicole short MD    PATIENT ID  Name: Fannie Jeter  MRN:[id number]  YOB: 1965

## 2019-07-05 NOTE — PLAN OF CARE
Patient visible this shift, pleasant and cooperative. Able to spend some time on SDU after showering. Eating well and medication compliant. No delusional comments noted. Geodon replaced with Zyprexa per MD due to prolonged QT. Another EKG ordered for tomorrow AM.  Pt mood is stable, with full range affect. Thought process is improving. Behavior is appropriate. Insight is improving. Pt denies suicidal ideation.

## 2019-07-05 NOTE — PLAN OF CARE
Pt was transferred to SDU on day shift; is adapting to new environment. Pt requested and received a copy of her medication list. Pt showered this evening; did her laundry. Pt received a copy of her current medication list and an information sheet on Zyprexa as pt requested.pt participated in Activity group this evening. Guarded but calm.

## 2019-07-06 PROCEDURE — 90853 GROUP PSYCHOTHERAPY: CPT

## 2019-07-06 PROCEDURE — 25000132 ZZH RX MED GY IP 250 OP 250 PS 637: Mod: GY | Performed by: PSYCHIATRY & NEUROLOGY

## 2019-07-06 PROCEDURE — 25000132 ZZH RX MED GY IP 250 OP 250 PS 637: Mod: GY | Performed by: PHYSICIAN ASSISTANT

## 2019-07-06 PROCEDURE — 12400000 ZZH R&B MH

## 2019-07-06 RX ORDER — ACETAMINOPHEN 500 MG
1000 TABLET ORAL EVERY 8 HOURS PRN
Status: DISCONTINUED | OUTPATIENT
Start: 2019-07-06 | End: 2019-07-10 | Stop reason: HOSPADM

## 2019-07-06 RX ADMIN — Medication 1 LOZENGE: at 16:11

## 2019-07-06 RX ADMIN — NICOTINE POLACRILEX 2 MG: 2 LOZENGE ORAL at 13:01

## 2019-07-06 RX ADMIN — ACETAMINOPHEN 1000 MG: 500 TABLET, FILM COATED ORAL at 09:19

## 2019-07-06 RX ADMIN — Medication 1 LOZENGE: at 14:44

## 2019-07-06 RX ADMIN — OLANZAPINE 10 MG: 10 TABLET, ORALLY DISINTEGRATING ORAL at 21:43

## 2019-07-06 RX ADMIN — NICOTINE POLACRILEX 2 MG: 2 GUM, CHEWING ORAL at 21:58

## 2019-07-06 RX ADMIN — NICOTINE POLACRILEX 2 MG: 2 LOZENGE ORAL at 09:59

## 2019-07-06 RX ADMIN — ACETAMINOPHEN 1000 MG: 500 TABLET, FILM COATED ORAL at 17:53

## 2019-07-06 NOTE — PLAN OF CARE
"A/O. Head to toe assessment WDL ex complains to general aches. Patient spent her time in groups or socializing. Patient is labile and easily irritated. Often perceives persecution and becomes very defensive. Her speech is very pressured. Laughs inappropriately at times and states delusional things at time like \"I can feel when I put my feet on the ground that there is going to be another earthquake in [place].\"    "

## 2019-07-06 NOTE — PROGRESS NOTES
Late note: Pt mood is way improving with bright affect and some good insight. Pt able to carry a constructive conversation although occasionally continue to think that she is not doing thinks right and was afraid she with get into trouble. PRN Zyprexa 10 was given. Pt denies SI. Pt transferred to SDU

## 2019-07-06 NOTE — CONSULTS
BRIEF NUTRITION NOTE:    Reason For Consult:  RN Request - vegetarian, gluten free preference; pt having difficulty getting enough food    BRIEF ASSESSMENT:  Pt had multiple allergies listed in Epic, including Gluten, Milk Protein, Casein.  She states that she tries to avoid gluten as it makes her feel better, but can sometimes have it.  She recently started the Vegetarian diet, but if craving certain meats (kinney, tuna, nitrate free pepperoni) she will eat it.  This morning she enjoyed scrambled eggs with ham as a comfort food to her.    She states she is addicted to Dairy but went 1 month without it and felt better.    INTERVENTIONS:  I canceled the allergies in Epic per patient request.    I provided [employer] copies of both Gluten Free diet and Vegetarian diet to expand her ordering options.    Hailey Guzman, RD, LD, CNSC

## 2019-07-07 PROCEDURE — 90853 GROUP PSYCHOTHERAPY: CPT

## 2019-07-07 PROCEDURE — 12400000 ZZH R&B MH

## 2019-07-07 PROCEDURE — 25000132 ZZH RX MED GY IP 250 OP 250 PS 637: Mod: GY | Performed by: PSYCHIATRY & NEUROLOGY

## 2019-07-07 RX ADMIN — NICOTINE POLACRILEX 2 MG: 2 GUM, CHEWING ORAL at 14:13

## 2019-07-07 RX ADMIN — ACETAMINOPHEN 1000 MG: 500 TABLET, FILM COATED ORAL at 20:29

## 2019-07-07 RX ADMIN — Medication 1 LOZENGE: at 04:54

## 2019-07-07 RX ADMIN — NICOTINE POLACRILEX 2 MG: 2 GUM, CHEWING ORAL at 09:55

## 2019-07-07 RX ADMIN — NICOTINE POLACRILEX 2 MG: 2 GUM, CHEWING ORAL at 20:32

## 2019-07-07 RX ADMIN — NICOTINE POLACRILEX 2 MG: 2 GUM, CHEWING ORAL at 06:31

## 2019-07-07 RX ADMIN — NICOTINE POLACRILEX 2 MG: 2 GUM, CHEWING ORAL at 11:43

## 2019-07-07 RX ADMIN — NICOTINE POLACRILEX 2 MG: 2 GUM, CHEWING ORAL at 18:35

## 2019-07-07 RX ADMIN — OLANZAPINE 10 MG: 10 TABLET, ORALLY DISINTEGRATING ORAL at 20:29

## 2019-07-07 ASSESSMENT — ACTIVITIES OF DAILY LIVING (ADL)
HYGIENE/GROOMING: INDEPENDENT
LAUNDRY: WITH SUPERVISION
DRESS: STREET CLOTHES
DRESS: INDEPENDENT
ORAL_HYGIENE: INDEPENDENT
ORAL_HYGIENE: INDEPENDENT
HYGIENE/GROOMING: SHOWER;INDEPENDENT

## 2019-07-07 NOTE — PLAN OF CARE
Pt mood is labile with a disorganized thought process and poor affect. Insight is distorted and feelings of having connections with God after a long time. Pt attended groups but denies SI. During 1:1 thought staffs here at [facility] are all psychic and are reading her mind. Pt reports tingling feelings but thought those are the moments she is connecting with God. Hears positive and educative voices. Med compliant and denies any SI.

## 2019-07-07 NOTE — PLAN OF CARE
"Pt presents with flat affect and labile mood.  Thought process is disorganized.  Speech is pressured and displays flight of ideas. Pt attended some unit programming.  During 1:1 pt reported feeling \"more clear\".  Pt is \"pleased\" with Zyprexa, and stated she likes how \"easy\" it is.  Pt denied suicidal ideation.    "

## 2019-07-08 PROCEDURE — 25000132 ZZH RX MED GY IP 250 OP 250 PS 637: Mod: GY | Performed by: PSYCHIATRY & NEUROLOGY

## 2019-07-08 PROCEDURE — 90853 GROUP PSYCHOTHERAPY: CPT

## 2019-07-08 PROCEDURE — 12400000 ZZH R&B MH

## 2019-07-08 RX ORDER — OLANZAPINE 15 MG/1
15 TABLET, ORALLY DISINTEGRATING ORAL AT BEDTIME
Status: DISCONTINUED | OUTPATIENT
Start: 2019-07-08 | End: 2019-07-10 | Stop reason: HOSPADM

## 2019-07-08 RX ORDER — ALBUTEROL SULFATE 90 UG/1
2 AEROSOL, METERED RESPIRATORY (INHALATION) EVERY 6 HOURS PRN
Status: DISCONTINUED | OUTPATIENT
Start: 2019-07-08 | End: 2019-07-10 | Stop reason: HOSPADM

## 2019-07-08 RX ADMIN — NICOTINE POLACRILEX 2 MG: 2 GUM, CHEWING ORAL at 13:39

## 2019-07-08 RX ADMIN — NICOTINE POLACRILEX 2 MG: 2 GUM, CHEWING ORAL at 21:53

## 2019-07-08 RX ADMIN — ACETAMINOPHEN 1000 MG: 500 TABLET, FILM COATED ORAL at 15:45

## 2019-07-08 RX ADMIN — Medication 1 LOZENGE: at 16:54

## 2019-07-08 RX ADMIN — NICOTINE POLACRILEX 2 MG: 2 GUM, CHEWING ORAL at 16:04

## 2019-07-08 RX ADMIN — NICOTINE POLACRILEX 2 MG: 2 GUM, CHEWING ORAL at 10:01

## 2019-07-08 RX ADMIN — OLANZAPINE 15 MG: 15 TABLET, ORALLY DISINTEGRATING ORAL at 21:28

## 2019-07-08 RX ADMIN — NICOTINE POLACRILEX 2 MG: 2 GUM, CHEWING ORAL at 07:08

## 2019-07-08 RX ADMIN — HYDROXYZINE HYDROCHLORIDE 25 MG: 25 TABLET ORAL at 12:23

## 2019-07-08 ASSESSMENT — ACTIVITIES OF DAILY LIVING (ADL)
ORAL_HYGIENE: INDEPENDENT
DRESS: STREET CLOTHES
ORAL_HYGIENE: INDEPENDENT
DRESS: STREET CLOTHES
LAUNDRY: WITH SUPERVISION
HYGIENE/GROOMING: INDEPENDENT
HYGIENE/GROOMING: INDEPENDENT
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
## 2019-07-08 NOTE — PLAN OF CARE
" Labile mood and irritable affect.  Disorganized thought process.  Flight of ideas and pressured speech.  Pt made delusional statements throughout shift.  Pt discussed with writer about possibly coming from two sperms morphed into one, and believes this to be true because she is assymetrical.  Pt stated to writer \"anything that I can imagine can happen\".  Pt stated she did not sleep well last night.  Pt attended unit programming and participated appropriately. Pt denied SI.        "

## 2019-07-08 NOTE — PROGRESS NOTES
Patient complained of chest discomfort with breathing.  Lung sounds have wheezing bilateral.  Patient has used albuterol and advair inhaler in the past but said she quit because she switched to vaping and it was better but now has not used anything since admission and she feels like there is something in her upper front chest. 02 sats were 96% on room air. Called Dr. Lujan and he ordered prn albuterol for her.

## 2019-07-08 NOTE — PLAN OF CARE
Pt mood is labile with tense affect. Thought process is disorganized, insight is poor. Pt denied suicidal ideation. Pt shows flight of ideas and delusional statements. Had an anger outburst in OT saying that discussed coping methods had previously been used as 'torture methods,' irritated with other pts disagreeing.

## 2019-07-08 NOTE — PLAN OF CARE
BEHAVIORAL TEAM DISCUSSION    Participants: MD,PA,RN,OT,CM  Progress: Is eating and sleeping. Zyprexa is working for her.  Continued Stay Criteria/Rationale: Needs further stabilization. Erratic behavior.  Medical/Physical: Medication adjustment.  Precautions: Code1  Behavioral Orders   Procedures    Code 1 - Restrict to Unit    Routine Programming     As clinically indicated    Status 15     Every 15 minutes.     Plan: Wants to follow with Dr. Lujan after discharge.  Rationale for change in precautions or plan: NA

## 2019-07-08 NOTE — PROGRESS NOTES
[facility] Psychiatric Progress Note      Interval History:   Pt seen, team meeting held with , nursing staff, OT, and PA's to review diagnosis and treatment plan.   Staff report that the patient continues to display intermittent bouts of psychosis and disorganized behavior. This morning, she is lucid and reports that she had been doing well on her Geodon until she missed some doses and was not sleeping or eating well. Staff report that she could not figure out how to peel a boiled egg earlier today but was able to continue after she was given initial directions. She tells me she is lonely at home and does not want anyone directing her care like it was when she was under civil commitment. She denies self harm, thoughts or plans. She says she feels Zyprexa is helping her but she does not want to consider a TAVERAS option. We decided to raise her medication dose and observe her response over the next few days. Tolerating medications well without significant side effects.     Review of systems:   The Review of Systems is negative other than noted in the HPI     Medications:       OLANZapine zydis  10 mg Oral At Bedtime     acetaminophen, sore throat lozenge, hydrOXYzine, LORazepam **OR** LORazepam, nicotine, OLANZapine zydis **OR** OLANZapine    Mental Status Examination:     Appearance:  awake, alert, adequately groomed and casually dressed  Eye Contact:  better  Speech:  clear, coherent  Psychomotor Behavior:  no evidence of tardive dyskinesia, dystonia, or tics and fidgeting  Mood:  better   Affect:  appropriate and in normal range and intensity is normal  Thought Process:  logical, linear and goal oriented no loose associations  Thought Content:  no evidence of suicidal ideation or homicidal ideation. Internally preoccupied but denies delusional themes or hallucinations.  Oriented to:  time, person, and place  Attention Span and Concentration:  limited  Recent and Remote Memory:   limited  Fund of Knowledge: appropriate  Muscle Strength and Tone: normal  Gait and Station: Normal  Insight:  fair  Judgment:  limited      Labs/Vitals:     No results found for this or any previous visit (from the past 24 hour(s)).  B/P: 111/63, T: 98.3, P: 82, R: 17    Impression:   The patient is a 53-year-old woman with history of bipolar 1 disorder and several prior psychiatric admissions in 2018 for psychosis and cyndi.  She acknowledges that she stopped taking her medications sometime recently.  She would like to restart her Geodon, noting that has been the most effective medication.  She is admitted on a 72-hour hold into the [facility] for psychiatric stabilization.       DIagnoses:     1. Schizoaffective disorder, bipolar type, multiple episodes in acute phase.  2. ADHD.  3. Tobacco use disorder.  4. Allergic rhinitis.  5. Central serous retinopathy.           Plan:   1. Written information given on medications. Side effects, risks, benefits reviewed.  2. Continue Zyprexa Zydis 15 mg qhs.   3. Continue hospitalization.      Attestation:  Patient has been seen and evaluated by Nicole short MD    PATIENT ID  Name: Fannie Jeter  MRN:[id number]  YOB: 1965

## 2019-07-09 ENCOUNTER — APPOINTMENT (OUTPATIENT)
Dept: GENERAL RADIOLOGY | Facility: CLINIC | Age: 54
DRG: 885 | End: 2019-07-09
Attending: PHYSICIAN ASSISTANT
Payer: MEDICARE

## 2019-07-09 LAB
ERYTHROCYTE [DISTWIDTH] IN BLOOD BY AUTOMATED COUNT: 13.2 % (ref 10–15)
HCT VFR BLD AUTO: 38.9 % (ref 35–47)
HGB BLD-MCNC: 13.1 G/DL (ref 11.7–15.7)
MCH RBC QN AUTO: 30 PG (ref 26.5–33)
MCHC RBC AUTO-ENTMCNC: 33.7 G/DL (ref 31.5–36.5)
MCV RBC AUTO: 89 FL (ref 78–100)
PLATELET # BLD AUTO: 314 10E9/L (ref 150–450)
RBC # BLD AUTO: 4.36 10E12/L (ref 3.8–5.2)
WBC # BLD AUTO: 6.4 10E9/L (ref 4–11)

## 2019-07-09 PROCEDURE — 85027 COMPLETE CBC AUTOMATED: CPT | Performed by: PHYSICIAN ASSISTANT

## 2019-07-09 PROCEDURE — 25000132 ZZH RX MED GY IP 250 OP 250 PS 637: Mod: GY | Performed by: PSYCHIATRY & NEUROLOGY

## 2019-07-09 PROCEDURE — 36415 COLL VENOUS BLD VENIPUNCTURE: CPT | Performed by: PHYSICIAN ASSISTANT

## 2019-07-09 PROCEDURE — 25000132 ZZH RX MED GY IP 250 OP 250 PS 637: Mod: GY | Performed by: PHYSICIAN ASSISTANT

## 2019-07-09 PROCEDURE — 99231 SBSQ HOSP IP/OBS SF/LOW 25: CPT | Performed by: PHYSICIAN ASSISTANT

## 2019-07-09 PROCEDURE — A9270 NON-COVERED ITEM OR SERVICE: HCPCS | Mod: GY | Performed by: PSYCHIATRY & NEUROLOGY

## 2019-07-09 PROCEDURE — 71046 X-RAY EXAM CHEST 2 VIEWS: CPT

## 2019-07-09 PROCEDURE — 12400000 ZZH R&B MH

## 2019-07-09 PROCEDURE — 90853 GROUP PSYCHOTHERAPY: CPT

## 2019-07-09 RX ORDER — GUAIFENESIN 600 MG/1
600 TABLET, EXTENDED RELEASE ORAL 2 TIMES DAILY
Status: DISCONTINUED | OUTPATIENT
Start: 2019-07-09 | End: 2019-07-10 | Stop reason: HOSPADM

## 2019-07-09 RX ADMIN — Medication 1 LOZENGE: at 09:37

## 2019-07-09 RX ADMIN — ALBUTEROL SULFATE 2 PUFF: 90 AEROSOL, METERED RESPIRATORY (INHALATION) at 16:50

## 2019-07-09 RX ADMIN — NICOTINE POLACRILEX 2 MG: 2 GUM, CHEWING ORAL at 21:25

## 2019-07-09 RX ADMIN — ALBUTEROL SULFATE 2 PUFF: 90 AEROSOL, METERED RESPIRATORY (INHALATION) at 11:57

## 2019-07-09 RX ADMIN — NICOTINE POLACRILEX 2 MG: 2 GUM, CHEWING ORAL at 13:25

## 2019-07-09 RX ADMIN — NICOTINE POLACRILEX 2 MG: 2 GUM, CHEWING ORAL at 07:04

## 2019-07-09 RX ADMIN — GUAIFENESIN 600 MG: 600 TABLET, EXTENDED RELEASE ORAL at 21:25

## 2019-07-09 RX ADMIN — Medication 1 LOZENGE: at 21:25

## 2019-07-09 RX ADMIN — NICOTINE POLACRILEX 2 MG: 2 GUM, CHEWING ORAL at 18:03

## 2019-07-09 RX ADMIN — Medication 1 LOZENGE: at 11:11

## 2019-07-09 RX ADMIN — NICOTINE POLACRILEX 2 MG: 2 GUM, CHEWING ORAL at 11:09

## 2019-07-09 RX ADMIN — OLANZAPINE 15 MG: 15 TABLET, ORALLY DISINTEGRATING ORAL at 21:25

## 2019-07-09 ASSESSMENT — ACTIVITIES OF DAILY LIVING (ADL)
LAUNDRY: WITH SUPERVISION
DRESS: INDEPENDENT
ORAL_HYGIENE: INDEPENDENT
HYGIENE/GROOMING: INDEPENDENT

## 2019-07-09 ASSESSMENT — MIFFLIN-ST. JEOR: SCORE: 1413.72

## 2019-07-09 NOTE — DISCHARGE INSTRUCTIONS
Behavioral Discharge Planning and Instructions    Summary: Psychosis    Main Diagnosis: Schizoaffective disorder, bipolar type, multiple episodes in acute phase; ADHD.    Lifestyle Adjustment:      Psychiatry Follow-up:     [facility]  [address] [place]  [phone]  Fax [phone]  Appointment with Giselle Hobbs on Tuesday, July 16 at 11:00 AM                         With PARISH Mckenzie on Tuesday, July 16 at 12:30 PM    [facility]  [address] [place]  [phone]  Fax [phone]  Appointment with Dr. Joann Valladares MD on Tuesday, July 16 at 2:30 PM    Resources:   Crisis Intervention: [phone] or 307-234-9890 (TTY: [phone]).  Call anytime for help.  National [place] on Mental Illness (www.mn.rula.org): 761.346.4550 or 386-605-1586.  Alcoholics Anonymous (www.alcoholics-anonymous.org): Check your phone book for your local chapter.  Suicide Awareness Voices of Education (SAVE) (www.save.org): 640-398-LWSJ (6737)  National Suicide Prevention Line (www.mentalhealthmn.org): 175-442-TAIP (1563)  Mental Health Consumer/Survivor Network of [place] (www.mhcsn.net): 934.597.4218 or 660-262-5106  Mental Health Association of [place] (www.mentalhealth.org): 291.990.5602 or 064-237-9433    General Medication Instructions:   See your medication sheet(s) for instructions.   Take all medicines as directed.  Make no changes unless your doctor suggests them.   Go to all your doctor visits.  Be sure to have all your required lab tests. This way, your medicines can be refilled on time.  Do not use any drugs not prescribed by your doctor.  Avoid alcohol.

## 2019-07-09 NOTE — PROGRESS NOTES
Paged by nursing staff to evaluate patient for expiratory wheezing, low grade temps and productive cough.     Patient seen and examined. Reports productive cough with green sputum the past few days. Denies any shortness of breath. She feels she is having a lot of post-nasal drip with occasional sore throat and mild bilateral sinus congestion. Denies chills. Feels wheezy at times. No chest pain.     Temp is mild with tmax of 100. Remainder of vital signs are stable.   Lungs with scattered expiratory wheeze, good air movement. No crackles. No increased effort. Overall she is well appearing.     Plan:  --CXR  --CBC  --albuterol inhaler  --mucinex  --she will likely discharge tomorrow, recommend PCP follow up in 5-7 days to ensure she is improving   --she would prefer to avoid abx unless there is a strong indication, will follow up after CXR    Nicole Donis PA-C    ADDENDUM: CXR is clear. WBC normal. Hold off on antibiotics for now. Suspect viral URI/early bronchitis.  Supportive cares for now.     Call with questions or concerns.

## 2019-07-09 NOTE — PLAN OF CARE
Pt presents with flat blunt affect and tense mood. Pt attended all groups and participated appropriately. Pt feels as if she has more work to do here and yet is worried about her cat while she is away. Demonstrated good insight into situation and presents herself in a grandiose manner at times. Med compliant, no SI.

## 2019-07-09 NOTE — PLAN OF CARE
Flat affect, mood calm. Thought process is linear and she goal oriented. However, she still will make odd statements at times like having a connection to God when her fingertips are tingling. She stated that she did not feel well. Complaints of cough with phlegm, she had slight temp of 99.2 in am. Hospitalist paged. Chest x-ray done and labs ordered. She will be started on mucinex and should follow-up with primary care provider. Denies SI

## 2019-07-09 NOTE — PROGRESS NOTES
[facility] Psychiatric Progress Note      Interval History:   Pt seen, team meeting held with , nursing staff, OT, and PA's to review diagnosis and treatment plan. Patient reports she is not doing well. She says she has been feverish, hacking and her cough is productive. She reports that she is not delusional but continues to believe that she is being protected from the effects of her abuse by her belief in Jasiel who's music she claims is protective to her. She denies self harm thoughts, plans and intent. She says she feels safe and is hoping to discharge tomorrow. Tolerating medications well without significant side effects.       Review of systems:   The Review of Systems is negative other than noted in the HPI     Medications:       guaiFENesin  600 mg Oral BID     OLANZapine zydis  15 mg Oral At Bedtime     acetaminophen, albuterol, sore throat lozenge, hydrOXYzine, LORazepam **OR** LORazepam, nicotine, OLANZapine zydis **OR** OLANZapine    Mental Status Examination:     Appearance:  awake, alert, adequately groomed and casually dressed  Eye Contact:  better  Speech:  clear, coherent  Psychomotor Behavior:  no evidence of tardive dyskinesia, dystonia, or tics and fidgeting  Mood:  better   Affect:  appropriate and in normal range and intensity is normal  Thought Process:  logical, linear and goal oriented no loose associations  Thought Content:  no evidence of suicidal ideation or homicidal ideation. Internally preoccupied but denies delusional themes or hallucinations.  Oriented to:  time, person, and place  Attention Span and Concentration:  limited  Recent and Remote Memory:  limited  Fund of Knowledge: appropriate  Muscle Strength and Tone: normal  Gait and Station: Normal  Insight:  fair  Judgment:  limited      Labs/Vitals:     No results found for this or any previous visit (from the past 24 hour(s)).  B/P: 111/63, T: 98.3, P: 82, R: 17    Impression:   The patient is a  53-year-old woman with history of bipolar 1 disorder and several prior psychiatric admissions in 2018 for psychosis and cyndi.  She acknowledges that she stopped taking her medications sometime recently.  She would like to restart her Geodon, noting that has been the most effective medication.  She is admitted on a 72-hour hold into the [facility] for psychiatric stabilization.       DIagnoses:     1. Schizoaffective disorder, bipolar type, multiple episodes in acute phase.  2. ADHD.  3. Tobacco use disorder.  4. Allergic rhinitis.  5. Central serous retinopathy.           Plan:   1. Written information given on medications. Side effects, risks, benefits reviewed.  2. Continue hospitalization.      Attestation:  Patient has been seen and evaluated by Nicole short MD    PATIENT ID  Name: Fannie Jeter  MRN:[id number]  YOB: 1965

## 2019-07-10 VITALS
DIASTOLIC BLOOD PRESSURE: 82 MMHG | BODY MASS INDEX: 28.24 KG/M2 | HEART RATE: 95 BPM | SYSTOLIC BLOOD PRESSURE: 119 MMHG | RESPIRATION RATE: 16 BRPM | HEIGHT: 66 IN | WEIGHT: 175.7 LBS | TEMPERATURE: 98.5 F | OXYGEN SATURATION: 94 %

## 2019-07-10 LAB
INTERPRETATION ECG - MUSE: NORMAL
INTERPRETATION ECG - MUSE: NORMAL

## 2019-07-10 PROCEDURE — 25000132 ZZH RX MED GY IP 250 OP 250 PS 637: Mod: GY | Performed by: PHYSICIAN ASSISTANT

## 2019-07-10 PROCEDURE — 90853 GROUP PSYCHOTHERAPY: CPT

## 2019-07-10 PROCEDURE — 25000132 ZZH RX MED GY IP 250 OP 250 PS 637: Mod: GY | Performed by: PSYCHIATRY & NEUROLOGY

## 2019-07-10 RX ORDER — GUAIFENESIN 600 MG/1
600 TABLET, EXTENDED RELEASE ORAL 2 TIMES DAILY
Qty: 60 TABLET | Refills: 0 | Status: ON HOLD | OUTPATIENT
Start: 2019-07-10 | End: 2019-08-06

## 2019-07-10 RX ORDER — ALBUTEROL SULFATE 90 UG/1
2 AEROSOL, METERED RESPIRATORY (INHALATION) EVERY 6 HOURS PRN
Qty: 1 INHALER | Refills: 0 | Status: SHIPPED | OUTPATIENT
Start: 2019-07-10 | End: 2020-03-17

## 2019-07-10 RX ORDER — OLANZAPINE 15 MG/1
15 TABLET ORAL AT BEDTIME
Qty: 30 TABLET | Refills: 0 | Status: ON HOLD | OUTPATIENT
Start: 2019-07-10 | End: 2019-08-06

## 2019-07-10 RX ADMIN — NICOTINE POLACRILEX 2 MG: 2 GUM, CHEWING ORAL at 09:17

## 2019-07-10 RX ADMIN — GUAIFENESIN 10 ML: 200 SOLUTION ORAL at 13:24

## 2019-07-10 RX ADMIN — NICOTINE POLACRILEX 2 MG: 2 GUM, CHEWING ORAL at 07:08

## 2019-07-10 RX ADMIN — GUAIFENESIN 600 MG: 600 TABLET, EXTENDED RELEASE ORAL at 09:03

## 2019-07-10 ASSESSMENT — ACTIVITIES OF DAILY LIVING (ADL)
ORAL_HYGIENE: INDEPENDENT
HYGIENE/GROOMING: INDEPENDENT
DRESS: INDEPENDENT
LAUNDRY: WITH SUPERVISION

## 2019-07-10 NOTE — PLAN OF CARE
Pt presents with tense affect and grandiose mood. Pt is easily irritable and when interacting with staff can be demanding. Participated in group and when sharing will become upset when staff attempt to guide her long winded stories back on track. Med compliant, no SI.

## 2019-07-10 NOTE — PROGRESS NOTES
07/10/19 1500   General Information   Date Initially Attended OT 07/10/19   Clinical Impression   Affect Appropriate to situation   Orientation Oriented to person, place and time   Appearance and ADLs General cleanliness observed in most areas   Attention to Internal Stimuli No observed signs   Interaction Skills Interacts appropriately with staff;Interacts appropriately with peers   Ability to Communicate Needs Demanding   Verbal Content Tangential;Appropriate to topic   Ability to Maintain Boundaries Maintains appropriate physical boundaries   Participation Participates with minimal encouragement   Concentration Concentrates <5 minutes   Ability to Concentrate With structure   Follows and Comprehends Directions Independently follows multi-step directions   Memory Needs further assessment   Organization Independently organizes simple tasks   Decision Making Needs further assessment   Planning and Problem Solving Needs further assessment   Ability to Apply and Learn Concepts Needs further assessment   Frustrations / Stress Tolerance Needs further assessment   Level of Insight Other (see comments)  (limited insight)   Self Esteem Can identify positives   Social Supports Has knowledge of support systems   General Observation/Plan   General Observations/Plan See Comments     INITIAL O.T. ASSESSMENT:      Pt transitioned to OT group with MIN VCs and engaged in therapeutic activity addressing functional cognition and interpersonal skills with task set up and extended time. With MIN encouragement, pt participated in therapeutic group activity and discussion addressing healthy leisure. Pt completed interest checklist and ID'd activities she is currently or was previously interested in (holidays, visiting friends/family). ID'd barriers to participation in these activities(e.g. Fear of driving) and with MIN A problem-solved how to address barriers. Pt declined to create a list of healthy leisure activities she would like  to participate in this summer, stating the information was personal and she would complete it in private. Pt will continue to benefit from OT intervention to address implementation of positive functional coping skills, role performance, and community reintegration.      OT assessment completed by semi-structured interview and direct observation. Per chart, pt admitted 2/2 psychosis. Goals ID'd include to improve concentration and to set and achieve goals. OT staff explained the purpose of pt being involved in current treatment plan.      Plan: Encourage ongoing attendance as able to meet self-stated goals, implement positive coping skills, engage in therapeutic activities, and provide assessment ongoing.

## 2019-07-10 NOTE — PROGRESS NOTES
Cross Cover:    Paged regarding patient request for Robitussin.    --Robitussin ordered PRN.      Bogdan Bullock PA-C

## 2019-07-10 NOTE — PROGRESS NOTES
" 07/09/19 1300   Art Therapy   Type of Intervention structured groups   Response Engaged with encouragement   Hours 1 [place]/ wellness wheel   Treatment Detail Art Therapy   Problem- 1. Schizoaffective disorder, bipolar type, multiple episodes in acute phase.  2. ADHD.  3. Tobacco use disorder.  4. Allergic rhinitis.  5. Central serous retinopathy.     Goal- cope, express, regulate through art therapy directives.     Outcome- pt attended group. She was cooperative and pleasant. She said she had come in contact with the wellness wheel model when she was doing some coaching. She did a simple mandala with symbols. She chose not to share and said it was private. She did use the computer also.  She talked about having an interaction with mother that bothered her, she reported this in Art Therapy and wrap- up group. She described it as telling mother she was having some difficulty with the friend that is taking care of her cat. She was very distressed that mother told her to \" not screw things up, so the friend will not help again.\" She talked about distancing herself from her family that she feels are \" toxic.\" In wrap up group she also reported to have given her contact info to a younger male pt who discharged today. She said she felt like he was like a younger brother.                    "

## 2019-07-10 NOTE — PROGRESS NOTES
Discharge teaching done. Pt denied SI. Pt verbalized understanding of medications and out pt F/U TX plan. No new medication ordered. Belongings returned to pt at discharge. Pt was unable to find a ride home, Pt was given a bus token, Pt was escorted out to the bus stop by [facility] staff member. Pt discharged to home.

## 2019-07-17 ENCOUNTER — TELEPHONE (OUTPATIENT)
Dept: BEHAVIORAL HEALTH | Facility: CLINIC | Age: 54
End: 2019-07-17

## 2019-07-17 ENCOUNTER — HOSPITAL ENCOUNTER (INPATIENT)
Facility: CLINIC | Age: 54
LOS: 21 days | Discharge: HOME | DRG: 885 | End: 2019-08-07
Attending: EMERGENCY MEDICINE | Admitting: PSYCHIATRY & NEUROLOGY
Payer: MEDICARE

## 2019-07-17 DIAGNOSIS — K64.4 EXTERNAL HEMORRHOIDS: ICD-10-CM

## 2019-07-17 DIAGNOSIS — F25.0 SCHIZOAFFECTIVE DISORDER, BIPOLAR TYPE (H): ICD-10-CM

## 2019-07-17 DIAGNOSIS — R21 RASH: ICD-10-CM

## 2019-07-17 DIAGNOSIS — K59.00 CONSTIPATION, UNSPECIFIED CONSTIPATION TYPE: Primary | ICD-10-CM

## 2019-07-17 DIAGNOSIS — M79.10 MYALGIA: ICD-10-CM

## 2019-07-17 DIAGNOSIS — J45.21 MILD INTERMITTENT EXACERBATION OF REACTIVE AIRWAY DISEASE: ICD-10-CM

## 2019-07-17 PROCEDURE — 90791 PSYCH DIAGNOSTIC EVALUATION: CPT

## 2019-07-17 PROCEDURE — 25000132 ZZH RX MED GY IP 250 OP 250 PS 637: Mod: GY | Performed by: PSYCHIATRY & NEUROLOGY

## 2019-07-17 PROCEDURE — 99285 EMERGENCY DEPT VISIT HI MDM: CPT | Mod: 25 | Performed by: EMERGENCY MEDICINE

## 2019-07-17 PROCEDURE — H2032 ACTIVITY THERAPY, PER 15 MIN: HCPCS

## 2019-07-17 PROCEDURE — 99285 EMERGENCY DEPT VISIT HI MDM: CPT | Mod: Z6 | Performed by: EMERGENCY MEDICINE

## 2019-07-17 PROCEDURE — 25000132 ZZH RX MED GY IP 250 OP 250 PS 637: Mod: GY | Performed by: EMERGENCY MEDICINE

## 2019-07-17 PROCEDURE — 12400001 ZZH R&B MH UMMC

## 2019-07-17 RX ORDER — ACETAMINOPHEN 325 MG/1
650 TABLET ORAL EVERY 4 HOURS PRN
Status: DISCONTINUED | OUTPATIENT
Start: 2019-07-17 | End: 2019-08-07 | Stop reason: HOSPADM

## 2019-07-17 RX ORDER — OLANZAPINE 5 MG/1
5 TABLET, ORALLY DISINTEGRATING ORAL ONCE
Status: COMPLETED | OUTPATIENT
Start: 2019-07-17 | End: 2019-07-17

## 2019-07-17 RX ORDER — ALBUTEROL SULFATE 90 UG/1
2 AEROSOL, METERED RESPIRATORY (INHALATION) EVERY 6 HOURS PRN
Status: DISCONTINUED | OUTPATIENT
Start: 2019-07-17 | End: 2019-08-07 | Stop reason: HOSPADM

## 2019-07-17 RX ORDER — OLANZAPINE 10 MG/2ML
10 INJECTION, POWDER, FOR SOLUTION INTRAMUSCULAR
Status: DISCONTINUED | OUTPATIENT
Start: 2019-07-17 | End: 2019-07-18

## 2019-07-17 RX ORDER — ASPIRIN 325 MG
325 TABLET ORAL EVERY 6 HOURS PRN
Status: DISCONTINUED | OUTPATIENT
Start: 2019-07-17 | End: 2019-08-07 | Stop reason: HOSPADM

## 2019-07-17 RX ORDER — OLANZAPINE 15 MG/1
15 TABLET ORAL AT BEDTIME
Status: DISCONTINUED | OUTPATIENT
Start: 2019-07-17 | End: 2019-07-18

## 2019-07-17 RX ORDER — OLANZAPINE 10 MG/1
10 TABLET ORAL
Status: DISCONTINUED | OUTPATIENT
Start: 2019-07-17 | End: 2019-07-18

## 2019-07-17 RX ORDER — HYDROXYZINE HYDROCHLORIDE 25 MG/1
25 TABLET, FILM COATED ORAL EVERY 4 HOURS PRN
Status: DISCONTINUED | OUTPATIENT
Start: 2019-07-17 | End: 2019-07-18

## 2019-07-17 RX ORDER — TRAZODONE HYDROCHLORIDE 50 MG/1
50 TABLET, FILM COATED ORAL
Status: DISCONTINUED | OUTPATIENT
Start: 2019-07-17 | End: 2019-08-07 | Stop reason: HOSPADM

## 2019-07-17 RX ORDER — NICOTINE 21 MG/24HR
1 PATCH, TRANSDERMAL 24 HOURS TRANSDERMAL ONCE
Status: COMPLETED | OUTPATIENT
Start: 2019-07-17 | End: 2019-07-17

## 2019-07-17 RX ORDER — NICOTINE 21 MG/24HR
1 PATCH, TRANSDERMAL 24 HOURS TRANSDERMAL DAILY
Status: DISCONTINUED | OUTPATIENT
Start: 2019-07-17 | End: 2019-08-07 | Stop reason: HOSPADM

## 2019-07-17 RX ORDER — BISACODYL 10 MG
10 SUPPOSITORY, RECTAL RECTAL DAILY PRN
Status: DISCONTINUED | OUTPATIENT
Start: 2019-07-17 | End: 2019-08-07 | Stop reason: HOSPADM

## 2019-07-17 RX ORDER — ALUMINA, MAGNESIA, AND SIMETHICONE 2400; 2400; 240 MG/30ML; MG/30ML; MG/30ML
30 SUSPENSION ORAL EVERY 4 HOURS PRN
Status: DISCONTINUED | OUTPATIENT
Start: 2019-07-17 | End: 2019-08-07 | Stop reason: HOSPADM

## 2019-07-17 RX ORDER — OLANZAPINE 10 MG/1
10 TABLET, ORALLY DISINTEGRATING ORAL ONCE
Status: COMPLETED | OUTPATIENT
Start: 2019-07-17 | End: 2019-07-17

## 2019-07-17 RX ADMIN — NICOTINE 1 PATCH: 21 PATCH, EXTENDED RELEASE TRANSDERMAL at 15:40

## 2019-07-17 RX ADMIN — OLANZAPINE 10 MG: 10 TABLET, ORALLY DISINTEGRATING ORAL at 15:30

## 2019-07-17 RX ADMIN — OLANZAPINE 5 MG: 5 TABLET, ORALLY DISINTEGRATING ORAL at 16:52

## 2019-07-17 RX ADMIN — OLANZAPINE 15 MG: 15 TABLET, FILM COATED ORAL at 21:21

## 2019-07-17 ASSESSMENT — ACTIVITIES OF DAILY LIVING (ADL)
TRANSFERRING: 0-->INDEPENDENT
BATHING: 0-->INDEPENDENT
TOILETING: 0-->INDEPENDENT
DRESS: 0-->INDEPENDENT
ORAL_HYGIENE: INDEPENDENT;PROMPTS
DRESS: INDEPENDENT;PROMPTS
AMBULATION: 0-->INDEPENDENT
COGNITION: 2 - DIFFICULTY WITH ORGANIZING THOUGHTS
WHICH_OF_THE_ABOVE_FUNCTIONAL_RISKS_HAD_A_RECENT_ONSET_OR_CHANGE?: COGNITION;COMMUNICATION/SPEECH
SWALLOWING: 0-->SWALLOWS FOODS/LIQUIDS WITHOUT DIFFICULTY
RETIRED_COMMUNICATION: 2-->DIFFICULTY UNDERSTANDING AND SPEAKING (NOT RELATED TO LANGUAGE BARRIER)
LAUNDRY: UNABLE TO COMPLETE
HYGIENE/GROOMING: INDEPENDENT;PROMPTS
RETIRED_EATING: 0-->INDEPENDENT

## 2019-07-17 ASSESSMENT — ENCOUNTER SYMPTOMS: AGITATION: 1

## 2019-07-17 ASSESSMENT — MIFFLIN-ST. JEOR: SCORE: 1407.82

## 2019-07-17 NOTE — ED NOTES
Constant, very loud yelling in room. Patient not tracking or making sense. Hard to redirect. Yells louder when staff enter room.

## 2019-07-17 NOTE — ED PROVIDER NOTES
"  History     Chief Complaint   Patient presents with     Aggressive Behavior     was seen by FABIAN today d/t being aggressive with neighbors.     HPI  Fannie Jeter is a 53 year old female who who is brought to the ED after being seen by FABIAN.  She tells me that  entered [facility] and lives inside of her.  He has affected her hearing and voice and they basically \"play\" together all day.  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  When he left he entered her.  There is a \"she\" that lives in her.  She was stable but then they pharmacy wouldn't give her the oral dissolvable zyprexa that is the name brand.  She says this is stronger than the generic zyprexa and she needs the stronger medicine to be stable.  She says Jessica won't leave \"her.\"  They are bound together.  She says that Jessica was diagnosed with mental illness at age 27 but she isn't crazy.  Jessica is highly intelligent.  Today she ws sent in by FABIAN because she was being aggressive towards staff and neighbors where she lives.  Transport note says the patient put a bag with syringes, string and plastic bags outside her neighbor's door so she could commit suicide. She says she gave her neighbor a present but it was a cat toy.      I have reviewed the Medications, Allergies, Past Medical and Surgical History, and Social History in the Epic system.    Review of Systems   Psychiatric/Behavioral: Positive for agitation. Negative for suicidal ideas.   All other systems reviewed and are negative.      Physical Exam   BP: 153/78  Pulse: 89  Temp: 98.7  F (37.1  C)  Resp: 16  SpO2: 94 %      Physical Exam   Constitutional: She appears well-nourished.   disheveled   HENT:   Head: Normocephalic and atraumatic.   Right Ear: External ear normal.   Left Ear: External ear normal.   Nose: Nose normal.   Eyes: EOM are normal.   Neck: Normal range of motion. " "  Cardiovascular: Normal rate, regular rhythm and normal heart sounds.   Pulmonary/Chest: Effort normal and breath sounds normal.   Abdominal: Soft.   Musculoskeletal: Normal range of motion.   Neurological: She is alert.   Skin: Skin is warm and dry.   Psychiatric: Her mood appears anxious. Her affect is labile and inappropriate. Her speech is tangential. She is not actively hallucinating. Thought content is delusional. She expresses impulsivity and inappropriate judgment. She is attentive.   Nursing note and vitals reviewed.      ED Course        Procedures           Labs Ordered and Resulted from Time of ED Arrival Up to the Time of Departure from the ED - No data to display         Assessments & Plan (with Medical Decision Making)   The patient has hx of schizoaffective disorder, bipolar type who was sent to the ED by FABIAN.   They found her to be disorganized, unstable, and \"not connected with reality.\"  Here she talks about Jasiel living inside of her.  She calls herself Jessica but says she also has a \"she\" living inside of her that is different to Jessica. On evaluation she is disorganized and thoughts are tangential. She will be admitted to inpatient mental health for stabilization.  She was will to take zyprexa 10 mg po.     The patient is screaming in her room.  She was given more zyprexa( 5 mg po).      I have reviewed the nursing notes.    I have reviewed the findings, diagnosis, plan and need for follow up with the patient.       Medication List      There are no discharge medications for this visit.         Final diagnoses:   Schizoaffective disorder, bipolar type (H)       7/17/2019   [facility], [place], EMERGENCY DEPARTMENT     Margarita Brooks MD  07/17/19 1606       Margarita Brooks MD  07/17/19 1648    "

## 2019-07-17 NOTE — ED TRIAGE NOTES
Per EMS pt states  is in her body and telling her what to do. Pt was seen by FABIAN today d/t being aggressive with her neighbors.     Pt is not sure why she is here. Pt is talking about herself in the 3rd person.

## 2019-07-17 NOTE — ED NOTES
ED to Behavioral Floor Handoff    SITUATION  Fannie Jeter is a 53 year old female who speaks English and lives in a home unknown The patient arrived in the ED by ambulance from clinic with a complaint of Aggressive Behavior (was seen by FABIAN today d/t being aggressive with neighbors.)  .The patient's current symptoms started/worsened 1 day(s) ago and during this time the symptoms have increased.   In the ED, pt was diagnosed with   Final diagnoses:   Schizoaffective disorder, bipolar type (H)        Initial vitals were: BP: 153/78  Pulse: 89  Temp: 98.7  F (37.1  C)  Resp: 16  SpO2: 94 %   --------  Is the patient diabetic? No   If yes, last blood glucose? --     If yes, was this treated in the ED? --  --------  Is the patient inebriated (ETOH) No or Impaired on other substances? No  MSSA done? N/A  Last MSSA score: --    Were withdrawal symptoms treated? N/A  Does the patient have a seizure history? No. If yes, date of most recent seizure--  --------  Is the patient patient experiencing suicidal ideation? denies current or recent suicidal ideation     Homicidal ideation? denies current or recent homicidal ideation or behaviors.    Self-injurious behavior/urges? denies current or recent self injurious behavior or ideation.  ------  Was pt aggressive in the ED Yes  Was a code called No  Is the pt now cooperative? Yes  -------  Meds given in ED:   Medications   OLANZapine zydis (zyPREXA) ODT tab 10 mg (10 mg Oral Given 7/17/19 1530)   nicotine (NICODERM CQ) 21 MG/24HR 24 hr patch 1 patch (1 patch Transdermal Given 7/17/19 1540)   OLANZapine zydis (zyPREXA) ODT tab 5 mg (5 mg Oral Given 7/17/19 1652)      Family present during ED course? No  Family currently present? No    BACKGROUND  Does the patient have a cognitive impairment or developmental disability? Yes  Allergies:   Allergies   Allergen Reactions     Animal Dander      Haldol Difficulty breathing     Haldol [Haloperidol]      Penicillins      Unsure of what  happens. Has been told she has allergies since she was a kid.     Penicillins      Seasonal Allergies    .   Social demographics are   Social History     Socioeconomic History     Marital status:      Spouse name: Not on file     Number of children: Not on file     Years of education: Not on file     Highest education level: Not on file   Occupational History     Not on file   Social Needs     Financial resource strain: Not on file     Food insecurity:     Worry: Not on file     Inability: Not on file     Transportation needs:     Medical: Not on file     Non-medical: Not on file   Tobacco Use     Smoking status: Former Smoker     Types: Cigarettes     Last attempt to quit: 2009     Years since quittin.9     Smokeless tobacco: Current User   Substance and Sexual Activity     Alcohol use: No     Drug use: No     Sexual activity: Not Currently     Partners: Male   Lifestyle     Physical activity:     Days per week: Not on file     Minutes per session: Not on file     Stress: Not on file   Relationships     Social connections:     Talks on phone: Not on file     Gets together: Not on file     Attends [employer] service: Not on file     Active member of club or organization: Not on file     Attends meetings of clubs or organizations: Not on file     Relationship status: Not on file     Intimate partner violence:     Fear of current or ex partner: Not on file     Emotionally abused: Not on file     Physically abused: Not on file     Forced sexual activity: Not on file   Other Topics Concern     Parent/sibling w/ CABG, MI or angioplasty before 65F 55M? Not Asked   Social History Narrative     Not on file        ASSESSMENT  Labs results Labs Ordered and Resulted from Time of ED Arrival Up to the Time of Departure from the ED - No data to display   Imaging Studies: No results found for this or any previous visit (from the past 24 hour(s)).   Most recent vital signs /78   Pulse 89   Temp 98.7  F (37.1   C) (Oral)   Resp 16   LMP 07/03/2014   SpO2 94%    Abnormal labs/tests/findings requiring intervention:---   Pain control: pt had none  Nausea control: pt had none    RECOMMENDATION  Are any infection precautions needed (MRSA, VRE, etc.)? No If yes, what infection? --  ---  Does the patient have mobility issues? independently. If yes, what device does the pt use? ---  ---  Is patient on 72 hour hold or commitment? Yes If on 72 hour hold, have hold and rights been given to patient? Yes  Are admitting orders written if after 10 p.m. ?N/A  Tasks needing to be completed:---     Eboni May     [phone] [facility]

## 2019-07-17 NOTE — ED NOTES
"Pt states that she has a female part and a male part and they live in the same space. She also states that they love each other so much that they are going to get . The pt states that \"she handles aging well\" when talking about herself and getting older. The pt is rambling about why she came here  "

## 2019-07-17 NOTE — ED NOTES
Patient yelling loudly, hard to redirect. Yelling so loudly during assessment that the  had to leave room. RN notified.

## 2019-07-17 NOTE — ED NOTES
"Pt states, \"It feels different here, she feels like this is for elites and she is usually in the lower place.\" Appears to be referring to someone other than herself. BEC process explained, pt states, \"She feels very nervous about this.\" Reassured pt that she is safe and to let staff know if she needs anything.  "

## 2019-07-17 NOTE — ED NOTES
Bed: HW02  Expected date: 7/17/19  Expected time: 1:05 PM  Means of arrival: Ambulance  Comments:  Nadia medic 2 54yo female, mental health eval, cooperative

## 2019-07-17 NOTE — TELEPHONE ENCOUNTER
S- 54 yo F in [place] ER for evaluation of disorganized manic behavior.     B- Fannie was bib EMS after COPE visited home when neighbors called concerned.  Pt has a hx of schizoaffective disorder- recently hospitalized at [facility] in the beginning of July.  Pt was displaying disorganized behavior at apartment building- singing, dancing, placing bizarre items in from of neighbors doors such as syringes, strings, and plastic baggies. Pt denies doing this.    In the ER pt is very disorganized, impaired in judgement, delusional- believes  has entered her crown chakra and lives inside of her body.     It was noted that pt began yelling loudly during assessment- difficult to redirect so  left room. NEEDS PRIVATE.    Utox ordered.     A- emergency hold    R- Paged Werner at 5:17pm    Accepted for /Vaishnavi/Private room. Unit and ER informed at 5:30pm.

## 2019-07-18 LAB
ALBUMIN SERPL-MCNC: 3.4 G/DL (ref 3.4–5)
ALP SERPL-CCNC: 48 U/L (ref 40–150)
ALT SERPL W P-5'-P-CCNC: 26 U/L (ref 0–50)
ANION GAP SERPL CALCULATED.3IONS-SCNC: 6 MMOL/L (ref 3–14)
AST SERPL W P-5'-P-CCNC: 27 U/L (ref 0–45)
BASOPHILS # BLD AUTO: 0 10E9/L (ref 0–0.2)
BASOPHILS NFR BLD AUTO: 0.2 %
BILIRUB SERPL-MCNC: 0.4 MG/DL (ref 0.2–1.3)
BUN SERPL-MCNC: 6 MG/DL (ref 7–30)
CALCIUM SERPL-MCNC: 8.4 MG/DL (ref 8.5–10.1)
CHLORIDE SERPL-SCNC: 108 MMOL/L (ref 94–109)
CHOLEST SERPL-MCNC: 161 MG/DL
CO2 SERPL-SCNC: 28 MMOL/L (ref 20–32)
CREAT SERPL-MCNC: 0.41 MG/DL (ref 0.52–1.04)
DIFFERENTIAL METHOD BLD: ABNORMAL
EOSINOPHIL # BLD AUTO: 0.3 10E9/L (ref 0–0.7)
EOSINOPHIL NFR BLD AUTO: 4.6 %
ERYTHROCYTE [DISTWIDTH] IN BLOOD BY AUTOMATED COUNT: 13.8 % (ref 10–15)
GFR SERPL CREATININE-BSD FRML MDRD: >90 ML/MIN/{1.73_M2}
GLUCOSE SERPL-MCNC: 97 MG/DL (ref 70–99)
HCT VFR BLD AUTO: 35.5 % (ref 35–47)
HDLC SERPL-MCNC: 55 MG/DL
HGB BLD-MCNC: 11.5 G/DL (ref 11.7–15.7)
IMM GRANULOCYTES # BLD: 0 10E9/L (ref 0–0.4)
IMM GRANULOCYTES NFR BLD: 0.2 %
LDLC SERPL CALC-MCNC: 88 MG/DL
LYMPHOCYTES # BLD AUTO: 1.3 10E9/L (ref 0.8–5.3)
LYMPHOCYTES NFR BLD AUTO: 22.5 %
MCH RBC QN AUTO: 29 PG (ref 26.5–33)
MCHC RBC AUTO-ENTMCNC: 32.4 G/DL (ref 31.5–36.5)
MCV RBC AUTO: 89 FL (ref 78–100)
MONOCYTES # BLD AUTO: 0.6 10E9/L (ref 0–1.3)
MONOCYTES NFR BLD AUTO: 10.4 %
NEUTROPHILS # BLD AUTO: 3.5 10E9/L (ref 1.6–8.3)
NEUTROPHILS NFR BLD AUTO: 62.1 %
NONHDLC SERPL-MCNC: 106 MG/DL
NRBC # BLD AUTO: 0 10*3/UL
NRBC BLD AUTO-RTO: 0 /100
PLATELET # BLD AUTO: 342 10E9/L (ref 150–450)
POTASSIUM SERPL-SCNC: 3.2 MMOL/L (ref 3.4–5.3)
PROT SERPL-MCNC: 6 G/DL (ref 6.8–8.8)
RBC # BLD AUTO: 3.97 10E12/L (ref 3.8–5.2)
SODIUM SERPL-SCNC: 142 MMOL/L (ref 133–144)
TRIGL SERPL-MCNC: 88 MG/DL
WBC # BLD AUTO: 5.6 10E9/L (ref 4–11)

## 2019-07-18 PROCEDURE — 12400001 ZZH R&B MH UMMC

## 2019-07-18 PROCEDURE — 85025 COMPLETE CBC W/AUTO DIFF WBC: CPT | Performed by: NURSE PRACTITIONER

## 2019-07-18 PROCEDURE — 25000132 ZZH RX MED GY IP 250 OP 250 PS 637: Mod: GY | Performed by: NURSE PRACTITIONER

## 2019-07-18 PROCEDURE — 99223 1ST HOSP IP/OBS HIGH 75: CPT | Mod: AI | Performed by: NURSE PRACTITIONER

## 2019-07-18 PROCEDURE — A9270 NON-COVERED ITEM OR SERVICE: HCPCS | Mod: GY | Performed by: PSYCHIATRY & NEUROLOGY

## 2019-07-18 PROCEDURE — 36415 COLL VENOUS BLD VENIPUNCTURE: CPT | Performed by: NURSE PRACTITIONER

## 2019-07-18 PROCEDURE — 25000132 ZZH RX MED GY IP 250 OP 250 PS 637: Mod: GY | Performed by: PSYCHIATRY & NEUROLOGY

## 2019-07-18 PROCEDURE — 80061 LIPID PANEL: CPT | Performed by: NURSE PRACTITIONER

## 2019-07-18 PROCEDURE — 80053 COMPREHEN METABOLIC PANEL: CPT | Performed by: NURSE PRACTITIONER

## 2019-07-18 RX ORDER — HYDROXYZINE HYDROCHLORIDE 25 MG/1
25-50 TABLET, FILM COATED ORAL EVERY 4 HOURS PRN
Status: DISCONTINUED | OUTPATIENT
Start: 2019-07-18 | End: 2019-08-07 | Stop reason: HOSPADM

## 2019-07-18 RX ORDER — LORAZEPAM 2 MG/ML
1-2 INJECTION INTRAMUSCULAR EVERY 4 HOURS PRN
Status: DISCONTINUED | OUTPATIENT
Start: 2019-07-18 | End: 2019-08-07 | Stop reason: HOSPADM

## 2019-07-18 RX ORDER — OLANZAPINE 15 MG/1
15 TABLET, ORALLY DISINTEGRATING ORAL 2 TIMES DAILY
Status: DISCONTINUED | OUTPATIENT
Start: 2019-07-18 | End: 2019-07-30

## 2019-07-18 RX ORDER — LORAZEPAM 1 MG/1
1-2 TABLET ORAL EVERY 4 HOURS PRN
Status: DISCONTINUED | OUTPATIENT
Start: 2019-07-18 | End: 2019-08-07 | Stop reason: HOSPADM

## 2019-07-18 RX ORDER — OLANZAPINE 10 MG/1
10 TABLET, ORALLY DISINTEGRATING ORAL 2 TIMES DAILY PRN
Status: DISCONTINUED | OUTPATIENT
Start: 2019-07-18 | End: 2019-08-07 | Stop reason: HOSPADM

## 2019-07-18 RX ORDER — BENZOCAINE/MENTHOL 6 MG-10 MG
LOZENGE MUCOUS MEMBRANE 2 TIMES DAILY
Status: DISCONTINUED | OUTPATIENT
Start: 2019-07-18 | End: 2019-08-07 | Stop reason: HOSPADM

## 2019-07-18 RX ORDER — GUAIFENESIN 600 MG/1
600 TABLET, EXTENDED RELEASE ORAL 2 TIMES DAILY
Status: DISCONTINUED | OUTPATIENT
Start: 2019-07-18 | End: 2019-08-06

## 2019-07-18 RX ORDER — OLANZAPINE 10 MG/2ML
10 INJECTION, POWDER, FOR SOLUTION INTRAMUSCULAR 2 TIMES DAILY PRN
Status: DISCONTINUED | OUTPATIENT
Start: 2019-07-18 | End: 2019-08-07 | Stop reason: HOSPADM

## 2019-07-18 RX ADMIN — LORAZEPAM 2 MG: 1 TABLET ORAL at 16:20

## 2019-07-18 RX ADMIN — NICOTINE 1 PATCH: 21 PATCH, EXTENDED RELEASE TRANSDERMAL at 04:52

## 2019-07-18 RX ADMIN — ALBUTEROL SULFATE 2 PUFF: 90 AEROSOL, METERED RESPIRATORY (INHALATION) at 00:10

## 2019-07-18 RX ADMIN — TRAZODONE HYDROCHLORIDE 50 MG: 50 TABLET ORAL at 00:14

## 2019-07-18 RX ADMIN — HYDROXYZINE HYDROCHLORIDE 25 MG: 25 TABLET ORAL at 00:14

## 2019-07-18 RX ADMIN — GUAIFENESIN 600 MG: 600 TABLET, EXTENDED RELEASE ORAL at 11:34

## 2019-07-18 RX ADMIN — HYDROCORTISONE: 1 CREAM TOPICAL at 08:43

## 2019-07-18 RX ADMIN — OLANZAPINE 10 MG: 10 TABLET, FILM COATED ORAL at 04:38

## 2019-07-18 RX ADMIN — OLANZAPINE 15 MG: 15 TABLET, ORALLY DISINTEGRATING ORAL at 08:27

## 2019-07-18 RX ADMIN — ALBUTEROL SULFATE 2 PUFF: 90 AEROSOL, METERED RESPIRATORY (INHALATION) at 11:15

## 2019-07-18 RX ADMIN — GUAIFENESIN 600 MG: 600 TABLET, EXTENDED RELEASE ORAL at 21:22

## 2019-07-18 RX ADMIN — OLANZAPINE 15 MG: 15 TABLET, ORALLY DISINTEGRATING ORAL at 21:22

## 2019-07-18 RX ADMIN — ALBUTEROL SULFATE 2 PUFF: 90 AEROSOL, METERED RESPIRATORY (INHALATION) at 05:17

## 2019-07-18 RX ADMIN — ALBUTEROL SULFATE 2 PUFF: 90 AEROSOL, METERED RESPIRATORY (INHALATION) at 15:07

## 2019-07-18 ASSESSMENT — ACTIVITIES OF DAILY LIVING (ADL)
ORAL_HYGIENE: INDEPENDENT
ORAL_HYGIENE: INDEPENDENT
LAUNDRY: WITH SUPERVISION
DRESS: SCRUBS (BEHAVIORAL HEALTH);INDEPENDENT
HYGIENE/GROOMING: INDEPENDENT
HYGIENE/GROOMING: HANDWASHING;SHOWER;INDEPENDENT
DRESS: INDEPENDENT

## 2019-07-18 ASSESSMENT — MIFFLIN-ST. JEOR: SCORE: 1407.37

## 2019-07-18 NOTE — PROGRESS NOTES
" 07/17/19 1300   Art Therapy   Type of Intervention structured groups   Response engaged   Hours 1   Treatment Detail Art Therapy- cbt goals/ stepping stones   Problem-psychotic symptoms including delusions, hallucinations, agitation, disorganized thoughts and speech, yelling,  Impulsive, and confused.         Goal- cope, express, regulate through Art Therapy directives     Outcome- pt made it though group with redirections. She is very tangential and disruptive. She has poor conversational  boundaries with other pts. She remembered writer from [facility] a couple of weeks ago where writer also does some Art Therapy groups. She offered one pt her coloring book for his daughter. She told another pt with Disability that she knows him from \"linked in\". She talks abut herself in thrid person. She talked about abandoning her daughter because of her mental health issues and feeling guilty about that. She said something like \" my daughter Carmen has black eyes. She slept between her fathers legs because that is the only place she felt safe.\" Writer also observed her rummaging through all the food in the lounge talking to herself saying \" Fannie don't take all the food you need to leave things for other pts. Very disorganized watch boundaries in groups and mileu              "

## 2019-07-18 NOTE — PROGRESS NOTES
This  treatment team discussed that pt may not be appropriate for attending groups at this time due to being disruptive and argumentative.  Pt was let into group by a staff person later in group, so she was allowed to stay to assess appropriateness.  At first, pt was quiet and thanked therapist for having her, but as group went on, pt became more disruptive, teary, argumentative, yelled out some delusional comments and odd posturing.  Pt reported that Jenelle being gone is her main stressor, but also believes jenelle and his spirit are now in her body.  Pt demonstrated poor verbal and physical boundaries at times, and becomes angry when she is redirected.  Pt's mood is very labile and at this time, pt does not appear appropriate for groups. This will be discussed with treatment team.

## 2019-07-18 NOTE — PROGRESS NOTES
Call from Anna in pre-petition screening. States that she saw pt 18 months ago and she was committed at that time; commitment has . She will be here tomorrow morning

## 2019-07-18 NOTE — PROGRESS NOTES
Pt stated she wanted something for sleep and anxiety.  Trazodone 50 mg po and hydroxyzine 25 mg po administered @0010.  Pt slept for a couple of hours and started yelling. Pt appeared agitated.  She would only take medication from male staff .Zyprexa 10 mg po administered.

## 2019-07-18 NOTE — PROGRESS NOTES
07/17/19 1859   Patient Belongings   Did you bring any home meds/supplements to the hospital?  No   Patient Belongings locker;sent to security per site process;remains with patient   Patient Belongings Remaining with Patient glasses   Patient Belongings Put in Hospital Secure Location (Security or Locker, etc.) cell phone/electronics;other (see comments);shoes;jewelry   Belongings Search Yes   Clothing Search Yes   Second Staff Malissa PAT Station [facility] Nurse   Belongings in patient locker:  Shoes  Jacket  Cell phone  Necklace   Cigarette lighter  Glasses    Belongings sent to security:   2 vaporizers       A               Admission:  I am responsible for any personal items that are not sent to the safe or pharmacy.  [facility] is not responsible for loss, theft or damage of any property in my possession.    Signature:  _________________________________ Date: _______  Time: _____                                              Staff Signature:  ____________________________ Date: ________  Time: _____      2nd Staff person, if patient is unable/unwilling to sign:    Signature: ________________________________ Date: ________  Time: _____     Discharge:  [facility] has returned all of my personal belongings:    Signature: _________________________________ Date: ________  Time: _____                                          Staff Signature:  ____________________________ Date: ________  Time: _____
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
## 2019-07-18 NOTE — PLAN OF CARE
Admission:     Admitted to [facility] on a 72 hour hold for psychotic symptoms including delusions, hallucinations, agitation, disorganized thoughts and speech, yelling,  Impulsive, and confused. Was brought to ER after neighbors called with concerns of odd behaviors. (Placing a syringe, string, and plastic baggies out side their door). Appears pt was at [facility] from 7/3/19-7/10/19. (See chart for further details and hx)    Presents as agitated upon admission. Confused, incoherently yelling loudly, growling in a deep tone intermittently. Pt is having a lot of difficulty with check in and contraband check. She is able to calm and take simple direction after a short time, but remains unable to participate in any meaningful interactions. She begins to talk about Jasiel, the late musician, and apparently has ongoing delusions regarding him. This was a theme last admission.     Is oriented to room, encouraged to sleep. She does lay down. Pt is unable to engage in any meaningful and productive interactions at this time. Unable to complete admission due to acute psychosis and cognitive impairment. See flowsheet for details.

## 2019-07-18 NOTE — PROGRESS NOTES
Patient observed in the milieu often intruding on other patients.  Patient was able to attend OT group, and for th most part managed to stay in control.  Patient periodically would exhibit loud outbursts, along with crying.  Quite labile in mood and delusional in thought.  Patient, would mumble or utter something by another peer, which often would elicit a negative response; including racial slurs.  Able to shower and groom self today, and has remained in marginal control.

## 2019-07-18 NOTE — PLAN OF CARE
Personal Plan of Care    Reasons you are in the Hospital  People I thought were my friends called  2.  3.  4.    Goals for Discharge   My cat needs me  2.  3.
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
## 2019-07-18 NOTE — PLAN OF CARE
BEHAVIORAL TEAM DISCUSSION    Participants: Provider: Zabrina Riggins NP    CTC: Supriya Altman MS,LP   Nurse: Ruchi Mcdonough RN   Progress: Pt was admitted on a 72 hour hold due to agitation and psychosis. Pt is quite disorganized. A petition for commitment and a Bishop has been made.   Continued Stay Criteria/Rationale: pt to remain inpatient for stabilization. A petition has been made.   Medical/Physical: none noted  Precautions:   Behavioral Orders   Procedures     Code 1 - Restrict to Unit     Petition for commitment     MI with D[facility]     Routine Programming     As clinically indicated     Single Room     Acute psychosis     Status 15     Every 15 minutes.     Plan: The patient will continue to meet w/ the treatment team for assessment and treatment planning, CTC will continue to work w/ for discharge planning.    Rationale for change in precautions or plan: pt to remain inpatient for commitment and Bishop

## 2019-07-18 NOTE — PROGRESS NOTES
"Pt coughed a lot during the night.  She used her prn inhailer x2. She has a rash on both knees.  Pt made numerous requests all shift.  She ate several snacks was in and out of her room most of the shift.  She was disorganized, agitated, and manic.  She was following male staff around.  She told the male staff \"I love you\".    "

## 2019-07-18 NOTE — PROGRESS NOTES
Case Management Note    Writer met with patient to complete initial psychosocial assessment and Personal Plan of Care.  Patient is sad about her cat.  She reports wanting to leave to make sure it is OK.  Patient signed JANINE's for her:     at [facility] in [place] (Oliva Olvera-[phone])  PCP: Corazon Siegel-Nadia [facility] [phone]  Dr Mena at [place]: [phone]

## 2019-07-19 PROCEDURE — G0177 OPPS/PHP; TRAIN & EDUC SERV: HCPCS

## 2019-07-19 PROCEDURE — 12400001 ZZH R&B MH UMMC

## 2019-07-19 PROCEDURE — 25000132 ZZH RX MED GY IP 250 OP 250 PS 637: Mod: GY | Performed by: NURSE PRACTITIONER

## 2019-07-19 PROCEDURE — 25000132 ZZH RX MED GY IP 250 OP 250 PS 637: Mod: GY | Performed by: PSYCHIATRY & NEUROLOGY

## 2019-07-19 PROCEDURE — H2032 ACTIVITY THERAPY, PER 15 MIN: HCPCS

## 2019-07-19 PROCEDURE — 99232 SBSQ HOSP IP/OBS MODERATE 35: CPT | Performed by: NURSE PRACTITIONER

## 2019-07-19 RX ORDER — IODINE/SODIUM IODIDE 2 %
TINCTURE TOPICAL 4 TIMES DAILY PRN
Status: DISCONTINUED | OUTPATIENT
Start: 2019-07-19 | End: 2019-08-07 | Stop reason: HOSPADM

## 2019-07-19 RX ADMIN — ALBUTEROL SULFATE 2 PUFF: 90 AEROSOL, METERED RESPIRATORY (INHALATION) at 03:08

## 2019-07-19 RX ADMIN — OLANZAPINE 15 MG: 15 TABLET, ORALLY DISINTEGRATING ORAL at 21:01

## 2019-07-19 RX ADMIN — HYDROXYZINE HYDROCHLORIDE 50 MG: 25 TABLET ORAL at 10:33

## 2019-07-19 RX ADMIN — ACETAMINOPHEN 650 MG: 325 TABLET, FILM COATED ORAL at 03:03

## 2019-07-19 RX ADMIN — HYDROXYZINE HYDROCHLORIDE 50 MG: 25 TABLET ORAL at 15:59

## 2019-07-19 RX ADMIN — GUAIFENESIN 600 MG: 600 TABLET, EXTENDED RELEASE ORAL at 07:59

## 2019-07-19 RX ADMIN — GUAIFENESIN 600 MG: 600 TABLET, EXTENDED RELEASE ORAL at 21:00

## 2019-07-19 RX ADMIN — LORAZEPAM 2 MG: 1 TABLET ORAL at 10:33

## 2019-07-19 RX ADMIN — NICOTINE 1 PATCH: 21 PATCH, EXTENDED RELEASE TRANSDERMAL at 08:00

## 2019-07-19 RX ADMIN — ALBUTEROL SULFATE 2 PUFF: 90 AEROSOL, METERED RESPIRATORY (INHALATION) at 18:40

## 2019-07-19 RX ADMIN — OLANZAPINE 15 MG: 15 TABLET, ORALLY DISINTEGRATING ORAL at 08:00

## 2019-07-19 RX ADMIN — HYDROCORTISONE: 1 CREAM TOPICAL at 21:01

## 2019-07-19 ASSESSMENT — ACTIVITIES OF DAILY LIVING (ADL)
HYGIENE/GROOMING: INDEPENDENT
DRESS: INDEPENDENT
LAUNDRY: WITH SUPERVISION
DRESS: SCRUBS (BEHAVIORAL HEALTH);INDEPENDENT
ORAL_HYGIENE: INDEPENDENT
HYGIENE/GROOMING: HANDWASHING;INDEPENDENT
ORAL_HYGIENE: INDEPENDENT

## 2019-07-19 NOTE — PROGRESS NOTES
"[facility]   Psychiatric Progress Note      Impression:     Fannie Jeter is a 53-year-old female admitted to [facility] on 7/17/2019.  She was admitted on a 72-hour hold through the ER due to agitation and psychosis.  She was seen by FABIAN.  EMS notes indicate she placed a bag with syringes, string, and plastic bags outside her neighbor's door.  EMS notes indicate she was singing, dancing, and causing a disturbance in her apartment.  She informed ER staff that  had entered her [facility] upon his death and was living inside her, playing with her all day.  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  She also said there is a different woman also named Fannie living inside her.  In the ER she was noted to be disorganized and tangential.  She had a sandwich and appeared to be attempting to feed something beside her, though nothing was present.  She was agitated and screaming.   She was hospitalized at [facility] 7/3 through 7/10 with similar symptoms and prescribed Zyprexa Zydis.  She reports taking medications as prescribed following discharge and said that upon discharge Zydis was changed to Zyprexa tablet due to insurance issues, and she reports that the tablet formulation is less effective.  Thus far on the unit she has been yelling and very disorganized.  During today's assessment she remarked, \" is my best friend and he tells me what to do and I obey.\"  She says there are many people living inside her including \"Rola, Álvaro, Nayely, my family, his family, everyone going back in time and space.\"  They sometimes tell her what to do.  She began yelling that I was typing too fast which was causing her to feel agitated.  She followed up with the comment, \"I have Asperger's and I'm a genius!\"  She said, \"Tai (outpatient psychiatrist) is looking at you through my eyes.  Show me your eyes, babe.\"  When asked about her " "appetite, she said, \"I don't want to eat because there's too many signs, baby parts and blood sausage and Chago doesn't want me to eat kinney.\"   She requested discharge or transfer to [facility].  She says she will only take Zyprexa Zydis and no other medications whatsoever.  She doesn't want to take \"that generic shit they give regular sheeple.\"  The patient's yelling and agitation precluded the possibility of further assessment.  Since admission, the patient has only consented to Zyprexa Zydis, which was subsequently scheduled.  PRNs of Zyprexa Zydis, Ativan, Vistaril and Trazodone were initiated.  A petition for commitment MI with Dario was filed in [facility].  She has been intermittently agitated and yelling, often making bizarre and delusional statements.           Diagnoses:     Schizoaffective disorder, bipolar type  Seasonal allergic rhinitis         Plan:     Medications:  She will only consent to Zyprexa Zydis.  Continue Zyprexa Zydis 15 mg BID.  She said she is willing to consider Invega Sustenna, however will not take oral Invega, and Invega Sustenna is not on the hospital formulary.  PRNs of Zyprexa Zydis, Ativan, Vistaril and Trazodone are available.   Add Calamine lotion at her request    Petition for commitment MI with Bishop in [facility]     She resides in an apartment.  Disposition to be determined pending outcome of court.         Clinical Global Impressions  First:  Considering your total clinical experience with this particular patient population, how severe are the patient's symptoms at this time?: 7 (07/18/19 0652)  Compared to the patient's condition at the START of treatment, this patient's condition is:: 4 (07/18/19 0652)  Most recent:  Considering your total clinical experience with this particular patient population, how severe are the patient's symptoms at this time?: 7 (07/18/19 0652)  Compared to the patient's condition at the START of treatment, this patient's condition is:: 4 " "(07/18/19 9098)     Attestation:  Patient has been seen and evaluated by me, SAMUEL Mejias CNP  The patient was counseled on nature of illness and treatment plan/options  Care was coordinated with treatment team             Interim History:     The patient's care was discussed with the treatment team and chart notes were reviewed.  Pt was documented as sleeping 4 hours during the overnight shift.  She was intermittently agitated and yelling yesterday.  She said that she thought another patient was \"being racial\" and remarked, \"In my head I saw the N word and she heard it.\"  She went on to say that \"Julia means black plow man and that's been corrupted and turned into [id number].\"  Pt said she no longer believes  is living inside her.  She then leaned in close and quietly said that \"Satan, Lucifer\" is inside her and telling her what to do.  She handed me a note with her hospital \"check in time\" and date along with the date and time of her 72-hour hold expiration.  Below it in different handwriting was \" Lucy.\"  She said, \"Lucifer told me to give you this note.  You need to sign it.  I need 2 witnesses.\"  Pt said that Lucifer is \"very dignified, just like he is on the TV show Lucifer.  It hurts his feelings that he had a coming out party and nobody believed him.\"  Pt said that she misses the jacket in her locker (which she reports wearing in spite of outdoor temperatures in the high 80's) because it made her feel \"like 's arms are wrapped around me.\"  Pt characterizes her mood as irritable.  She denies suicidal and homicidal ideation.  States she will only take Zydis or Invega Sustenna.  Informed pt of the plan for a petition for commitment.   Pt expressed concerns about her cat Mignon.  I contacted her apartment  May and left a message requesting a call back to her CTC's number to ensure the cat is cared for.  Pt requested calamine lotion for her legs.   " "       Medications:     Current Facility-Administered Medications   Medication     acetaminophen (TYLENOL) tablet 650 mg     albuterol (PROAIR HFA/PROVENTIL HFA/VENTOLIN HFA) 108 (90 Base) MCG/ACT inhaler 2 puff     alum & mag hydroxide-simethicone (MYLANTA ES/MAALOX  ES) suspension 30 mL     aspirin (ASA) tablet 325 mg     bisacodyl (DULCOLAX) Suppository 10 mg     calamine 8-8 % lotion     guaiFENesin (MUCINEX) 12 hr tablet 600 mg     hydrocortisone (CORTAID) 1 % cream     hydrOXYzine (ATARAX) tablet 25-50 mg     LORazepam (ATIVAN) tablet 1-2 mg    Or     LORazepam (ATIVAN) injection 1-2 mg     magnesium hydroxide (MILK OF MAGNESIA) suspension 30 mL     nicotine (NICODERM CQ) 21 MG/24HR 24 hr patch 1 patch     nicotine Patch in Place     nicotine patch REMOVAL     OLANZapine zydis (zyPREXA) ODT tab 10 mg    Or     OLANZapine (zyPREXA) injection 10 mg     OLANZapine zydis (zyPREXA) ODT tab 15 mg     traZODone (DESYREL) tablet 50 mg             Allergies:     Allergies   Allergen Reactions     Animal Dander      Haldol Difficulty breathing     Haldol [Haloperidol]      Penicillins      Unsure of what happens. Has been told she has allergies since she was a kid.     Penicillins      Seasonal Allergies             Psychiatric Examination:   /85   Pulse 93   Temp 100.3  F (37.9  C) (Oral)   Resp 16   Ht 1.676 m (5' 6\")   Wt 79.1 kg (174 lb 4.8 oz)   LMP 07/03/2014   SpO2 96%   BMI 28.13 kg/m    Weight is 174 lbs 4.8 oz  Body mass index is 28.13 kg/m .    Appearance:  awake, alert and adequately groomed  Attitude:  mostly cooperative  Eye Contact:  intense at times  Mood:  \"irritable\"  Affect:  intensity is heightened but calmer than yesterday  Speech:  normal volume, slightly pressured  Psychomotor Behavior:  no evidence of tardive dyskinesia, dystonia, or tics, psychomotor agitation present  Thought Process:  tangential, disorganized  Associations:  loosening of associations present  Thought Content:  " denies suicidal and homicidal ideation, grandiosity, delusional thought content and auditory hallucinations are present  Insight:  poor  Judgment:  poor  Oriented to:  person, place, time, date  Attention Span and Concentration:  poor  Recent and Remote Memory:  poor  Language:  intact  Fund of Knowledge:  appropriate  Muscle Strength and Tone:  normal  Gait and Station:  normal          Labs:     No results found for this or any previous visit (from the past 24 hour(s)).

## 2019-07-19 NOTE — PROGRESS NOTES
VM from [facility] Hernandes in pre-petition, [phone]. Advised they are supporting the petition. Court hold likely to occur on Monday.

## 2019-07-19 NOTE — PROGRESS NOTES
"Initiated  participation in OT groups. Was calm and followed 2 step verbal directions. Pt was given and completed a written self assessment. OT purpose was explained with a value of having involvement in tx plan, and provided options to meet self identified goals. Will assess further in the areas of organization, problem solving, and concentration. Noted she was admitted due to : \"grief, mental breakdown, new friends-help me save [employer] friends\". Noted she had experienced all feelings, thoughts and behaviors listed on form. Identified as having numerous coping skills but, did not list any. Selected to address goals of: look at self destructive behaviors, manage anger and med management. Identified supports as: friends, family, therapist and doctors\". Needed redirection x 2 to finish Self Assessment Form. Became teary x 1 when she realized all the sx's etc she was experiencing PTA. Responsive to compassion and focus on developing skills to manage sx's. Some impulsivity noted with task selection and planning/organization. Will continue to assess and provide structured reality based groups to increase insight and independence.    "

## 2019-07-19 NOTE — PROGRESS NOTES
Pt requested prn Tylenol 650 mg and Albuterol inhaler. Administeredd at 0300. Pt observed coughing throughout the night. Will continue to monitor.

## 2019-07-19 NOTE — PROGRESS NOTES
CTC received message from May Schuler at [facility] ([phone]) who indicates that patient's junie Crow is being well cared for by two other [facility] residents/friends Marsha and Osiris.

## 2019-07-19 NOTE — PROGRESS NOTES
"Pt made bizarre statements, dreams of her cats being eaten by demons or monsters, then began stating it as if it were truly happening. No SI, SIB stated/observed. Pt states lucifer is her ailyn. Pt mainly stayed in bed after beginning the shift tearful. Pt spoke a lot about a friend \"evelio\" and it is not clear if this person is real. Pt spoke a lot about fellow pt. Pt would sit at end of viera as well, was redirectable. Did not eat dinner.     07/18/19 2630   Behavioral Health   Hallucinations appears responding   Thinking poor concentration;delusional;paranoid;confused   Orientation person: oriented   Memory baseline memory   Insight poor   Judgement impaired   Eye Contact at examiner   Affect blunted, flat;irritable   Mood labile   Physical Appearance/Attire disheveled   Hygiene neglected grooming - unclean body, hair, teeth   Suicidality other (see comments)  (none stated/observd)   1. Wish to be Dead No   2. Non-Specific Active Suicidal Thoughts  No   Self Injury other (see comment)  (none stated/observed)   Elopement   (N/A)   Activity hyperactive (agitated, impulsive)   Speech clear   Medication Sensitivity no stated side effects;no observed side effects   Psychomotor / Gait balanced;steady   Psycho Education   Type of Intervention 1:1 intervention   Response participates with encouragement   Hours 0.5   Treatment Detail check in   Activities of Daily Living   Hygiene/Grooming independent   Oral Hygiene independent   Dress independent   Laundry with supervision   Room Organization independent     "

## 2019-07-19 NOTE — DISCHARGE SUMMARY
[facility]    Discharge Summary  Adult Psychiatry    Date of Admission:  7/3/2019  Date of Discharge:  7/10/2019  1:48 PM  Discharging Provider: Nicole Lujan MD  Date of Service (when I saw the patient): 07/10/19    Discharge Diagnoses   1. Schizoaffective disorder, bipolar type, multiple episodes in acute phase.  2. ADHD.  3. Tobacco use disorder.  4. Allergic rhinitis.  5. Central serous retinopathy.    History of Present Illness   Ms. Jessica Jeter is a 53-year-old  woman with history of bipolar disorder type 1 with psychotic features, with previous psychiatric admissions including 04/2018 and a separate admission as well in 02/2018 and 03/2018 at [facility].  She has a , apparently became agitated in the meeting with her  and needed to be taken in with EMS to the Emergency Department for stabilization.  She was floridly psychotic at the time of admission, believing that she was  to Lapel and acknowledged she had not been taking her medication.  She was admitted to [facility] in [place] for further stabilization. For more details, I refer the reader to the initial psychiatric assessment documented on 07/04/2019 by Jefferson Dc MD in addition to the H&P documented by Nicole Donis PA-C on 07/04/2019    Hospital Course   Fannie Jeter was admitted on 7/3/2019.  She was introduced to the milieu and encouraged to participate in individual, milieu and group therapy.  She was taken off Geodon on account of QTc prolongation which fortunately resolved within 48 hours of her admission. She was therefore started on a replacement antipsychotic and she consented to the trial of lithium carbonate. We discussed the need for her to resume medications. She openly discussed her preoccupation with Lapel.  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 She tolerated her prescribed medication well. She  "reports absence of self harm thoughts, plans and intent. We discussed going back on a Long acting atypical antipsychotic but she declined. She reports that her belief in Jasiel does not suggest she is hallucinating or being paranoid. She declined an injectable antipsychotic medication. She talked about distancing herself from her family that she feels are \" toxic.\" In wrap up group she also reported to have given her contact info to a younger male pt who discharged today. She said she felt like he was like a younger brother. She was intermittently tangential and other times clear. She began to maintain appropriate boundaries both physically and verbally.  She was able to follow multistep directions and interacted appropriately with staff and her peers.  She maintained general  Cleanliness.  By 7/10/2019, the patient requested to be discharged from the hospital as she felt she was doing well enough to go home.  At the point of discharge she was not deemed to meet criteria for involuntary admission until she was discharged home with aftercare recommendations.  Nicole Lujan MD    Significant Results and Procedures   Please see below.    Unresulted Labs Ordered in the Past 30 Days of this Admission     No orders found from 6/3/2019 to 7/4/2019.          Code Status   Full Code    Primary Care Physician   [facility]    Physical Exam   Appearance:  awake, alert, adequately groomed and casually dressed  Attitude:  cooperative  Eye Contact:  good  Mood:  good  Affect:  appropriate and in normal range and mood congruent  Speech:  clear, coherent and normal prosody  Psychomotor Behavior:  no evidence of tardive dyskinesia, dystonia, or tics and intact station, gait and muscle tone  Thought Process:  logical, linear and goal oriented  Associations:  no loose associations  Thought Content:  no evidence of suicidal ideation or homicidal ideation and no evidence of psychotic " thought  Insight: Fair  Judgment:  intact  Oriented to:  time, person, and place  Attention Span and Concentration:  intact  Recent and Remote Memory:  intact  Language: Able to name objects and Able to repeat phrases  Fund of Knowledge: appropriate  Muscle Strength and Tone: normal  Gait and Station: Normal    Time Spent on this Encounter   I, Nicole Lujan, personally saw the patient today and spent greater than 30 minutes discharging this patient.    Discharge Disposition   Discharged to home  Condition at discharge: Stable  Psychiatry Follow-up:     [facility]  [address]  [phone]  Fax [phone]  Appointment with Giselle Hobbs on Tuesday, July 16 at 11:00 AM                         With PARISH Mckenzie on Tuesday, July 16 at 12:30 PM    [facility]  [address]  [phone]  Fax [phone]  Appointment with Dr. Joann Valladares MD on Tuesday, July 16 at 2:30 PM    Consultations This Hospital Stay   HOSPITALIST IP CONSULT  NUTRITION SERVICES ADULT IP CONSULT  HOSPITALIST IP CONSULT    Discharge Orders      Follow-up and recommended labs and tests     Follow up with primary care provider within 7 days of hospital discharge for follow up respiratory symptoms. You should be seen sooner if symptoms worsen.     Discharge Medications   Discharge Medication List as of 7/10/2019 12:19 PM      START taking these medications    Details   guaiFENesin (MUCINEX) 600 MG 12 hr tablet Take 1 tablet (600 mg) by mouth 2 times daily, Disp-60 tablet, R-0, E-Prescribe      OLANZapine (ZYPREXA) 15 MG tablet Take 1 tablet (15 mg) by mouth At Bedtime, Disp-30 tablet, R-0, E-Prescribe      albuterol (PROAIR HFA/PROVENTIL HFA/VENTOLIN HFA) 108 (90 Base) MCG/ACT inhaler Inhale 2 puffs into the lungs every 6 hours as needed for wheezing, Disp-1 Inhaler, R-0, E-Prescribe         CONTINUE these medications which have NOT  CHANGED    Details   aspirin 325 MG tablet Take 325 mg by mouth every 6 hours as needed (for headache) pain, Historical      cetirizine (ZYRTEC) 10 MG tablet Take 10 mg by mouth daily , Historical      Homeopathic Products (SIMILASAN DRY EYE RELIEF OP) Place 1 drop into both eyes as needed (for dry eyes) , Historical         STOP taking these medications       ziprasidone (GEODON) 40 MG capsule Comments:   Reason for Stopping:             Allergies   Allergies   Allergen Reactions     Animal Dander      Haldol Difficulty breathing     Haldol [Haloperidol]      Penicillins      Unsure of what happens. Has been told she has allergies since she was a kid.     Penicillins      Seasonal Allergies      Data   Most Recent 3 CBC's:  Recent Labs   Lab Test 07/18/19  0720 07/09/19  1221 07/04/19  0806   WBC 5.6 6.4 5.3   HGB 11.5* 13.1 13.5   MCV 89 89 87    314 311      Most Recent 3 BMP's:  Recent Labs   Lab Test 07/18/19  0720 07/04/19  0806 03/19/18  1513    142 139   POTASSIUM 3.2* 3.6 3.4   CHLORIDE 108 105 105   CO2 28 29 27   BUN 6* 8 7   CR 0.41* 0.47* 0.48*   ANIONGAP 6 8 7   SARAY 8.4* 9.2 8.0*   GLC 97 114* 100*     Most Recent 2 LFT's:  Recent Labs   Lab Test 07/18/19  0720 03/19/18  1513   AST 27 14   ALT 26 21   ALKPHOS 48 58   BILITOTAL 0.4 0.2      Panel:  Recent Labs   Lab Test 07/18/19  0720   CHOL 161   LDL 88   HDL 55   TRIG 88     Most Recent 6 Bacteria Isolates From Any Culture (See EPIC Reports for Culture Details):  Recent Labs   Lab Test 03/29/18  1811 03/11/18  1255 12/11/15  1127 11/26/12  1018   CULT <10,000 colonies/mL  urogenital belle  Susceptibility testing not routinely done   10,000 to 50,000 colonies/mL  mixed urogenital belle  Susceptibility testing not routinely done   Normal belle No Beta Streptococcus isolated     Most Recent TSH, T4 and A1c Labs:  Recent Labs   Lab Test 07/04/19  0806  02/02/17  1117 02/12/16  1039   TSH 2.07   < > 2.52  --    T4  --   --  1.03  --    A1C   --   --   --  6.0*    < > = values in this interval not displayed.     Results for orders placed or performed during the hospital encounter of 07/03/19   XR Chest 2 Views    Narrative    CHEST TWO VIEWS  7/9/2019 12:13 PM     HISTORY: 53-year-old woman with productive cough and wheezing.       Impression    IMPRESSION: Since July 23, 2014, heart size is normal. No pleural  effusion, pneumothorax, or abnormal area of consolidation.    JENNA JONES MD

## 2019-07-20 PROCEDURE — 12400001 ZZH R&B MH UMMC

## 2019-07-20 PROCEDURE — H2032 ACTIVITY THERAPY, PER 15 MIN: HCPCS

## 2019-07-20 PROCEDURE — 25000132 ZZH RX MED GY IP 250 OP 250 PS 637: Mod: GY | Performed by: NURSE PRACTITIONER

## 2019-07-20 PROCEDURE — 25000132 ZZH RX MED GY IP 250 OP 250 PS 637: Mod: GY | Performed by: PSYCHIATRY & NEUROLOGY

## 2019-07-20 RX ADMIN — GUAIFENESIN 600 MG: 600 TABLET, EXTENDED RELEASE ORAL at 09:08

## 2019-07-20 RX ADMIN — NICOTINE 1 PATCH: 21 PATCH, EXTENDED RELEASE TRANSDERMAL at 09:10

## 2019-07-20 RX ADMIN — NICOTINE 1 PATCH: 21 PATCH, EXTENDED RELEASE TRANSDERMAL at 01:56

## 2019-07-20 RX ADMIN — OLANZAPINE 15 MG: 15 TABLET, ORALLY DISINTEGRATING ORAL at 09:08

## 2019-07-20 RX ADMIN — ALBUTEROL SULFATE 2 PUFF: 90 AEROSOL, METERED RESPIRATORY (INHALATION) at 13:43

## 2019-07-20 RX ADMIN — TRAZODONE HYDROCHLORIDE 50 MG: 50 TABLET ORAL at 21:35

## 2019-07-20 RX ADMIN — HYDROXYZINE HYDROCHLORIDE 50 MG: 25 TABLET ORAL at 13:41

## 2019-07-20 RX ADMIN — GUAIFENESIN 600 MG: 600 TABLET, EXTENDED RELEASE ORAL at 21:33

## 2019-07-20 RX ADMIN — HYDROCORTISONE: 1 CREAM TOPICAL at 21:37

## 2019-07-20 RX ADMIN — HYDROCORTISONE: 1 CREAM TOPICAL at 09:08

## 2019-07-20 RX ADMIN — OLANZAPINE 15 MG: 15 TABLET, ORALLY DISINTEGRATING ORAL at 21:33

## 2019-07-20 ASSESSMENT — ACTIVITIES OF DAILY LIVING (ADL)
DRESS: INDEPENDENT
ORAL_HYGIENE: INDEPENDENT
HYGIENE/GROOMING: INDEPENDENT

## 2019-07-20 NOTE — PROGRESS NOTES
Pt attended group this evening. Pt was calm, and spent most of the evening in her room. Pt denies SI/SIB as well as anxiety and depression.        07/19/19 2200   Behavioral Health   Hallucinations appears responding   Thinking poor concentration   Orientation place: oriented;person: oriented;date: oriented;time: oriented   Memory baseline memory   Insight poor   Judgement impaired   Eye Contact at examiner   Affect blunted, flat   Mood mood is calm   Physical Appearance/Attire disheveled   Hygiene neglected grooming - unclean body, hair, teeth   Suicidality other (see comments)  (denies )   1. Wish to be Dead No   2. Non-Specific Active Suicidal Thoughts  No   3. Active Sucidal Ideation with any Methods (Not Plan) Without Intent to Act  No   4. Active Suicidal Ideation with Some Intent to Act, Without Specific Plan  No   5. Active Suicidal Ideation with Specific Plan and Intent  No   Enviromental Risk Factors None   Self Injury other (see comment)  (none observed )   Elopement   (none observed/stated )   Activity   (in the milieu)   Speech clear   Medication Sensitivity no observed side effects   Psychomotor / Gait balanced   Psycho Education   Type of Intervention 1:1 intervention   Response participates with cues/redirection   Hours 0.5   Treatment Detail check in    Activities of Daily Living   Hygiene/Grooming independent   Oral Hygiene independent   Dress independent   Laundry with supervision   Room Organization independent

## 2019-07-20 NOTE — PROGRESS NOTES
07/19/19 2100   General Information   Art Directive other (see comments)   AT directive is mindfulness-based, using watercolors to create an image based on a chosen personal intention. Goals of directive: emotional expression, mindfulness.  Pt was observed with depressed mood, irritable. Pt participated for the first half of group. 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 Another pt was laughing after pt said this (but the laughter did not appear to be in response to this pts comment) and pt became irritable, questioning if other pt was laughing at her. Pt did leave the group without escalating with redirection from staff.

## 2019-07-20 NOTE — PROGRESS NOTES
07/20/19 1300   Behavioral Health   Hallucinations appears responding   Thinking poor concentration   Orientation person: oriented   Memory baseline memory   Insight poor   Judgement impaired   Eye Contact at examiner   Affect blunted, flat   Mood mood is calm   Physical Appearance/Attire attire appropriate to age and situation   Hygiene well groomed   Suicidality other (see comments)  (pt denies current SI)   1. Wish to be Dead No   2. Non-Specific Active Suicidal Thoughts  No   Self Injury other (see comment)  (pt denies current SIB)   Elopement   (None stated/observed)   Activity   (pacing)   Speech clear   Medication Sensitivity no observed side effects   Psychomotor / Gait balanced;steady   Activities of Daily Living   Hygiene/Grooming independent   Oral Hygiene independent   Dress independent   Room Organization independent     Pt has been observed pacing the halls mostly throughout this shift. She is social with staff, often making multiple requests at a time. Pt denies current SI/SIB.

## 2019-07-21 PROCEDURE — 25000132 ZZH RX MED GY IP 250 OP 250 PS 637: Mod: GY | Performed by: PSYCHIATRY & NEUROLOGY

## 2019-07-21 PROCEDURE — 12400001 ZZH R&B MH UMMC

## 2019-07-21 PROCEDURE — 25000132 ZZH RX MED GY IP 250 OP 250 PS 637: Mod: GY | Performed by: NURSE PRACTITIONER

## 2019-07-21 RX ADMIN — OLANZAPINE 15 MG: 15 TABLET, ORALLY DISINTEGRATING ORAL at 09:10

## 2019-07-21 RX ADMIN — ACETAMINOPHEN 650 MG: 325 TABLET, FILM COATED ORAL at 03:02

## 2019-07-21 RX ADMIN — GUAIFENESIN 600 MG: 600 TABLET, EXTENDED RELEASE ORAL at 09:10

## 2019-07-21 RX ADMIN — HYDROCORTISONE: 1 CREAM TOPICAL at 09:12

## 2019-07-21 RX ADMIN — TRAZODONE HYDROCHLORIDE 50 MG: 50 TABLET ORAL at 03:45

## 2019-07-21 RX ADMIN — GUAIFENESIN 600 MG: 600 TABLET, EXTENDED RELEASE ORAL at 22:02

## 2019-07-21 RX ADMIN — ALBUTEROL SULFATE 2 PUFF: 90 AEROSOL, METERED RESPIRATORY (INHALATION) at 12:58

## 2019-07-21 RX ADMIN — NICOTINE 1 PATCH: 21 PATCH, EXTENDED RELEASE TRANSDERMAL at 09:10

## 2019-07-21 RX ADMIN — OLANZAPINE 15 MG: 15 TABLET, ORALLY DISINTEGRATING ORAL at 22:02

## 2019-07-21 RX ADMIN — LORAZEPAM 1 MG: 1 TABLET ORAL at 14:37

## 2019-07-21 ASSESSMENT — ACTIVITIES OF DAILY LIVING (ADL)
HYGIENE/GROOMING: HANDWASHING;INDEPENDENT
DRESS: SCRUBS (BEHAVIORAL HEALTH)
ORAL_HYGIENE: INDEPENDENT
HYGIENE/GROOMING: INDEPENDENT
LAUNDRY: WITH SUPERVISION
LAUNDRY: WITH SUPERVISION
DRESS: SCRUBS (BEHAVIORAL HEALTH);INDEPENDENT
ORAL_HYGIENE: INDEPENDENT

## 2019-07-21 ASSESSMENT — MIFFLIN-ST. JEOR: SCORE: 1420.52

## 2019-07-21 NOTE — PROGRESS NOTES
07/20/19 2100   Therapeutic Recreation   Type of Intervention structured groups   Activity game   Response Participates with cues/redirection   Hours 1     Pt participated in Therapeutic Recreation group with focus on leisure participation, social engagement, and strategic cognitive reasoning.  Engaged and cooperative in group recreational intervention via a group game.  Pt was a full participant throughout the entire duration of group. Pt at times would communicate with nonverbal actions when asked to take her turn. Towards the group, pt seemed to get restless and make sporadic gestures,making excessive movements with hands/arms and would mess up her hair. At one point, pt was not able to correctly guess simple words, but stated she enjoined the hard ones and didn't want to give up.   Showed progress in session goal.
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
## 2019-07-21 NOTE — PROGRESS NOTES
Pt given Tylenol 650 mg for toothache at 03:02, and Trazodone 50 mg at 03:45 for sleep, pt slept ZERO, walked in and out of room during the night, redirectable.

## 2019-07-22 PROCEDURE — 25000132 ZZH RX MED GY IP 250 OP 250 PS 637: Mod: GY | Performed by: NURSE PRACTITIONER

## 2019-07-22 PROCEDURE — 25000132 ZZH RX MED GY IP 250 OP 250 PS 637: Mod: GY | Performed by: PSYCHIATRY & NEUROLOGY

## 2019-07-22 PROCEDURE — 99232 SBSQ HOSP IP/OBS MODERATE 35: CPT | Performed by: NURSE PRACTITIONER

## 2019-07-22 PROCEDURE — H2032 ACTIVITY THERAPY, PER 15 MIN: HCPCS

## 2019-07-22 PROCEDURE — G0177 OPPS/PHP; TRAIN & EDUC SERV: HCPCS

## 2019-07-22 PROCEDURE — 12400001 ZZH R&B MH UMMC

## 2019-07-22 RX ORDER — POLYETHYLENE GLYCOL 3350 17 G
2 POWDER IN PACKET (EA) ORAL
Status: DISCONTINUED | OUTPATIENT
Start: 2019-07-22 | End: 2019-07-25 | Stop reason: ALTCHOICE

## 2019-07-22 RX ORDER — DIVALPROEX SODIUM 500 MG/1
500 TABLET, EXTENDED RELEASE ORAL AT BEDTIME
Status: DISCONTINUED | OUTPATIENT
Start: 2019-07-22 | End: 2019-07-23

## 2019-07-22 RX ADMIN — DIVALPROEX SODIUM 500 MG: 500 TABLET, EXTENDED RELEASE ORAL at 20:09

## 2019-07-22 RX ADMIN — NICOTINE POLACRILEX 2 MG: 2 LOZENGE ORAL at 17:03

## 2019-07-22 RX ADMIN — GUAIFENESIN 600 MG: 600 TABLET, EXTENDED RELEASE ORAL at 19:47

## 2019-07-22 RX ADMIN — NICOTINE 1 PATCH: 21 PATCH, EXTENDED RELEASE TRANSDERMAL at 07:49

## 2019-07-22 RX ADMIN — ALBUTEROL SULFATE 2 PUFF: 90 AEROSOL, METERED RESPIRATORY (INHALATION) at 05:00

## 2019-07-22 RX ADMIN — GUAIFENESIN 600 MG: 600 TABLET, EXTENDED RELEASE ORAL at 07:49

## 2019-07-22 RX ADMIN — NICOTINE POLACRILEX 2 MG: 2 LOZENGE ORAL at 12:01

## 2019-07-22 RX ADMIN — HYDROCORTISONE: 1 CREAM TOPICAL at 08:17

## 2019-07-22 RX ADMIN — MAGNESIUM HYDROXIDE 30 ML: 400 SUSPENSION ORAL at 04:08

## 2019-07-22 RX ADMIN — LORAZEPAM 2 MG: 1 TABLET ORAL at 17:07

## 2019-07-22 RX ADMIN — OLANZAPINE 15 MG: 15 TABLET, ORALLY DISINTEGRATING ORAL at 07:49

## 2019-07-22 RX ADMIN — OLANZAPINE 15 MG: 15 TABLET, ORALLY DISINTEGRATING ORAL at 19:47

## 2019-07-22 RX ADMIN — ACETAMINOPHEN 650 MG: 325 TABLET, FILM COATED ORAL at 03:36

## 2019-07-22 ASSESSMENT — ACTIVITIES OF DAILY LIVING (ADL)
HYGIENE/GROOMING: PROMPTS
ORAL_HYGIENE: INDEPENDENT
HYGIENE/GROOMING: INDEPENDENT
DRESS: STREET CLOTHES;INDEPENDENT
LAUNDRY: WITH SUPERVISION

## 2019-07-22 NOTE — PROGRESS NOTES
Pt restless & hyper verbal at times.She made frequent requests & needed to be put on hourly requests because she is unable to limit herself. During group she was quite disorganized & needed help to understand assignment & stay on task. Pt was observed laughing then shortly after, she began crying . When staff approached her she could not give a clear reason for mood change. She was pleasant overall & followed prompts for redirection. Pt denies si/sib.      07/22/19 1100   Behavioral Health   Hallucinations denies / not responding to hallucinations   Thinking confused;distractable;delusional   Orientation place: oriented   Memory short term   Insight denial of illness;poor;insight appropriate to situation   Judgement impaired   Eye Contact at examiner   Affect full range affect   Mood labile   Hygiene other (see comment)  (adequate)   Suicidality other (see comments)  (denial)   1. Wish to be Dead No   2. Non-Specific Active Suicidal Thoughts  No   Self Injury other (see comment)  (denial)   Activity restless;other (see comment)  (attended groups, very distractable. )   Speech rambling;tangential   Psychomotor / Gait balanced;steady   Psycho Education   Type of Intervention 1:1 intervention   Response participates with encouragement   Hours 0.5   Treatment Detail check in   Activities of Daily Living   Hygiene/Grooming independent   Oral Hygiene independent   Dress street clothes;independent   Laundry with supervision   Room Organization independent

## 2019-07-22 NOTE — PROGRESS NOTES
Pt presents as disheveled and unkempt. Speech is pressured, tangential and rambling. Pt is asking for discharge. She is compliant with hourly requests, generally asking for coffee and nicotine replacement. Pt denies SI and hallucinations at this time. Pt is isolative to self, occasionally yelling out or commenting on the milieu activities. Pt states no further concerns to this writer.        07/22/19 1007   Behavioral Health   Hallucinations denies / not responding to hallucinations   Thinking distractable;delusional;poor concentration   Orientation situation, disoriented   Memory short term   Insight poor   Judgement impaired   Eye Contact at examiner   Affect tense   Mood anxious   Physical Appearance/Attire disheveled   Hygiene neglected grooming - unclean body, hair, teeth   Elopement Loitering near exit doors   Activity isolative   Speech pressured;rambling;tangential;flight of ideas   Psycho Education   Type of Intervention 1:1 intervention   Response participates with encouragement   Hours 0.5   Activities of Daily Living   Hygiene/Grooming prompts

## 2019-07-22 NOTE — PROGRESS NOTES
"[facility]   Psychiatric Progress Note      Impression:     Fannie Jeter is a 53-year-old female admitted to [facility] on 7/17/2019.  She was admitted on a 72-hour hold through the ER due to agitation and psychosis.  She was seen by FABIAN.  EMS notes indicate she placed a bag with syringes, string, and plastic bags outside her neighbor's door.  EMS notes indicate she was singing, dancing, and causing a disturbance in her apartment.  She informed ER staff that  had entered her [facility] upon his death and was living inside her, playing with her all day.  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  She also said there is a different woman also named Fannie living inside her.  In the ER she was noted to be disorganized and tangential.  She had a sandwich and appeared to be attempting to feed something beside her, though nothing was present.  She was agitated and screaming.   She was hospitalized at [facility] 7/3 through 7/10 with similar symptoms and prescribed Zyprexa Zydis.  She reports taking medications as prescribed following discharge and said that upon discharge Zydis was changed to Zyprexa tablet due to insurance issues, and she reports that the tablet formulation is less effective.  Thus far on the unit she has been yelling and very disorganized.  During today's assessment she remarked, \" is my best friend and he tells me what to do and I obey.\"  She says there are many people living inside her including \"Rola, Álvaro, Nayely, my family, his family, everyone going back in time and space.\"  They sometimes tell her what to do.  She began yelling that I was typing too fast which was causing her to feel agitated.  She followed up with the comment, \"I have Asperger's and I'm a genius!\"  She said, \"Tai (outpatient psychiatrist) is looking at you through my eyes.  Show me your eyes, babe.\"  When asked about her " "appetite, she said, \"I don't want to eat because there's too many signs, baby parts and blood sausage and Chago doesn't want me to eat kinney.\"   She requested discharge or transfer to [facility].  She says she will only take Zyprexa Zydis and no other medications whatsoever.  She doesn't want to take \"that generic shit they give regular sheeple.\"  The patient's yelling and agitation precluded the possibility of further assessment.  Since admission, she initially consented only to Zyprexa Zydis, which was scheduled.  As of 7/22 she has agreed to take Depakote.  PRNs of Zyprexa Zydis, Ativan, Vistaril and Trazodone were initiated.  A petition for commitment MI with Dario was filed in [facility] and she is on a court hold.  Her behavior has been less disruptive.  She continues to experience auditory hallucinations and delusional thought content.  She has been sleeping poorly.  She remains disorganized.           Diagnoses:     Schizoaffective disorder, bipolar type  Seasonal allergic rhinitis         Plan:     Medications:  She was previously stable on a combination of Zyprexa, Invega and Depakote.  Continue Zyprexa Zydis 15 mg BID.  Begin Depakote  mg at HS.  PRNs of Zyprexa Zydis, Ativan, Vistaril and Trazodone are available.       Petition for commitment MI with Dario in [facility].  She is on a court hold.  She resides in an apartment.  Disposition to be determined pending outcome of court.         Clinical Global Impressions  First:  Considering your total clinical experience with this particular patient population, how severe are the patient's symptoms at this time?: 7 (07/18/19 0652)  Compared to the patient's condition at the START of treatment, this patient's condition is:: 4 (07/18/19 0652)  Most recent:  Considering your total clinical experience with this particular patient population, how severe are the patient's symptoms at this time?: 7 (07/18/19 0652)  Compared to the patient's condition at " "the START of treatment, this patient's condition is:: 4 (07/18/19 0652)     Attestation:  Patient has been seen and evaluated by me, SAMUEL Mejias CNP  The patient was counseled on nature of illness and treatment plan/options  Care was coordinated with treatment team             Interim History:     The patient's care was discussed with the treatment team and chart notes were reviewed.  Pt was documented as sleeping 6, 0 and 4 hours during the weekend overnight shifts.  Her behavior has been less disruptive and she has not been as loud.  She has been occasionally taking PRNs of Vistaril, Ativan and Trazodone.  Today she agreed to take Depakote.  Pt said she is \"making lots of progress.\"  She reports a variety of auditory hallucinations.  She hears \"Vanmckenzie, Jasiel's first partner in crime\" as well as \"Bogdan Affleck, he's my favorite.\"  Auditory hallucinations are sometimes command in nature but they are not telling her to harm/kill herself or others.  She denies suicidal thoughts but remarked that she could easily stick her sweatshirt in the electrical outlet to set herself on fire.  She said, \"I haven't slept a wink in days\" because she is \"scared I'm in the cloning center.\"  When asked who would clone her, she replied, \"Men in small hats, [employer] types, politicians.\"  She went on to talk about Michael Ferreira.  She said her mood is angry and irritable at times.            Medications:     Current Facility-Administered Medications   Medication     acetaminophen (TYLENOL) tablet 650 mg     albuterol (PROAIR HFA/PROVENTIL HFA/VENTOLIN HFA) 108 (90 Base) MCG/ACT inhaler 2 puff     alum & mag hydroxide-simethicone (MYLANTA ES/MAALOX  ES) suspension 30 mL     aspirin (ASA) tablet 325 mg     bisacodyl (DULCOLAX) Suppository 10 mg     calamine 8-8 % lotion     divalproex sodium extended-release (DEPAKOTE ER) 24 hr tablet 500 mg     guaiFENesin (MUCINEX) 12 hr tablet 600 mg     hydrocortisone (CORTAID) 1 % " "cream     hydrOXYzine (ATARAX) tablet 25-50 mg     LORazepam (ATIVAN) tablet 1-2 mg    Or     LORazepam (ATIVAN) injection 1-2 mg     magnesium hydroxide (MILK OF MAGNESIA) suspension 30 mL     nicotine (NICODERM CQ) 21 MG/24HR 24 hr patch 1 patch     nicotine Patch in Place     nicotine patch REMOVAL     OLANZapine zydis (zyPREXA) ODT tab 10 mg    Or     OLANZapine (zyPREXA) injection 10 mg     OLANZapine zydis (zyPREXA) ODT tab 15 mg     traZODone (DESYREL) tablet 50 mg             Allergies:     Allergies   Allergen Reactions     Animal Dander      Haldol Difficulty breathing     Haldol [Haloperidol]      Penicillins      Unsure of what happens. Has been told she has allergies since she was a kid.     Penicillins      Seasonal Allergies             Psychiatric Examination:   /81 (BP Location: Left arm)   Pulse 98   Temp 98.5  F (36.9  C) (Oral)   Resp 16   Ht 1.676 m (5' 6\")   Wt 80.4 kg (177 lb 3.2 oz)   LMP 07/03/2014   SpO2 96%   BMI 28.60 kg/m    Weight is 177 lbs 3.2 oz  Body mass index is 28.6 kg/m .    Appearance:  awake, alert and adequately groomed  Attitude:  cooperative  Eye Contact:  good  Mood:  variable, sometimes angry/irritable  Affect:  intensity is heightened   Speech:  normal volume, slightly pressured  Psychomotor Behavior:  no evidence of tardive dyskinesia, dystonia, or tics, psychomotor agitation present  Thought Process:  tangential, disorganized  Associations:  loosening of associations present  Thought Content:  denies suicidal and homicidal ideation, grandiosity, delusional thought content and auditory hallucinations are present  Insight:  poor  Judgment:  poor  Oriented to:  person, place, time, date  Attention Span and Concentration:  limited  Recent and Remote Memory:  limited  Language:  intact  Fund of Knowledge:  appropriate  Muscle Strength and Tone:  normal  Gait and Station:  normal          Labs:     No results found for this or any previous visit (from the past " 24 hour(s)).

## 2019-07-22 NOTE — PROGRESS NOTES
Attended  3 of 3 OT groups offered. Loose/ tangential and delusuional. Needed consistent limits, redirection and reality orientation consistently. Intrusive to group process with her rambling speech and reference to being raped and Jasiel delusions. Was responsive to repeated redirection.

## 2019-07-22 NOTE — PROGRESS NOTES
Pt was calm and pleasant. She was isolative and withdrwan. She spent the majority of the shift sleeping. No SI and SIB was noted this shift.      07/21/19 2149   Behavioral Health   Hallucinations other (see comment)   Thinking paranoid   Orientation time: oriented;date: oriented;place: oriented;person: oriented   Memory baseline memory   Insight poor   Judgement impaired   Eye Contact at examiner   Affect blunted, flat   Mood labile;mood is calm   Physical Appearance/Attire attire appropriate to age and situation   Hygiene well groomed   Suicidality other (see comments)  (MARISA)   1. Wish to be Dead No   2. Non-Specific Active Suicidal Thoughts  No   Self Injury other (see comment)  (MARISA)   Elopement   (nothing observed)   Activity isolative;withdrawn   Speech clear;coherent   Medication Sensitivity no observed side effects   Psychomotor / Gait balanced;steady   Activities of Daily Living   Hygiene/Grooming independent   Oral Hygiene independent   Dress scrubs (behavioral health)   Laundry with supervision   Room Organization independent        07/21/19 2149   Behavioral Health   Hallucinations other (see comment)   Thinking paranoid   Orientation time: oriented;date: oriented;place: oriented;person: oriented   Memory baseline memory   Insight poor   Judgement impaired   Eye Contact at examiner   Affect blunted, flat   Mood labile;mood is calm   Physical Appearance/Attire attire appropriate to age and situation   Hygiene well groomed   Suicidality other (see comments)  (MARISA)   1. Wish to be Dead No   2. Non-Specific Active Suicidal Thoughts  No   Self Injury other (see comment)  (MARISA)   Elopement   (nothing observed)   Activity isolative;withdrawn   Speech clear;coherent   Medication Sensitivity no observed side effects   Psychomotor / Gait balanced;steady   Activities of Daily Living   Hygiene/Grooming independent   Oral Hygiene independent   Dress scrubs (behavioral health)   Laundry with supervision   Room  Organization independent

## 2019-07-23 PROCEDURE — H2032 ACTIVITY THERAPY, PER 15 MIN: HCPCS

## 2019-07-23 PROCEDURE — G0177 OPPS/PHP; TRAIN & EDUC SERV: HCPCS

## 2019-07-23 PROCEDURE — 25000132 ZZH RX MED GY IP 250 OP 250 PS 637: Mod: GY | Performed by: NURSE PRACTITIONER

## 2019-07-23 PROCEDURE — 99232 SBSQ HOSP IP/OBS MODERATE 35: CPT | Performed by: NURSE PRACTITIONER

## 2019-07-23 PROCEDURE — 12400001 ZZH R&B MH UMMC

## 2019-07-23 PROCEDURE — 25000132 ZZH RX MED GY IP 250 OP 250 PS 637: Mod: GY | Performed by: PSYCHIATRY & NEUROLOGY

## 2019-07-23 RX ORDER — FLUORIDE TOOTHPASTE
15 TOOTHPASTE DENTAL 4 TIMES DAILY PRN
Status: DISCONTINUED | OUTPATIENT
Start: 2019-07-23 | End: 2019-08-07 | Stop reason: HOSPADM

## 2019-07-23 RX ADMIN — ALBUTEROL SULFATE 2 PUFF: 90 AEROSOL, METERED RESPIRATORY (INHALATION) at 21:13

## 2019-07-23 RX ADMIN — DIVALPROEX SODIUM 750 MG: 500 TABLET, FILM COATED, EXTENDED RELEASE ORAL at 20:53

## 2019-07-23 RX ADMIN — LORAZEPAM 2 MG: 1 TABLET ORAL at 21:22

## 2019-07-23 RX ADMIN — HYDROXYZINE HYDROCHLORIDE 50 MG: 25 TABLET ORAL at 19:08

## 2019-07-23 RX ADMIN — NICOTINE POLACRILEX 2 MG: 2 LOZENGE ORAL at 12:39

## 2019-07-23 RX ADMIN — NICOTINE 1 PATCH: 21 PATCH, EXTENDED RELEASE TRANSDERMAL at 08:23

## 2019-07-23 RX ADMIN — OLANZAPINE 15 MG: 15 TABLET, ORALLY DISINTEGRATING ORAL at 19:08

## 2019-07-23 RX ADMIN — ACETAMINOPHEN 650 MG: 325 TABLET, FILM COATED ORAL at 09:57

## 2019-07-23 RX ADMIN — OLANZAPINE 15 MG: 15 TABLET, ORALLY DISINTEGRATING ORAL at 08:24

## 2019-07-23 RX ADMIN — NICOTINE POLACRILEX 2 MG: 2 LOZENGE ORAL at 19:07

## 2019-07-23 RX ADMIN — HYDROXYZINE HYDROCHLORIDE 50 MG: 25 TABLET ORAL at 13:31

## 2019-07-23 RX ADMIN — GUAIFENESIN 600 MG: 600 TABLET, EXTENDED RELEASE ORAL at 19:07

## 2019-07-23 RX ADMIN — HYDROCORTISONE: 1 CREAM TOPICAL at 19:08

## 2019-07-23 RX ADMIN — GUAIFENESIN 600 MG: 600 TABLET, EXTENDED RELEASE ORAL at 08:23

## 2019-07-23 RX ADMIN — HYDROCORTISONE: 1 CREAM TOPICAL at 08:26

## 2019-07-23 ASSESSMENT — ACTIVITIES OF DAILY LIVING (ADL)
HYGIENE/GROOMING: INDEPENDENT
HYGIENE/GROOMING: INDEPENDENT
LAUNDRY: WITH SUPERVISION
DRESS: INDEPENDENT
ORAL_HYGIENE: INDEPENDENT
LAUNDRY: WITH SUPERVISION
ORAL_HYGIENE: INDEPENDENT
DRESS: INDEPENDENT

## 2019-07-23 ASSESSMENT — MIFFLIN-ST. JEOR: SCORE: 1425.06

## 2019-07-23 NOTE — PROGRESS NOTES
[facility] called at start of shift and requested that this writer put in a note about the pt's court hold. Caller stated that the court hold began at 1552 on 7/22/19 and stated they would be faxing over the court hold paperwork either today or tomorrow. Currently have not received any court documents from [facility] for this pt during this shift.

## 2019-07-23 NOTE — H&P
" Date: 07/04/2019      Note: This note was initially erroneously added as a consult note when in fact it was a History and Physical Note. It is being added back as an H and P retroactively and replaces the consult note that this author had signed previously.      PSYCHIATRIC H and P     REFERRAL SOURCE.  [facility].      CHIEF COMPLAINT:  \"I stopped taking my prescription.\"      HISTORY OF PRESENT ILLNESS:  Ms. Jessica Jeter is a 53-year-old  woman with history of bipolar disorder type 1 with psychotic features, with previous psychiatric admissions including 04/2018 and a separate admission as well in 02/2018 and 03/2018 at [facility].  She has a , apparently became agitated in the meeting with her  and needed to be taken in with EMS to the Emergency Department for stabilization.  She was floridly psychotic at the time of admission, believing that she was  to [place] and acknowledged she had not been taking her medication.  She was admitted to [facility] in [facility] for further stabilization.      Upon interview this morning, the patient remains quite disorganized.  She indicates that she stopped her lamotrigine at some point in the past and also had stopped her Geodon.  She then indicates that she was unable to get her Geodon from her doctors and is upset about this.  She is demanding that she restart her Geodon as soon as possible, believing that it was helpful in the past.  She very difficult to follow at this time.  She reports ongoing depression but struggles to articulate current symptoms in a coherent manner.  She acknowledges she has not been sleeping well.  She denies suicidal ideation or thoughts of harming others.  She denies any physical health issues at present.  She would like to know when she can leave the hospital, hoping to go by tomorrow if possible.      PAST PSYCHIATRIC HISTORY:  At least 3 prior admissions, all 3 in " "2018, 2 of them at [facility] in 02/2018 and then 03/2018.  She reports that she does best on Geodon.  She reports that she self-tapered off lamotrigine recently.  Her thought is very disorganized at present, so it is hard to get a fully coherent psychiatric history including current providers.      PAST MEDICAL HISTORY:  Central serous retinopathy, herpes zoster ophthalmicus (HDO), osteoarthritis, scoliosis.      PAST SURGICAL HISTORY:  Gynecological surgery.      FAMILY HISTORY:  No EMR history indicated or past psychiatric disorders.      SOCIAL HISTORY:  Resides in an independent living facility apartment complex.  She was meeting with the facility , [facility], when she became agitated in the [facility].  The emergency medical provider took her to the hospital.  It is difficult to get a coherent social history at this time given her significant thought disorganization, but she is reportedly an everyday smoker without reported alcohol use or drug use.  Urine drug screen was negative on admission.  She is .      REVIEW OF SYSTEMS:  A 10-point review of systems completed and negative other than described in the HPI.      PHYSICAL EXAMINATION:   VITAL SIGNS:  Temperature 99, pulse 82, blood pressure 124/85, respiratory rate 16, SpO2 of 95% on room air.      MENTAL STATUS EXAMINATION:  She is dressed in hospital scrubs.  Appears slightly disheveled.  Eye contact is intense.  She is paranoid, indicating a fear that the provider is standing up and indicating that she wants to be \"on the same level.\"  She is exasperated why she has not gotten her Geodon yet this morning.  Speech is quick, at times loud and intense.  Mood is described as depressed.  Affect is intense and labile.  Thought form is very disorganized and difficult to follow, circumstantial.  Thought content notable for ongoing paranoia.  She is not responding to internal stimuli.  There is no fluctuation in cognition or " obvious deficits in cognitive processing other than psychotic symptoms.  Short-term memory is somewhat impaired.  She at times struggles to provide a coherent recent account of the events prompting admission.  Long-term memory appears reasonably intact in a conversation.  Insight very poor, although she did recognize psychiatric symptoms.  She has recently been very labile, and it is hard to understand what her level of cooperation truly is at this time.  Judgment has recently been very poor given agitation and required psychiatric admission for stabilization.      IMPRESSION/PLAN:   1.  Unspecified schizophrenia and other psychotic disorder.   2.  Bipolar disorder type 1 with psychotic features, mixed, episode versus schizoaffective disorder, bipolar type with psychotic features.      FORMULATION:  The patient is a 53-year-old woman with history of bipolar 1 disorder and several prior psychiatric admissions in 2018 for psychosis and cyndi.  She acknowledges that she stopped taking her medications sometime recently.  She would like to restart her Geodon, noting that has been the most effective medication.  She is admitted on a 72-hour hold into the [facility] for psychiatric stabilization.      PLAN:   1.  Resume Geodon 40 mg b.i.d. with meals for ongoing psychosis and cyndi.   2.  We will need to collaborate with outpatient provider to help with disposition planning.     3.  She will require social work evaluation and assistance.      RESULTS REVIEW:  A basic metabolic panel was unremarkable including creatinine 0.47, TSH 2.04, glucose 114.  CBC was unremarkable.  Urine drug screen negative.  We will obtain an EKG given she is on a second-generation antipsychotic including Geodon, which can have risk of QTc prolongation.         AMY HENSLEY MD             D: 2019   T: 2019   MT:       Name:     FLORY JAQUEZ   MRN:      [id number]        Account:       [id number]   :      1965            Consult Date:  07/04/2019      Document: [id number]          Addendum  QTc is prolonged. Will provide Potasium targeting level above 4.0 and check magnesium targeting level above 2.0. Will discontinue Geodon and start Zyprexa tonight which is more neutral on QTc. Abilify could be a reasonable option as well with minimal effect on QTc.       Last signed by: Jefferson Dc MD at 7/4/2019  8:22 PM

## 2019-07-23 NOTE — PLAN OF CARE
"Patient is labile frequently varying from tearful to elated . Patient requested/given PRN medication after papers were served \"so I do not lose my mind.\" Patient stated GOAL is \"I want to go home and just sit in my apartment.\"    Patient eating 100% and visible in lounge. Boundaries frequently reinforced. Patient appears disheveled and hygiene encouraged. Patient attends groups and required redirection.    Patient denies SI/SIB.    /77   Pulse 103   Temp 99  F (37.2  C)   Resp 16   Ht 1.676 m (5' 6\")   Wt 80.8 kg (178 lb 3.2 oz)   LMP 07/03/2014   SpO2 96%   BMI 28.76 kg/m  . Patient given PRN tylenol for R great toe \"arthritis\" and no further c/o pain.    Nursing will continue to monitor.    "

## 2019-07-23 NOTE — PROGRESS NOTES
"   07/22/19 2100   General Information   Art Directive other (see comments)   AT directive is to create two hand images using pts chosen art media with the theme of \"reaching for and letting go.\" Goals of directive: to identify personal goals, emotional expression, coping skills, motivation for change and personal strengths. Pt was a quiet participant, focused on task for the first half of group. Pt finished early and when asked if she would like to share her artwork before she left pt replied that \"its too personal\" Pt was observed drawing various symbols on the hands, did not share with author.  Pt did thank author for the group before leaving.  "
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
## 2019-07-23 NOTE — PROGRESS NOTES
BEHAVIORAL TEAM DISCUSSION    Participants: Provider: Zabrina Riggins NP    CTC: Lucy Rodriguez   Nurse: Ruchi Mcdonough RN   Progress:  petition for commitment and a Bishop was upheld and pt has a court hearing and date scheduled.    Continued Stay Criteria/Rationale: pt remains agitated, delusional.    Medical/Physical: uneventful  Precautions:   Behavioral Orders   Procedures     Code 1 - Restrict to Unit     Petition for commitment     MI with [facility]     Routine Programming     As clinically indicated     Single Room     Acute psychosis     Status 15     Every 15 minutes.     Plan: continue to stablize, work with court system.    Rationale for change in precautions or plan: no change.

## 2019-07-23 NOTE — PROGRESS NOTES
Court paperwork received.  Pt has an alias of Supriya Cason.    Pt has received a copy of the court paperwork. Copy in chart.

## 2019-07-23 NOTE — PROGRESS NOTES
"Attended 2 of 2 OT groups offered. Needed encouragement in completing creative task. Followed 2 step verbal directions with set up. Was found painting her hands and paint on her forehead and lips. Noted she was  and was expressing herself traditionally. Resistant when asked to wash the paint off. Argued, \" Catholics can use ashes as a form of expression why cant I express myself?\" Calmed when it was explained that OT group was task focused with purpose of assessing problem solving and planning skills. Made reference to  and her personal efren. Redirected to conversation and task completion.  "

## 2019-07-24 PROCEDURE — 25000132 ZZH RX MED GY IP 250 OP 250 PS 637: Mod: GY | Performed by: PSYCHIATRY & NEUROLOGY

## 2019-07-24 PROCEDURE — 99233 SBSQ HOSP IP/OBS HIGH 50: CPT | Performed by: PSYCHIATRY & NEUROLOGY

## 2019-07-24 PROCEDURE — G0177 OPPS/PHP; TRAIN & EDUC SERV: HCPCS

## 2019-07-24 PROCEDURE — 12400001 ZZH R&B MH UMMC

## 2019-07-24 PROCEDURE — 25000132 ZZH RX MED GY IP 250 OP 250 PS 637: Mod: GY | Performed by: NURSE PRACTITIONER

## 2019-07-24 RX ADMIN — NICOTINE POLACRILEX 2 MG: 2 LOZENGE ORAL at 14:45

## 2019-07-24 RX ADMIN — NICOTINE POLACRILEX 2 MG: 2 LOZENGE ORAL at 08:57

## 2019-07-24 RX ADMIN — DIVALPROEX SODIUM 750 MG: 500 TABLET, FILM COATED, EXTENDED RELEASE ORAL at 19:02

## 2019-07-24 RX ADMIN — HYDROCORTISONE: 1 CREAM TOPICAL at 07:58

## 2019-07-24 RX ADMIN — GUAIFENESIN 600 MG: 600 TABLET, EXTENDED RELEASE ORAL at 07:53

## 2019-07-24 RX ADMIN — TRAZODONE HYDROCHLORIDE 50 MG: 50 TABLET ORAL at 22:47

## 2019-07-24 RX ADMIN — TRAZODONE HYDROCHLORIDE 50 MG: 50 TABLET ORAL at 23:55

## 2019-07-24 RX ADMIN — NICOTINE POLACRILEX 2 MG: 2 LOZENGE ORAL at 18:07

## 2019-07-24 RX ADMIN — NICOTINE 1 PATCH: 21 PATCH, EXTENDED RELEASE TRANSDERMAL at 07:53

## 2019-07-24 RX ADMIN — OLANZAPINE 15 MG: 15 TABLET, ORALLY DISINTEGRATING ORAL at 07:53

## 2019-07-24 RX ADMIN — OLANZAPINE 15 MG: 15 TABLET, ORALLY DISINTEGRATING ORAL at 19:02

## 2019-07-24 RX ADMIN — HYDROXYZINE HYDROCHLORIDE 50 MG: 25 TABLET ORAL at 22:47

## 2019-07-24 RX ADMIN — HYDROXYZINE HYDROCHLORIDE 50 MG: 25 TABLET ORAL at 04:19

## 2019-07-24 RX ADMIN — GUAIFENESIN 600 MG: 600 TABLET, EXTENDED RELEASE ORAL at 19:02

## 2019-07-24 ASSESSMENT — ACTIVITIES OF DAILY LIVING (ADL)
DRESS: SCRUBS (BEHAVIORAL HEALTH);INDEPENDENT
ORAL_HYGIENE: INDEPENDENT
LAUNDRY: WITH SUPERVISION
ORAL_HYGIENE: INDEPENDENT
HYGIENE/GROOMING: HANDWASHING;INDEPENDENT
HYGIENE/GROOMING: INDEPENDENT
DRESS: INDEPENDENT

## 2019-07-24 NOTE — PROGRESS NOTES
"   07/23/19 2100   Therapeutic Recreation   Type of Intervention structured groups   Activity game   Response Participates with cues/redirection   Hours 1     Pt participated in Therapeutic Recreation group with focus on leisure participation, social engagement, and strategic cognitive reasoning.  Cooperative in group recreational intervention via a group game.  Pt was a participant throughout most of the group, but sat and observed for part. Pt made a racially inappropriate comment, then was appropriately confronted by another peer. Then pt stormed up to the dry erase board knocking supplies on the floor and frantically harish lines on the board with a marker. Pt helped  the supplies and then took a break from the activity. Pt combed her hair vigorously a few times then sat at the table in silence. Pt took a break from the activity, but would stare intensely at this writer and another peer. Pt stated she was doing ok to stay in group. Pt seemed to calm down at that point. Pt began participating in the activity again, but needing a cue and redirection each turn to focus on the activity. Pt remained in emotional control for the remainder of the group and apologized to the peer for prior inappropriate comment. Throughout the group, pt made reference to Jasiel and said \"he\" and \"him\" when talking about several things that were off topic to the group. Pt needed several directives to focus on the activity and group discussion.   "
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
## 2019-07-24 NOTE — PROGRESS NOTES
Pt appears to have slept 4 hrs this shift.  Pt reported anxiety and requested/received PRN Hydroxyzine at 0419.  Pt polite, behaviorally controlled.  Will continue to monitor and support plan of care.

## 2019-07-24 NOTE — PROGRESS NOTES
Babar Cardenas the patients step father called and spoke to writer. Step  stated that the only medication that has worked in the past was Geodon and he would like her Provider to be notified.

## 2019-07-24 NOTE — PROGRESS NOTES
Attended 2 of 2 OT groups offered. Task focused x 20 minutes. Talking the entire time even if no one was listening. At one point asked if staff were watching her when she was taking a shower. Said she avoids showers because she feels they are watching her. Needed consistent redirection to task and conversation. Needed concrete description of ways to implement healthy self management and coping skills awareness. Will continue to provide graded groups focused on reality orientation and self management strategies and supports.
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
## 2019-07-24 NOTE — PROGRESS NOTES
Pt has been making racist and inconsiderate statements throughout shift and past few days. See nursing note. No SI, SIB stated/observed. Pt visible in milieu, restless. Very intrusive, sometimes going up to patients and asking them who they are talking to on the phone, much to their annoyance. Poor boundaries, tried to give a male pt her phone number and address, saying they could stay with her at her house. This led to a verbal altercation. Pt at times speaking loudly to self, clearly responding to stimuli. Bizarre statements such as her friends being Jasiel and other celebrities. Pt attempted to take entire snack tray to her room and became distressed when she was redirected. Pt explicitly states she wants to be with other male pts and does not pay much attention to female pts at all.     07/23/19 2200   Behavioral Health   Hallucinations appears responding;auditory;visual;tactile   Thinking poor concentration;delusional;distractable;confused   Orientation person: oriented   Memory confabulation   Insight poor   Judgement impaired   Eye Contact at examiner   Affect full range affect;irritable   Mood labile;irritable   Physical Appearance/Attire attire appropriate to age and situation   Hygiene neglected grooming - unclean body, hair, teeth   Suicidality other (see comments)  (none stated/observed)   1. Wish to be Dead No   2. Non-Specific Active Suicidal Thoughts  No   Self Injury other (see comment)  (none stated/observed)   Elopement   (N/A)   Activity hyperactive (agitated, impulsive);restless   Speech clear;rambling;flight of ideas   Medication Sensitivity no stated side effects;no observed side effects   Psychomotor / Gait balanced;steady   Psycho Education   Type of Intervention 1:1 intervention   Response observes from a distance   Activities of Daily Living   Hygiene/Grooming independent   Oral Hygiene independent   Dress independent   Laundry with supervision   Room Organization independent

## 2019-07-24 NOTE — PROGRESS NOTES
Pt awake most of the shift.  Pt ate meals, attended some groups, and social with others.  Pt does require some redirection from poor boundaries and inappropriate statements.  Pt is not sleeping much and has been encouraged to use PRNs.       07/24/19 1343   Sleep/Rest/Relaxation   Day/Evening Time Hours up all shift   Behavioral Health   Hallucinations appears responding   Thinking poor concentration   Orientation person: oriented;place: oriented   Memory confabulation   Insight poor   Judgement impaired   Eye Contact at examiner   Affect full range affect   Mood anxious;labile   Physical Appearance/Attire attire appropriate to age and situation   Hygiene neglected grooming - unclean body, hair, teeth   Suicidality other (see comments)  (denies)   1. Wish to be Dead No   2. Non-Specific Active Suicidal Thoughts  No   Activity hyperactive (agitated, impulsive)   Speech rambling;tangential   Psychomotor / Gait balanced;steady   Coping/Psychosocial   Verbalized Emotional State acceptance;anxiety;frustration;powerlessness   Plan of Care Reviewed With patient   Psycho Education   Type of Intervention 1:1 intervention   Response participates with cues/redirection   Activities of Daily Living   Hygiene/Grooming handwashing;independent   Oral Hygiene independent   Dress scrubs (behavioral health);independent   Activity   Activity Assistance Provided independent

## 2019-07-25 PROCEDURE — 25000132 ZZH RX MED GY IP 250 OP 250 PS 637: Mod: GY | Performed by: NURSE PRACTITIONER

## 2019-07-25 PROCEDURE — 12400001 ZZH R&B MH UMMC

## 2019-07-25 PROCEDURE — G0177 OPPS/PHP; TRAIN & EDUC SERV: HCPCS

## 2019-07-25 PROCEDURE — 25000132 ZZH RX MED GY IP 250 OP 250 PS 637: Mod: GY | Performed by: PSYCHIATRY & NEUROLOGY

## 2019-07-25 RX ADMIN — OLANZAPINE 15 MG: 15 TABLET, ORALLY DISINTEGRATING ORAL at 08:04

## 2019-07-25 RX ADMIN — ACETAMINOPHEN 650 MG: 325 TABLET, FILM COATED ORAL at 16:44

## 2019-07-25 RX ADMIN — HYDROXYZINE HYDROCHLORIDE 25 MG: 25 TABLET ORAL at 19:33

## 2019-07-25 RX ADMIN — GUAIFENESIN 600 MG: 600 TABLET, EXTENDED RELEASE ORAL at 08:04

## 2019-07-25 RX ADMIN — NICOTINE POLACRILEX 2 MG: 2 LOZENGE ORAL at 14:55

## 2019-07-25 RX ADMIN — NICOTINE POLACRILEX 2 MG: 2 LOZENGE ORAL at 06:26

## 2019-07-25 RX ADMIN — HYDROCORTISONE: 1 CREAM TOPICAL at 19:37

## 2019-07-25 RX ADMIN — OLANZAPINE 15 MG: 15 TABLET, ORALLY DISINTEGRATING ORAL at 19:33

## 2019-07-25 RX ADMIN — DIVALPROEX SODIUM 750 MG: 500 TABLET, FILM COATED, EXTENDED RELEASE ORAL at 20:58

## 2019-07-25 RX ADMIN — GUAIFENESIN 600 MG: 600 TABLET, EXTENDED RELEASE ORAL at 19:33

## 2019-07-25 RX ADMIN — OLANZAPINE 10 MG: 10 TABLET, ORALLY DISINTEGRATING ORAL at 12:40

## 2019-07-25 RX ADMIN — NICOTINE POLACRILEX 2 MG: 2 LOZENGE ORAL at 05:03

## 2019-07-25 RX ADMIN — NICOTINE 1 PATCH: 21 PATCH, EXTENDED RELEASE TRANSDERMAL at 08:04

## 2019-07-25 ASSESSMENT — ACTIVITIES OF DAILY LIVING (ADL)
DRESS: INDEPENDENT
ORAL_HYGIENE: INDEPENDENT
HYGIENE/GROOMING: INDEPENDENT
LAUNDRY: UNABLE TO COMPLETE

## 2019-07-25 NOTE — PROGRESS NOTES
BEHAVIORAL TEAM DISCUSSION    Participants: Lucy Resendez, [facility] (team occurred yesterday 7/24/19).    Progress: Pt remains disorganized with auditory hallucinations, and inappropriate behaviors with others.    Continued Stay Criteria/Rationale: continued need for acute level of care given psychotic sxs. Judgement remains impaired.  Medical/Physical: uneventful  Precautions:   Behavioral Orders   Procedures     Code 1 - Restrict to Unit     Petition for commitment     MI with [facility]     Routine Programming     As clinically indicated     Sexual precautions     Single Room     Acute psychosis     Status 15     Every 15 minutes.     Plan: assess, monitor and continue to work toward stabilization.    Rationale for change in precautions or plan: no change

## 2019-07-25 NOTE — PROGRESS NOTES
Pt went to court today for most of the morning. When pt was getting searched again in the exam room pt became angry and hostile with staff for following hospital protocol. Pt angrily disrobed in front of staff instead of going into the bathroom and threw her soiled diaper at staff calling writer a 'fucker' and shouting 'I've been raped'. Pt was labile throughout the shift, swearing in the lounge and at various staff. Pt was overheard by staff speaking to herself in her room. During check in pt reported her mood as 'fine', denied all mental health symptoms. When asked by staff why she was admitted pt stated 'maintaining my dignity'.  Pt later was very polite and appeared much calmer.        07/25/19 1409   Behavioral Health   Hallucinations appears responding   Thinking delusional;paranoid;poor concentration;distractable   Orientation place: oriented;person: oriented   Memory baseline memory   Insight poor;denial of illness   Judgement impaired   Eye Contact at examiner;at floor   Affect tense;irritable;angry   Mood labile;anxious   Physical Appearance/Attire disheveled   Hygiene neglected grooming - unclean body, hair, teeth   Suicidality other (see comments)  (denies)   1. Wish to be Dead No   2. Non-Specific Active Suicidal Thoughts  No   Self Injury other (see comment)  (denies)   Elopement Statements about wanting to leave   Activity restless;hyperactive (agitated, impulsive)   Speech pressured;tangential   Psychomotor / Gait balanced;steady   Psycho Education   Type of Intervention 1:1 intervention   Response participates with encouragement   Hours 0.5   Treatment Detail check in   Activities of Daily Living   Hygiene/Grooming independent   Oral Hygiene independent   Dress independent   Laundry unable to complete   Room Organization independent

## 2019-07-25 NOTE — PROGRESS NOTES
Pt asked to meet a few hours after attending her court exam.  She reported to this CTC that she may be going home to wait on her court date next week.  Together we called her  to inquire about this.   reported that pt had asked  if she could go home and  advised he would take it under consideration.   reminded pt that this would be unlikely but to wait for court response.  A fax was later received indicating pt was to continue on a court hold.  Fax copy given to pt.  She expressed appreciation for at least knowing what the plan was given it was making her anxious not knowing.      Per notes, pt had an incident when she returned from court making it highly unlikely she would have gone home to await court next week given continued dysregulation.

## 2019-07-25 NOTE — PROGRESS NOTES
VM from [employer] 's office (caller is Zabrina MONDRAGON at [phone]), reporting that provider will need to be available for court next week on Tuesday 7/30 at 2:15pm for commitment / mary trial.

## 2019-07-25 NOTE — PROGRESS NOTES
Pt visible in milieu, no SI, SIB stated/observed. Much less bizarre statements and self-talk today, pt was appropriate for majority of the shift. Pt still disorganized and appears to be responding, but appears to be improving.     07/24/19 2240   Behavioral Health   Hallucinations appears responding   Thinking poor concentration;delusional;distractable   Orientation person: oriented   Memory confabulation   Insight poor   Judgement impaired   Eye Contact at examiner   Affect full range affect   Mood mood is calm   Physical Appearance/Attire attire appropriate to age and situation   Hygiene neglected grooming - unclean body, hair, teeth   Suicidality other (see comments)  (none stated/observed)   1. Wish to be Dead No   2. Non-Specific Active Suicidal Thoughts  No   Self Injury other (see comment)  (none stated/observed)   Elopement   (N/A)   Activity restless   Speech rambling   Medication Sensitivity no stated side effects;no observed side effects   Psychomotor / Gait balanced;steady   Psycho Education   Type of Intervention 1:1 intervention   Response participates, initiates socially appropriate   Hours 0.5   Treatment Detail check in   Activities of Daily Living   Hygiene/Grooming independent   Oral Hygiene independent   Dress independent   Laundry with supervision   Room Organization independent

## 2019-07-25 NOTE — PROGRESS NOTES
Pt given MR x 1 dose of Trazodone 50 mg for sleep, at beginning of shift, slept approximately 3.5 hours, in and out of room during the night.

## 2019-07-26 PROCEDURE — 25000132 ZZH RX MED GY IP 250 OP 250 PS 637: Mod: GY | Performed by: NURSE PRACTITIONER

## 2019-07-26 PROCEDURE — 99232 SBSQ HOSP IP/OBS MODERATE 35: CPT | Performed by: PSYCHIATRY & NEUROLOGY

## 2019-07-26 PROCEDURE — G0177 OPPS/PHP; TRAIN & EDUC SERV: HCPCS

## 2019-07-26 PROCEDURE — 25000132 ZZH RX MED GY IP 250 OP 250 PS 637: Mod: GY | Performed by: PSYCHIATRY & NEUROLOGY

## 2019-07-26 PROCEDURE — 12400001 ZZH R&B MH UMMC

## 2019-07-26 RX ADMIN — DIVALPROEX SODIUM 750 MG: 500 TABLET, FILM COATED, EXTENDED RELEASE ORAL at 20:38

## 2019-07-26 RX ADMIN — OLANZAPINE 15 MG: 15 TABLET, ORALLY DISINTEGRATING ORAL at 07:38

## 2019-07-26 RX ADMIN — GUAIFENESIN 600 MG: 600 TABLET, EXTENDED RELEASE ORAL at 20:37

## 2019-07-26 RX ADMIN — TRAZODONE HYDROCHLORIDE 50 MG: 50 TABLET ORAL at 00:27

## 2019-07-26 RX ADMIN — LORAZEPAM 2 MG: 1 TABLET ORAL at 12:23

## 2019-07-26 RX ADMIN — HYDROXYZINE HYDROCHLORIDE 50 MG: 25 TABLET ORAL at 12:24

## 2019-07-26 RX ADMIN — GUAIFENESIN 600 MG: 600 TABLET, EXTENDED RELEASE ORAL at 07:38

## 2019-07-26 RX ADMIN — NICOTINE 1 PATCH: 21 PATCH, EXTENDED RELEASE TRANSDERMAL at 07:38

## 2019-07-26 RX ADMIN — OLANZAPINE 15 MG: 15 TABLET, ORALLY DISINTEGRATING ORAL at 20:37

## 2019-07-26 ASSESSMENT — ACTIVITIES OF DAILY LIVING (ADL)
LAUNDRY: UNABLE TO COMPLETE
HYGIENE/GROOMING: INDEPENDENT
ORAL_HYGIENE: INDEPENDENT
DRESS: STREET CLOTHES

## 2019-07-26 NOTE — PROGRESS NOTES
Patient slept for 6.5 hours. She received prn Trazodone for sleep. No other complaints or concerns voiced by patient or noted by staff. Will continue to monitor and update if there are changes.    1. What PRN did patient receive? Trazodone.    2. What was the patient doing that led to the PRN medication? Awake.    3. Did they require R/S? No.    4. Side effects to PRN medication? No.    5. After 1 Hour, patiet appeared: Sleeping.

## 2019-07-26 NOTE — PROGRESS NOTES
Pt  was visible in lounge much of shift. She attended select groups w/ redirection. Pt was observed talking to herself in her room & began yelling & crying at one point. She fell asleep shortly thereafter. Pt denies si/sib . She is quite delusional, stating she could make someone disintegrate if she licked their DNA. Pt is quite focused on a certain male psych associate. She tends to fixate on male psych associates at times but is directable. Pt had a pleasant day overall     07/26/19 1400   Behavioral Health   Hallucinations appears responding   Thinking delusional   Orientation person: oriented   Memory short term   Insight denial of illness;insight appropriate to situation   Judgement impaired   Eye Contact at examiner   Affect incongruent   Mood labile   Physical Appearance/Attire disheveled   Hygiene other (see comment)  (fair)   Suicidality other (see comments)  (none)   1. Wish to be Dead No   2. Non-Specific Active Suicidal Thoughts  No   Self Injury other (see comment)  (none)   Activity other (see comment)  (visble in milieu, attends some groups)   Speech tangential   Psycho Education   Type of Intervention 1:1 intervention   Response participates with encouragement   Hours 0.5   Treatment Detail check in   Activities of Daily Living   Hygiene/Grooming independent   Oral Hygiene independent   Dress street clothes   Laundry unable to complete   Room Organization independent

## 2019-07-26 NOTE — PROGRESS NOTES
[facility], [place]   Psychiatric Progress Note  Fannie Jeter  [id number]  07/26/19    Chief Complaint: Continued medical care          Interim History:   The patient's care was discussed with the treatment team during the daily team meeting and/or staff's chart notes were reviewed.  Staff report patient has been having some tachycardia mentions something about a fake pregnancy and people are killing her with medications.  She became agitated with being searched after going to court.  She slept about 6 hours overnight.    Upon visiting with her she says that she is feeling tired and she has some future business idea about her knocking on people's doors to wake him up in the morning if they needed.  She also may be involved in some type of movement class to help other people in her building.  She does describe some logical thoughts about not giving out her phone #2 other people on the unit that are strangers and having some boundaries.  She denies any thoughts of harming herself or others any energy problems or attention or concentration issues.  Does not mention any paranoia but does mention rapid thoughts and no side effects from medications currently.  He appears to be anxious about staying in the hospital and is informed that she will not be leaving until after the court process and she has been stabilized.  She goes on to talk about being connected to many people but not necessarily them being inside of her body.  She then talks about political believes and may be has auditory hallucinations of someone by the name of Azar who is derogatory and of lozano .  She then mentions that she has had sexual relations with  overnight.  She seems to unravel at this point and abruptly ends the interview is not able to regulate her emotions and becomes irritable and stormed off.         Medications:       divalproex sodium extended-release  750 mg Oral At Bedtime     guaiFENesin   "600 mg Oral BID     hydrocortisone   Topical BID     nicotine  1 patch Transdermal Daily     nicotine   Transdermal Q8H     nicotine   Transdermal Daily     OLANZapine zydis  15 mg Oral BID          Allergies:     Allergies   Allergen Reactions     Animal Dander      Haldol Difficulty breathing     Haldol [Haloperidol]      Penicillins      Unsure of what happens. Has been told she has allergies since she was a kid.     Penicillins      Seasonal Allergies           Labs:   No results found for this or any previous visit (from the past 24 hour(s)).       Psychiatric Examination:     /78   Pulse 106   Temp 99.7  F (37.6  C) (Tympanic)   Resp 16   Ht 1.676 m (5' 6\")   Wt 80.8 kg (178 lb 3.2 oz)   LMP 07/03/2014   SpO2 96%   BMI 28.76 kg/m    Weight is 178 lbs 3.2 oz  Body mass index is 28.76 kg/m .                Sitting Orthostatic BP: 121/86      Sitting Orthostatic Pulse: 101 bpm      Standing Orthostatic BP: 104/59      Standing Orthostatic Pulse: 103 bpm       Weight over time:  Vitals:    07/17/19 1857 07/18/19 0806 07/21/19 0752 07/23/19 0700   Weight: 79.1 kg (174 lb 6.4 oz) 79.1 kg (174 lb 4.8 oz) 80.4 kg (177 lb 3.2 oz) 80.8 kg (178 lb 3.2 oz)       Orthostatic Vitals       Most Recent      Sitting Orthostatic /86 07/26 0700    Sitting Orthostatic Pulse (bpm) 101 07/26 0700    Standing Orthostatic /59 07/26 0700    Standing Orthostatic Pulse (bpm) 103 07/26 0700            Cardiometabolic risk assessment. 07/26/19      Reviewed patient profile for cardiometabolic risk factors    Date taken /Value  REFERENCE RANGE   Abdominal Obesity  (Waist Circumference)   See nursing flowsheet Women ?35 in (88 cm)   Men ?40 in (102 cm)      Triglycerides  Triglycerides   Date Value Ref Range Status   07/18/2019 88 <150 mg/dL Final       ?150 mg/dL (1.7 mmol/L) or current treatment for elevated triglycerides   HDL cholesterol  HDL Cholesterol   Date Value Ref Range Status   07/18/2019 55 >49 mg/dL " Final   ]   Women <50 mg/dL (1.3 mmol/L) in women or current treatment for low HDL cholesterol  Men <40 mg/dL (1 mmol/L) in men or current treatment for low HDL cholesterol     Fasting plasma glucose (FPG) Lab Results   Component Value Date    GLC 97 07/18/2019      FPG ?100 mg/dL (5.6 mmol/L) or treatment for elevated blood glucose   Blood pressure  BP Readings from Last 3 Encounters:   07/25/19 118/78   07/10/19 119/82   04/01/18 102/63    Blood pressure ?130/85 mmHg or treatment for elevated blood pressure   Family History  See family history         Fannie is a 53-year-old female with long gray hair.  Her speech is of an appropriate rate and tone and her language is intact.  Her behavior is appropriate and she does not have any abnormal movements.  Her affect is labile.  Her mood she describes as tired.  Her thought content consists of the above without thoughts of harming herself or others with continued delusions.  Her thought process is notable for tangents and looseness of association.  She continues to have abnormal perceptions.  She is alert and aware of her current location but not completely of circumstances.  Her attention and concentration is limited.  Her cognition and fund of knowledge is also limited.  Her long-term/short-term/remote memory is limited.  Her insight and judgment are both limited to impaired.         Precautions:     Behavioral Orders   Procedures     Code 1 - Restrict to Unit     Petition for commitment     MI with [facility]     Routine Programming     As clinically indicated     Sexual precautions     Single Room     Acute psychosis     Status 15     Every 15 minutes.          DIagnoses:     Schizoaffective disorder bipolar type  Rule out posttraumatic stress disorder         Assessment & Plan:     Fannie continues to experience psychotic symptoms as described above in both hallucinations, delusions, and elevation in mood and grandiose thoughts.  She has not had  any recent hypersexual behaviors but does mention having sex on the unit with .  She is unable to manage her symptoms towards the end of our conversation and gives me tons of information about her current function.  We will continue her current medication unchanged at this time but may need to start to consider increasing.    Continue court hold    The risks, benefits, alternatives and side effects have been discussed and are understood by the patient and other caregivers.      Jose Francisco Tovar  [facility] Psychiatry      The following document has been created with voice recognition software and may contain unintentional word substitutions.    Non clinically relevant CMS requirements:  Clinical Global Impressions  First:  Considering your total clinical experience with this particular patient population, how severe are the patient's symptoms at this time?: 7 (07/18/19 0652)  Compared to the patient's condition at the START of treatment, this patient's condition is:: 4 (07/18/19 0652)  Most recent:  Considering your total clinical experience with this particular patient population, how severe are the patient's symptoms at this time?: 7 (07/26/19 1441)  Compared to the patient's condition at the START of treatment, this patient's condition is:: 4 (07/26/19 1441)

## 2019-07-26 NOTE — PLAN OF CARE
"  Problem: Adult Behavioral Health Plan of Care  Goal: Optimized Coping Skills in Response to Life Stressors  Outcome: No Change     Problem: Cognitive Impairment (Psychotic Signs/Symptoms)  Goal: Improved Thought Clarity and Organization (Psychotic Signs/Symptoms)  Outcome: No Change     Problem: Adult Inpatient Plan of Care  Goal: Optimal Comfort and Wellbeing  Outcome: Improving     Problem: Mental State/Mood Impairment (Psychotic Signs/Symptoms)  Goal: Improved Mental State and Mood (Psychotic Signs/Symptoms)  Outcome: Improving    [facility] and [facility].  She is labile.  Crying at the start of the shift about not getting nicotine gum.  \"Abby talked to 3 people\"  Orders received for gum instead of the losenge.  [facility] talking to self and making inappropriate comments, some of which had to do with faking a pregnancy\"  At times she was calm.  When this writer administered her scheduled Depakote she cried out \"You're killing me by forcing me to take this.  I overdosed on a bottle of Depakote and Lithium. She was crying and upset but went to room.  "

## 2019-07-27 LAB
ALBUMIN UR-MCNC: NEGATIVE MG/DL
APPEARANCE UR: CLEAR
BILIRUB UR QL STRIP: NEGATIVE
COLOR UR AUTO: ABNORMAL
GLUCOSE UR STRIP-MCNC: NEGATIVE MG/DL
HGB UR QL STRIP: NEGATIVE
KETONES UR STRIP-MCNC: 5 MG/DL
LEUKOCYTE ESTERASE UR QL STRIP: NEGATIVE
NITRATE UR QL: NEGATIVE
PH UR STRIP: 7 PH (ref 5–7)
RBC #/AREA URNS AUTO: 1 /HPF (ref 0–2)
SOURCE: ABNORMAL
SP GR UR STRIP: 1.01 (ref 1–1.03)
UROBILINOGEN UR STRIP-MCNC: NORMAL MG/DL (ref 0–2)
WBC #/AREA URNS AUTO: 2 /HPF (ref 0–5)

## 2019-07-27 PROCEDURE — 25000132 ZZH RX MED GY IP 250 OP 250 PS 637: Mod: GY | Performed by: NURSE PRACTITIONER

## 2019-07-27 PROCEDURE — 25000132 ZZH RX MED GY IP 250 OP 250 PS 637: Mod: GY | Performed by: PSYCHIATRY & NEUROLOGY

## 2019-07-27 PROCEDURE — 81001 URINALYSIS AUTO W/SCOPE: CPT | Performed by: PSYCHIATRY & NEUROLOGY

## 2019-07-27 PROCEDURE — 12400001 ZZH R&B MH UMMC

## 2019-07-27 RX ADMIN — NICOTINE POLACRILEX 2 MG: 2 GUM, CHEWING ORAL at 18:00

## 2019-07-27 RX ADMIN — GUAIFENESIN 600 MG: 600 TABLET, EXTENDED RELEASE ORAL at 07:40

## 2019-07-27 RX ADMIN — DIVALPROEX SODIUM 750 MG: 500 TABLET, FILM COATED, EXTENDED RELEASE ORAL at 20:07

## 2019-07-27 RX ADMIN — NICOTINE POLACRILEX 2 MG: 2 GUM, CHEWING ORAL at 14:37

## 2019-07-27 RX ADMIN — OLANZAPINE 15 MG: 15 TABLET, ORALLY DISINTEGRATING ORAL at 07:40

## 2019-07-27 RX ADMIN — GUAIFENESIN 600 MG: 600 TABLET, EXTENDED RELEASE ORAL at 20:07

## 2019-07-27 RX ADMIN — OLANZAPINE 15 MG: 15 TABLET, ORALLY DISINTEGRATING ORAL at 20:07

## 2019-07-27 RX ADMIN — HYDROCORTISONE: 1 CREAM TOPICAL at 10:59

## 2019-07-27 RX ADMIN — NICOTINE 1 PATCH: 21 PATCH, EXTENDED RELEASE TRANSDERMAL at 07:41

## 2019-07-27 ASSESSMENT — ACTIVITIES OF DAILY LIVING (ADL)
HYGIENE/GROOMING: INDEPENDENT
ORAL_HYGIENE: INDEPENDENT
ORAL_HYGIENE: INDEPENDENT
DRESS: SCRUBS (BEHAVIORAL HEALTH)
DRESS: SCRUBS (BEHAVIORAL HEALTH);INDEPENDENT
HYGIENE/GROOMING: HANDWASHING;INDEPENDENT
LAUNDRY: UNABLE TO COMPLETE

## 2019-07-27 NOTE — PROGRESS NOTES
Pt is reporting itching and irritation in her vaginal area. Pt also says she has some urgency and pain when urinating and that she has been having some bloody spotting for the past couple days. UA was ordered to test for possible UTI or yeast infection. Will continue to monitor.

## 2019-07-27 NOTE — PROGRESS NOTES
"Patient spent majority of the day in lounge among peers.  Attended Community Meeting this morning.  Expressed desire to watch a movie (Good Will Hunting) today.  Denies SI or any significant thought disturbances.  However, patient became \"weepy\" during 1:1 talk with this staff.  Patient states that she wants to become the next female \"Jasiel\", even thought she is older.  Otherwise, generally pleasant and cooperative with unit routine.  "

## 2019-07-28 PROCEDURE — 25000132 ZZH RX MED GY IP 250 OP 250 PS 637: Mod: GY | Performed by: PSYCHIATRY & NEUROLOGY

## 2019-07-28 PROCEDURE — 25000132 ZZH RX MED GY IP 250 OP 250 PS 637: Mod: GY | Performed by: NURSE PRACTITIONER

## 2019-07-28 PROCEDURE — 12400001 ZZH R&B MH UMMC

## 2019-07-28 PROCEDURE — H2032 ACTIVITY THERAPY, PER 15 MIN: HCPCS

## 2019-07-28 RX ADMIN — OLANZAPINE 15 MG: 15 TABLET, ORALLY DISINTEGRATING ORAL at 08:00

## 2019-07-28 RX ADMIN — NICOTINE POLACRILEX 2 MG: 2 GUM, CHEWING ORAL at 19:06

## 2019-07-28 RX ADMIN — LORAZEPAM 2 MG: 1 TABLET ORAL at 00:22

## 2019-07-28 RX ADMIN — ALBUTEROL SULFATE 2 PUFF: 90 AEROSOL, METERED RESPIRATORY (INHALATION) at 00:20

## 2019-07-28 RX ADMIN — DIVALPROEX SODIUM 750 MG: 500 TABLET, FILM COATED, EXTENDED RELEASE ORAL at 21:07

## 2019-07-28 RX ADMIN — HYDROXYZINE HYDROCHLORIDE 50 MG: 25 TABLET ORAL at 21:31

## 2019-07-28 RX ADMIN — OLANZAPINE 15 MG: 15 TABLET, ORALLY DISINTEGRATING ORAL at 21:09

## 2019-07-28 RX ADMIN — NICOTINE 1 PATCH: 21 PATCH, EXTENDED RELEASE TRANSDERMAL at 08:00

## 2019-07-28 RX ADMIN — HYDROCORTISONE: 1 CREAM TOPICAL at 08:01

## 2019-07-28 RX ADMIN — GUAIFENESIN 600 MG: 600 TABLET, EXTENDED RELEASE ORAL at 08:00

## 2019-07-28 RX ADMIN — NICOTINE POLACRILEX 2 MG: 2 GUM, CHEWING ORAL at 16:19

## 2019-07-28 RX ADMIN — GUAIFENESIN 600 MG: 600 TABLET, EXTENDED RELEASE ORAL at 21:07

## 2019-07-28 RX ADMIN — NICOTINE POLACRILEX 2 MG: 2 GUM, CHEWING ORAL at 08:27

## 2019-07-28 ASSESSMENT — ACTIVITIES OF DAILY LIVING (ADL)
HYGIENE/GROOMING: HANDWASHING;INDEPENDENT
DRESS: SCRUBS (BEHAVIORAL HEALTH)
DRESS: SCRUBS (BEHAVIORAL HEALTH);INDEPENDENT
ORAL_HYGIENE: INDEPENDENT
HYGIENE/GROOMING: INDEPENDENT
ORAL_HYGIENE: INDEPENDENT

## 2019-07-28 NOTE — PROGRESS NOTES
"Pt yelling from room \"help me, help me, help me.\" This writer went to doorway of room. Pt sitting on floor of bathroom stating \"My baby is not kicking and I am not breathing.\" Pt reassured and redirected to day room for inhaler. She was redirectable and able to take slow steady breaths on command with reassurance. Her primary RN notified she was requesting inhaler.   "

## 2019-07-28 NOTE — PLAN OF CARE
"This writer had a lengthy 1:1 with this patient with intentions of clearing up incomplete admission requirements.  Patient would go in and out of third person, at times the former artist known as Jasiel was speaking or some other unknown person.  Patient was excited to share with this writer \"although I cannot find my beautiful daughter Carmen Garcia who is 33 because she is on Esther's most wanted list, I found my new daughter's today here in the hospital\" and the patient names an RN who works on the unit and a patient (MURIEL. W.) currently on the unit.  Patient also reports anger towards patient E. B. stating her belief this patient is 's former wife, Katina Cha, although does not resemble her.  Patient seems increasingly delusional despite the fact that she has been more cooperative.  "

## 2019-07-28 NOTE — PROGRESS NOTES
Patient spent most of the day in milieu.  Social with a few peers.  Watched some TV as well.  Denies SI or any significant thought disturbances today.  States her mood fluctuates throughout the day.  Otherwise, has been generally pleasant and cooperative.

## 2019-07-28 NOTE — PROGRESS NOTES
"[facility] and started crying hysterically stating one of \"'s friends stole her music and is making millions.  She is not getting the credit or the money and there is nothing she can do.  She also said she can not feel the baby inside of her.  Pt c/o shortness of breath prn inhailer administered along with ativan 2 mg po.  After a long 1:1 with pt she went to her room to go to sleep.  "

## 2019-07-29 PROCEDURE — 25000128 H RX IP 250 OP 636: Performed by: NURSE PRACTITIONER

## 2019-07-29 PROCEDURE — G0177 OPPS/PHP; TRAIN & EDUC SERV: HCPCS

## 2019-07-29 PROCEDURE — 99232 SBSQ HOSP IP/OBS MODERATE 35: CPT | Performed by: PSYCHIATRY & NEUROLOGY

## 2019-07-29 PROCEDURE — 25000132 ZZH RX MED GY IP 250 OP 250 PS 637: Mod: GY | Performed by: NURSE PRACTITIONER

## 2019-07-29 PROCEDURE — 25000132 ZZH RX MED GY IP 250 OP 250 PS 637: Mod: GY | Performed by: PSYCHIATRY & NEUROLOGY

## 2019-07-29 PROCEDURE — 12400001 ZZH R&B MH UMMC

## 2019-07-29 RX ADMIN — OLANZAPINE 15 MG: 15 TABLET, ORALLY DISINTEGRATING ORAL at 08:10

## 2019-07-29 RX ADMIN — OLANZAPINE 10 MG: 10 INJECTION, POWDER, FOR SOLUTION INTRAMUSCULAR at 12:59

## 2019-07-29 RX ADMIN — NICOTINE POLACRILEX 2 MG: 2 GUM, CHEWING ORAL at 14:56

## 2019-07-29 RX ADMIN — NICOTINE POLACRILEX 2 MG: 2 GUM, CHEWING ORAL at 20:58

## 2019-07-29 RX ADMIN — OLANZAPINE 15 MG: 15 TABLET, ORALLY DISINTEGRATING ORAL at 20:58

## 2019-07-29 RX ADMIN — NICOTINE 1 PATCH: 21 PATCH, EXTENDED RELEASE TRANSDERMAL at 08:10

## 2019-07-29 RX ADMIN — GUAIFENESIN 600 MG: 600 TABLET, EXTENDED RELEASE ORAL at 08:10

## 2019-07-29 RX ADMIN — DIVALPROEX SODIUM 750 MG: 500 TABLET, FILM COATED, EXTENDED RELEASE ORAL at 20:58

## 2019-07-29 RX ADMIN — GUAIFENESIN 600 MG: 600 TABLET, EXTENDED RELEASE ORAL at 20:58

## 2019-07-29 ASSESSMENT — ACTIVITIES OF DAILY LIVING (ADL)
DRESS: INDEPENDENT
LAUNDRY: WITH SUPERVISION
ORAL_HYGIENE: INDEPENDENT
LAUNDRY: WITH SUPERVISION
DRESS: INDEPENDENT
ORAL_HYGIENE: INDEPENDENT
LAUNDRY: WITH SUPERVISION
HYGIENE/GROOMING: INDEPENDENT
HYGIENE/GROOMING: INDEPENDENT
ORAL_HYGIENE: INDEPENDENT
DRESS: INDEPENDENT
HYGIENE/GROOMING: INDEPENDENT

## 2019-07-29 NOTE — PROGRESS NOTES
" 07/28/19 1300   Art Therapy   Type of Intervention structured groups   Response Engaged/ with encouragement   Hours 1   Treatment Detail Art Therapy-hierarchy of joys   Problem-  . delusional    Goal- cope, express, regulate through Art Therapy directives     Outcome- pt checked in with group saying she was sitting by the window because she wanted to create some distance from the group because she is getting closer to leaving the hospital. Her joys are \" comfort food, first kisses and her new reeboks.\" Writer encouraged her to do images to go with her lengthy writings about tangential topics including , Fab Yeung, spirituality, third eye, her daughter, and other nonlinear topics. Writer encouraged her to make images to go with her writing and she got irritated and said writer wasn't letting her express her self in her own artisitic way. She didn't follow the project and did her own writing. She wanted to present to the group and she sung an entire song with this narrative. She appears to have no insight about her delusions.     "
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
## 2019-07-29 NOTE — PROGRESS NOTES
"Patient has been visible on the milieu, watching movies, talking with peers and staff. Patient appears to be experiencing delusions, though it's unclear if this is her baseline. Her associations are loose, talking about a [employer] program and how a different pt took her legal paper (though she found it several minutes later in her room). Rehana's eye contact is more at writer today, affect is blunted but has been observed smiling and crying. Patient did eat all of her dinner. She talked about having \"all these personalities\" in her. RN notified. She continues to talk about her other half of . She attended art therapy. Denies SI/SIB/HI. Endorses AH, unclear to for VH.       07/28/19 2200   Behavioral Health   Hallucinations denies / not responding to hallucinations   Thinking distractable   Orientation person: oriented;place: oriented;date: oriented   Insight poor   Judgement impaired   Affect blunted, flat   Mood anxious;depressed;mood is calm   Physical Appearance/Attire untidy   Hygiene other (see comment)  (acceptable)   Suicidality other (see comments)  (denies)   1. Wish to be Dead No   2. Non-Specific Active Suicidal Thoughts  No   Change in Protective Factors? No   Enviromental Risk Factors None   Self Injury other (see comment)  (pt denies)   Elopement Statements about wanting to leave   Activity other (see comment)  (socializing with some peers, talking at the television)   Speech clear;coherent   Medication Sensitivity no stated side effects;no observed side effects   Psychomotor / Gait balanced;steady   Psycho Education   Type of Intervention 1:1 intervention   Response participates with encouragement   Hours 0.5   Treatment Detail 1:1 check-in   Activities of Daily Living   Hygiene/Grooming independent   Oral Hygiene independent   Dress scrubs (behavioral health)   Room Organization independent     "

## 2019-07-29 NOTE — PLAN OF CARE
Pt. Willing to engage in a 1:1 with staff.  Pt. Was pleasant and cooperative.  Pt. Denies suicidal ideation; pt.'s thoughts appear reality based and clear.  Pt. States that she is motivated for CD treatment and that she is ready to attend the program whenever the discharge plans are completed.

## 2019-07-30 PROCEDURE — 25000132 ZZH RX MED GY IP 250 OP 250 PS 637: Mod: GY | Performed by: PSYCHIATRY & NEUROLOGY

## 2019-07-30 PROCEDURE — 12400001 ZZH R&B MH UMMC

## 2019-07-30 PROCEDURE — G0177 OPPS/PHP; TRAIN & EDUC SERV: HCPCS

## 2019-07-30 PROCEDURE — 25000132 ZZH RX MED GY IP 250 OP 250 PS 637: Mod: GY | Performed by: NURSE PRACTITIONER

## 2019-07-30 PROCEDURE — 99232 SBSQ HOSP IP/OBS MODERATE 35: CPT | Performed by: PSYCHIATRY & NEUROLOGY

## 2019-07-30 RX ORDER — AMOXICILLIN 250 MG
1 CAPSULE ORAL AT BEDTIME
Status: DISCONTINUED | OUTPATIENT
Start: 2019-07-30 | End: 2019-08-07 | Stop reason: HOSPADM

## 2019-07-30 RX ORDER — OLANZAPINE 10 MG/1
20 TABLET, ORALLY DISINTEGRATING ORAL 2 TIMES DAILY
Status: DISCONTINUED | OUTPATIENT
Start: 2019-07-30 | End: 2019-08-07 | Stop reason: HOSPADM

## 2019-07-30 RX ADMIN — DIVALPROEX SODIUM 750 MG: 500 TABLET, FILM COATED, EXTENDED RELEASE ORAL at 21:35

## 2019-07-30 RX ADMIN — NICOTINE POLACRILEX 2 MG: 2 GUM, CHEWING ORAL at 16:49

## 2019-07-30 RX ADMIN — LORAZEPAM 2 MG: 1 TABLET ORAL at 16:49

## 2019-07-30 RX ADMIN — SENNOSIDES AND DOCUSATE SODIUM 1 TABLET: 8.6; 5 TABLET ORAL at 21:35

## 2019-07-30 RX ADMIN — OLANZAPINE 20 MG: 10 TABLET, ORALLY DISINTEGRATING ORAL at 21:36

## 2019-07-30 RX ADMIN — HYDROCORTISONE: 1 CREAM TOPICAL at 21:36

## 2019-07-30 RX ADMIN — NICOTINE 1 PATCH: 21 PATCH, EXTENDED RELEASE TRANSDERMAL at 08:40

## 2019-07-30 RX ADMIN — GUAIFENESIN 600 MG: 600 TABLET, EXTENDED RELEASE ORAL at 21:35

## 2019-07-30 RX ADMIN — NICOTINE POLACRILEX 2 MG: 2 GUM, CHEWING ORAL at 10:57

## 2019-07-30 RX ADMIN — Medication 10 MG: at 17:24

## 2019-07-30 RX ADMIN — GUAIFENESIN 600 MG: 600 TABLET, EXTENDED RELEASE ORAL at 08:40

## 2019-07-30 RX ADMIN — OLANZAPINE 15 MG: 15 TABLET, ORALLY DISINTEGRATING ORAL at 08:40

## 2019-07-30 ASSESSMENT — MIFFLIN-ST. JEOR: SCORE: 1466.34

## 2019-07-30 ASSESSMENT — ACTIVITIES OF DAILY LIVING (ADL)
HYGIENE/GROOMING: INDEPENDENT
DRESS: SCRUBS (BEHAVIORAL HEALTH)
LAUNDRY: UNABLE TO COMPLETE
ORAL_HYGIENE: INDEPENDENT
LAUNDRY: WITH SUPERVISION
HYGIENE/GROOMING: PROMPTS
DRESS: INDEPENDENT
ORAL_HYGIENE: INDEPENDENT
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
## 2019-07-30 NOTE — PROGRESS NOTES
Attended 1 of 3 OT groups offered. Disorganized in task completion and refused assistance and/or suggestions. Put task away and refused to work on or discuss it any further. Did work on paper pencil activity x 35 minutes. Avoided discussion regarding goals and moving toward discharge.

## 2019-07-30 NOTE — PLAN OF CARE
Pt. exhibits no significant change.  Pt. appears to be delusional, ie. screaming out that she is not safe.  At this time the pt. appears to be responding to internal stimuli.  Pt. With encouragement accepts her medications.  Pt. appears disheveled overall.

## 2019-07-30 NOTE — PROGRESS NOTES
Writer completed DA and faxed back to Kassi Gayle. Writer confirmed through [employer] that it reached it's destination.

## 2019-07-30 NOTE — PROGRESS NOTES
"Pt. was irritable towards end of shift. Pt stormed out of her room to the  and stating that she is constipated bcausse she is \"taking it out by myself\". Pt. also demanded a diaper which was given to her. Pt had many requests. Mood was labile because she would apologize soon after being irritable.  No other concerns,  "

## 2019-07-30 NOTE — PROGRESS NOTES
"   07/29/19 2100   Therapeutic Recreation   Type of Intervention structured groups   Activity game   Response Refuses     Pt briefly attended the Therapeutic Recreation group with focus on leisure participation, social engagement, and stress reduction.  Pt chose not to participate in the group recreational intervention via a group drawing game.  Pt whispered to this writer, asking if she needed to leave group. This writer encouraged pt to stay for group, then pt whispered that \"she\" was only here for this writer's attention. Pt motioned with her eyes and head when pt stated \"she\", but directed towards an open area in the room. Pt then left group after a few minutes. Pt returned to group, but declined to participate in the activity and passively observed for several minutes before leaving for the remainder of the group.    "

## 2019-07-30 NOTE — PROGRESS NOTES
"Attended 1 of 2 OT groups offered. Respectfully asked to attend group. Was attentive and focused on group topic 80% of group. The other 20 % she was loose in conversations. Talked about \"too many voices in my head, people out to sabotage me and people chasing me'.  Did respond(verbally and behaviorally) to visual cues she was getting off topic. Did identify MH sx's and causes for some exacerbation of sx's. Was open to strategies she could implement.  "

## 2019-07-30 NOTE — PROGRESS NOTES
"\"I have a pain right here (pointing to gut) that's worse than heroin.\" \"I use Juul 5 mg, can't I get 5 mg?.  I am sovereign.  If you can't keep up than you should retire.\"  She proceeded to call this writer names.  Nicotine and Ativan PRN given.    "

## 2019-07-30 NOTE — PROGRESS NOTES
Phone call received from [employer] , Marques Robles, calling from [phone] requesting update on clinical for pt.  Trial is today at 2:15pm.

## 2019-07-31 PROCEDURE — 25000132 ZZH RX MED GY IP 250 OP 250 PS 637: Mod: GY | Performed by: PSYCHIATRY & NEUROLOGY

## 2019-07-31 PROCEDURE — 25000132 ZZH RX MED GY IP 250 OP 250 PS 637: Mod: GY | Performed by: NURSE PRACTITIONER

## 2019-07-31 PROCEDURE — 99232 SBSQ HOSP IP/OBS MODERATE 35: CPT | Performed by: PSYCHIATRY & NEUROLOGY

## 2019-07-31 PROCEDURE — H2032 ACTIVITY THERAPY, PER 15 MIN: HCPCS

## 2019-07-31 PROCEDURE — 12400001 ZZH R&B MH UMMC

## 2019-07-31 PROCEDURE — G0177 OPPS/PHP; TRAIN & EDUC SERV: HCPCS

## 2019-07-31 RX ORDER — DIVALPROEX SODIUM 500 MG/1
1000 TABLET, EXTENDED RELEASE ORAL AT BEDTIME
Status: DISCONTINUED | OUTPATIENT
Start: 2019-07-31 | End: 2019-08-02

## 2019-07-31 RX ADMIN — NICOTINE 1 PATCH: 21 PATCH, EXTENDED RELEASE TRANSDERMAL at 09:56

## 2019-07-31 RX ADMIN — OLANZAPINE 20 MG: 10 TABLET, ORALLY DISINTEGRATING ORAL at 21:51

## 2019-07-31 RX ADMIN — GUAIFENESIN 600 MG: 600 TABLET, EXTENDED RELEASE ORAL at 09:56

## 2019-07-31 RX ADMIN — NICOTINE POLACRILEX 2 MG: 2 GUM, CHEWING ORAL at 10:21

## 2019-07-31 RX ADMIN — DIVALPROEX SODIUM 1000 MG: 500 TABLET, EXTENDED RELEASE ORAL at 21:51

## 2019-07-31 RX ADMIN — SENNOSIDES AND DOCUSATE SODIUM 1 TABLET: 8.6; 5 TABLET ORAL at 21:51

## 2019-07-31 RX ADMIN — GUAIFENESIN 600 MG: 600 TABLET, EXTENDED RELEASE ORAL at 21:52

## 2019-07-31 RX ADMIN — OLANZAPINE 20 MG: 10 TABLET, ORALLY DISINTEGRATING ORAL at 09:56

## 2019-07-31 ASSESSMENT — ACTIVITIES OF DAILY LIVING (ADL)
LAUNDRY: UNABLE TO COMPLETE
HYGIENE/GROOMING: INDEPENDENT
DRESS: SCRUBS (BEHAVIORAL HEALTH)
ORAL_HYGIENE: INDEPENDENT

## 2019-07-31 NOTE — PROGRESS NOTES
"Received fax from [address] containing \"Order for Commitment\" and Order Authorizing Use of Neuroleptic Medication.  Filed in chart.  Pt was sleeping when this CTC attempted to give her a copy.  "
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
## 2019-07-31 NOTE — PROGRESS NOTES
"BEHAVIORAL TEAM DISCUSSION    Participants: [facility]; Vernell Kee RN  Progress: pt remains in the court process with a \"order for continuing hold\".  She is disorganized and vacillates between behavioral regulation and disruption.  Delusional features remain evident.  Continued Stay Criteria/Rationale: pt continues to require acute level of care  Medical/Physical: uneventful  Precautions:   Behavioral Orders   Procedures     Code 1 - Restrict to Unit     Petition for commitment     MI with [facility]     Routine Programming     As clinically indicated     Sexual precautions     Single Room     Acute psychosis     Status 15     Every 15 minutes.     Plan: continue to work with court process.  Pt will continue to receive regular psychiatric care and milieu management. DA was filled out and submitted for case management.  Rationale for change in precautions or plan: no change     "

## 2019-07-31 NOTE — PROGRESS NOTES
Behavioral Health  Note   Behavioral Health  Spirituality Group Note     Unit [address]    Name: Fannie Jeter    YOB: 1965   MRN: [id number]    Age: 53 year old     Patient attended -led group, which included discussion of spirituality, coping with illness and building resilience.   Patient attended group for 1.0 hrs.   patient minimally participated, but was respectful to the group process. Pt cried during the group and she have some poor concentration during the group discussions.     [facility]  Staff    Page [phone]

## 2019-07-31 NOTE — PROGRESS NOTES
Attended 1 of 2 OT groups offered. With simplified diagram was able to understand group focus and discuss positive directives she could take to improve communication with MD and other care providers. Redirected with 1 cue.  Encourage to get thoughts organized  by writing them down and logically prioritizing.

## 2019-07-31 NOTE — PROGRESS NOTES
07/30/19 2200   Behavioral Health   Hallucinations appears responding   Thinking delusional;distractable;confused   Orientation person: oriented   Memory confabulation   Insight poor   Judgement impaired   Eye Contact at examiner   Affect full range affect   Mood elated   Physical Appearance/Attire disheveled   Hygiene neglected grooming - unclean body, hair, teeth   Suicidality other (see comments)  (none stated)   1. Wish to be Dead No   2. Non-Specific Active Suicidal Thoughts  No   Activity restless   Speech flight of ideas;rambling;pressured   Psychomotor / Gait fast   Activities of Daily Living   Hygiene/Grooming independent   Oral Hygiene independent   Dress scrubs (behavioral health)   Laundry unable to complete   Room Organization independent   Activity   Activity Assistance Provided independent

## 2019-07-31 NOTE — PROGRESS NOTES
[facility], [place]   Psychiatric Progress Note  Fannie Jeter  [id number]  07/31/19    Chief Complaint: Continued medical care          Interim History:   The patient's care was discussed with the treatment team during the daily team meeting and/or staff's chart notes were reviewed.  Staff report patient has been seeing people are sabotaging her rambling and disorganized.    Upon visiting with her she says that she is feeling okay she wants to go home and starts crying.  She denies any thoughts of harming herself or others any hopelessness or helplessness but does have some memory deficits.  She says that  is not currently in control of her.  She apologizes for recent disorganization behaviors.  She is still anxious and financially concerned though starts discussing about some kind of ring she wants to buy from a magazine.  Is still disorganized and having some looseness of association during conversation.  Mentions some believes about energy entering into her body and cause a restless leg syndrome.  Endorses auditory hallucinations and says that she is telepathic.  Informed her about current circumstances and increasing medications to stabilize her so that she can discharge from the hospital.  She is currently worried about not having the financial ability to keep her cat as she has paid somebody to watch it until Friday.  She does not have any anhedonia, energy deficits, sleep problems.  She is hopeful about the future and getting out of the hospital.  She denies any side effects from recent increases in medication.         Medications:       divalproex sodium extended-release  1,000 mg Oral At Bedtime     guaiFENesin  600 mg Oral BID     hydrocortisone   Topical BID     nicotine  1 patch Transdermal Daily     nicotine   Transdermal Q8H     nicotine   Transdermal Daily     OLANZapine zydis  20 mg Oral BID     senna-docusate  1 tablet Oral At Bedtime          Allergies:  "    Allergies   Allergen Reactions     Animal Dander      Haldol Difficulty breathing     Haldol [Haloperidol]      Penicillins      Unsure of what happens. Has been told she has allergies since she was a kid.     Penicillins      Seasonal Allergies           Labs:   No results found for this or any previous visit (from the past 24 hour(s)).       Psychiatric Examination:     /79 (BP Location: Right arm)   Pulse 108   Temp 98.5  F (36.9  C) (Oral)   Resp 17   Ht 1.676 m (5' 6\")   Wt 85 kg (187 lb 4.8 oz)   LMP 07/03/2014   SpO2 96%   BMI 30.23 kg/m    Weight is 187 lbs 4.8 oz  Body mass index is 30.23 kg/m .                Sitting Orthostatic BP: 119/80      Sitting Orthostatic Pulse: 97 bpm      Standing Orthostatic BP: 116/78      Standing Orthostatic Pulse: 102 bpm       Weight over time:  Vitals:    07/17/19 1857 07/18/19 0806 07/21/19 0752 07/23/19 0700   Weight: 79.1 kg (174 lb 6.4 oz) 79.1 kg (174 lb 4.8 oz) 80.4 kg (177 lb 3.2 oz) 80.8 kg (178 lb 3.2 oz)    07/30/19 0807   Weight: 85 kg (187 lb 4.8 oz)       Orthostatic Vitals       Most Recent      Sitting Orthostatic /80 07/31 0700    Sitting Orthostatic Pulse (bpm) 97 07/31 0700            Cardiometabolic risk assessment. 07/31/19      Reviewed patient profile for cardiometabolic risk factors    Date taken /Value  REFERENCE RANGE   Abdominal Obesity  (Waist Circumference)   See nursing flowsheet Women ?35 in (88 cm)   Men ?40 in (102 cm)      Triglycerides  Triglycerides   Date Value Ref Range Status   07/18/2019 88 <150 mg/dL Final       ?150 mg/dL (1.7 mmol/L) or current treatment for elevated triglycerides   HDL cholesterol  HDL Cholesterol   Date Value Ref Range Status   07/18/2019 55 >49 mg/dL Final   ]   Women <50 mg/dL (1.3 mmol/L) in women or current treatment for low HDL cholesterol  Men <40 mg/dL (1 mmol/L) in men or current treatment for low HDL cholesterol     Fasting plasma glucose (FPG) Lab Results   Component Value Date "    GLC 97 07/18/2019      FPG ?100 mg/dL (5.6 mmol/L) or treatment for elevated blood glucose   Blood pressure  BP Readings from Last 3 Encounters:   07/30/19 138/79   07/10/19 119/82   04/01/18 102/63    Blood pressure ?130/85 mmHg or treatment for elevated blood pressure   Family History  See family history         Fannie is a 53-year-old female with long gray hair.  Her speech is rambling and her language is intact.  Her behavior is appropriate and she does not have any abnormal movements.  Her affect is labile.  Her mood she describes as ok.  Her thought content consists of the above without thoughts of harming herself or others with continued delusions.  Her thought process is notable for tangents and looseness of association.  She continues to have abnormal perceptions.  She is alert and aware of her current location but not completely of circumstances.  Her attention and concentration is limited.  Her cognition and fund of knowledge is also limited.  Her long-term/short-term/remote memory is limited.  Her insight and judgment are both limited to impaired.         Precautions:     Behavioral Orders   Procedures     Code 1 - Restrict to Unit     Petition for commitment     MI with [facility]     Routine Programming     As clinically indicated     Sexual precautions     Single Room     Acute psychosis     Status 15     Every 15 minutes.          DIagnoses:     Schizoaffective disorder bipolar type  Rule out posttraumatic stress disorder         Assessment & Plan:     Fannie was most recently is seriously disorganized and psychotic and making multiple delusional statements.  She will at times been able to control her symptoms and present relatively well.  She is currently presenting reasonably though still has looseness of association and delusions as above.  Informed her we will continue to hospitalize her until we have had a improvements that is consistent and have managed her medications  appropriately.  She can then discharged back to her apartment.    Increasing divalproex to 1000 mg daily  Now committed with [facility]    The risks, benefits, alternatives and side effects have been discussed and are understood by the patient and other caregivers.      Jose Francisco Tovar  [facility] Psychiatry      The following document has been created with voice recognition software and may contain unintentional word substitutions.    Non clinically relevant CMS requirements:  Clinical Global Impressions  First:  Considering your total clinical experience with this particular patient population, how severe are the patient's symptoms at this time?: 7 (07/18/19 0652)  Compared to the patient's condition at the START of treatment, this patient's condition is:: 4 (07/18/19 0652)  Most recent:  Considering your total clinical experience with this particular patient population, how severe are the patient's symptoms at this time?: 7 (07/26/19 1441)  Compared to the patient's condition at the START of treatment, this patient's condition is:: 4 (07/26/19 1441)

## 2019-08-01 PROCEDURE — 25000132 ZZH RX MED GY IP 250 OP 250 PS 637: Mod: GY | Performed by: PSYCHIATRY & NEUROLOGY

## 2019-08-01 PROCEDURE — H2032 ACTIVITY THERAPY, PER 15 MIN: HCPCS

## 2019-08-01 PROCEDURE — 25000132 ZZH RX MED GY IP 250 OP 250 PS 637: Mod: GY | Performed by: CLINICAL NURSE SPECIALIST

## 2019-08-01 PROCEDURE — 12400001 ZZH R&B MH UMMC

## 2019-08-01 PROCEDURE — 25000132 ZZH RX MED GY IP 250 OP 250 PS 637: Mod: GY | Performed by: NURSE PRACTITIONER

## 2019-08-01 PROCEDURE — G0177 OPPS/PHP; TRAIN & EDUC SERV: HCPCS

## 2019-08-01 RX ADMIN — GUAIFENESIN 600 MG: 600 TABLET, EXTENDED RELEASE ORAL at 19:24

## 2019-08-01 RX ADMIN — HYDROCORTISONE: 1 CREAM TOPICAL at 08:05

## 2019-08-01 RX ADMIN — NICOTINE POLACRILEX 4 MG: 2 GUM, CHEWING BUCCAL at 19:24

## 2019-08-01 RX ADMIN — OLANZAPINE 20 MG: 10 TABLET, ORALLY DISINTEGRATING ORAL at 19:24

## 2019-08-01 RX ADMIN — NICOTINE POLACRILEX 2 MG: 2 GUM, CHEWING ORAL at 08:04

## 2019-08-01 RX ADMIN — OLANZAPINE 20 MG: 10 TABLET, ORALLY DISINTEGRATING ORAL at 08:04

## 2019-08-01 RX ADMIN — NICOTINE POLACRILEX 2 MG: 2 GUM, CHEWING ORAL at 17:05

## 2019-08-01 RX ADMIN — SENNOSIDES AND DOCUSATE SODIUM 1 TABLET: 8.6; 5 TABLET ORAL at 21:11

## 2019-08-01 RX ADMIN — GUAIFENESIN 600 MG: 600 TABLET, EXTENDED RELEASE ORAL at 08:04

## 2019-08-01 RX ADMIN — NICOTINE POLACRILEX 4 MG: 2 GUM, CHEWING BUCCAL at 21:11

## 2019-08-01 RX ADMIN — HYDROXYZINE HYDROCHLORIDE 50 MG: 25 TABLET ORAL at 00:22

## 2019-08-01 RX ADMIN — NICOTINE 1 PATCH: 21 PATCH, EXTENDED RELEASE TRANSDERMAL at 08:04

## 2019-08-01 RX ADMIN — DIVALPROEX SODIUM 1000 MG: 500 TABLET, EXTENDED RELEASE ORAL at 21:10

## 2019-08-01 RX ADMIN — NICOTINE POLACRILEX 2 MG: 2 GUM, CHEWING ORAL at 06:28

## 2019-08-01 RX ADMIN — TRAZODONE HYDROCHLORIDE 50 MG: 50 TABLET ORAL at 00:22

## 2019-08-01 ASSESSMENT — ACTIVITIES OF DAILY LIVING (ADL)
HYGIENE/GROOMING: INDEPENDENT
ORAL_HYGIENE: INDEPENDENT
DRESS: INDEPENDENT
LAUNDRY: UNABLE TO COMPLETE
LAUNDRY: UNABLE TO COMPLETE
DRESS: INDEPENDENT
ORAL_HYGIENE: INDEPENDENT
HYGIENE/GROOMING: INDEPENDENT

## 2019-08-01 ASSESSMENT — MIFFLIN-ST. JEOR: SCORE: 1442.3

## 2019-08-01 NOTE — PROGRESS NOTES
07/31/19 1300   Art Therapy   Type of Intervention structured groups   Response Engaged/ with encouragement/ redirection   Hours .5   Treatment Detail Art Therapy-nature mandalas   Problem-  . delusional     Goal- cope, express, regulate through Art Therapy directives     Outcome- pt was less hyper verbal than previous groups. Seeing the therapy dog really calmed her immediately prior to group.  She got immediately involved with the nature mandala project but then she was irritable with writer and said she disliked the project. She returned after group and apologized to writer and asked for a photo copy of the project. She reflected on the group and decided she did enjoy after all was the basics of the conversation she engaged writer in post group.

## 2019-08-01 NOTE — PROGRESS NOTES
Pt attended all groups today, social with peers and visible in the milieu. Pt was tangential during 1-1 check in, with racing thoughts, was distracted. Pt reports feeling anxious and irritable. Overall pt is much calmer and less paranoid than previous shifts, no concerns to be noted this shift.       08/01/19 1200   Behavioral Health   Hallucinations denies / not responding to hallucinations   Thinking delusional;paranoid;poor concentration;distractable   Orientation person: oriented;place: oriented;date: oriented;time: oriented   Memory baseline memory   Insight poor   Judgement impaired   Eye Contact at examiner   Affect full range affect   Mood irritable;labile;anxious   Physical Appearance/Attire attire appropriate to age and situation;disheveled   Hygiene well groomed   Suicidality other (see comments)  (denies)   1. Wish to be Dead No   2. Non-Specific Active Suicidal Thoughts  No   Self Injury other (see comment)  (denies)   Elopement   (none)   Activity restless   Speech tangential;clear;coherent   Psychomotor / Gait balanced;steady   Psycho Education   Type of Intervention 1:1 intervention   Response participates, initiates socially appropriate   Hours 0.5   Treatment Detail check in   Activities of Daily Living   Hygiene/Grooming independent   Oral Hygiene independent   Dress independent   Laundry unable to complete   Room Organization independent

## 2019-08-01 NOTE — PROGRESS NOTES
"Met with patient per her request.  Patient started with questions about discharging.  Once this writer reminded her of Commitment with [facility] order, patient stated \"I already know that, but I just talked to my  and when can I go home?\"  Patient immediately started talking about digging through the garbage last night to find an old piece of Nicotine gum \"my cravings were so bad, just like heroin, so I dumped the gross garbage out just to find Nicotine gum.\"      Patient also reports having her menses but also being in menopause for many years now, \"I could have cervical cancer.\"  Not sure if this is a delusion.  Will attempt to revisit with patient later.  "

## 2019-08-01 NOTE — PROGRESS NOTES
"Patient reports her mood as \"delightful.\"  Spending time resting or in the lounge.  While watching TV patient makes odd gestures with her hands as if pushing down something that doesn't exist.  Continues with her usual delusions about the formal artist known as Jasiel and other famous persons \"I have dated many celebs,  several as well.\"  Patient goes on to talk about  warfare and other random things.    "

## 2019-08-02 LAB — VALPROATE SERPL-MCNC: 84 MG/L (ref 50–100)

## 2019-08-02 PROCEDURE — 36415 COLL VENOUS BLD VENIPUNCTURE: CPT | Performed by: PSYCHIATRY & NEUROLOGY

## 2019-08-02 PROCEDURE — 25000132 ZZH RX MED GY IP 250 OP 250 PS 637: Mod: GY | Performed by: PSYCHIATRY & NEUROLOGY

## 2019-08-02 PROCEDURE — 25000132 ZZH RX MED GY IP 250 OP 250 PS 637: Mod: GY | Performed by: NURSE PRACTITIONER

## 2019-08-02 PROCEDURE — 80164 ASSAY DIPROPYLACETIC ACD TOT: CPT | Performed by: PSYCHIATRY & NEUROLOGY

## 2019-08-02 PROCEDURE — 99232 SBSQ HOSP IP/OBS MODERATE 35: CPT | Performed by: PSYCHIATRY & NEUROLOGY

## 2019-08-02 PROCEDURE — 25000132 ZZH RX MED GY IP 250 OP 250 PS 637: Mod: GY | Performed by: CLINICAL NURSE SPECIALIST

## 2019-08-02 PROCEDURE — H2032 ACTIVITY THERAPY, PER 15 MIN: HCPCS

## 2019-08-02 PROCEDURE — 12400001 ZZH R&B MH UMMC

## 2019-08-02 PROCEDURE — G0177 OPPS/PHP; TRAIN & EDUC SERV: HCPCS

## 2019-08-02 RX ORDER — BENZOCAINE/MENTHOL 6 MG-10 MG
LOZENGE MUCOUS MEMBRANE 2 TIMES DAILY
Status: DISCONTINUED | OUTPATIENT
Start: 2019-08-02 | End: 2019-08-07 | Stop reason: HOSPADM

## 2019-08-02 RX ORDER — DIVALPROEX SODIUM 500 MG/1
1500 TABLET, EXTENDED RELEASE ORAL AT BEDTIME
Status: DISCONTINUED | OUTPATIENT
Start: 2019-08-02 | End: 2019-08-07 | Stop reason: HOSPADM

## 2019-08-02 RX ADMIN — NICOTINE POLACRILEX 4 MG: 2 GUM, CHEWING BUCCAL at 19:39

## 2019-08-02 RX ADMIN — DIVALPROEX SODIUM 1500 MG: 500 TABLET, EXTENDED RELEASE ORAL at 22:05

## 2019-08-02 RX ADMIN — MAGNESIUM HYDROXIDE 30 ML: 400 SUSPENSION ORAL at 05:54

## 2019-08-02 RX ADMIN — Medication 10 MG: at 06:16

## 2019-08-02 RX ADMIN — NICOTINE 1 PATCH: 21 PATCH, EXTENDED RELEASE TRANSDERMAL at 07:56

## 2019-08-02 RX ADMIN — OLANZAPINE 20 MG: 10 TABLET, ORALLY DISINTEGRATING ORAL at 07:56

## 2019-08-02 RX ADMIN — NICOTINE POLACRILEX 4 MG: 2 GUM, CHEWING BUCCAL at 11:28

## 2019-08-02 RX ADMIN — OLANZAPINE 20 MG: 10 TABLET, ORALLY DISINTEGRATING ORAL at 19:39

## 2019-08-02 RX ADMIN — HYDROCORTISONE: 1 CREAM TOPICAL at 22:06

## 2019-08-02 RX ADMIN — SENNOSIDES AND DOCUSATE SODIUM 1 TABLET: 8.6; 5 TABLET ORAL at 22:05

## 2019-08-02 RX ADMIN — HYDROCORTISONE: 1 CREAM TOPICAL at 22:07

## 2019-08-02 RX ADMIN — GUAIFENESIN 600 MG: 600 TABLET, EXTENDED RELEASE ORAL at 07:56

## 2019-08-02 RX ADMIN — GUAIFENESIN 600 MG: 600 TABLET, EXTENDED RELEASE ORAL at 19:39

## 2019-08-02 ASSESSMENT — ACTIVITIES OF DAILY LIVING (ADL)
HYGIENE/GROOMING: HANDWASHING;INDEPENDENT
HYGIENE/GROOMING: INDEPENDENT
LAUNDRY: UNABLE TO COMPLETE
ORAL_HYGIENE: INDEPENDENT
DRESS: SCRUBS (BEHAVIORAL HEALTH);INDEPENDENT
DRESS: SCRUBS (BEHAVIORAL HEALTH)
ORAL_HYGIENE: INDEPENDENT
DRESS: INDEPENDENT
HYGIENE/GROOMING: INDEPENDENT
LAUNDRY: WITH SUPERVISION
ORAL_HYGIENE: INDEPENDENT
LAUNDRY: UNABLE TO COMPLETE

## 2019-08-02 NOTE — PROGRESS NOTES
[facility], [place]   Psychiatric Progress Note  Fannie Jeter  [id number]  08/02/19    Chief Complaint: Continued medical care          Interim History:   The patient's care was discussed with the treatment team during the daily team meeting and/or staff's chart notes were reviewed.  Staff report patient has been complaining about constipation has delusions about singers and has been trying to go to group activities slept about 5 hours.    Upon visiting with her says that she is okay possibly having some delusional content about someone violating her anus.  She mentions her hallucinations and delusions as being fantasy though she still believe she has telepathic de la fuente.  Likely still has auditory hallucinations and looseness of association with memory deficits.  Still has grandiose thoughts about employment in the future and possible employment business options.  No thoughts of harming herself or others no hopelessness or helplessness and is future oriented about getting out of the hospital with multiple plans.  Going to also clean her apartment and play with her cat.  No appetite issues sleep problems and is still paranoid about [place] people however this could be based as previous trauma.  Guilty feelings currently in no anxiety other than hopeful about getting out of the hospital soon.         Medications:       divalproex sodium extended-release  1,500 mg Oral At Bedtime     guaiFENesin  600 mg Oral BID     hydrocortisone   Topical BID     nicotine  1 patch Transdermal Daily     nicotine   Transdermal Q8H     nicotine   Transdermal Daily     OLANZapine zydis  20 mg Oral BID     senna-docusate  1 tablet Oral At Bedtime          Allergies:     Allergies   Allergen Reactions     Animal Dander      Haldol Difficulty breathing     Haldol [Haloperidol]      Penicillins      Unsure of what happens. Has been told she has allergies since she was a kid.     Penicillins      Seasonal  "Allergies           Labs:     Recent Results (from the past 24 hour(s))   Valproic acid    Collection Time: 08/02/19  8:07 AM   Result Value Ref Range    Valproic Acid Level 84 50 - 100 mg/L          Psychiatric Examination:     /77 (BP Location: Left arm)   Pulse 103   Temp 98.9  F (37.2  C) (Tympanic)   Resp 16   Ht 1.676 m (5' 6\")   Wt 82.6 kg (182 lb)   LMP 07/03/2014   SpO2 96%   BMI 29.38 kg/m    Weight is 182 lbs 0 oz  Body mass index is 29.38 kg/m .                Sitting Orthostatic BP: 108/77      Sitting Orthostatic Pulse: 89 bpm      Standing Orthostatic BP: 125/83      Standing Orthostatic Pulse: 110 bpm       Weight over time:  Vitals:    07/17/19 1857 07/18/19 0806 07/21/19 0752 07/23/19 0700   Weight: 79.1 kg (174 lb 6.4 oz) 79.1 kg (174 lb 4.8 oz) 80.4 kg (177 lb 3.2 oz) 80.8 kg (178 lb 3.2 oz)    07/30/19 0807 08/01/19 0700   Weight: 85 kg (187 lb 4.8 oz) 82.6 kg (182 lb)       Orthostatic Vitals       Most Recent      Standing Orthostatic /83 08/02 0751    Standing Orthostatic Pulse (bpm) 110 08/02 0751            Cardiometabolic risk assessment. 08/02/19      Reviewed patient profile for cardiometabolic risk factors    Date taken /Value  REFERENCE RANGE   Abdominal Obesity  (Waist Circumference)   See nursing flowsheet Women ?35 in (88 cm)   Men ?40 in (102 cm)      Triglycerides  Triglycerides   Date Value Ref Range Status   07/18/2019 88 <150 mg/dL Final       ?150 mg/dL (1.7 mmol/L) or current treatment for elevated triglycerides   HDL cholesterol  HDL Cholesterol   Date Value Ref Range Status   07/18/2019 55 >49 mg/dL Final   ]   Women <50 mg/dL (1.3 mmol/L) in women or current treatment for low HDL cholesterol  Men <40 mg/dL (1 mmol/L) in men or current treatment for low HDL cholesterol     Fasting plasma glucose (FPG) Lab Results   Component Value Date    GLC 97 07/18/2019      FPG ?100 mg/dL (5.6 mmol/L) or treatment for elevated blood glucose   Blood pressure  BP " Readings from Last 3 Encounters:   08/02/19 125/77   07/10/19 119/82   04/01/18 102/63    Blood pressure ?130/85 mmHg or treatment for elevated blood pressure   Family History  See family history         Fannie is a 53-year-old female with long gray hair.  Her speech is rambling and her language is intact.  Her behavior is appropriate and she does not have any abnormal movements.  Her affect is smiling.  Her mood she describes as good.  Her thought content consists of the above without thoughts of harming herself or others with continued delusions.  Her thought process is notable for tangents and looseness of association.  She continues to have abnormal perceptions.  She is alert and aware of her current location but not completely of circumstances.  Her attention and concentration is limited.  Her cognition and fund of knowledge is also limited.  Her long-term/short-term/remote memory is limited.  Her insight and judgment are both limited to impaired.         Precautions:     Behavioral Orders   Procedures     Code 1 - Restrict to Unit     Petition for commitment     MI with [facility]     Routine Programming     As clinically indicated     Sexual precautions     Single Room     Acute psychosis     Status 15     Every 15 minutes.          DIagnoses:     Schizoaffective disorder bipolar type  Rule out posttraumatic stress disorder         Assessment & Plan:     Fannie has shown some improvement over the past several days she is not as disorganized and psychotic and seems to be slightly less delusional.  We have discussed management and believe she likely needs a little bit more time in the hospital.  I will be increasing her divalproex and effort to plate stabilization and likely discharge if all goes well sometime next week.    Increasing divalproex to 1500 mg daily  Now committed with [facility]    The risks, benefits, alternatives and side effects have been discussed and are understood by the  patient and other caregivers.      Jose Francisco Tovra  [facility] Psychiatry      The following document has been created with voice recognition software and may contain unintentional word substitutions.    Non clinically relevant CMS requirements:  Clinical Global Impressions  First:  Considering your total clinical experience with this particular patient population, how severe are the patient's symptoms at this time?: 7 (07/18/19 0652)  Compared to the patient's condition at the START of treatment, this patient's condition is:: 4 (07/18/19 0652)  Most recent:  Considering your total clinical experience with this particular patient population, how severe are the patient's symptoms at this time?: 7 (07/26/19 1441)  Compared to the patient's condition at the START of treatment, this patient's condition is:: 4 (07/26/19 1441)

## 2019-08-02 NOTE — PROGRESS NOTES
Thank you for referring Fannie Jeter to Day Treatment (DT) .     The referral order included a request for DA completion on the inpatient unit, prior to discharge.     Upon initial screening including review of the EMR, consult with the CTC, and/or a brief meeting with the patient to discuss this referral, the patient meets criteria for the DA.     At this time, we have scheduled Fannie Jeter in our next available DA appointment on: Tuesday, 8/6/19 at 1:00pm    We will coordinate with the CTC and/or patient should an earlier appointment become available.    Please contact me directly if you would like to discuss this referral further.    Susan Castellon  8/2/2019  [phone]

## 2019-08-02 NOTE — PROGRESS NOTES
"Attended 2 of 2 morning OT groups offered. Initiated task, planning and problem solving. Asked writer for feedback appropriately. Attentive to suggestions and accordingly responded. In conversation was speaking of \"Jasiel was in my body\" and made political statements when previously  Redirected, numerous times, due to sensitivity of topic and disruption to therapeutic enviornnment. Agreeable and followed through with task completion.  "

## 2019-08-02 NOTE — PROGRESS NOTES
08/02/19 1400   Behavioral Health   Hallucinations denies / not responding to hallucinations   Thinking delusional;paranoid;poor concentration   Orientation person: oriented;place: oriented;date: oriented   Memory baseline memory   Insight poor   Judgement impaired   Eye Contact at examiner   Affect full range affect   Mood irritable   Physical Appearance/Attire attire appropriate to age and situation   Suicidality other (see comments)  (pt.denies )   1. Wish to be Dead No   2. Non-Specific Active Suicidal Thoughts  No   Self Injury other (see comment)  (pt. denies )   Medication Sensitivity no stated side effects;no observed side effects   Activities of Daily Living   Hygiene/Grooming independent   Oral Hygiene independent   Dress scrubs (behavioral health)   Laundry unable to complete   Room Organization independent     Pt. Appeared to be social in the milieu with others. Pt. Went to group and participated. Pt. Appeared to be irritable and guarded during check-in and stated that she's fine. Pt. Denies SI and SIB.

## 2019-08-02 NOTE — PROGRESS NOTES
Pt reported constipation and a small amount of blood on toilet paper after straining to get stool out. She also reported that she has hemorrhoids and said she is menopausal. Pt was given Milk of Magnesia for constipation and Dulcolax Suppository as requested.

## 2019-08-02 NOTE — PROGRESS NOTES
"Participated in Music Therapy group with focus on mood elevation, validation and decreasing anxiety and improved group cohesiveness. Engaged and cooperative in music listening interventions.   Showed progress in session goals.     Fannie engaged in uninhibited improvisatory movement during the music listening intervention.  Kept good boundaries with others, but was very expressive.  Also sang along to the music, which was appropriate for group, but once again very disinhibited.      She asked writer to come back as she was leaving the unit to say \"thank you\" for coming.  "

## 2019-08-03 PROCEDURE — 12400001 ZZH R&B MH UMMC

## 2019-08-03 PROCEDURE — 25000132 ZZH RX MED GY IP 250 OP 250 PS 637: Mod: GY | Performed by: NURSE PRACTITIONER

## 2019-08-03 PROCEDURE — 25000132 ZZH RX MED GY IP 250 OP 250 PS 637: Mod: GY | Performed by: CLINICAL NURSE SPECIALIST

## 2019-08-03 PROCEDURE — 25000132 ZZH RX MED GY IP 250 OP 250 PS 637: Mod: GY | Performed by: PSYCHIATRY & NEUROLOGY

## 2019-08-03 RX ADMIN — NICOTINE POLACRILEX 2 MG: 2 GUM, CHEWING BUCCAL at 09:25

## 2019-08-03 RX ADMIN — GUAIFENESIN 600 MG: 600 TABLET, EXTENDED RELEASE ORAL at 08:49

## 2019-08-03 RX ADMIN — OLANZAPINE 20 MG: 10 TABLET, ORALLY DISINTEGRATING ORAL at 08:49

## 2019-08-03 RX ADMIN — NICOTINE POLACRILEX 4 MG: 2 GUM, CHEWING BUCCAL at 14:58

## 2019-08-03 RX ADMIN — DIVALPROEX SODIUM 1500 MG: 500 TABLET, EXTENDED RELEASE ORAL at 21:15

## 2019-08-03 RX ADMIN — OLANZAPINE 20 MG: 10 TABLET, ORALLY DISINTEGRATING ORAL at 21:16

## 2019-08-03 RX ADMIN — SENNOSIDES AND DOCUSATE SODIUM 1 TABLET: 8.6; 5 TABLET ORAL at 21:16

## 2019-08-03 RX ADMIN — LORAZEPAM 2 MG: 1 TABLET ORAL at 14:58

## 2019-08-03 RX ADMIN — GUAIFENESIN 600 MG: 600 TABLET, EXTENDED RELEASE ORAL at 21:16

## 2019-08-03 RX ADMIN — NICOTINE 1 PATCH: 21 PATCH, EXTENDED RELEASE TRANSDERMAL at 08:49

## 2019-08-03 ASSESSMENT — ACTIVITIES OF DAILY LIVING (ADL)
LAUNDRY: UNABLE TO COMPLETE
HYGIENE/GROOMING: INDEPENDENT
ORAL_HYGIENE: INDEPENDENT
DRESS: INDEPENDENT
ORAL_HYGIENE: INDEPENDENT
DRESS: SCRUBS (BEHAVIORAL HEALTH)
LAUNDRY: WITH SUPERVISION
HYGIENE/GROOMING: INDEPENDENT

## 2019-08-03 NOTE — PLAN OF CARE
"RN ASSESSMENT   Overall improvement was noted. Patients thinking is still delusional and tangential but  more reality based. She had made attempts to rationalize and normalize her previously reported thoughts and delusional statements about  stating that she did not felt that  was talking to her, but rather that she was thinking of  and what he would say. She referred on multiple occasions to being integrated spiritually with  and feeling that he is her . She does admit to manic symptoms which have decreased since admission and states, \" There is a fine line between passion and cyndi. When I am passionate about something I should just stay home and be passionate about it. When I go out to smoke my cigarettes I should just put my head down and smoke them, otherwise if I show my passion all these people that care about me seem to think I need help.\" Patient presented visible in the milieu, and social with staff and peers. Interactions noted to be appropriate, however from previous reports patietn demonstrated poor boundaries with male peer. Sexual precautions in place. Mood noted to be elated. Denied suicidal and homicidal thoughts. Sleep remains inadequate.  Per chart review patient has been sleeping average of 5 hours per night.    Refer to case manger notes for discharge planing and recommendations. Continue with current treatment plan and recommendations. Continue to monitor and reassess symptoms. Monitor response to medications. Monitor progress towards treatment goals. Encourage groups and participation.  "

## 2019-08-03 NOTE — PROGRESS NOTES
"   08/02/19 2200   General Information   Art Directive other (see comments)   AT directive was to \"draw your heart/draw what is within your heart.\" Goals of directive: emotional expression, identifying personal strengths, aspects of self, creating a personal self narrative through art, trauma containment. Pt was a positive participant, focused on task for the full duration of group. While creating artwork pt talked to author about her ideas for starting an exercise/movement group for the elderly residents at her apartment building and also her idea of doing \"wellness checks\" as a way to check on the welfare of the residents. Pt then said that these ideas would help herself to stop relying on welfare but it was unclear what she meant by this. Pt chose not to share artwork with group but did briefly share with author. Pt created an image of a small void on the paper which she described as \"when a man had ripped out my heart.\" Pt then said this man gave her his heart then passed away and pt harish stitches to represent this. Pt was emotionally regulated after sharing artwork and thanked author for group.  Pts mood was calm.  "

## 2019-08-03 NOTE — PROGRESS NOTES
08/02/19 1940   Behavioral Health   Hallucinations denies / not responding to hallucinations   Thinking poor concentration;delusional   Orientation person: oriented;place: oriented;date: oriented;time: oriented   Memory baseline memory   Insight poor   Judgement impaired   Eye Contact at examiner   Affect full range affect   Mood mood is calm   Physical Appearance/Attire attire appropriate to age and situation   Hygiene neglected grooming - unclean body, hair, teeth   Suicidality other (see comments)  (Denies)   1. Wish to be Dead No   2. Non-Specific Active Suicidal Thoughts  No   Self Injury other (see comment)  (Denies)   Activity other (see comment)  (in milieu)   Speech coherent;clear;tangential   Psychomotor / Gait balanced;steady   Activities of Daily Living   Hygiene/Grooming independent   Oral Hygiene independent   Dress independent   Laundry unable to complete   Room Organization independent     Pt stated that today was a good day. She said she felt like she was getting back to her normal self. Pt said she is ready to try to get things in order (I.e. Change her [employer] subscription to a more affordable one, get a job). Pt stated that she is nervous about losing benefits if she makes more than $200 a month. Pt denied SI and SIB. Pt stated that she was frustrated that she is always asked questions regarding SI and SIB, especially since she's noticed ways someone could hurt themselves if they wanted to (I.e. Plugs in Pt rooms, stealing a pen and stabbing themselves in the eye.) When asked questions, Pt would answer, then continue on tangents. Pt stated that she was irritated earlier in the day, but glad that she got to meet with her Doctor and felt that her Doctor saw that she was doing well in the milieu. Pt spent the majority of the shift thus far in the lounge watching a movie.

## 2019-08-03 NOTE — PROGRESS NOTES
08/03/19 1500   Behavioral Health   Hallucinations denies / not responding to hallucinations   Thinking distractable;delusional   Orientation person: oriented;place: oriented   Memory confabulation   Insight poor   Judgement impaired   Eye Contact at examiner   Affect tense;full range affect;irritable   Mood labile   Physical Appearance/Attire attire appropriate to age and situation   Hygiene pre-occupied with grooming   Suicidality other (see comments)  (Pt. denies )   1. Wish to be Dead No   Activities of Daily Living   Hygiene/Grooming independent   Oral Hygiene independent   Dress scrubs (behavioral health)   Laundry unable to complete   Room Organization independent     Pt. Appeared to be irritable and upset at times. PT. Spent some time out in the milieu with others. Pt. Did not participate with  Staff check-in. Pt. Stated that she wants to go home and feels like staff is treating her like a child. Pt. Denies SI and SIB.

## 2019-08-04 PROCEDURE — 25000132 ZZH RX MED GY IP 250 OP 250 PS 637: Mod: GY | Performed by: PSYCHIATRY & NEUROLOGY

## 2019-08-04 PROCEDURE — 25000132 ZZH RX MED GY IP 250 OP 250 PS 637: Mod: GY | Performed by: CLINICAL NURSE SPECIALIST

## 2019-08-04 PROCEDURE — 25000132 ZZH RX MED GY IP 250 OP 250 PS 637: Mod: GY | Performed by: NURSE PRACTITIONER

## 2019-08-04 PROCEDURE — 12400001 ZZH R&B MH UMMC

## 2019-08-04 RX ADMIN — DIVALPROEX SODIUM 1500 MG: 500 TABLET, EXTENDED RELEASE ORAL at 20:03

## 2019-08-04 RX ADMIN — SENNOSIDES AND DOCUSATE SODIUM 1 TABLET: 8.6; 5 TABLET ORAL at 20:03

## 2019-08-04 RX ADMIN — GUAIFENESIN 600 MG: 600 TABLET, EXTENDED RELEASE ORAL at 20:03

## 2019-08-04 RX ADMIN — NICOTINE POLACRILEX 2 MG: 2 GUM, CHEWING BUCCAL at 17:43

## 2019-08-04 RX ADMIN — GUAIFENESIN 600 MG: 600 TABLET, EXTENDED RELEASE ORAL at 08:01

## 2019-08-04 RX ADMIN — NICOTINE POLACRILEX 2 MG: 2 GUM, CHEWING BUCCAL at 08:03

## 2019-08-04 RX ADMIN — OLANZAPINE 20 MG: 10 TABLET, ORALLY DISINTEGRATING ORAL at 20:03

## 2019-08-04 RX ADMIN — OLANZAPINE 20 MG: 10 TABLET, ORALLY DISINTEGRATING ORAL at 08:01

## 2019-08-04 RX ADMIN — NICOTINE 1 PATCH: 21 PATCH, EXTENDED RELEASE TRANSDERMAL at 08:01

## 2019-08-04 ASSESSMENT — ACTIVITIES OF DAILY LIVING (ADL)
LAUNDRY: WITH SUPERVISION
HYGIENE/GROOMING: INDEPENDENT
ORAL_HYGIENE: INDEPENDENT
DRESS: INDEPENDENT
ORAL_HYGIENE: INDEPENDENT
HYGIENE/GROOMING: INDEPENDENT
DRESS: INDEPENDENT

## 2019-08-04 NOTE — PLAN OF CARE
Pt. appears to be improving slightly, ie.  Pt. Is out of her room more, interacting with staff when staff approaches the pt.  The pt. will answer with very brief responses.  Pt. Does not report suicidal ideation. Pt. States that she would like to be discharged home.

## 2019-08-04 NOTE — PROGRESS NOTES
"Pt reported that her hand shook twice causing her to spill her coffee, also stated, \"I need medication for my yeast infection.\"  "

## 2019-08-04 NOTE — PROGRESS NOTES
Pt denies SI/SIB. Pt spent most of the evening sleeping in her room. Pt woke up for dinner and to have a visitor.        08/03/19 2200   Behavioral Health   Hallucinations denies / not responding to hallucinations   Thinking distractable   Orientation person: oriented;place: oriented;date: oriented;time: oriented   Memory confabulation   Insight poor   Judgement impaired   Eye Contact at examiner   Affect blunted, flat   Mood mood is calm;depressed   Physical Appearance/Attire attire appropriate to age and situation   Hygiene well groomed   Suicidality other (see comments)  (Pt. denies )   1. Wish to be Dead No   2. Non-Specific Active Suicidal Thoughts  No   3. Active Sucidal Ideation with any Methods (Not Plan) Without Intent to Act  No   4. Active Suicidal Ideation with Some Intent to Act, Without Specific Plan  No   5. Active Suicidal Ideation with Specific Plan and Intent  No   Duration (Lifetime) NA   Change in Protective Factors? No   Enviromental Risk Factors None   Self Injury other (see comment)  (none observed )   Elopement   (none observed )   Activity isolative   Speech clear   Medication Sensitivity no observed side effects;no stated side effects   Psychomotor / Gait balanced;steady   Psycho Education   Type of Intervention 1:1 intervention   Response participates, initiates socially appropriate   Hours 0.5   Treatment Detail check in    Activities of Daily Living   Hygiene/Grooming independent   Oral Hygiene independent   Dress independent   Laundry with supervision   Room Organization independent

## 2019-08-05 PROCEDURE — H2032 ACTIVITY THERAPY, PER 15 MIN: HCPCS

## 2019-08-05 PROCEDURE — 99231 SBSQ HOSP IP/OBS SF/LOW 25: CPT | Performed by: PSYCHIATRY & NEUROLOGY

## 2019-08-05 PROCEDURE — 25000132 ZZH RX MED GY IP 250 OP 250 PS 637: Mod: GY | Performed by: NURSE PRACTITIONER

## 2019-08-05 PROCEDURE — 12400001 ZZH R&B MH UMMC

## 2019-08-05 PROCEDURE — 25000132 ZZH RX MED GY IP 250 OP 250 PS 637: Mod: GY | Performed by: PSYCHIATRY & NEUROLOGY

## 2019-08-05 PROCEDURE — 25000132 ZZH RX MED GY IP 250 OP 250 PS 637: Mod: GY | Performed by: CLINICAL NURSE SPECIALIST

## 2019-08-05 PROCEDURE — G0177 OPPS/PHP; TRAIN & EDUC SERV: HCPCS

## 2019-08-05 RX ADMIN — OLANZAPINE 20 MG: 10 TABLET, ORALLY DISINTEGRATING ORAL at 09:17

## 2019-08-05 RX ADMIN — OLANZAPINE 20 MG: 10 TABLET, ORALLY DISINTEGRATING ORAL at 21:06

## 2019-08-05 RX ADMIN — DIVALPROEX SODIUM 1500 MG: 500 TABLET, EXTENDED RELEASE ORAL at 21:06

## 2019-08-05 RX ADMIN — GUAIFENESIN 600 MG: 600 TABLET, EXTENDED RELEASE ORAL at 21:06

## 2019-08-05 RX ADMIN — GUAIFENESIN 600 MG: 600 TABLET, EXTENDED RELEASE ORAL at 09:17

## 2019-08-05 RX ADMIN — NICOTINE POLACRILEX 4 MG: 2 GUM, CHEWING BUCCAL at 13:02

## 2019-08-05 RX ADMIN — SENNOSIDES AND DOCUSATE SODIUM 1 TABLET: 8.6; 5 TABLET ORAL at 21:06

## 2019-08-05 RX ADMIN — NICOTINE 1 PATCH: 21 PATCH, EXTENDED RELEASE TRANSDERMAL at 09:17

## 2019-08-05 RX ADMIN — ACETAMINOPHEN 650 MG: 325 TABLET, FILM COATED ORAL at 18:56

## 2019-08-05 RX ADMIN — NICOTINE POLACRILEX 2 MG: 2 GUM, CHEWING BUCCAL at 07:15

## 2019-08-05 ASSESSMENT — ACTIVITIES OF DAILY LIVING (ADL)
DRESS: SCRUBS (BEHAVIORAL HEALTH)
LAUNDRY: WITH SUPERVISION
ORAL_HYGIENE: INDEPENDENT
LAUNDRY: WITH SUPERVISION
HYGIENE/GROOMING: INDEPENDENT
ORAL_HYGIENE: INDEPENDENT
HYGIENE/GROOMING: INDEPENDENT
DRESS: INDEPENDENT

## 2019-08-05 NOTE — PROGRESS NOTES
[facility], [place]   Psychiatric Progress Note  Fannie Jeter  [id number]  08/05/19    Chief Complaint: Continued medical care          Interim History:   The patient's care was discussed with the treatment team during the daily team meeting and/or staff's chart notes were reviewed.  Staff report patient has been more social has some delusional thoughts intentions and slept about 6 hours.    Upon visiting with her says that she is feeling good she is not as emotional as previous and feels as though she is better.  She thinks that she is getting more restful sleep and her thoughts are slower.  She denies any side effects from the changes of medications and seems to be more organized.  Denies that she has special de la fuente of telepathic abilities and is not communicating with multiple individuals inside of her head.  Denies any thoughts of harming herself or others any paranoia or hallucinations states.  Is not hopeless or helpless and awaiting going home and her constipation has now resolved.  Is not labile or somatically focused or as disorganized today.  No grandiose believes that she describes.         Medications:       divalproex sodium extended-release  1,500 mg Oral At Bedtime     guaiFENesin  600 mg Oral BID     hydrocortisone   Topical BID     hydrocortisone   Topical BID     nicotine  1 patch Transdermal Daily     nicotine   Transdermal Q8H     nicotine   Transdermal Daily     OLANZapine zydis  20 mg Oral BID     senna-docusate  1 tablet Oral At Bedtime          Allergies:     Allergies   Allergen Reactions     Animal Dander      Haldol Difficulty breathing     Haldol [Haloperidol]      Penicillins      Unsure of what happens. Has been told she has allergies since she was a kid.     Penicillins      Seasonal Allergies           Labs:   No results found for this or any previous visit (from the past 24 hour(s)).       Psychiatric Examination:     /87 (BP Location: Left arm)   " Pulse 90   Temp 98  F (36.7  C) (Oral)   Resp 16   Ht 1.676 m (5' 6\")   Wt 82.6 kg (182 lb)   LMP 07/03/2014   SpO2 96%   BMI 29.38 kg/m    Weight is 182 lbs 0 oz  Body mass index is 29.38 kg/m .                Sitting Orthostatic BP: 115/70      Sitting Orthostatic Pulse: 102 bpm      Standing Orthostatic BP: 111/77      Standing Orthostatic Pulse: 103 bpm       Weight over time:  Vitals:    07/17/19 1857 07/18/19 0806 07/21/19 0752 07/23/19 0700   Weight: 79.1 kg (174 lb 6.4 oz) 79.1 kg (174 lb 4.8 oz) 80.4 kg (177 lb 3.2 oz) 80.8 kg (178 lb 3.2 oz)    07/30/19 0807 08/01/19 0700   Weight: 85 kg (187 lb 4.8 oz) 82.6 kg (182 lb)       Orthostatic Vitals       Most Recent      Sitting Orthostatic /70 08/04 0700    Sitting Orthostatic Pulse (bpm) 102 08/04 0700    Standing Orthostatic /77 08/05 0700    Standing Orthostatic Pulse (bpm) 103 08/05 0700            Cardiometabolic risk assessment. 08/05/19      Reviewed patient profile for cardiometabolic risk factors    Date taken /Value  REFERENCE RANGE   Abdominal Obesity  (Waist Circumference)   See nursing flowsheet Women ?35 in (88 cm)   Men ?40 in (102 cm)      Triglycerides  Triglycerides   Date Value Ref Range Status   07/18/2019 88 <150 mg/dL Final       ?150 mg/dL (1.7 mmol/L) or current treatment for elevated triglycerides   HDL cholesterol  HDL Cholesterol   Date Value Ref Range Status   07/18/2019 55 >49 mg/dL Final   ]   Women <50 mg/dL (1.3 mmol/L) in women or current treatment for low HDL cholesterol  Men <40 mg/dL (1 mmol/L) in men or current treatment for low HDL cholesterol     Fasting plasma glucose (FPG) Lab Results   Component Value Date    GLC 97 07/18/2019      FPG ?100 mg/dL (5.6 mmol/L) or treatment for elevated blood glucose   Blood pressure  BP Readings from Last 3 Encounters:   08/05/19 124/87   07/10/19 119/82   04/01/18 102/63    Blood pressure ?130/85 mmHg or treatment for elevated blood pressure   Family History  See " family history         Fannie is a 53-year-old female with long gray hair.  Her speech is rambling and her language is intact.  Her behavior is appropriate and she does not have any abnormal movements.  Her affect is smiling.  Her mood she describes as good.  Her thought content consists of the above without thoughts of harming herself or others with continued delusions.  Her thought process is notable for tangents and looseness of association.  She continues to have abnormal perceptions.  She is alert and aware of her current location but not completely of circumstances.  Her attention and concentration is limited.  Her cognition and fund of knowledge is also limited.  Her long-term/short-term/remote memory is limited.  Her insight and judgment are both limited to impaired.         Precautions:     Behavioral Orders   Procedures     Code 1 - Restrict to Unit     Petition for commitment     MI with Courtney Bishop     Routine Programming     As clinically indicated     Sexual precautions     Single Room     Acute psychosis     Status 15     Every 15 minutes.          DIagnoses:     Schizoaffective disorder bipolar type  Rule out posttraumatic stress disorder         Assessment & Plan:     Fannie was originally hospitalized for severe psychosis disorganization and looseness of association.  She believes she was a telepathic individual with multiple people communicating with her including the singer Weathers. She has shown some improvement over the past several days now that she is on a higher dose of olanzapine at 40 mg daily and divalproex 1500 mg daily.  Laboratory evaluation has shown a therapeutic level and she appears to be stabilizing.  Discussed with her the possibility of discharging from the hospital either Tuesday or Wednesday of this week which would be my recommendation.  We have established her with the day treatment program though she does not have a start date yet.    Currently committed  with Bishop    The risks, benefits, alternatives and side effects have been discussed and are understood by the patient and other caregivers.      Jose Francisco Tovar  [facility] Psychiat[facility]      The following document has been created with voice recognition software and may contain unintentional word substitutions.    Non clinically relevant CMS requirements:  Clinical Global Impressions  First:  Considering your total clinical experience with this particular patient population, how severe are the patient's symptoms at this time?: 7 (07/18/19 0652)  Compared to the patient's condition at the START of treatment, this patient's condition is:: 4 (07/18/19 0652)  Most recent:  Considering your total clinical experience with this particular patient population, how severe are the patient's symptoms at this time?: 3 (08/05/19 1559)  Compared to the patient's condition at the START of treatment, this patient's condition is:: 2 (08/05/19 1559)

## 2019-08-05 NOTE — PROGRESS NOTES
Attended 2 of 2 OT groups offered.  Bright, pleasant and social. Engaged in conversation with peers demonstrating ability to remain oriented,  focused on topic and offering relevant info to conversation. Futuristic in goals and activities. Attentive x 40 minutes. Able to plan and organize task with minimal assistance. Eager for discharge in next few days.

## 2019-08-05 NOTE — PROGRESS NOTES
Patient fairly stable & pleasant today. She attended all groups & appeared more insightful & intact today. Pt was polite & appropriate in all her interactions. She needed no redirection . Patient denied si/sib & spent little time in room sleeping.      08/05/19 1300   Behavioral Health   Hallucinations denies / not responding to hallucinations   Thinking distractable   Orientation person: oriented;place: oriented   Memory baseline memory   Insight insight appropriate to situation   Judgement impaired   Eye Contact at examiner   Affect full range affect   Mood mood is calm   Hygiene well groomed   Suicidality other (see comments)  (none)   1. Wish to be Dead No   2. Non-Specific Active Suicidal Thoughts  No   Self Injury other (see comment)  (none)   Speech clear;coherent;other (see comments)  (sometimes pressured speech, but stablizing.)   Coping/Psychosocial   Verbalized Emotional State acceptance   Plan of Care Reviewed With patient   Psycho Education   Type of Intervention 1:1 intervention   Response participates with encouragement   Hours 0.5   Treatment Detail check in   Activities of Daily Living   Hygiene/Grooming independent   Oral Hygiene independent   Dress scrubs (behavioral health)   Laundry with supervision   Room Organization independent

## 2019-08-05 NOTE — PROGRESS NOTES
Pt has been visible in the milieu. Pt had poor concerntration and was slower to respond to questions today. Pt has been more social with peers during this shift. Pt denied SI/SIB/Hallucinations. Pt reported depression 3/10 and anxiety 4/10. Good appetite. Medication compliant. No behavioral issues noted.

## 2019-08-06 ENCOUNTER — TELEPHONE (OUTPATIENT)
Dept: BEHAVIORAL HEALTH | Facility: CLINIC | Age: 54
End: 2019-08-06

## 2019-08-06 PROCEDURE — 25000132 ZZH RX MED GY IP 250 OP 250 PS 637: Mod: GY | Performed by: PSYCHIATRY & NEUROLOGY

## 2019-08-06 PROCEDURE — 12400001 ZZH R&B MH UMMC

## 2019-08-06 PROCEDURE — 99232 SBSQ HOSP IP/OBS MODERATE 35: CPT | Performed by: NURSE PRACTITIONER

## 2019-08-06 PROCEDURE — 25000132 ZZH RX MED GY IP 250 OP 250 PS 637: Mod: GY | Performed by: CLINICAL NURSE SPECIALIST

## 2019-08-06 PROCEDURE — H2032 ACTIVITY THERAPY, PER 15 MIN: HCPCS

## 2019-08-06 PROCEDURE — 90791 PSYCH DIAGNOSTIC EVALUATION: CPT

## 2019-08-06 PROCEDURE — G0177 OPPS/PHP; TRAIN & EDUC SERV: HCPCS

## 2019-08-06 PROCEDURE — 25000132 ZZH RX MED GY IP 250 OP 250 PS 637: Mod: GY | Performed by: NURSE PRACTITIONER

## 2019-08-06 RX ORDER — GUAIFENESIN 600 MG/1
600 TABLET, EXTENDED RELEASE ORAL 2 TIMES DAILY PRN
Start: 2019-08-06 | End: 2020-03-17

## 2019-08-06 RX ORDER — BENZOCAINE/MENTHOL 6 MG-10 MG
LOZENGE MUCOUS MEMBRANE 2 TIMES DAILY
Qty: 60 G | Refills: 1 | Status: SHIPPED | OUTPATIENT
Start: 2019-08-06 | End: 2020-03-17

## 2019-08-06 RX ORDER — OLANZAPINE 20 MG/1
20 TABLET, ORALLY DISINTEGRATING ORAL 2 TIMES DAILY
Qty: 60 TABLET | Refills: 1 | Status: SHIPPED | OUTPATIENT
Start: 2019-08-06 | End: 2020-03-17

## 2019-08-06 RX ORDER — DIVALPROEX SODIUM 500 MG/1
1500 TABLET, EXTENDED RELEASE ORAL AT BEDTIME
Qty: 90 TABLET | Refills: 1 | Status: SHIPPED | OUTPATIENT
Start: 2019-08-06 | End: 2020-06-09

## 2019-08-06 RX ORDER — AMOXICILLIN 250 MG
1 CAPSULE ORAL AT BEDTIME
Qty: 30 TABLET | Refills: 1 | Status: SHIPPED | OUTPATIENT
Start: 2019-08-06 | End: 2020-03-17

## 2019-08-06 RX ORDER — ACETAMINOPHEN 325 MG/1
650 TABLET ORAL EVERY 4 HOURS PRN
Start: 2019-08-06

## 2019-08-06 RX ADMIN — DIVALPROEX SODIUM 1500 MG: 500 TABLET, EXTENDED RELEASE ORAL at 21:01

## 2019-08-06 RX ADMIN — OLANZAPINE 20 MG: 10 TABLET, ORALLY DISINTEGRATING ORAL at 07:40

## 2019-08-06 RX ADMIN — NICOTINE POLACRILEX 2 MG: 2 GUM, CHEWING BUCCAL at 14:57

## 2019-08-06 RX ADMIN — NICOTINE POLACRILEX 4 MG: 2 GUM, CHEWING BUCCAL at 07:35

## 2019-08-06 RX ADMIN — NICOTINE 1 PATCH: 21 PATCH, EXTENDED RELEASE TRANSDERMAL at 07:41

## 2019-08-06 RX ADMIN — NICOTINE POLACRILEX 4 MG: 2 GUM, CHEWING BUCCAL at 21:02

## 2019-08-06 RX ADMIN — SENNOSIDES AND DOCUSATE SODIUM 1 TABLET: 8.6; 5 TABLET ORAL at 21:02

## 2019-08-06 RX ADMIN — OLANZAPINE 20 MG: 10 TABLET, ORALLY DISINTEGRATING ORAL at 21:02

## 2019-08-06 RX ADMIN — GUAIFENESIN 600 MG: 600 TABLET, EXTENDED RELEASE ORAL at 07:41

## 2019-08-06 ASSESSMENT — ACTIVITIES OF DAILY LIVING (ADL)
DRESS: INDEPENDENT;STREET CLOTHES
HYGIENE/GROOMING: INDEPENDENT
LAUNDRY: WITH SUPERVISION
ORAL_HYGIENE: INDEPENDENT
ORAL_HYGIENE: INDEPENDENT
HYGIENE/GROOMING: HANDWASHING;INDEPENDENT
DRESS: STREET CLOTHES;INDEPENDENT

## 2019-08-06 ASSESSMENT — MIFFLIN-ST. JEOR: SCORE: 1439.58

## 2019-08-06 NOTE — PLAN OF CARE
"  Problem: Gratification/Engagement Impairment (Psychotic Signs/Symptoms)  Goal: Increased Interaction and Expression (Psychotic Signs/Symptoms)  Outcome: Improving     Problem: Self-expression Impairment (Psychotic Signs/Symptoms)  Goal: Improved Self-Expression (Psychotic Signs/Symptoms)  Outcome: Improving   Pt has had a good shift.  She is pleasant and appropriate on the unit.  She asked Monostat for a yeast infection.  \"I'd like to get it taken care of  before I'm discharged\".  She talked of discharge \"in a few days\" and wants to make sure she gets the \"dissolvable pills because they work better and last longer\"   "

## 2019-08-06 NOTE — TELEPHONE ENCOUNTER
PA Initiation    Medication: Olanzapine ODT 20mg - Quantity Limits (Max 1 daily)  Insurance Company: [employer] - Phone [phone] Fax [phone]  Pharmacy Filling the Rx: [facility] - [place], [place] - [address]  Filling Pharmacy Phone: [phone]  Filling Pharmacy Fax: [phone]  Start Date: 8/6/2019  CMM Key: [id number] - PA Case ID: [id number]    [facility]  [address]  [address] [address]   [email or web address]  [email or web address]   Phone: [phone]  Pager: [phone]  Fax: [phone]

## 2019-08-06 NOTE — PROGRESS NOTES
"[facility]   Psychiatric Progress Note      Impression:     Fannie Jeter is a 53-year-old female admitted to [facility] on 7/17/2019.  She was admitted on a 72-hour hold through the ER due to agitation and psychosis.  She was seen by FABIAN.  EMS notes indicate she placed a bag with syringes, string, and plastic bags outside her neighbor's door.  EMS notes indicate she was singing, dancing, and causing a disturbance in her apartment.  She informed ER staff that  had entered her [facility] upon his death and was living inside her, playing with her all day.  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  She also said there is a different woman also named Fannie living inside her.  In the ER she was noted to be disorganized and tangential.  She had a sandwich and appeared to be attempting to feed something beside her, though nothing was present.  She was agitated and screaming.   She was hospitalized at [facility] 7/3 through 7/10 with similar symptoms and prescribed Zyprexa Zydis.  She reports taking medications as prescribed following discharge and said that upon discharge Zydis was changed to Zyprexa tablet due to insurance issues, and she reports that the tablet formulation is less effective.  Thus far on the unit she has been yelling and very disorganized.  During today's assessment she remarked, \" is my best friend and he tells me what to do and I obey.\"  She says there are many people living inside her including \"Rola, Álvaro, Nayely, my family, his family, everyone going back in time and space.\"  They sometimes tell her what to do.  She began yelling that I was typing too fast which was causing her to feel agitated.  She followed up with the comment, \"I have Asperger's and I'm a genius!\"  She said, \"Tai (outpatient psychiatrist) is looking at you through my eyes.  Show me your eyes, babe.\"  When asked about her " "appetite, she said, \"I don't want to eat because there's too many signs, baby parts and blood sausage and Chago doesn't want me to eat kinney.\"   She requested discharge or transfer to [facility].  She says she will only take Zyprexa Zydis and no other medications whatsoever.  She doesn't want to take \"that generic shit they give regular sheeple.\"  The patient's yelling and agitation precluded the possibility of further assessment.      Since admission, she initially consented only to Zyprexa Zydis, which was scheduled.  As of 7/22 she agreed to take Depakote.  PRNs of Zyprexa Zydis, Ativan, Vistaril and Trazodone were initiated.  A petition for commitment MI with Dario was filed in [facility] and she was committed and Jarvised.  She has been better organized and denies hallucinations.  Delusional thought content is significantly reduced.  Sleep is improved.            Diagnoses:     Schizoaffective disorder, bipolar type  Rule out PTSD         Plan:     Medications:  She is Jarvised for Zyprexa, Geodon, Invega and Risperdal.  Continue Depakote ER 1500 mg (VPA level 84)  Continue Zyprexa Zydis 20 mg BID.  PRNs of Zyprexa Zydis, Ativan, Vistaril and Trazodone are available.   Ordered discharge meds.      OB/GYN consult ordered.      She is committed and Jarvised.  Plan for discharge to her apartment, likely tomorrow.  She has been referred to day treatment.  She has outpatient providers.         Attestation:  Patient has been seen and evaluated by me, SAMUEL Mejias CNP  The patient was counseled on nature of illness and treatment plan/options  Care was coordinated with treatment team       Clinical Global Impressions  First:  Considering your total clinical experience with this particular patient population, how severe are the patient's symptoms at this time?: 7 (07/18/19 0652)  Compared to the patient's condition at the START of treatment, this patient's condition is:: 4 (07/18/19 0652)  Most " "recent:  Considering your total clinical experience with this particular patient population, how severe are the patient's symptoms at this time?: 3 (08/05/19 1559)  Compared to the patient's condition at the START of treatment, this patient's condition is:: 2 (08/05/19 1559)            Interim History:     The patient's care was discussed with the treatment team and chart notes were reviewed.  Pt was documented as sleeping 6 hours during the overnight shift.  She reports her sleep has been adequate for about 4-5 nights.  Her mood is \"good.\"  She feels \"more focused, I've got my balance back and emotions under control.\"  She denies suicidal ideation.  She denies auditory hallucinations.  States she only hears her own voice inside her head.  States she is \"still grieving\" Jasiel's death but no longer feels as though he inhabits her body.  Pt reports that she is 5 years postmenopausal but experienced vaginal bleeding x 7 days, and some brown discharge for the past 4 days which is very concerning to her.  OB/GYN consult ordered.  Pt reports occasionally experiencing headaches.  Denies any other side effects from meds.  Pt is eager for discharge.            Medications:     Current Facility-Administered Medications   Medication     acetaminophen (TYLENOL) tablet 650 mg     albuterol (PROAIR HFA/PROVENTIL HFA/VENTOLIN HFA) 108 (90 Base) MCG/ACT inhaler 2 puff     alum & mag hydroxide-simethicone (MYLANTA ES/MAALOX  ES) suspension 30 mL     artificial saliva (BIOTENE DRY MOUTHWASH) liquid 15 mL     aspirin (ASA) tablet 325 mg     bisacodyl (DULCOLAX) Suppository 10 mg     bismuth subsalicylate (PEPTO BISMOL) suspension 30 mL     calamine 8-8 % lotion     divalproex sodium extended-release (DEPAKOTE ER) 24 hr tablet 1,500 mg     guaiFENesin (MUCINEX) 12 hr tablet 600 mg     hydrocortisone (CORTAID) 1 % cream     hydrocortisone (CORTAID) 1 % cream     hydrOXYzine (ATARAX) tablet 25-50 mg     LORazepam (ATIVAN) tablet 1-2 mg " "   Or     LORazepam (ATIVAN) injection 1-2 mg     magnesium hydroxide (MILK OF MAGNESIA) suspension 30 mL     nicotine (NICODERM CQ) 21 MG/24HR 24 hr patch 1 patch     nicotine (NICORETTE) gum 2-4 mg     nicotine Patch in Place     nicotine patch REMOVAL     OLANZapine zydis (zyPREXA) ODT tab 10 mg    Or     OLANZapine (zyPREXA) injection 10 mg     OLANZapine zydis (zyPREXA) ODT tab 20 mg     pramox-pe-glycerin-petrolatum (PREPARATION H) cream     senna-docusate (SENOKOT-S/PERICOLACE) 8.6-50 MG per tablet 1 tablet     traZODone (DESYREL) tablet 50 mg             Allergies:     Allergies   Allergen Reactions     Animal Dander      Haldol Difficulty breathing     Haldol [Haloperidol]      Penicillins      Unsure of what happens. Has been told she has allergies since she was a kid.     Penicillins      Seasonal Allergies             Psychiatric Examination:   BP 95/66   Pulse 96   Temp 98.2  F (36.8  C)   Resp 16   Ht 1.676 m (5' 6\")   Wt 82.3 kg (181 lb 6.4 oz)   LMP 07/03/2014   SpO2 96%   BMI 29.28 kg/m    Weight is 181 lbs 6.4 oz  Body mass index is 29.28 kg/m .    Appearance:  awake, alert and adequately groomed  Attitude:  cooperative  Eye Contact:  good  Mood:  \"good\"  Affect:  slightly anxious  Speech:  normal rate/rhythm/volume/tone  Psychomotor Behavior:  no evidence of tardive dyskinesia, dystonia, or tics  Thought Process:  linear, goal-oriented, less than logical  Associations:  no loosening of associations present  Thought Content:  denies suicidal and homicidal ideation, denies hallucinations, grandiosity and delusional thought content are reduced compared to admission  Insight:  fair  Judgment:  fair  Oriented to:  person, place, time, date  Attention Span and Concentration:  fair, improved  Recent and Remote Memory:  fair, improved  Language:  intact  Fund of Knowledge:  appropriate  Muscle Strength and Tone:  normal  Gait and Station:  normal          Labs:     No results found for this or any " previous visit (from the past 24 hour(s)).

## 2019-08-06 NOTE — TELEPHONE ENCOUNTER
Prior Authorization Approval    Authorization Effective Date: 1/1/2019  Authorization Expiration Date: 12/31/2019  Medication: Olanzapine ODT 20mg - Quantity Limits  Approved Dose/Quantity: 2 tablets daily  Reference #: CMM Key: [id number] - PA Case ID: [id number]   Insurance Company: [employer] - Phone [phone] Fax [phone]  Expected CoPay: $1.25     CoPay Card Available: No    Foundation Assistance Needed: n/a  Which Pharmacy is filling the prescription (Not needed for infusion/clinic administered): [facility] [place], [place] - [address]  Pharmacy Notified: Yes        [facility]     [facility]  [address] [facility]  [address] [place] [address] [address] [address]   [email or web address]  [email or web address]   Phone: [phone]  Pager: [phone]  Fax: [phone]

## 2019-08-06 NOTE — PROGRESS NOTES
"Attended 1 of 2 OT groups offered. Tearful stating ,\"I don't think I will ever go home. I was suppose to leave today\". Was accepting of the need to be sure all is in place so she won't have to return. Was able to move forward with task completion with no further interruption. Noted she was tired and needed to lie down.  "

## 2019-08-06 NOTE — PROGRESS NOTES
Received a call from pt's newly assigned , Oliva Lay calling from [facility] [phone].  She will be calling pt on the unit to inform her.  She notes she has met pt in the past.

## 2019-08-06 NOTE — PROGRESS NOTES
"   08/05/19 2100   General Information   Art Directive other (see comments)   AT directive is to create a postcard from future self. Goals of directive: to create a personal self narrative, to identify future goals and personal strengths by writing \"words of wisdom\"/personal advice from future self and creating an image on front of postcard that represents goals/personal strengths. Pt was a positive participant, focused on task for the full duration of group. Pt created an image that was related to pt's past and a personal relationship with a man in pts life whom she described as \"holding her back\" from living to her true potential. Pt asked author if she could tear up this image to symbolize letting go of this relationship. Pt also wrote \"need to let it go\" on the back of paper. Pt tore up image into small pieces and described this process as therapeutic. Pt said that in order to focus on the future pt needs to process events of the past. Pt also shared that she has a degree in psychotherapy but would rather find a future job being a  because she would like to help people move forward rather then examine the past.  Pts mood was calm.    "

## 2019-08-06 NOTE — PROGRESS NOTES
Pt observed sleeping some.  Pt reported to being lethargic.  Pt went to groups, social with others.  Pt will maybe discharge tomorrow.  Pt also had a Day Tx assessment.  Pt is cooperative and pleasant.       08/06/19 1433   Sleep/Rest/Relaxation   Day/Evening Time Hours napping;resting in bed   Number of hours napping 3 hours   Behavioral Health   Hallucinations denies / not responding to hallucinations   Thinking poor concentration   Orientation person: oriented;place: oriented;date: oriented;time: oriented   Memory baseline memory   Insight admits / accepts   Judgement   (improved)   Eye Contact at examiner   Affect full range affect   Mood mood is calm   Physical Appearance/Attire attire appropriate to age and situation   Hygiene well groomed   Suicidality other (see comments)  (denies)   1. Wish to be Dead No   2. Non-Specific Active Suicidal Thoughts  No   Activity isolative   Speech clear;coherent   Psychomotor / Gait balanced;steady   Coping/Psychosocial   Verbalized Emotional State acceptance;anxiety;hopefulness;relief   Plan of Care Reviewed With patient   Psycho Education   Type of Intervention 1:1 intervention   Response participates with encouragement   Activities of Daily Living   Hygiene/Grooming handwashing;independent   Oral Hygiene independent   Dress street clothes;independent   Activity   Activity Assistance Provided independent

## 2019-08-07 ENCOUNTER — APPOINTMENT (OUTPATIENT)
Dept: ULTRASOUND IMAGING | Facility: CLINIC | Age: 54
DRG: 885 | End: 2019-08-07
Attending: NURSE PRACTITIONER
Payer: MEDICARE

## 2019-08-07 VITALS
OXYGEN SATURATION: 96 % | HEIGHT: 66 IN | RESPIRATION RATE: 16 BRPM | DIASTOLIC BLOOD PRESSURE: 66 MMHG | SYSTOLIC BLOOD PRESSURE: 95 MMHG | WEIGHT: 181.4 LBS | BODY MASS INDEX: 29.15 KG/M2 | TEMPERATURE: 98 F | HEART RATE: 96 BPM

## 2019-08-07 PROCEDURE — G0177 OPPS/PHP; TRAIN & EDUC SERV: HCPCS

## 2019-08-07 PROCEDURE — 25000132 ZZH RX MED GY IP 250 OP 250 PS 637: Mod: GY | Performed by: PSYCHIATRY & NEUROLOGY

## 2019-08-07 PROCEDURE — 25000132 ZZH RX MED GY IP 250 OP 250 PS 637: Mod: GY | Performed by: CLINICAL NURSE SPECIALIST

## 2019-08-07 PROCEDURE — 76830 TRANSVAGINAL US NON-OB: CPT

## 2019-08-07 RX ADMIN — NICOTINE POLACRILEX 2 MG: 2 GUM, CHEWING BUCCAL at 09:02

## 2019-08-07 RX ADMIN — OLANZAPINE 20 MG: 10 TABLET, ORALLY DISINTEGRATING ORAL at 08:09

## 2019-08-07 RX ADMIN — NICOTINE 1 PATCH: 21 PATCH, EXTENDED RELEASE TRANSDERMAL at 08:08

## 2019-08-07 ASSESSMENT — ACTIVITIES OF DAILY LIVING (ADL)
DRESS: STREET CLOTHES
LAUNDRY: WITH SUPERVISION
ORAL_HYGIENE: INDEPENDENT
HYGIENE/GROOMING: INDEPENDENT

## 2019-08-07 NOTE — PLAN OF CARE
Patient discharge to home.  Discharge instructions and medications explained to patient [facility] no question asked.  Patient verbalized understanding of the discharge instructions.  Medications and belongings sent with patient upon discharge.

## 2019-08-07 NOTE — PLAN OF CARE
"Pt. visible in and out of milieu, pleasant and cooperative. Pt. social with peers. Pt attended group. Pt. states that her goal for today was \"to get through the day\" which she was able to achieve and states that she kept her self busy by writing down her ideas on a notebook. Pt. denied SI, SIB, hallucinations, anxiety or depression. Pt was compliant with scheduled medications. No further concerns.  "

## 2019-08-07 NOTE — DISCHARGE INSTRUCTIONS
Behavioral Discharge Planning and Instructions      Summary:    You were admitted on 7/17/2019  due to Disorganized Thinking/Behaviors.  You were treated by Dr. Jose Francisco Moser MD and Zabrina Riggins NP and discharged on 8/7/2019 from [facility]    You are now under a mental health commitment through [facility].Court File No.[id number] and you have been provisionally discharged which means you need to follow the discharge plan and stay safe within the community.    Principal Diagnosis:     Schizoaffective disorder, bipolar type  Rule out PTSD    Health Care Follow-up Appointments:     You will receive a call from a pharmacist, Nhung, tomorrow at 9:30am to see if you have an medication questions. Future appointments can be scheduled by calling the [facility] scheduling line at [phone] or toll-free at [phone].     While on the unit, you met with staff from:  [facility]   Located in [facility] Floor [address] ; [address] [place] [address] Phone:[phone]  They are recommending Day treatment to begin: Monday 8/12/19 (please arrive 15 minutes early).  Your groups will be Monday, Tuesdays and Thursdays from 1pm to 4pm for 12 weeks.    Follow up psychiatry appointment has been scheduled for you at [place] with Nayely Anderson CNP on:   Wednesday 8/14/19 @ 11am at [place]:  [address] [facility]  [phone] fax: [phone]     You have also been assigned a , Oliva Lay from [facility] [phone]. She will be working with you going forward.     You have been referred to [facility]   Appointment scheduled with  Dr. Thierno Abernathy on 8/19/19 @ 10am  [address] [place] [address] 3rd floor  [place], [phone]       at your [facility] in [place] is (Oliva Olvera-[phone])  PCP: Corazon Uriarte  "[phone]      Attend all scheduled appointments with your outpatient providers. Call at least 24 hours in advance if you need to reschedule an appointment to ensure continued access to your outpatient providers.   Major Treatments, Procedures and Findings:  You were provided with: a psychiatric assessment, assessed for medical stability, medication evaluation and/or management, CD evaluation/assessment and milieu management    Symptoms to Report: feeling more aggressive, increased confusion, losing more sleep, mood getting worse, thoughts of suicide or hallucinations.    Early warning signs can include: increased depression or anxiety sleep disturbances increased thoughts or behaviors of suicide or self-harm  increased unusual thinking, such as paranoia or hearing voices    Safety and Wellness:  Take all medicines as directed.  Make no changes unless your doctor suggests them.      Follow treatment recommendations.  Refrain from alcohol and non-prescribed drugs.  If there is a concern for safety, call [phone].    Resources:   Crisis Intervention: 326.226.5082 or 452-325-4561 (TTY: 944.390.8773).  Call anytime for help.  National [place] on Mental Illness (www.[place].rula.org): 599.610.3273 or 982-548-5736.  Suicide Awareness Voices of Education (SAVE) (www.save.org): 477-582-SAVE (2430)  Mental Health Consumer/Survivor Network of [place] (www.mhcsn.net): 259.259.7466 or 996-110-8004  Mental Health Association of [place] (www.mentalhealth.org): 135.658.2889 or 468-536-9999  [facility] Crisis (COPE) Response - Adult 289 191-3741  Text 4 Life: txt \"LIFE\" to 46578 for immediate support and crisis intervention  Crisis text line: Text \"[place]\" to 203660. Free, confidential, 24/7.  Crisis Intervention: 104.601.7040 or 060-074-4172. Call anytime for help.     Lifestyle Adjustment:   1. Adjust your lifestyle to get enough sleep, relaxation, exercise and good nutrition.  Continue to develop healthy coping skills to decrease stress and " promote a healthy  lifestyle.  2. Abstain from all substances of abuse.  3. Take medications as prescribed.  Please work with your doctor to discuss any concerns you have with your medications or side effects you may be experiencing.  4. Follow up with appointments as scheduled.      General Medication Instructions:   1. See your medication sheet(s) for instructions.   2. Take all medicines as directed.  Make no changes unless your doctor suggests them.   3. Go to all your doctor visits.  4. Be sure to have all your required lab tests. This way, your medicines can be refilled on time.  5. Do not use any drugs not prescribed by your doctor.    The treatment team has appreciated the opportunity to work with you.     If you have any questions or concerns our unit number is [phone]  You may be receiving a follow-up phone call within the next three days from a representative from behavioral health.    You have identified the best phone number to reach you as [phone]    If you would like to obtain any specific documentation regarding your hospitalization after your discharge, contact [facility] of Information/Medical Records:  [phone]

## 2019-08-07 NOTE — DISCHARGE SUMMARY
"Psychiatric Discharge Summary    Fannie Jeter MRN# [id number]   Age: 53 year old YOB: 1965     Date of Admission:  7/17/2019  Date of Discharge:  8/7/2019  Admitting Physician:  Erick Swift MD  Discharge Physician:  SAMUEL Mejias CNP (Contact: [phone])         Event Leading to Hospitalization:      From H&P 7/18/2019:    Fannie Jeter is a 53-year-old female admitted to [address] on 7/17/2019.  She was admitted on a 72-hour hold through the ER due to agitation and psychosis.  She was seen by [facility].  EMS notes indicate she placed a bag with syringes, string, and plastic bags outside her neighbor's door.  EMS notes indicate she was singing, dancing, and causing a disturbance in her apartment.  She informed ER staff that  had entered her [facility] upon his death and was living inside her, playing with her all day.  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  She also said there is a different woman also named Fannie living inside her.  In the ER she was noted to be disorganized and tangential.  She had a sandwich and appeared to be attempting to feed something beside her, though nothing was present.  She was agitated and screaming.   She was hospitalized at [facility] 7/3 through 7/10 with similar symptoms and prescribed Zyprexa Zydis.  She reports taking medications as prescribed following discharge and said that upon discharge Zydis was changed to Zyprexa tablet due to insurance issues, and she reports that the tablet formulation is less effective.  Thus far on the unit she has been yelling and very disorganized.  During today's assessment she remarked, \" is my best friend and he tells me what to do and I obey.\"  She says there are many people living inside her including \"Álvaro Canela Brenda, my family, his family, everyone going back in time and space.\"  They sometimes tell her what to do.  She began yelling that " "I was typing too fast which was causing her to feel agitated.  She followed up with the comment, \"I have Asperger's and I'm a genius!\"  She said, \"Tai (outpatient psychiatrist) is looking at you through my eyes.  Show me your eyes, babe.\"  When asked about her appetite, she said, \"I don't want to eat because there's too many signs, baby parts and blood sausage and Chago doesn't want me to eat kinney.\"   She requested discharge or transfer to [facility].  She says she will only take Zyprexa Zydis and no other medications whatsoever.  She doesn't want to take \"that generic shit they give regular sheeple.\"  The patient's yelling and agitation precluded the possibility of further assessment.     With regard to her mood, she replied, \"I don't know how I feel.  This is the first time I've consciously tried to tell someone how I feel, other than Jasiel.\"  She reports thinking about harming and kiilling others and herself \"all the time\" but says she would not act upon these thoughts.  She endorses auditory hallucinations.  She has delusional thoughts of many people living inside her.  She reports she has been eating less due to concerns about the contents of food.  She reports sleeping poorly.      See full admission note by Zabrina Riggins NP 7/18/2019 for details.            Diagnoses:     Schizoaffective disorder, bipolar type  Rule out PTSD         Labs & Results:      Ref. Range 7/18/2019 07:20 7/27/2019 14:00 8/2/2019 08:07   Sodium Latest Ref Range: 133 - 144 mmol/L 142     Potassium Latest Ref Range: 3.4 - 5.3 mmol/L 3.2 (L)     Chloride Latest Ref Range: 94 - 109 mmol/L 108     Carbon Dioxide Latest Ref Range: 20 - 32 mmol/L 28     Urea Nitrogen Latest Ref Range: 7 - 30 mg/dL 6 (L)     Creatinine Latest Ref Range: 0.52 - 1.04 mg/dL 0.41 (L)     GFR Estimate Latest Ref Range: >60 mL/min/1.73_m2 >90     GFR Estimate If Black Latest Ref Range: >60 mL/min/1.73_m2 >90     Calcium Latest Ref Range: 8.5 - 10.1 mg/dL 8.4 (L)   "   Anion Gap Latest Ref Range: 3 - 14 mmol/L 6     Albumin Latest Ref Range: 3.4 - 5.0 g/dL 3.4     Protein Total Latest Ref Range: 6.8 - 8.8 g/dL 6.0 (L)     Bilirubin Total Latest Ref Range: 0.2 - 1.3 mg/dL 0.4     Alkaline Phosphatase Latest Ref Range: 40 - 150 U/L 48     ALT Latest Ref Range: 0 - 50 U/L 26     AST Latest Ref Range: 0 - 45 U/L 27     Cholesterol Latest Ref Range: <200 mg/dL 161     HDL Cholesterol Latest Ref Range: >49 mg/dL 55     LDL Cholesterol Calculated Latest Ref Range: <100 mg/dL 88     Non HDL Cholesterol Latest Ref Range: <130 mg/dL 106     Triglycerides Latest Ref Range: <150 mg/dL 88     Glucose Latest Ref Range: 70 - 99 mg/dL 97     WBC Latest Ref Range: 4.0 - 11.0 10e9/L 5.6     Hemoglobin Latest Ref Range: 11.7 - 15.7 g/dL 11.5 (L)     Hematocrit Latest Ref Range: 35.0 - 47.0 % 35.5     Platelet Count Latest Ref Range: 150 - 450 10e9/L 342     RBC Count Latest Ref Range: 3.8 - 5.2 10e12/L 3.97     MCV Latest Ref Range: 78 - 100 fl 89     MCH Latest Ref Range: 26.5 - 33.0 pg 29.0     MCHC Latest Ref Range: 31.5 - 36.5 g/dL 32.4     RDW Latest Ref Range: 10.0 - 15.0 % 13.8     Diff Method Unknown Automated Method     % Neutrophils Latest Units: % 62.1     % Lymphocytes Latest Units: % 22.5     % Monocytes Latest Units: % 10.4     % Eosinophils Latest Units: % 4.6     % Basophils Latest Units: % 0.2     % Immature Granulocytes Latest Units: % 0.2     Nucleated RBCs Latest Ref Range: 0 /100 0     Absolute Neutrophil Latest Ref Range: 1.6 - 8.3 10e9/L 3.5     Absolute Lymphocytes Latest Ref Range: 0.8 - 5.3 10e9/L 1.3     Absolute Monocytes Latest Ref Range: 0.0 - 1.3 10e9/L 0.6     Absolute Eosinophils Latest Ref Range: 0.0 - 0.7 10e9/L 0.3     Absolute Basophils Latest Ref Range: 0.0 - 0.2 10e9/L 0.0     Abs Immature Granulocytes Latest Ref Range: 0 - 0.4 10e9/L 0.0     Absolute Nucleated RBC Unknown 0.0     Color Urine Unknown  Light Yellow    Appearance Urine Unknown  Clear    Glucose  Urine Latest Ref Range: NEG^Negative mg/dL  Negative    Bilirubin Urine Latest Ref Range: NEG^Negative   Negative    Ketones Urine Latest Ref Range: NEG^Negative mg/dL  5 (A)    Specific Gravity Urine Latest Ref Range: 1.003 - 1.035   1.007    pH Urine Latest Ref Range: 5.0 - 7.0 pH  7.0    Protein Albumin Urine Latest Ref Range: NEG^Negative mg/dL  Negative    Urobilinogen mg/dL Latest Ref Range: 0.0 - 2.0 mg/dL  Normal    Nitrite Urine Latest Ref Range: NEG^Negative   Negative    Blood Urine Latest Ref Range: NEG^Negative   Negative    Leukocyte Esterase Urine Latest Ref Range: NEG^Negative   Negative    Source Unknown  Midstream Urine    WBC Urine Latest Ref Range: 0 - 5 /HPF  2    RBC Urine Latest Ref Range: 0 - 2 /HPF  1    Valproic Acid Level Latest Ref Range: 50 - 100 mg/L   84     EXAMINATION: US PELVIC COMPLETE WITH TRANSVAGINAL, 8/7/2019 1:20 PM      COMPARISON: None.     HISTORY: post-menopausal vaginal bleeding     TECHNIQUE: The pelvis was scanned in standard fashion with  transabdominal and transvaginal transducer(s) using both grey scale  and color Doppler techniques.     FINDINGS:  The uterus measures 5.7 x 3.8 x 3.5 cm and there is no evidence of a  focal fibroid. The endometrium is within normal limits and measures 4  mm. Cervical calcifications and nabothian cyst. No free fluid in the  pelvis.     The right ovary measures 2.6 x 2.2 x 2.0  and exhibits normal color  Doppler flow. Left ovary is not visualized. No adnexal mass.                                                                       IMPRESSION:   Left ovary is not visualized. Otherwise, unremarkable normal pelvic  ultrasound.         Consults:     No consultations were requested during this admission.  She reported being 5 years post-menopausal and experiencing vaginal bleeding during her hospitalization.  OB/GYN recommended a transvaginal ultrasound, which was completed prior to discharge, and she was scheduled for a follow up OB/GYN  "appointment for further assessment.           Hospital Course:     Fannie Jeter was admitted to [facility] with attending [place], Erick Jose MD, under the direct care of Zabrina Riggins NP on a 72 hour mental health hold. The patient was placed under status 15 (15 minute checks) to ensure patient safety.  A petition for commitment MI with Dario was filed in [facility] and she was committed and Jarvised for Zyprexa, Geodon, Invega and Risperdal.     She initially consented only to Zyprexa Zydis, which was scheduled and titrated to 20 mg BID.  On 7/22 she agreed to take Depakote ER which was titrated to 1500 mg at which does her level was 84 about 12 hours after her last dose.  She did experience some tremor after Depakote was initiated.  She otherwise tolerated her medications well.  PRNs of Zyprexa Zydis, Ativan, Vistaril and Trazodone were initiated.   All PRNs were discontinued prior to discharge as she no longer needed them.      Fannie Jeter was initially disruptive, yelling loudly and intrusive with other patients.  She made racist comments and many other profane statements.  She made inappropriate sexual statements and disrobed.  As she improved her behavior was calm and cooperative.  She participated in groups and was visible in the milieu.  She completed an intake for day treatment.      The patient's symptoms of psychosis improved.  She was better organized.  Auditory hallucinations were significantly reduced.  Delusional thought content was significantly reduced, though she characterizes Jasiel as a chronic \"obsession.\"  Sleep was improved.  She reported her mood was euthymic and denied suicidal ideation.      Fannie Jeter was released on a provisional discharge to her apartment.  At the time of discharge Fannie Jeter was determined to not be a danger to herself or others.          Discharge Medications:      Fannie Jeter   Home Medication Instructions GUERLINE:[id number]    Printed " "on:08/07/19 0704   Medication Information                      acetaminophen (TYLENOL) 325 MG tablet  Take 2 tablets (650 mg) by mouth every 4 hours as needed for mild pain             albuterol (PROAIR HFA/PROVENTIL HFA/VENTOLIN HFA) 108 (90 Base) MCG/ACT inhaler  Inhale 2 puffs into the lungs every 6 hours as needed for wheezing             aspirin 325 MG tablet  Take 325 mg by mouth every 6 hours as needed (for headache) pain             cetirizine (ZYRTEC) 10 MG tablet  Take 10 mg by mouth daily              divalproex sodium extended-release (DEPAKOTE ER) 500 MG 24 hr tablet  Take 3 tablets (1,500 mg) by mouth At Bedtime             guaiFENesin (MUCINEX) 600 MG 12 hr tablet  Take 1 tablet (600 mg) by mouth 2 times daily as needed for congestion or cough             Homeopathic Products (SIMILASAN DRY EYE RELIEF OP)  Place 1 drop into both eyes as needed (for dry eyes)              hydrocortisone (CORTAID) 1 % external cream  Apply topically 2 times daily.  Apply to legs.             OLANZapine zydis (ZYPREXA) 20 MG ODT  Take 1 tablet (20 mg) by mouth 2 times daily             pramox-pe-glycerin-petrolatum (PREPARATION H) 1-0.25-14.4-15 % CREA cream  Place rectally 3 times daily as needed for hemorrhoids or itching             senna-docusate (SENOKOT-S/PERICOLACE) 8.6-50 MG tablet  Take 1 tablet by mouth At Bedtime                      Psychiatric Examination:     Appearance:  awake, alert and adequately groomed  Attitude:  cooperative  Eye Contact:  good  Mood:  \"excited\"  Affect:  slightly anxious  Speech:  normal rate/rhythm/volume/tone  Psychomotor Behavior:  no evidence of tardive dyskinesia, dystonia, or tics, mild tremor noted  Thought Process:  linear, goal-oriented, less than logical  Associations:  no loosening of associations present  Thought Content:  denies suicidal and homicidal ideation, reports hallucinations are at baseline and not disruptive, grandiosity and delusional thought content are " "significantly reduced compared to admission and near baseline  Judgment:  fair  Oriented to:  person, place, time, date  Attention Span and Concentration:  fair, improved  Recent and Remote Memory:  fair, improved  Language:  intact  Fund of Knowledge:  appropriate  Muscle Strength and Tone:  normal  Gait and Station:  normal     BP 95/66   Pulse 96   Temp 99  F (37.2  C) (Tympanic)   Resp 16   Ht 1.676 m (5' 6\")   Wt 82.3 kg (181 lb 6.4 oz)   LMP 07/03/2014   SpO2 96%   BMI 29.28 kg/m             Discharge Plan:     Per Discharge AVS:    You are now under a mental health commitment through [facility].Court File No.[id number] and you have been provisionally discharged which means you need to follow the discharge plan and stay safe within the community.    Health Care Follow-up Appointments:     You will receive a call from a pharmacist, Nhung, tomorrow at 9:30am to see if you have an medication questions. Future appointments can be scheduled by calling the [facility] scheduling line at [phone] or toll-free at [phone].     While on the unit, you met with staff from:  [facility] Day [facility]   Located in [facility] Floor [address] ; [address] [place] [address] Phone:[phone]  They are recommending Day treatment to begin: Monday 8/12/19 (please arrive 15 minutes early).  Your groups will be Monday, Tuesdays and Thursdays from 1pm to 4pm for 12 weeks.    Follow up psychiatry appointment has been scheduled for you at [place] with Nayely Anderson CNP on:   Wednesday 8/14/19 @ 11am at [place]:  [address] [facility]  [phone] fax: [phone]     You have also been assigned a , Oliva Lay from [facility] [phone]. She will be working with you going forward.     You [facility] been [facility] to [facility]   Appointment scheduled with  Dr. Thierno Abernathy on 8/19/19 @ 10am  [address] [place] [address]"   [place] professional [facility] 3rd floor  [facility], [phone]       at your [facility] in [place] is (Oliva Olvera-[phone])    PCP: Cor[facility]on Zuniga [facility] [phone]      She was provided with a 30-day supply of medications plus one refill.  She was advised to take her medications as prescribed and to abstain from alcohol and illicit substances.        Clinical Global Impressions  First:  Considering your total clinical experience with this particular patient population, how severe are the patient's symptoms at this time?: 7 (07/18/19 0652)  Compared to the patient's condition at the START of treatment, this patient's condition is:: 4 (07/18/19 0652)  Most recent:  Considering your total clinical experience with this particular patient population, how severe are the patient's symptoms at this time?: 3 (08/05/19 1559)  Compared to the patient's condition at the START of treatment, this patient's condition is:: 2 (08/05/19 1559)      Attestation:  The patient has been seen and evaluated by me, SAMUEL Mejias CNP   Discharge time > 30 minutes

## 2019-08-07 NOTE — PROGRESS NOTES
OBGYN note:     Notified by Zabrina Riggins that the pt has recently had postmenopausal bleeding. Pt is being discharged today. I recommended a pelvic US to evaluate endometrial lining and sent a message to schedule a new patient visit and EMB in the [facility] clinic.     Cira Allen MD  PGY-4 OBGYN  [id number]

## 2019-08-07 NOTE — PROGRESS NOTES
08/06/19 2100   Therapeutic Recreation   Type of Intervention structured groups   Activity game   Response Participates, initiates socially appropriate   Hours 1     Pt participated in Therapeutic Recreation group with focus on leisure participation and strategic cognitive reasoning.  Engaged and cooperative in group recreational intervention; [employer] game. Pt participated throughout entire duration of the group. Showed progress in session goals. Pt mood was calm with congruent affect. Pt was appropriate with interactions without any concerning behaviors compared to prior groups.

## 2019-08-07 NOTE — PROGRESS NOTES
Attended 1 of 2 OT groups offered. Initiated completion of creative task. Was basking in the sun when she indicated she felt ill. Returned pen[facility]entiary through next group. Discussed importance of med management, day tx and communication with support networks upon discharge. Agreed to fore mentioned with indication she plans to follow through.

## 2019-08-07 NOTE — PROGRESS NOTES
Pt signed PD and this CTC placed call to her  to obtain fax number      Discharge plan:   You will receive a call from a pharmacist, Nhung, tomorrow at 9:30am to see if you have an medication questions. Future appointments can be scheduled by calling the [facility] scheduling line at [phone] or toll-free at [phone].     While on the unit, you met with staff from:  [facility]   Located in [facility] Floor [address] ; [address] [place] [address] Phone:[phone]  They are recommending Day treatment to begin: Monday 8/12/19 (please arrive 15 minutes early).  Your groups will be Monday, Tuesdays and Thursdays from 1pm to 4pm for 12 weeks.    Follow up psychiatry appointment has been scheduled for you at [place] with Nayely Anderson CNP on:   Wednesday 8/14/19 @ 11am at [place]:  [address] [facility]  [phone] fax: [phone]     You have also been assigned a , Oliva Lay from [facility] [phone]. She will be working with you going forward.     You have been referred to [facility]   Appointment scheduled with  Dr. Thierno Abernathy on 8/19/19 @ 10am  [address] 3rd floor  hospitals, [phone]       at your [facility] in [place] is (Oliva Olvera-[phone])  PCP: Corazon Early [facility] [phone]

## 2019-08-08 ENCOUNTER — TELEPHONE (OUTPATIENT)
Dept: OBGYN | Facility: CLINIC | Age: 54
End: 2019-08-08

## 2019-08-08 ENCOUNTER — TELEPHONE (OUTPATIENT)
Dept: PHARMACY | Facility: CLINIC | Age: 54
End: 2019-08-08

## 2019-08-08 ENCOUNTER — ALLIED HEALTH/NURSE VISIT (OUTPATIENT)
Dept: PHARMACY | Facility: CLINIC | Age: 54
End: 2019-08-08

## 2019-08-08 DIAGNOSIS — Z53.9 ERRONEOUS ENCOUNTER--DISREGARD: Primary | ICD-10-CM

## 2019-08-08 NOTE — TELEPHONE ENCOUNTER
"Patient was scheduled for Medication Therapy Management visit to review medication after recent hospital discharge.  Pt answered on second attempt and reported \"I know everything about my meds and I doubt you can add anything to it.  I have another call. Goodbye.\" then hung up.  Unable to provide phone number to patient on where to reach should she have questions, though it is noted that she does have psychiatry follow up next week.    Nhung Bartlett, PharmD  Medication Therapy Management Pharmacist  [facility]  Phone: [phone]      "

## 2019-08-08 NOTE — TELEPHONE ENCOUNTER
Called pt to schedule Endometrial Biopsy and pt refused to schedule and said she won't schedule anything until talking to her .

## 2019-08-08 NOTE — TELEPHONE ENCOUNTER
----- Message from Hailey Cox RN sent at 8/8/2019  8:22 AM CDT -----  Regarding: FW: please schedule appointment  Needs an appt in 1-2 weeks for Endometrial biopsy,    Thanks,Hailey RN  ----- Message -----  From: Cira Allen MD  Sent: 8/7/2019   8:12 AM  To: Cecy Riggins, APRN CNP, #  Subject: please schedule appointment                      Hello,   This patient is currently inpatient in [facility]. She has recently experienced post-menopausal bleeding and is being discharged today. She will get a pelvic ultrasound inpatient, but can you schedule a new patient appointment with endometrial biopsy in the next 1-2 weeks? Thanks!!    Cira Allen MD  PGY-4 OBGYN  [id number]

## 2019-08-12 NOTE — H&P
"Admitted:     07/03/2019      REFERRAL:    [facility] Emergency Department.      CHIEF COMPLAINT:  \"I stopped taking my prescription.\"      HISTORY OF PRESENT ILLNESS:  Ms. Jessica Jeter is a 53-year-old  woman with history of bipolar disorder type 1 with psychotic features, with previous psychiatric admissions including 04/2018  a separate admission as well in 02/2018 and 03/2018 at [facility].  She has a , apparently became agitated in the meeting with her  and needed to be taken in with EMS to the Emergency Department for stabilization.  She was floridly psychotic at the time of admission, believing that she was  to [place] and acknowledged she had not been taking her medication.  She was admitted to [facility] in [facility] for further stabilization.      Upon interview this morning, the patient remains quite disorganized.  She indicates that she stopped her lamotrigine at some point in the past and also had stopped her Geodon.  She then indicates that she was unable to get her Geodon from her doctors and is upset about this.  She is demanding that she restart her Geodon as soon as possible, believing that it was helpful in the past.  She very difficult to follow at this time.  She reports ongoing depression but struggles to articulate current symptoms in a coherent manner.  She acknowledges she has not been sleeping well.  She denies suicidal ideation or thoughts of harming others.  She denies any physical health issues at present.  She would like to know when she can leave the hospital, hoping to go by tomorrow if possible.      PAST PSYCHIATRIC HISTORY:  At least 3 prior admissions, all 3 in 2018, 2 of them at [facility] in 02/2018 and then 03/2018.  She reports that she does best on Geodon.  She reports that she self-tapered off lamotrigine recently.  Her thought is very disorganized at present, so it is hard to get a fully coherent " "psychiatric history including current providers.      PAST MEDICAL HISTORY:  Central serous retinopathy, herpes zoster ophthalmicus (HDO), osteoarthritis, scoliosis.      PAST SURGICAL HISTORY:  Gynecological surgery.      FAMILY HISTORY:  No EMR history indicated or past psychiatric disorders.      SOCIAL HISTORY:  Resides in an independent living facility apartment complex.  She was meeting with the facility , [facility], when she became agitated in the [facility].  The emergency medical provider took her to the hospital.  It is difficult to get a coherent social history at this time given her significant thought disorganization, but she is reportedly an everyday smoker without reported alcohol use or drug use.  Urine drug screen was negative on admission.  She is .      REVIEW OF SYSTEMS:  A 10-point review of systems completed and negative other than described in the HPI.      PHYSICAL EXAMINATION:   VITAL SIGNS:  Temperature 99, pulse 82, blood pressure 124/85, respiratory rate 16, SpO2 of 95% on room air.      MENTAL STATUS EXAMINATION:  She is dressed in hospital scrubs.  Appears slightly disheveled.  Eye contact is intense.  She is paranoid, indicating a fear that the provider is standing up and indicating that she wants to be \"on the same level.\"  She is exasperated why she has not gotten her Geodon yet this morning.  Speech is quick, at times loud and intense.  Mood is described as depressed.  Affect is intense and labile.  Thought form is very disorganized and difficult to follow, circumstantial.  Thought content notable for ongoing paranoia.  She is not responding to internal stimuli.  There is no fluctuation in cognition or obvious deficits in cognitive processing other than psychotic symptoms.  Short-term memory is somewhat impaired.  She at times struggles to provide a coherent recent account of the events prompting admission.  Long-term memory appears reasonably intact in a " conversation.  Insight very poor, although she did recognize psychiatric symptoms.  She has recently been very labile, and it is hard to understand what her level of cooperation truly is at this time.  Judgment has recently been very poor given agitation and required psychiatric admission for stabilization.      IMPRESSION/PLAN:   1. Unspecified schizophrenia and other psychotic disorder.   2. Bipolar disorder type 1 with psychotic features, mixed, episode versus schizoaffective disorder, bipolar type with psychotic features.      FORMULATION:  The patient is a 53-year-old woman with history of bipolar 1 disorder and several prior psychiatric admissions in 2018 for psychosis and cyndi.  She acknowledges that she stopped taking her medications sometime recently.  She would like to restart her Geodon, noting that has been the most effective medication.  She is admitted on a 72-hour hold into the [facility] for psychiatric stabilization.      PLAN:   1. Resume Geodon 40 mg b.i.d. with meals for ongoing psychosis and cyndi.   2. We will need to collaborate with outpatient provider to help with disposition planning.   3. She will require social work evaluation and assistance.      RESULTS REVIEW:  A basic metabolic panel was unremarkable including creatinine 0.47, TSH 2.04, glucose 114.  CBC was unremarkable.  Urine drug screen negative.  We will obtain an EKG given she is on a second-generation antipsychotic including Geodon, which can have risk of QTc prolongation.      Addendum   QTc is prolonged. Will provide Potasium targeting level above 4.0 and check magnesium targeting level above 2.0. Will discontinue Geodon and start Zyprexa tonight which is more neutral on QTc. Abilify could be a reasonable option as well with minimal effect on QTc.       Revised work type lg 8/12/2019            AMY HENSLEY MD             D: 07/04/2019   T: 07/04/2019   MT:       Name:     FLORY JAQUEZ   MRN:      [id number]         Account:      [id number]   :      1965        Admitted:     2019                   Document: [id number]

## 2019-08-14 ENCOUNTER — TRANSFERRED RECORDS (OUTPATIENT)
Dept: HEALTH INFORMATION MANAGEMENT | Facility: CLINIC | Age: 54
End: 2019-08-14

## 2019-08-15 ENCOUNTER — HOSPITAL ENCOUNTER (OUTPATIENT)
Dept: BEHAVIORAL HEALTH | Facility: CLINIC | Age: 54
End: 2019-08-15
Attending: PSYCHIATRY & NEUROLOGY
Payer: MEDICARE

## 2019-08-15 PROCEDURE — 90853 GROUP PSYCHOTHERAPY: CPT

## 2019-08-15 PROCEDURE — G0177 OPPS/PHP; TRAIN & EDUC SERV: HCPCS

## 2019-08-15 ASSESSMENT — ANXIETY QUESTIONNAIRES
1. FEELING NERVOUS, ANXIOUS, OR ON EDGE: NEARLY EVERY DAY
7. FEELING AFRAID AS IF SOMETHING AWFUL MIGHT HAPPEN: NEARLY EVERY DAY
5. BEING SO RESTLESS THAT IT IS HARD TO SIT STILL: NEARLY EVERY DAY
6. BECOMING EASILY ANNOYED OR IRRITABLE: NEARLY EVERY DAY
IF YOU CHECKED OFF ANY PROBLEMS ON THIS QUESTIONNAIRE, HOW DIFFICULT HAVE THESE PROBLEMS MADE IT FOR YOU TO DO YOUR WORK, TAKE CARE OF THINGS AT HOME, OR GET ALONG WITH OTHER PEOPLE: EXTREMELY DIFFICULT
2. NOT BEING ABLE TO STOP OR CONTROL WORRYING: NEARLY EVERY DAY
GAD7 TOTAL SCORE: 21
3. WORRYING TOO MUCH ABOUT DIFFERENT THINGS: NEARLY EVERY DAY

## 2019-08-15 ASSESSMENT — PATIENT HEALTH QUESTIONNAIRE - PHQ9
5. POOR APPETITE OR OVEREATING: NEARLY EVERY DAY
SUM OF ALL RESPONSES TO PHQ QUESTIONS 1-9: 21

## 2019-08-15 NOTE — PROGRESS NOTES
"Adult Mental Health Outpatient Group Therapy Progress Note       Psychiatry: Dr. Tracie Beard           [facility]  Therapy: Dr. Maria Del Carmen Young Psy.D., L.P.    [facility]  [facility], [facility]  Family Therapy: Soo Bradford Shaggy Gibbons: [phone] / fax: [phone].     Diagnosis:  295.90 Schizophrenia     Client Initial Individualized Goals for Treatment: \"Integrate the different aspects of myself  So I don't forget important things; learn how, and do, only behaviors that are helpful to me \".     See Initial Treatment suggestions for the client during the time between Diagnostic Assessment and completion of the Master Individualized Treatment Plan.      Treatment Goals:  1,Client will notify staff when needing assistance to develop or implement a coping plan to manage suicidal or self injurious urges.  Client will use coping plan for safety, as needed.  Follow Safety Plan   Ask for more information, support and/or assistance as needed.  Follow up with providers/community supports as needed:   Report increases or changes in symptoms to staff.  Report any personal safety concerns to staff.   Take medications as prescribed.  Report medication changes and/or side effects to staff.  Attend and participate in groups as scheduled or notify staff if unable to do so.  Report any use of substances to staff as this may impact your symptoms and/or            personal safety.  Notify staff if you have any other issues that need to be addressed. This may include    any current abuse / neglect / exploitation or other vulnerability.  Follow recommendations of your treatment team and discuss concerns if not in  agreement.        Area of Treatment Focus:  Symptom Management      Therapeutic Interventions/Treatment Strategies:  Support, Feedback, Safety Assessments, Structured Activity and Education      Response to Treatment Strategies:  Accepted Feedback, Listened, Attentive, Accepted Support and Alert      Name of Group:  Group " Psychotherapy  2:00 - 2:50 pm  3:00 - 3:50 pm        8/15/2019     Group Participants:   7      Description and Outcome:                Fannie reported being safe today.  She reported that she only slept for 5 hours and had trouble getting to sleep.  She stated that she is accustomd to staying up late and doing creative activities at night, but has to take her medications earlier, so that she can get to this group.  She reported that she feels less anxious and suspicious about the neighbors, and that she did chat with them, recently, but could not do this before being in the hospital.  She reported that her mood was better and that last night there was an incident at her [facility] building, where a predator was in the building and two police cars were out front. She talked to the police and they went in to search the building, and she stayed next to the police cars until they came out and said that it was safe.  She talked to the group about how she was fearful of all police, previously, but last night was happy that they were there. She stated that she called another friend in the building to tell them to stay inside their apartment.   She reported that she is trying to maintain a budget and that she likes shopping and looking up items on the internet.          3:00 - 3:50 pm           The group participated in a structured activity that involved reading a worksheet about anger, opposite actions, and coping.  The group discussed their thoughts and feelings about the process and shared their experiences with others.  The group wrote out examples from their lives about how they felt it had affected them.  The group discussed their different stories and those that were similar.  The group validated others who had experienced distress.      She did a word find and listened to while the group practiced mindfulness deep breathing with the group for 10 minutes.        The client would benefit from further education  about this skill and to demonstrate how it is practiced over time.              Is this a Weekly Review of the Progress on the Treatment Plan?  Yes.        Are Treatment Plan Goals being addressed?  Yes, continue treatment goals          Are Treatment Plan Strategies to Address Goals Effective?  Yes, continue treatment strategies          Are there any current contracts in place?  No

## 2019-08-16 PROBLEM — F25.9 SCHIZOAFFECTIVE DISORDER (H): Status: ACTIVE | Noted: 2019-08-16

## 2019-08-16 ASSESSMENT — ANXIETY QUESTIONNAIRES: GAD7 TOTAL SCORE: 21

## 2019-08-19 ENCOUNTER — OFFICE VISIT (OUTPATIENT)
Dept: OBGYN | Facility: CLINIC | Age: 54
End: 2019-08-19
Attending: OBSTETRICS & GYNECOLOGY
Payer: MEDICARE

## 2019-08-19 VITALS — WEIGHT: 180.3 LBS | BODY MASS INDEX: 28.98 KG/M2 | HEIGHT: 66 IN

## 2019-08-19 DIAGNOSIS — Z12.4 SCREENING FOR CERVICAL CANCER: ICD-10-CM

## 2019-08-19 DIAGNOSIS — N95.0 POSTMENOPAUSAL BLEEDING: Primary | ICD-10-CM

## 2019-08-19 PROBLEM — M19.90 ARTHRITIS: Status: ACTIVE | Noted: 2019-08-19

## 2019-08-19 PROBLEM — E07.9 THYROID DISEASE: Status: ACTIVE | Noted: 2019-08-19

## 2019-08-19 PROCEDURE — 87624 HPV HI-RISK TYP POOLED RSLT: CPT | Performed by: OBSTETRICS & GYNECOLOGY

## 2019-08-19 PROCEDURE — G0476 HPV COMBO ASSAY CA SCREEN: HCPCS | Performed by: OBSTETRICS & GYNECOLOGY

## 2019-08-19 PROCEDURE — G0145 SCR C/V CYTO,THINLAYER,RESCR: HCPCS | Performed by: OBSTETRICS & GYNECOLOGY

## 2019-08-19 ASSESSMENT — ANXIETY QUESTIONNAIRES
3. WORRYING TOO MUCH ABOUT DIFFERENT THINGS: SEVERAL DAYS
1. FEELING NERVOUS, ANXIOUS, OR ON EDGE: NEARLY EVERY DAY
GAD7 TOTAL SCORE: 16
7. FEELING AFRAID AS IF SOMETHING AWFUL MIGHT HAPPEN: NEARLY EVERY DAY
2. NOT BEING ABLE TO STOP OR CONTROL WORRYING: MORE THAN HALF THE DAYS
5. BEING SO RESTLESS THAT IT IS HARD TO SIT STILL: NEARLY EVERY DAY
6. BECOMING EASILY ANNOYED OR IRRITABLE: NEARLY EVERY DAY

## 2019-08-19 ASSESSMENT — PATIENT HEALTH QUESTIONNAIRE - PHQ9
SUM OF ALL RESPONSES TO PHQ QUESTIONS 1-9: 16
5. POOR APPETITE OR OVEREATING: SEVERAL DAYS

## 2019-08-19 ASSESSMENT — MIFFLIN-ST. JEOR: SCORE: 1434.59

## 2019-08-19 NOTE — PROGRESS NOTES
"    SUBJECTIVE   Fannie Jeter is a 53 year old No obstetric history on file., Patient's last menstrual period was 2014., who presents for consultation for PMB.    Pt discharged from inpt in psych 2019 (for agitation and psychosis) and had experienced PMB and was advised to have EMB with GYN.  U/S ordered as inpt.  In  she had an U/S for \"DUB\" which was normal.  She reports being menopausal about age 50.  She reports light bleeding for about 1.5 weeks in the hospital.  She is not sexually active (spouse ).    She reports an abnormal pap in  with freezing to her cervix.    She has not been doing her mammograms or colonoscopy.  She does report diarrhea/constipation.    HISTORY: post-menopausal vaginal bleeding     TECHNIQUE: The pelvis was scanned in standard fashion with  transabdominal and transvaginal transducer(s) using both grey scale  and color Doppler techniques.     FINDINGS:  The uterus measures 5.7 x 3.8 x 3.5 cm and there is no evidence of a  focal fibroid. The endometrium is within normal limits and measures 4  mm. Cervical calcifications and nabothian cyst. No free fluid in the  pelvis.     The right ovary measures 2.6 x 2.2 x 2.0  and exhibits normal color  Doppler flow. Left ovary is not visualized. No adnexal mass.                                                                       IMPRESSION:   Left ovary is not visualized. Otherwise, unremarkable normal pelvic  ultrasound.     I have personally reviewed the examination and initial interpretation  and I agree with the findings.     OLEKSANDR HARRISON MD      Gynecologic History  Patient's last menstrual period was 2014.   Menstrual History:  Menstrual History 2015   LAST MENSTRUAL PERIOD 7/3/2014         Lab Results   Component Value Date    PAP ASC-US 08/15/2013      HPV negative .    Obstetric History  OB History   No data available        Past Medical History  Past Medical History:   Diagnosis Date     ADHD " (attention deficit hyperactivity disorder)      Asperger syndrome      Central serous retinopathy      HZO (herpes zoster oticus)     Left eye     Osteoarthritis      Schizoaffective disorder (H)      Scoliosis        Past Surgical History  Past Surgical History:   Procedure Laterality Date     BIOPSY      breast     BREAST SURGERY      decreased- lots of pain     GYN SURGERY      d and c     MAMMOPLASTY REDUCTION BILATERAL         Medications  Current Outpatient Medications   Medication     acetaminophen (TYLENOL) 325 MG tablet     albuterol (PROAIR HFA/PROVENTIL HFA/VENTOLIN HFA) 108 (90 Base) MCG/ACT inhaler     aspirin 325 MG tablet     cetirizine (ZYRTEC) 10 MG tablet     divalproex sodium extended-release (DEPAKOTE ER) 500 MG 24 hr tablet     guaiFENesin (MUCINEX) 600 MG 12 hr tablet     Homeopathic Products (SIMILASAN DRY EYE RELIEF OP)     hydrocortisone (CORTAID) 1 % external cream     OLANZapine zydis (ZYPREXA) 20 MG ODT     pramox-pe-glycerin-petrolatum (PREPARATION H) 1-0.25-14.4-15 % CREA cream     senna-docusate (SENOKOT-S/PERICOLACE) 8.6-50 MG tablet     No current facility-administered medications for this visit.        Allergies     Allergies   Allergen Reactions     Animal Dander      Haldol Difficulty breathing     Haldol [Haloperidol]      Penicillins      Unsure of what happens. Has been told she has allergies since she was a kid.     Penicillins      Seasonal Allergies        Social History  Social History     Tobacco Use     Smoking status: Current Every Day Smoker     Packs/day: 1.50     Years: 35.00     Pack years: 52.50     Types: Cigarettes     Last attempt to quit: 8/11/2009     Years since quitting: 10.0     Smokeless tobacco: Current User   Substance Use Topics     Alcohol use: No     Drug use: No       Family History  Family History   Problem Relation Age of Onset     Arthritis Mother      Parkinsonism Mother      Cerebrovascular Disease Father      Substance Abuse Father       Substance Abuse Maternal Grandfather      Substance Abuse Paternal Grandmother      Substance Abuse Paternal Grandfather      Unknown/Adopted Brother      Substance Abuse Daughter        Review of Systems  Positive for night sweats hot flashes fever chills insomnia stress ringing in ears trouble swallowing congestion cough chest pain palpitations wheezing and shortness of breath difficulty bleeding sputum nausea constipation rectal bleeding diarrhea bloating abdominal pain urinary infections painful urination vaginal dryness incontinence vaginal discharge frequency urgency abnormal bleeding back pain joint pain loss of balance swelling tremors stiffness muscle weakness cramps rashes tingling memory problems dizziness change in sleep pattern depression difficulty sleeping nervousness no change difficulty concentrating increased thirst temperature intolerance increased urination.      OBJECTIVE   LMP 07/03/2014     E.G. Normal female  Vagina: normal  Cx: normal  Uterus: normal  Adnexa: negative    ASSESSMENT   Fannie Jeter is a 53 year old No obstetric history on file., here today for consultation for PMB.    U/S reveals stripe of 4mm.    Pt has not had gyn exam in years; so pap done today; she does report a hx of abnormal paps.    PLAN       Since stripe is 4mm; it is acceptable to forego an endo bx.  She will report any further bleeding.  Check pap.    Pt advised to have mammogram/colonoscopy.

## 2019-08-19 NOTE — LETTER
"2019       RE: Fannie Jeter  [address]  [place] [address]     Dear Colleague,    Thank you for referring your patient, Fannie Jeter, to the [facility] at [facility]. Please see a copy of my visit note below.      SUBJECTIVE   Fannie Jeter is a 53 year old No obstetric history on file., Patient's last menstrual period was 2014., who presents for consultation for PMB.    Pt discharged from inpt in psych 2019 (for agitation and psychosis) and had experienced PMB and was advised to have EMB with GYN.  U/S ordered as inpt.  In  she had an U/S for \"DUB\" which was normal.  She reports being menopausal about age 50.  She reports light bleeding for about 1.5 weeks in the hospital.  She is not sexually active (spouse ).    She reports an abnormal pap in  with freezing to her cervix.    She has not been doing her mammograms or colonoscopy.  She does report diarrhea/constipation.    HISTORY: post-menopausal vaginal bleeding     TECHNIQUE: The pelvis was scanned in standard fashion with  transabdominal and transvaginal transducer(s) using both grey scale  and color Doppler techniques.     FINDINGS:  The uterus measures 5.7 x 3.8 x 3.5 cm and there is no evidence of a  focal fibroid. The endometrium is within normal limits and measures 4  mm. Cervical calcifications and nabothian cyst. No free fluid in the  pelvis.     The right ovary measures 2.6 x 2.2 x 2.0  and exhibits normal color  Doppler flow. Left ovary is not visualized. No adnexal mass.                                                                   IMPRESSION:   Left ovary is not visualized. Otherwise, unremarkable normal pelvic  ultrasound.     I have personally reviewed the examination and initial interpretation  and I agree with the findings.     OLEKSANDR HARRISON MD      Gynecologic History  Patient's last menstrual period was 2014.     Menstrual " History:  Menstrual History 7/31/2015   LAST MENSTRUAL PERIOD 7/3/2014     Lab Results   Component Value Date    PAP ASC-US 08/15/2013      HPV negative 2013.    Obstetric History  OB History   No data available     Past Medical History  Past Medical History:   Diagnosis Date     ADHD (attention deficit hyperactivity disorder)      Asperger syndrome      Central serous retinopathy      HZO (herpes zoster oticus)     Left eye     Osteoarthritis      Schizoaffective disorder (H)      Scoliosis      Past Surgical History  Past Surgical History:   Procedure Laterality Date     BIOPSY      breast     BREAST SURGERY      decreased- lots of pain     GYN SURGERY      d and c     MAMMOPLASTY REDUCTION BILATERAL       Medications  Current Outpatient Medications   Medication     acetaminophen (TYLENOL) 325 MG tablet     albuterol (PROAIR HFA/PROVENTIL HFA/VENTOLIN HFA) 108 (90 Base) MCG/ACT inhaler     aspirin 325 MG tablet     cetirizine (ZYRTEC) 10 MG tablet     divalproex sodium extended-release (DEPAKOTE ER) 500 MG 24 hr tablet     guaiFENesin (MUCINEX) 600 MG 12 hr tablet     Homeopathic Products (SIMILASAN DRY EYE RELIEF OP)     hydrocortisone (CORTAID) 1 % external cream     OLANZapine zydis (ZYPREXA) 20 MG ODT     pramox-pe-glycerin-petrolatum (PREPARATION H) 1-0.25-14.4-15 % CREA cream     senna-docusate (SENOKOT-S/PERICOLACE) 8.6-50 MG tablet     No current facility-administered medications for this visit.      Allergies  Allergies   Allergen Reactions     Animal Dander      Haldol Difficulty breathing     Haldol [Haloperidol]      Penicillins      Unsure of what happens. Has been told she has allergies since she was a kid.     Penicillins      Seasonal Allergies      Social History  Social History     Tobacco Use     Smoking status: Current Every Day Smoker     Packs/day: 1.50     Years: 35.00     Pack years: 52.50     Types: Cigarettes     Last attempt to quit: 8/11/2009     Years since quitting: 10.0     Smokeless  tobacco: Current User   Substance Use Topics     Alcohol use: No     Drug use: No     Family History  Family History   Problem Relation Age of Onset     Arthritis Mother      Parkinsonism Mother      Cerebrovascular Disease Father      Substance Abuse Father      Substance Abuse Maternal Grandfather      Substance Abuse Paternal Grandmother      Substance Abuse Paternal Grandfather      Unknown/Adopted Brother      Substance Abuse Daughter      Review of Systems  Positive for night sweats hot flashes fever chills insomnia stress ringing in ears trouble swallowing congestion cough chest pain palpitations wheezing and shortness of breath difficulty bleeding sputum nausea constipation rectal bleeding diarrhea bloating abdominal pain urinary infections painful urination vaginal dryness incontinence vaginal discharge frequency urgency abnormal bleeding back pain joint pain loss of balance swelling tremors stiffness muscle weakness cramps rashes tingling memory problems dizziness change in sleep pattern depression difficulty sleeping nervousness no change difficulty concentrating increased thirst temperature intolerance increased urination.      OBJECTIVE   LMP 07/03/2014     E.G. Normal female  Vagina: normal  Cx: normal  Uterus: normal  Adnexa: negative    ASSESSMENT   Fannie Jeter is a 53 year old No obstetric history on file., here today for consultation for PMB.    U/S reveals stripe of 4mm.    Pt has not had gyn exam in years; so pap done today; she does report a hx of abnormal paps.    PLAN   Since stripe is 4mm; it is acceptable to forego an endo bx.  She will report any further bleeding.  Check pap.    Pt advised to have mammogram/colonoscopy.        Again, thank you for allowing me to participate in the care of your patient.      Sincerely,    Thierno Maza MD

## 2019-08-20 ASSESSMENT — ANXIETY QUESTIONNAIRES: GAD7 TOTAL SCORE: 16

## 2019-08-22 ENCOUNTER — HOSPITAL ENCOUNTER (OUTPATIENT)
Dept: BEHAVIORAL HEALTH | Facility: CLINIC | Age: 54
End: 2019-08-22
Attending: PSYCHIATRY & NEUROLOGY
Payer: MEDICARE

## 2019-08-22 PROCEDURE — 90853 GROUP PSYCHOTHERAPY: CPT

## 2019-08-22 PROCEDURE — G0177 OPPS/PHP; TRAIN & EDUC SERV: HCPCS

## 2019-08-22 NOTE — PROGRESS NOTES
"Adult Mental Health Outpatient Group Therapy Progress Note       Psychiatry: Dr. Tracie Beard           [facility]  Therapy: Dr. Maria Del Carmen Young Psy.D., L.P.    [facility]  [facility], [facility]  Family Therapy: Soo Bradford Shaggy Gibbons: [phone] / fax: [phone].     Diagnosis:  295.90 Schizophrenia     Client Initial Individualized Goals for Treatment: \"Integrate the different aspects of myself  So I don't forget important things; learn how, and do, only behaviors that are helpful to me \".     See Initial Treatment suggestions for the client during the time between Diagnostic Assessment and completion of the Master Individualized Treatment Plan.      Treatment Goals:  1,Client will notify staff when needing assistance to develop or implement a coping plan to manage suicidal or self injurious urges.  Client will use coping plan for safety, as needed.  Follow Safety Plan   Ask for more information, support and/or assistance as needed.  Follow up with providers/community supports as needed:   Report increases or changes in symptoms to staff.  Report any personal safety concerns to staff.   Take medications as prescribed.  Report medication changes and/or side effects to staff.  Attend and participate in groups as scheduled or notify staff if unable to do so.  Report any use of substances to staff as this may impact your symptoms and/or            personal safety.  Notify staff if you have any other issues that need to be addressed. This may include    any current abuse / neglect / exploitation or other vulnerability.  Follow recommendations of your treatment team and discuss concerns if not in  agreement.         Area of Treatment Focus:  Symptom Management      Therapeutic Interventions/Treatment Strategies:  Support, Feedback, Safety Assessments, Structured Activity and Education      Response to Treatment Strategies:  Accepted Feedback, Listened, Attentive, Accepted Support and Alert      Name of Group:  Group " "Psychotherapy  2:00 - 2:50 pm  3:00 - 3:50 pm        2019     Group Participants:   4      Description and Outcome:                Fannie reported being safe today.  She reported that she had better sleep, and that she feels continuously tired.   She reported that she is trying to organize her home, and that she notices she is more manic.    She talked with another member about a conspiracy and told her that there are good guys around that are trying to \"retrain people\" so that they are better.    She reported that half of her body is Jasiel, the lozano who  and that she is ok with this. She stated that she hears her own voice, but it is Jasiel and it \"keeps me on track.\"    She reported that she made spaghetti at home and it tastes wonderful.      She stated that she is accustomd to staying up late and doing creative activities at night, but has to take her medications earlier, so that she can get to this group.  She reported that she feels less anxious and suspicious about the neighbors, and that she did chat with them, recently, but could not do this before being in the hospital.      She talked to the group about how she was fearful of all police, previously, but last night was happy that they were there. She stated that she called another friend in the building to tell them to stay inside their apartment.        She reported that she is trying to maintain a budget and that she likes shopping and looking up items on the internet.           3:00 - 3:50 pm           The group participated in a structured activity that involved reading a worksheet about cognitive distortions, core beliefs, and coping.  The group discussed their thoughts and feelings about the process and shared their experiences with others.  The group wrote out examples from their lives about how they felt it had affected them.  The group discussed their different stories and those that were similar.  The group validated others who had " experienced distress.      She did a word find and listened to while the group practiced mindfulness deep breathing with the group for 10 minutes.        The client would benefit from further education about this skill and to demonstrate how it is practiced over time.              Is this a Weekly Review of the Progress on the Treatment Plan?  Yes.        Are Treatment Plan Goals being addressed?  Yes, continue treatment goals          Are Treatment Plan Strategies to Address Goals Effective?  Yes, continue treatment strategies          Are there any current contracts in place?  No

## 2019-08-23 LAB
COPATH REPORT: NORMAL
PAP: NORMAL

## 2019-08-23 NOTE — PROGRESS NOTES
Adult Mental Health Day Treatment  TRACK: 4C    PATIENT'S NAME: Fannie Jeter  MRN:   [id number]  :   1965  ACCT. NUMBER: [id number]  DATE OF SERVICE: 8/15/19  START TIME: 1:00  END TIME: 1:50  NUMBER OF PARTICIPANTS: 5      Summary of Group / Topics Discussed:  Sensory Approaches in Mental Health: Sensory Enhanced Mindfulness: Patients were taught and provided with an opportunity to explore and practice how using sensory enhanced mindfulness practices can help them stay grounded in the present moment as a way to manage mental health symptoms and stressors with an emphasis on proprioceptive and vestibular input.      Patient Session Goals / Objectives:    Identified how using sensory enhanced mindfulness practices can be used for grounding, stress management, and self regulation focused on proprioceptive and vestibular sensory input.      Improved awareness of different types of sensory enhanced mindfulness activities that assist with healthy coping of stress and symptoms      Established a plan for practice of these skills in their own environments    Practiced and reflected on how to generalize taught skills to their everyday life    Patient Participation / Response:  Fully participated with the group by sharing personal reflections / insights and openly received / provided feedback with other participants.    Patient presentation: fully engaged in experiential process, easily adapts content and identifies ways to integrate skills into her daily life. , Verbalized understanding of content and Patient would benefit from additional opportunities to practice the content to be able to generalize it to their everyday life with increased intentionality, consistency, and efficacy in support of their psychiatric recovery    Treatment Plan:  Patient has a current master individualized treatment plan.  See Epic treatment plan for more information.          Avtar Esposito, OT

## 2019-08-26 ENCOUNTER — HOSPITAL ENCOUNTER (OUTPATIENT)
Dept: BEHAVIORAL HEALTH | Facility: CLINIC | Age: 54
End: 2019-08-26
Attending: PSYCHIATRY & NEUROLOGY
Payer: MEDICARE

## 2019-08-26 ENCOUNTER — TELEPHONE (OUTPATIENT)
Dept: BEHAVIORAL HEALTH | Facility: CLINIC | Age: 54
End: 2019-08-26

## 2019-08-26 LAB
FINAL DIAGNOSIS: NORMAL
HPV HR 12 DNA CVX QL NAA+PROBE: NEGATIVE
HPV16 DNA SPEC QL NAA+PROBE: NEGATIVE
HPV18 DNA SPEC QL NAA+PROBE: NEGATIVE
SPECIMEN DESCRIPTION: NORMAL
SPECIMEN SOURCE CVX/VAG CYTO: NORMAL

## 2019-08-26 PROCEDURE — 99207 ZZC CDG-MDM COMPONENT: MEETS MODERATE - UP CODED: CPT | Performed by: NURSE PRACTITIONER

## 2019-08-26 PROCEDURE — 90853 GROUP PSYCHOTHERAPY: CPT

## 2019-08-26 PROCEDURE — G0177 OPPS/PHP; TRAIN & EDUC SERV: HCPCS

## 2019-08-26 PROCEDURE — 99214 OFFICE O/P EST MOD 30 MIN: CPT | Performed by: NURSE PRACTITIONER

## 2019-08-26 NOTE — PROGRESS NOTES
"Adult Mental Health Outpatient Group Therapy Progress Note           Psychiatry: Dr. LONG at [facility]  Therapy:  Rola at [facility]  PCP:  Dr. Joann Siegel [facility] [phone]      Diagnosis:  295.70 Schizoaffective Disorder, Bipolar Type  Asperger's Disorder  309.81PTSD     Client Initial Individualized Goals for Treatment: \"Integrate the different aspects of myself  So I don't forget important things; learn how, and do, only behaviors that are helpful to me \".     See Initial Treatment suggestions for the client during the time between Diagnostic Assessment and completion of the Master Individualized Treatment Plan.      Treatment Goals:  1,Client will notify staff when needing assistance to develop or implement a coping plan to manage suicidal or self injurious urges.  Client will use coping plan for safety, as needed.  Follow Safety Plan   Ask for more information, support and/or assistance as needed.  Follow up with providers/community supports as needed:   Report increases or changes in symptoms to staff.  Report any personal safety concerns to staff.   Take medications as prescribed.  Report medication changes and/or side effects to staff.  Attend and participate in groups as scheduled or notify staff if unable to do so.  Report any use of substances to staff as this may impact your symptoms and/or            personal safety.  Notify staff if you have any other issues that need to be addressed. This may include    any current abuse / neglect / exploitation or other vulnerability.  Follow recommendations of your treatment team and discuss concerns if not in  agreement.         Area of Treatment Focus:  Symptom Management      Therapeutic Interventions/Treatment Strategies:  Support, Feedback, Safety Assessments, Structured Activity and Education      Response to Treatment Strategies:  Accepted Feedback, Listened, Attentive, Accepted Support and Alert      Name of " Group:  Group Psychotherapy  1:00 - 1:50 pm        8/26/2019     Group Participants:   6      Description and Outcome:                Fannie reported being safe today.  She reported that her mood was beligerent today.She reported that she bought some comfort food and enjoyed eating it. She reported that she is writing out lists of things that need to be done and feels focused to get all of her chores and duties done. She reported that she felt some manic energy, but felt that she focused it to get her list completed, and reported to the group that she was nearly done with it and was happy about it.   She reported no voices, some paranoia of feeling targeted, especially by [employer], since she has credit card debt. She talked with the group about how she feels Rome Weathers is in her body, but hears her own voice, and feels inspired by Romeemmy Weathers. She reported to another member that she has sometimes difficulties with conspiracy theories and made a joke about it, since she doesn't have tin foil on her window to stop the radio waves.  She reported some Special Messages from TV or radio or computers but said they are managed.     She stated that she is accustomd to staying up late and doing creative activities at night, but has to take her medications earlier, so that she can get to this group.  She reported that she got new eye glasses, went to the doctor and will  her prescription glasses in 2 weeks.   She reported that she is managing her budget and will return something to [employer], so that she has money through the end of the month.      She reported that she is trying to maintain a budget and that she likes shopping and looking up items on the internet.             She did a word find and listened to while the group practiced mindfulness deep breathing with the group for 10 minutes.        The client would benefit from further education about this skill and to demonstrate how it is  practiced over time.              Is this a Weekly Review of the Progress on the Treatment Plan?  Yes.        Are Treatment Plan Goals being addressed?  Yes, continue treatment goals          Are Treatment Plan Strategies to Address Goals Effective?  Yes, continue treatment strategies          Are there any current contracts in place?  No

## 2019-08-26 NOTE — TELEPHONE ENCOUNTER
Absence call- Phone call and message left to check on Fannie Jeter  Since Fannie Jeter did not attend today.    Cristina Nathan., D,  L.P.
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
## 2019-08-27 ENCOUNTER — HOSPITAL ENCOUNTER (OUTPATIENT)
Dept: BEHAVIORAL HEALTH | Facility: CLINIC | Age: 54
End: 2019-08-27
Attending: PSYCHIATRY & NEUROLOGY
Payer: MEDICARE

## 2019-08-27 PROCEDURE — G0177 OPPS/PHP; TRAIN & EDUC SERV: HCPCS

## 2019-08-27 PROCEDURE — 90853 GROUP PSYCHOTHERAPY: CPT

## 2019-08-27 NOTE — PROGRESS NOTES
Adult Mental Health Day Treatment  TRACK: 4C    PATIENT'S NAME: Fannie Jeter  MRN:   [id number]  :   1965  ACCT. NUMBER: [id number]  DATE OF SERVICE: 19  START TIME: 1:00  END TIME: 1:50  NUMBER OF PARTICIPANTS: 4      Summary of Group / Topics Discussed:  Sensory Approaches in Mental Health: Coping Through the Senses Application: Patients reviewed neurosensory based skills and strategies related to supporting effective self regulation skills focusing on a sensory preference and creating a sensory toolkit to help them to stay grounded and regulated when at home and in the community.  Patients discussed  how their chosen sensory tool can be used for coping with mental health symptoms and stressors.  Patients were introduced on how to create supportive environments that encourage use of sensory based skills and toolkit.         Patient Session Goals / Objectives:    Identified specific and individualized neurosensory skills to help when distressed      Identified skills learned and how this applies to current daily life    Established a plan for practice of these skills in their own environments    Patient Participation / Response:  Fully participated with the group by sharing personal reflections / insights and openly received / provided feedback with other participants.    Verbalized understanding of content and Patient would benefit from additional opportunities to practice the content to be able to generalize it to their everyday life with increased intentionality, consistency, and efficacy in support of their psychiatric recovery    Treatment Plan:  Patient has a current master individualized treatment plan.  See Epic treatment plan for more information.      Avtar Esposito OT

## 2019-08-27 NOTE — PROGRESS NOTES
"Adult Mental Health Outpatient Group Therapy Progress Note       Psychiatry: Dr. LONG at [facility]  Therapy:  Rola at [facility]  PCP:  Dr. Joann Siegel [facility] [phone]    Diagnosis:  295.70 Schizoaffective Disorder, Bipolar Type  Asperger's Disorder  309.81PTSD  Client Initial Individualized Goals for Treatment: \"Integrate the different aspects of myself  So I don't forget important things; learn how, and do, only behaviors that are helpful to me \".     See Initial Treatment suggestions for the client during the time between Diagnostic Assessment and completion of the Master Individualized Treatment Plan.      Treatment Goals:  1,Client will notify staff when needing assistance to develop or implement a coping plan to manage suicidal or self injurious urges.  Client will use coping plan for safety, as needed.  Follow Safety Plan   Ask for more information, support and/or assistance as needed.  Follow up with providers/community supports as needed:   Report increases or changes in symptoms to staff.  Report any personal safety concerns to staff.   Take medications as prescribed.  Report medication changes and/or side effects to staff.  Attend and participate in groups as scheduled or notify staff if unable to do so.  Report any use of substances to staff as this may impact your symptoms and/or            personal safety.  Notify staff if you have any other issues that need to be addressed. This may include    any current abuse / neglect / exploitation or other vulnerability.  Follow recommendations of your treatment team and discuss concerns if not in  agreement.         Area of Treatment Focus:  Symptom Management      Therapeutic Interventions/Treatment Strategies:  Support, Feedback, Safety Assessments, Structured Activity and Education      Response to Treatment Strategies:  Accepted Feedback, Listened, Attentive, Accepted Support and Alert      Name of Group:  Group " Psychotherapy  2:00 - 2:50 pm 3:00 - 3:50 pm    8/27/2019     Group Participants:   5      Description and Outcome:                Fannie reported being safe today.  She reported that her mood was better today.She reported that she bought some comfort food and enjoyed eating it and talked about how she was half-awake and bought healthy food on-line, in the middle of the night. She reported that she is writing out lists of things that need to be done and feels focused to get all of her chores and duties done. She reported that she felt some manic energy, but felt that she focused it to get her list completed, and reported to the group that she was nearly done with it and was happy about it.   She reported that she felt more integrated.  She talked with the group about how she felt overwhelmed in crowds and how she feels other's feelings and filters everything, so then she would go and hide. The group discussed how to set a boundary and protect herself so that she didn't feel overwhelmed.         She reported no voices, some paranoia of feeling targeted, especially by [employer] card [employer], since she has credit card debt. She talked with the group about how she feels Rome Weathers is in her body, but hears her own voice, and feels inspired by Rome Weathers. She reported to another member that she has sometimes difficulties with conspiracy theories and made a joke about it, since she doesn't have tin foil on her window to stop the radio waves.  She reported some Special Messages from TV or radio or computers but said they are managed. She talked to another member about songs of Jasiel.         She stated that she feels better about herself and made a wish list on-line for others to give her trinkets, and talked about how small things have helped her with the group.        She reported that she is managing her budget and will return something to [employer], so that she has money through the end of the month.      She  reported that she is trying to maintain a budget and that she likes shopping and looking up items on the internet.         3:00 - 3:50 pm     The group participated in a structured activity that involved reading a worksheet about mindfulness, triggers, and emotions.  The group discussed their thoughts and feelings about the process and shared their experiences with others.  The group wrote out examples from their lives about how they felt it had affected them.  The group discussed their different stories and those that were similar.  The group validated others who had experienced distress.        She did a word find and listened to while the group practiced mindfulness deep breathing with the group for 10 minutes.        The client would benefit from further education about this skill and to demonstrate how it is practiced over time.              Is this a Weekly Review of the Progress on the Treatment Plan?  Yes.        Are Treatment Plan Goals being addressed?  Yes, continue treatment goals          Are Treatment Plan Strategies to Address Goals Effective?  Yes, continue treatment strategies          Are there any current contracts in place?  No

## 2019-08-29 ENCOUNTER — TELEPHONE (OUTPATIENT)
Dept: BEHAVIORAL HEALTH | Facility: CLINIC | Age: 54
End: 2019-08-29

## 2019-09-03 ENCOUNTER — HOSPITAL ENCOUNTER (OUTPATIENT)
Dept: BEHAVIORAL HEALTH | Facility: CLINIC | Age: 54
End: 2019-09-03
Attending: PSYCHIATRY & NEUROLOGY
Payer: MEDICARE

## 2019-09-03 PROCEDURE — 90853 GROUP PSYCHOTHERAPY: CPT

## 2019-09-03 PROCEDURE — G0177 OPPS/PHP; TRAIN & EDUC SERV: HCPCS

## 2019-09-03 NOTE — PROGRESS NOTES
"Adult Mental Health Outpatient Group Therapy Progress Note       Psychiatry: Dr. LONG at [facility]  Therapy:  Rola at [facility]  PCP:  Dr. Joann Siegel [facility] [phone]     Diagnosis:  295.70 Schizoaffective Disorder, Bipolar Type  Asperger's Disorder  309.81PTSD  Client Initial Individualized Goals for Treatment: \"Integrate the different aspects of myself  So I don't forget important things; learn how, and do, only behaviors that are helpful to me \".     See Initial Treatment suggestions for the client during the time between Diagnostic Assessment and completion of the Master Individualized Treatment Plan.      Treatment Goals:  1,Client will notify staff when needing assistance to develop or implement a coping plan to manage suicidal or self injurious urges.  Client will use coping plan for safety, as needed.  Follow Safety Plan   Ask for more information, support and/or assistance as needed.  Follow up with providers/community supports as needed:   Report increases or changes in symptoms to staff.  Report any personal safety concerns to staff.   Take medications as prescribed.  Report medication changes and/or side effects to staff.  Attend and participate in groups as scheduled or notify staff if unable to do so.  Report any use of substances to staff as this may impact your symptoms and/or            personal safety.  Notify staff if you have any other issues that need to be addressed. This may include    any current abuse / neglect / exploitation or other vulnerability.  Follow recommendations of your treatment team and discuss concerns if not in  agreement.         Area of Treatment Focus:  Symptom Management      Therapeutic Interventions/Treatment Strategies:  Support, Feedback, Safety Assessments, Structured Activity and Education      Response to Treatment Strategies:  Accepted Feedback, Listened, Attentive, Accepted Support and Alert      Name of Group:  Group " Psychotherapy 1:00 - 1:50 pm   3:00 - 3:50 pm     9/3/2019       Group Participants:   4      Description and Outcome:                Fannie reported being safe today.  She reported that her mood was better today.She reported that she was dressed in black, since  had , and was not living inside of herself, and she realized that he was actually dead.     She reported that she wore two necklaces to help with keeping away radio waves that could be harmful to people, and showed the group the different necklaces.           She reported problems with credit card [employer], since she has credit card debt. She talked with the group about how she feels Rome Weathers is in her body, but hears her own voice, and feels inspired by Rome Weathers. She reported to another member that she has sometimes difficulties with conspiracy theories and made a joke about it, since she doesn't have tin foil on her window to stop the radio waves.  She reported some Special Messages from TV or radio or computers but said they are managed. She talked to another member about songs of .         She stated that she feels better about herself and made a wish list on-line for others to give her trinkets, and talked about how small things have helped her with the group.        She reported that she is managing her budget and will return something to [employer], so that she has money through the end of the month.      She reported that she is trying to maintain a budget and that she likes shopping and looking up items on the internet.         3:00 - 3:50 pm                 The group participated in a structured activity that involved reading a worksheet about distraction skills, and self-soothing skills.  The group discussed their thoughts and feelings about the process and shared their experiences with others.  The group wrote out examples from their lives about how they felt it had affected them.  The group discussed their different  stories and those that were similar.  The group validated others who had experienced distress.         She did a word find and listened to while the group practiced mindfulness deep breathing with the group for 10 minutes.        The client would benefit from further education about this skill and to demonstrate how it is practiced over time.              Is this a Weekly Review of the Progress on the Treatment Plan?  Yes.        Are Treatment Plan Goals being addressed?  Yes, continue treatment goals          Are Treatment Plan Strategies to Address Goals Effective?  Yes, continue treatment strategies          Are there any current contracts in place?  No

## 2019-09-05 ENCOUNTER — HOSPITAL ENCOUNTER (OUTPATIENT)
Dept: BEHAVIORAL HEALTH | Facility: CLINIC | Age: 54
End: 2019-09-05
Attending: PSYCHIATRY & NEUROLOGY
Payer: MEDICARE

## 2019-09-05 PROCEDURE — G0177 OPPS/PHP; TRAIN & EDUC SERV: HCPCS

## 2019-09-05 PROCEDURE — 90853 GROUP PSYCHOTHERAPY: CPT

## 2019-09-05 NOTE — PROGRESS NOTES
Adult Mental Health Day Treatment  TRACK: 4C    PATIENT'S NAME: Fannie Jeter  MRN:   [id number]  :   1965  ACCT. NUMBER: [id number]  DATE OF SERVICE: 19  START TIME: 1400  END TIME: 1450  NUMBER OF PARTICIPANTS: 4      Summary of Group / Topics Discussed:  Sensory Approaches in Mental Health: Focused Activity: Patients were provided with verbal and experiential education to identify physical and sensorimotor based activities that can be utilized for stress management, self care, health maintenance, and self regulation.  Patients increased knowledge and awareness of activities that support good self care, build resiliency, and prevent relapse through healthy engagement in mindful focused activities for effective coping with illness and reduction of maladaptive coping skills.     Patient Session Goals / Objectives:    Identified specific physical and sensorimotor based activities for stress management, self care, health maintenance, and self regulation      Improved awareness of activities that are meaningful focused activities that support good self care, build resiliency, and prevent relapse and how this applies to current daily life    Established a plan for practice of these skills in their own environments    Practiced and reflected on how to generalize taught skills to their everyday life    Patient Participation / Response:  Fully participated with the group by identifying and working on a mindful focused task with assistance from this writer.     Patient presentation: fair concentration, needed a lot of assistance with planning, disorganized in her focused task work, Verbalized understanding of content and Patient would benefit from additional opportunities to practice the content to be able to generalize it to their everyday life with increased intentionality, consistency, and efficacy in support of their psychiatric recovery    Treatment Plan:  Patient has a current master individualized  treatment plan.  See Epic treatment plan for more information.          Dottie Gee, OTR/L

## 2019-09-05 NOTE — PROGRESS NOTES
"Adult Mental Health Outpatient Group Therapy Progress Note       Psychiatry: Dr. LONG at [facility]  Therapy:  Rola at [facility]  PCP:  Dr. Joann Siegel [facility] [phone]     Diagnosis:  295.70 Schizoaffective Disorder, Bipolar Type  Asperger's Disorder  309.81PTSD  Client Initial Individualized Goals for Treatment: \"Integrate the different aspects of myself  So I don't forget important things; learn how, and do, only behaviors that are helpful to me \".     See Initial Treatment suggestions for the client during the time between Diagnostic Assessment and completion of the Master Individualized Treatment Plan.      Treatment Goals:  1,Client will notify staff when needing assistance to develop or implement a coping plan to manage suicidal or self injurious urges.  Client will use coping plan for safety, as needed.  Follow Safety Plan   Ask for more information, support and/or assistance as needed.  Follow up with providers/community supports as needed:   Report increases or changes in symptoms to staff.  Report any personal safety concerns to staff.   Take medications as prescribed.  Report medication changes and/or side effects to staff.  Attend and participate in groups as scheduled or notify staff if unable to do so.  Report any use of substances to staff as this may impact your symptoms and/or            personal safety.  Notify staff if you have any other issues that need to be addressed. This may include    any current abuse / neglect / exploitation or other vulnerability.  Follow recommendations of your treatment team and discuss concerns if not in  agreement.         Area of Treatment Focus:  Symptom Management      Therapeutic Interventions/Treatment Strategies:  Support, Feedback, Safety Assessments, Structured Activity and Education      Response to Treatment Strategies:  Accepted Feedback, Listened, Attentive, Accepted Support and Alert      Name of Group:  Group " Psychotherapy 1:00 - 1:50 pm  2:00 - 2:50 pm       9/5/2019        Group Participants:   6      Description and Outcome:                Fannie reported being safe today.  She reported that her mood was worried, and talked to the group about her money, her needed medications, foods, groceries, and that she didn't get one signature on her MSA paperwork last week. She stated that she is trying to stay on a budget.   She reported problems with another credit card company, since she has credit card debt.     She was dressed in purple and talked about a coincidental situation where someone on TV talked about a lozano from Jasiel's band and it made her feel good. She reported that she dressed in purple and feels motivated by .  She talked with the group about how she feels Rome Weathers is in her body, but hears her own voice, and feels inspired by Rome Weathers.       She reported that she wore two necklaces to help with keeping away radio waves that could be harmful to people, and showed the group the different necklaces.             She reported to another member that she has sometimes difficulties with conspiracy theories and made a joke about it, since she doesn't have tin foil on her window to stop the radio waves.  She reported some Special Messages from TV or radio or computers but said they are managed. She talked to another member about songs of Jasiel.         She stated that she feels better about herself and made a wish list on-line for others to give her trinkets, and talked about how small things have helped her with the group.  She reported that she is trying to maintain a budget and that she likes shopping and looking up items on the internet.        2:00 - 2:50 pm                   The group participated in a structured activity that involved reading a worksheet about distraction skills, and self-soothing skills.  The group discussed their thoughts and feelings about the process and shared their  experiences with others.  The group wrote out examples from their lives about how they felt it had affected them.  The group discussed their different stories and those that were similar.  The group validated others who had experienced distress.         She did a word find and listened to while the group practiced mindfulness deep breathing with the group for 10 minutes.        The client would benefit from further education about this skill and to demonstrate how it is practiced over time.              Is this a Weekly Review of the Progress on the Treatment Plan?  Yes.        Are Treatment Plan Goals being addressed?  Yes, continue treatment goals          Are Treatment Plan Strategies to Address Goals Effective?  Yes, continue treatment strategies          Are there any current contracts in place?  No

## 2019-09-06 NOTE — PROGRESS NOTES
Psychiatry staffing: case discussed  Diagnosis: Schizoaffective disorder, Asperger's. PTSD. Still psychotic.     Current Outpatient Medications   Medication     acetaminophen (TYLENOL) 325 MG tablet     albuterol (PROAIR HFA/PROVENTIL HFA/VENTOLIN HFA) 108 (90 Base) MCG/ACT inhaler     aspirin 325 MG tablet     divalproex sodium extended-release (DEPAKOTE ER) 500 MG 24 hr tablet     guaiFENesin (MUCINEX) 600 MG 12 hr tablet     Homeopathic Products (SIMILASAN DRY EYE RELIEF OP)     hydrocortisone (CORTAID) 1 % external cream     OLANZapine zydis (ZYPREXA) 20 MG ODT     pramox-pe-glycerin-petrolatum (PREPARATION H) 1-0.25-14.4-15 % CREA cream     senna-docusate (SENOKOT-S/PERICOLACE) 8.6-50 MG tablet     No current facility-administered medications for this encounter.      Past Medical History:   Diagnosis Date     ADHD (attention deficit hyperactivity disorder)      Asperger syndrome      Central serous retinopathy      HZO (herpes zoster oticus)     Left eye     Osteoarthritis      Schizoaffective disorder (H)      Scoliosis

## 2019-09-06 NOTE — PROGRESS NOTES
Adult Mental Health Day Treatment  TRACK: 4C    PATIENT'S NAME: Fannie Jeter  MRN:   [id number]  :   1965  ACCT. NUMBER: [id number]  DATE OF SERVICE: 19  START TIME: 1500  END TIME: 1600  NUMBER OF PARTICIPANTS: 6      Summary of Group / Topics Discussed:  Communication and Social Skills Development: Social Supports: Social Support Scale: Patients completed a social support self assessment to identify people who are supportive and to evaluate the effectiveness of their social support system.  Patients were taught and gained awareness of characteristics of an effective support and how to develop this in their lives.  Patients identified both personal strengths and opportunities for growth in this areas to improve overall communication and connection with other people.    Patient Session Goals / Objectives:    Identified strengths and opportunities for growth in developing their social support system and how this impacts their ability to connect and communicate with other people       Improved awareness of important aspects of social support systems and how this relates to mental health recovery        Established a plan for practice of these skills in their own environments    Practiced and reflected on how to generalize taught skills to their everyday life    Patient Participation / Response:  Fully participated with the group by sharing personal reflections / insights and openly received / provided feedback with other participants.    Patient presentation: Calm,alert and focused with stable mood and thought process. Client spent time gathering paperwork so that she can file personal bankruptcy., Verbalized understanding of content and Patient would benefit from additional opportunities to practice the content to be able to generalize it to their everyday life with increased intentionality, consistency, and efficacy in support of their psychiatric recovery      Treatment Plan:  Patient has a current  master individualized treatment plan.  See Epic treatment plan for more information.          Michael Hardin, OTR/L

## 2019-09-10 ENCOUNTER — HOSPITAL ENCOUNTER (OUTPATIENT)
Dept: BEHAVIORAL HEALTH | Facility: CLINIC | Age: 54
End: 2019-09-10
Attending: PSYCHIATRY & NEUROLOGY
Payer: MEDICARE

## 2019-09-10 ENCOUNTER — TELEPHONE (OUTPATIENT)
Dept: BEHAVIORAL HEALTH | Facility: CLINIC | Age: 54
End: 2019-09-10

## 2019-09-10 PROCEDURE — G0177 OPPS/PHP; TRAIN & EDUC SERV: HCPCS

## 2019-09-10 PROCEDURE — 90853 GROUP PSYCHOTHERAPY: CPT

## 2019-09-10 NOTE — PROGRESS NOTES
"Adult Mental Health Outpatient Group Therapy Progress Note       Psychiatry: Dr. LONG at [facility]  Therapy:  Rola at [facility]  PCP:  Dr. Joann Siegel [facility] [phone]     Diagnosis:  295.70 Schizoaffective Disorder, Bipolar Type  Asperger's Disorder  309.81PTSD  Client Initial Individualized Goals for Treatment: \"Integrate the different aspects of myself  So I don't forget important things; learn how, and do, only behaviors that are helpful to me \".     See Initial Treatment suggestions for the client during the time between Diagnostic Assessment and completion of the Master Individualized Treatment Plan.      Treatment Goals:  1,Client will notify staff when needing assistance to develop or implement a coping plan to manage suicidal or self injurious urges.  Client will use coping plan for safety, as needed.  Follow Safety Plan   Ask for more information, support and/or assistance as needed.  Follow up with providers/community supports as needed:   Report increases or changes in symptoms to staff.  Report any personal safety concerns to staff.   Take medications as prescribed.  Report medication changes and/or side effects to staff.  Attend and participate in groups as scheduled or notify staff if unable to do so.  Report any use of substances to staff as this may impact your symptoms and/or            personal safety.  Notify staff if you have any other issues that need to be addressed. This may include    any current abuse / neglect / exploitation or other vulnerability.  Follow recommendations of your treatment team and discuss concerns if not in  agreement.         Area of Treatment Focus:  Symptom Management      Therapeutic Interventions/Treatment Strategies:  Support, Feedback, Safety Assessments, Structured Activity and Education      Response to Treatment Strategies:  Accepted Feedback, Listened, Attentive, Accepted Support and Alert      Name of Group:  Group " Psychotherapy 1:00 - 1:50 pm  2:00 - 2:50 pm      9/10/2019        Group Participants:   5      Description and Outcome:                Fannie reported being safe today.  She reported that her mood was worried, and talked to the group about her mom, and how her mom didn't think she was the same person as she knew when she was younger, and told her she should be on Geodon.  She talked to the group about this and how she has gained weight on the Zyprexa.. She stated that she is trying to stay at average weight.  She reported an increase in appetite.       She reported that she wants to learn how to play different percussion instruments and talked to the group about this.  She talked with the group about how she feels Rome Weathers is in her body, but hears her own voice, and feels inspired by Rome Weathers.       She reported that she wore two necklaces to help with keeping away radio waves that could be harmful to people, and showed the group the different necklaces.                 She reported to another member that she has sometimes difficulties with conspiracy theories and made a joke about it, since she doesn't have tin foil on her window to stop the radio waves.  She reported some Special Messages from TV or radio or computers but said they are managed. She talked to another member about songs of Jasiel.         She stated that she feels better about herself and made a wish list on-line for others to give her trinkets, and talked about how small things have helped her with the group.  She reported that she is trying to maintain a budget and that she likes shopping and looking up items on the internet.        2:00 - 2:50 pm                 The group participated in a structured activity that involved reading a worksheet about motivation.  The group discussed their thoughts and feelings about the process and shared their experiences with others.  The group wrote out examples from their lives about how they  felt it had affected them.  The group discussed their different stories and those that were similar.  The group validated others who had experienced distress.         She did a word find and listened to while the group practiced mindfulness deep breathing with the group for 10 minutes.        The client would benefit from further education about this skill and to demonstrate how it is practiced over time.              Is this a Weekly Review of the Progress on the Treatment Plan?  Yes.        Are Treatment Plan Goals being addressed?  Yes, continue treatment goals          Are Treatment Plan Strategies to Address Goals Effective?  Yes, continue treatment strategies          Are there any current contracts in place?  No

## 2019-09-11 ENCOUNTER — OFFICE VISIT (OUTPATIENT)
Dept: FAMILY MEDICINE | Facility: CLINIC | Age: 54
End: 2019-09-11
Payer: MEDICARE

## 2019-09-11 VITALS
OXYGEN SATURATION: 96 % | HEART RATE: 87 BPM | WEIGHT: 183.25 LBS | TEMPERATURE: 98.9 F | BODY MASS INDEX: 29.58 KG/M2 | DIASTOLIC BLOOD PRESSURE: 81 MMHG | SYSTOLIC BLOOD PRESSURE: 115 MMHG

## 2019-09-11 DIAGNOSIS — N95.0 POST-MENOPAUSAL BLEEDING: Primary | ICD-10-CM

## 2019-09-11 DIAGNOSIS — Z13.220 SCREENING FOR HYPERLIPIDEMIA: ICD-10-CM

## 2019-09-11 DIAGNOSIS — R73.09 ELEVATED GLUCOSE LEVEL: ICD-10-CM

## 2019-09-11 LAB
CHOLEST SERPL-MCNC: 272 MG/DL (ref 0–200)
CHOLEST/HDLC SERPL: 3.1 {RATIO} (ref 0–5)
FASTING SPECIMEN: NO
FSH SERPL-ACNC: 54.3 IU/L
HBA1C MFR BLD: 5.6 % (ref 4.1–5.7)
HDLC SERPL-MCNC: 87 MG/DL
LDLC SERPL CALC-MCNC: 157 MG/DL (ref 0–129)
TRIGL SERPL-MCNC: 139 MG/DL (ref 0–150)
VLDL-CHOLESTEROL: 28 (ref 7–32)

## 2019-09-11 RX ORDER — NIACIN 100 MG
100 TABLET ORAL
Status: ON HOLD | COMMUNITY
End: 2020-06-10

## 2019-09-11 RX ORDER — LAMOTRIGINE 100 MG/1
100 TABLET ORAL DAILY
Status: ON HOLD | COMMUNITY
Start: 2019-07-01 | End: 2020-06-24

## 2019-09-11 RX ORDER — ZIPRASIDONE HYDROCHLORIDE 40 MG/1
40 CAPSULE ORAL DAILY
Status: ON HOLD | COMMUNITY
End: 2020-06-24

## 2019-09-11 RX ORDER — CLONAZEPAM 0.5 MG/1
0.5 TABLET ORAL 3 TIMES DAILY PRN
Status: ON HOLD | COMMUNITY
Start: 2014-08-14 | End: 2020-06-24

## 2019-09-11 RX ORDER — LANOLIN ALCOHOL/MO/W.PET/CERES
3 CREAM (GRAM) TOPICAL
COMMUNITY
Start: 2015-09-25

## 2019-09-11 ASSESSMENT — PATIENT HEALTH QUESTIONNAIRE - PHQ9: SUM OF ALL RESPONSES TO PHQ QUESTIONS 1-9: 13

## 2019-09-11 NOTE — PROGRESS NOTES
CHIEF COMPLAINT: Post-menopausal bleeding      HISTORY OF PRESENT ILLNESS: Fannie Jeter is a 53 year old female who presents for above. She is postmenopausal and has had some mild bleeding. She followed with ob/gyn on 8/19/19 and had a normal pap smear with negative HPV. She also had a pelvic ultrasound of which was normal. She is requesting her hormone level to be checked today.     She has been experiencing difficulty concentrating as well as symptoms she believes could be related to hypoglycemia.      She also has brought in some forms from the [facility] regarding her diet limitations that she would like me to fill out.       FAMILY, SOCIAL AND PAST SURGICAL HISTORIES:  Unchanged.       MEDICATIONS:  Carefully reviewed.       REVIEW OF SYSTEMS:  A 10-point review is negative except as otherwise noted.      OBJECTIVE:    GENERAL: Fannie is in no distress.  Affect is upbeat.  Breathing comfortably.  She does not appear unusually pale.   VITAL SIGNS: /81 (BP Location: Right arm, Patient Position: Sitting, Cuff Size: Adult Regular)   Pulse 87   Temp 98.9  F (37.2  C) (Oral)   Wt 83.1 kg (183 lb 4 oz)   LMP 07/03/2014   SpO2 96%   BMI 29.58 kg/m    PSYCH: Speech is clear, unpressured. Good insight and judgement.      PHQ9 score = 13  ACT = 20    LABORATORY DATA: Labs pending (see below)      ASSESSMENT AND PLAN:   1. Post-menopausal bleeding: FSH, pending.  Her US was reviewed and she understands the reassuring nature of this.  2. Elevated glucose level: Hemoglobin A1c, pending  3. Screening hor hyperlipidemia: Lipid panel, pending    Call with any problems or questions.     I, Brian Matos, am serving as a scribe to document services personally performed by Dr. Vikas Acharya, based on data collection and the provider's statements to me. Dr. Acharya has reviewed, edited, and approved the above note.     --Vikas Acharya MD

## 2019-09-11 NOTE — PROGRESS NOTES
Adult Mental Health Day Treatment  TRACK: 4C    PATIENT'S NAME: Fannie Jeter  MRN:   [id number]  :   1965  ACCT. NUMBER: [id number]  DATE OF SERVICE: 9/10/19  START TIME: 1500  END TIME: 1600  NUMBER OF PARTICIPANTS: 5      Summary of Group / Topics Discussed:  Lifestyle Balance and Structure: Benefits of Leisure on Mental Health: Patients explored and learned about the benefits and possibilities of leisure activity to create lifestyle balance that supports their mental and physical wellbeing.  Patients were assisted to identify individualized leisure values and interests, recognized the benefits of leisure activity on mental health, and problem solved barriers to leisure engagement and strategies to overcome.  Patient engaged in an experiential leisure activity to gain self-awareness and build milieu social aspects and reflected on the impact the experiential activity had on their mood.       Patient Session Goals / Objectives:    Increased awareness of the importance of engagement in leisure activities to support lifestyle balance and perceived quality of life    Identified strategies to recognize and challenge barriers to leisure participation     Facilitated exploration of meaningful leisure interests and values    Practiced and reflected on how to generalize taught skills to their everyday life    Patient Participation / Response:  Fully participated with the group by sharing personal reflections / insights and openly received / provided feedback with other participants.    Patient presentation: Calm,alert and focused with stable mood and thought process, Verbalized understanding of content and Patient would benefit from additional opportunities to practice the content to be able to generalize it to their everyday life with increased intentionality, consistency, and efficacy in support of their psychiatric recovery    Treatment Plan:  Patient has a current master individualized treatment plan.  See  Epic treatment plan for more information.          Michael Hardin, OTR/L

## 2019-09-11 NOTE — NURSING NOTE
53 year old  Chief Complaint   Patient presents with     [facility]     reestablishing (last 2015),      Vaginal Bleeding     some slight bleeding, but patient is past menopause, pap smear was negative      Forms     from the [facility] for diet       Blood pressure 115/81, pulse 87, temperature 98.9  F (37.2  C), temperature source Oral, weight 83.1 kg (183 lb 4 oz), last menstrual period 07/03/2014, SpO2 96 %. Body mass index is 29.58 kg/m .  Patient Active Problem List   Diagnosis     Scoliosis     Dysfunctional uterine bleeding     Central serous retinopathy     Autism spectrum disorder     Adult ADHD     Fibromyalgia     Psychosis (H)     Schizoaffective disorder (H)     ADHD (attention deficit hyperactivity disorder)     Arthritis     Asthma     Borderline diabetes     Chronic constipation     De Quervain's tenosynovitis, left     Hemorrhoids without complication     Nicotine dependence     Nonspecific reaction to tuberculin skin test without active tuberculosis     Phalanx of the foot fracture     PTSD (post-traumatic stress disorder)     Severe manic bipolar I disorder with psychotic features (H)     Thyroid disease       Wt Readings from Last 2 Encounters:   09/11/19 83.1 kg (183 lb 4 oz)   08/19/19 81.8 kg (180 lb 4.8 oz)     BP Readings from Last 3 Encounters:   09/11/19 115/81   08/05/19 95/66   07/10/19 119/82         Current Outpatient Medications   Medication     acetaminophen (TYLENOL) 325 MG tablet     ADVAIR DISKUS 100-50 MCG/DOSE inhaler     aspirin 325 MG tablet     B Complex-Folic Acid (B COMPLEX PLUS) TABS     cholecalciferol (D-3-5) 5000 units CAPS     clonazePAM (KLONOPIN) 0.5 MG tablet     divalproex sodium extended-release (DEPAKOTE ER) 500 MG 24 hr tablet     lamoTRIgine (LAMICTAL) 100 MG tablet     Levomefolate Glucosamine (METHYLFOLATE PO)     magnesium 100 MG TABS     melatonin 3 MG tablet     Multiple Vitamins-Minerals (MULTIVITAMIN ADULT PO)     Multiple Vitamins-Minerals  (PRESERVISION AREDS 2 PO)     niacin 100 MG tablet     UNABLE TO FIND     ziprasidone (GEODON) 40 MG capsule     albuterol (PROAIR HFA/PROVENTIL HFA/VENTOLIN HFA) 108 (90 Base) MCG/ACT inhaler     guaiFENesin (MUCINEX) 600 MG 12 hr tablet     Homeopathic Products (SIMILASAN DRY EYE RELIEF OP)     hydrocortisone (CORTAID) 1 % external cream     OLANZapine zydis (ZYPREXA) 20 MG ODT     pramox-pe-glycerin-petrolatum (PREPARATION H) 1-0.25-14.4-15 % CREA cream     senna-docusate (SENOKOT-S/PERICOLACE) 8.6-50 MG tablet     No current facility-administered medications for this visit.        Social History     Tobacco Use     Smoking status: Current Every Day Smoker     Packs/day: 1.50     Years: 35.00     Pack years: 52.50     Types: Cigarettes     Last attempt to quit: 8/11/2009     Years since quitting: 10.0     Smokeless tobacco: Current User   Substance Use Topics     Alcohol use: No     Drug use: No       Health Maintenance Due   Topic Date Due     ASTHMA ACTION PLAN  1965     ASTHMA CONTROL TEST  1965     MEDICARE ANNUAL WELLNESS VISIT  10/25/1982     ZOSTER IMMUNIZATION (1 of 2) 10/25/2014     MAMMO SCREENING  02/02/2018     DTAP/TDAP/TD IMMUNIZATION (2 - Td) 04/02/2018     INFLUENZA VACCINE (1) 09/01/2019       Lab Results   Component Value Date    PAP NIL 08/19/2019 September 11, 2019 3:05 PM

## 2019-09-12 ENCOUNTER — HOSPITAL ENCOUNTER (OUTPATIENT)
Dept: BEHAVIORAL HEALTH | Facility: CLINIC | Age: 54
End: 2019-09-12
Attending: PSYCHIATRY & NEUROLOGY
Payer: MEDICARE

## 2019-09-12 PROCEDURE — 90853 GROUP PSYCHOTHERAPY: CPT

## 2019-09-12 PROCEDURE — G0177 OPPS/PHP; TRAIN & EDUC SERV: HCPCS

## 2019-09-12 ASSESSMENT — ASTHMA QUESTIONNAIRES: ACT_TOTALSCORE: 20

## 2019-09-12 NOTE — PROGRESS NOTES
"Adult Mental Health Outpatient Group Therapy Progress Note       Psychiatry: Dr. LONG at [facility]  Therapy:  Rola at [facility]  PCP:  Dr. Joann Siegel [facility] [phone]     Diagnosis:  295.70 Schizoaffective Disorder, Bipolar Type  Asperger's Disorder  309.81PTSD  Client Initial Individualized Goals for Treatment: \"Integrate the different aspects of myself  So I don't forget important things; learn how, and do, only behaviors that are helpful to me \".     See Initial Treatment suggestions for the client during the time between Diagnostic Assessment and completion of the Master Individualized Treatment Plan.      Treatment Goals:  1,Client will notify staff when needing assistance to develop or implement a coping plan to manage suicidal or self injurious urges.  Client will use coping plan for safety, as needed.  Follow Safety Plan   Ask for more information, support and/or assistance as needed.  Follow up with providers/community supports as needed:   Report increases or changes in symptoms to staff.  Report any personal safety concerns to staff.   Take medications as prescribed.  Report medication changes and/or side effects to staff.  Attend and participate in groups as scheduled or notify staff if unable to do so.  Report any use of substances to staff as this may impact your symptoms and/or            personal safety.  Notify staff if you have any other issues that need to be addressed. This may include    any current abuse / neglect / exploitation or other vulnerability.  Follow recommendations of your treatment team and discuss concerns if not in  agreement.         Area of Treatment Focus:  Symptom Management      Therapeutic Interventions/Treatment Strategies:  Support, Feedback, Safety Assessments, Structured Activity and Education      Response to Treatment Strategies:  Accepted Feedback, Listened, Attentive, Accepted Support and Alert      Name of Group:  Group " Psychotherapy   2:00 - 2:50 pm   3:00 - 3:50 pm     9/12/2019        Group Participants:   6      Description and Outcome:                Fannie reported being safe today.  She reported that she called and talked to her clinic and got on Geodon, and Lamictal, and feels much better now.  She talked to the group about this and how she has gained weight on the Zyprexa.. She stated that she is trying to stay at average weight.  She reported an increase in appetite.         She reported that she wants to learn how to play different percussion instruments and talked to the group about this.  She talked with the group about how she feels Rome Weathers is in her body, but hears her own voice, and feels inspired by Rome Weathers.       She reported that she wore two necklaces to help with keeping away radio waves that could be harmful to people, and showed the group the different necklaces.                 She reported to another member that she has sometimes difficulties with conspiracy theories and made a joke about it, since she doesn't have tin foil on her window to stop the radio waves.  She reported some Special Messages from TV or radio or computers but said they are managed.          She stated that she feels better about herself and made a wish list on-line for others to give her trinkets, and talked about how small things have helped her with the group.  She reported that she is trying to maintain a budget and that she likes shopping and looking up items on the internet.        3:00 - 3:50 pm                   The group participated in a structured activity that involved reading a worksheet about affirmations.  The group discussed their thoughts and feelings about the process and shared their experiences with others.  The group wrote out examples from their lives about how they felt it had affected them.  The group discussed their different stories and those that were similar.  The group validated others who had  experienced distress.         She did a word find and listened to while the group practiced mindfulness deep breathing with the group for 10 minutes.        The client would benefit from further education about this skill and to demonstrate how it is practiced over time.              Is this a Weekly Review of the Progress on the Treatment Plan?  Yes.        Are Treatment Plan Goals being addressed?  Yes, continue treatment goals          Are Treatment Plan Strategies to Address Goals Effective?  Yes, continue treatment strategies          Are there any current contracts in place?  No

## 2019-09-13 ENCOUNTER — TELEPHONE (OUTPATIENT)
Dept: FAMILY MEDICINE | Facility: CLINIC | Age: 54
End: 2019-09-13

## 2019-09-16 ENCOUNTER — HOSPITAL ENCOUNTER (OUTPATIENT)
Dept: BEHAVIORAL HEALTH | Facility: CLINIC | Age: 54
End: 2019-09-16
Attending: PSYCHIATRY & NEUROLOGY
Payer: MEDICARE

## 2019-09-16 PROCEDURE — 90853 GROUP PSYCHOTHERAPY: CPT

## 2019-09-16 PROCEDURE — G0177 OPPS/PHP; TRAIN & EDUC SERV: HCPCS

## 2019-09-16 NOTE — PROGRESS NOTES
"Adult Mental Health Outpatient Group Therapy Progress Note       Psychiatry: Dr. LONG at [facility]  Therapy:  Rola at [facility]  PCP:  Dr. Joann Siegel [facility] [phone]     Diagnosis:  295.70 Schizoaffective Disorder, Bipolar Type  Asperger's Disorder  309.81PTSD  Client Initial Individualized Goals for Treatment: \"Integrate the different aspects of myself  So I don't forget important things; learn how, and do, only behaviors that are helpful to me \".     See Initial Treatment suggestions for the client during the time between Diagnostic Assessment and completion of the Master Individualized Treatment Plan.      Treatment Goals:  1,Client will notify staff when needing assistance to develop or implement a coping plan to manage suicidal or self injurious urges.  Client will use coping plan for safety, as needed.  Follow Safety Plan   Ask for more information, support and/or assistance as needed.  Follow up with providers/community supports as needed:   Report increases or changes in symptoms to staff.  Report any personal safety concerns to staff.   Take medications as prescribed.  Report medication changes and/or side effects to staff.  Attend and participate in groups as scheduled or notify staff if unable to do so.  Report any use of substances to staff as this may impact your symptoms and/or            personal safety.  Notify staff if you have any other issues that need to be addressed. This may include    any current abuse / neglect / exploitation or other vulnerability.  Follow recommendations of your treatment team and discuss concerns if not in  agreement.         Area of Treatment Focus:  Symptom Management      Therapeutic Interventions/Treatment Strategies:  Support, Feedback, Safety Assessments, Structured Activity and Education      Response to Treatment Strategies:  Accepted Feedback, Listened, Attentive, Accepted Support and Alert      Name of Group:  Group " Psychotherapy   1:00 - 1:50 pm     9/16/2019     Group Participants:   3      Description and Outcome:                Fannie reported being safe today.  She reported that she more like her old self on Geodon, and Lamictal, and feels much better now, and said previously she felt like she was in a cloud.  She talked to the group about this and how she has gained weight on the Zyprexa.. She stated that she is trying to stay at average weight.  She reported that she walks her cat in a stroller around the building each day and enjoys the time outside.  She stated that she had a relaxing weekend.           She reported that she wants to learn how to play different percussion instruments and talked to the group about this.  She talked with the group about how she feels Rome Weathers is in her body, but hears her own voice, and feels inspired by Rome Weathers.       She reported that she wore two necklaces to help with keeping away radio waves that could be harmful to people, and showed the group the different necklaces.                  She stated that she feels better about herself and made a wish list on-line for others to give her trinkets, and talked about how small things have helped her with the group.  She reported that she is trying to maintain a budget and that she likes shopping and looking up items on the internet.       She did a word find and listened to while the group practiced mindfulness deep breathing.        The client would benefit from further education about this skill and to demonstrate how it is practiced over time.              Is this a Weekly Review of the Progress on the Treatment Plan?  Yes.        Are Treatment Plan Goals being addressed?  Yes, continue treatment goals          Are Treatment Plan Strategies to Address Goals Effective?  Yes, continue treatment strategies          Are there any current contracts in place?  No

## 2019-09-17 ENCOUNTER — HOSPITAL ENCOUNTER (OUTPATIENT)
Dept: BEHAVIORAL HEALTH | Facility: CLINIC | Age: 54
End: 2019-09-17
Attending: PSYCHIATRY & NEUROLOGY
Payer: MEDICARE

## 2019-09-17 PROCEDURE — 90853 GROUP PSYCHOTHERAPY: CPT

## 2019-09-17 PROCEDURE — G0177 OPPS/PHP; TRAIN & EDUC SERV: HCPCS

## 2019-09-17 NOTE — PROGRESS NOTES
Adult Mental Health Day Treatment  TRACK: 4C     PATIENT'S NAME:    Fannie Jeter  MRN:                           [id number]  :                           1965  ACCT. NUMBER:       [id number]  DATE OF SERVICE:  19  START TIME: 1400  END TIME: 1500  NUMBER OF PARTICIPANTS: 5      Summary of Group / Topics Discussed:  Lifestyle Balance and Structure: Occupations: A Balanced Lifestyle: Patients were introduced to the importance of daily occupations in support of mental health management by exploring a balanced lifestyle.  Patients identified how they spend their time and skills to bring this into better balance.  Patients were assisted to establish, restore, and/or modify performance skills and patterns for improved engagement and promotion of positive mental health through meaningful occupations.  Patients gained awareness of the connection between who they are (self identity) with what they do.    Patient Session Goals / Objectives:    Increased awareness of how patient s functioning in identified meaningful occupations are impacted by their mental health status     Developed performance skills and performance patterns to enhance occupational engagement through creating a balanced lifestyle    Explored ways to generalize new awareness and skills to their everyday life    Patient Participation / Response:  Fully participated with the group by sharing personal reflections / insights and openly received / provided feedback with other participants.    Patient presentation: Calm,alert,focused with stable mood and thought process, Verbalized understanding of content and Patient would benefit from additional opportunities to practice the content to be able to generalize it to their everyday life with increased intentionality, consistency, and efficacy in support of their psychiatric recovery    Treatment Plan:  Patient has a current master individualized treatment plan.  See Epic treatment plan for more  information.

## 2019-09-17 NOTE — PROGRESS NOTES
Adult Mental Health Day Treatment  TRACK: 4C     PATIENT'S NAME:    Fannie Jeter  MRN:                           [id number]  :                           1965  ACCT. NUMBER:       [id number]  DATE OF SERVICE:  19  START TIME: 3:00  END TIME: 3:50  NUMBER OF PARTICIPANTS: 5      Summary of Group / Topics Discussed:  Health Maintenance: Eight Dimensions of Wellness: The concept of holistic health through the model of eight dimensions was introduced. Group members participated in identifying behaviors and activities in each of the dimensions of wellness.  The importance of each dimension was reinforced and the concept of balance in life as it relates to wellness was explored.      Patient Session Goals / Objectives:    Verbalized understanding of balance in wellness and how it relates to their life    Identified and explained the eight dimensions of wellness    Categorized activities and wellness needs into corresponding dimensions appropriately during exercise    Patient Participation / Response:  Fully participated with the group by sharing personal reflections / insights and openly received / provided feedback with other participants.    Verbalized understanding of health maintenance topic    Treatment Plan:  Patient has a current master individualized treatment plan.  See Epic treatment plan for more information.     Avtar Esposito OT on 2019 at 5:21 PM

## 2019-09-17 NOTE — PROGRESS NOTES
"Adult Mental Health Outpatient Group Therapy Progress Note       Psychiatry: Dr. LONG at [facility]  Therapy:  Rola at [facility]  PCP:  Dr. Joann Siegel [facility] [phone]     Diagnosis:  295.70 Schizoaffective Disorder, Bipolar Type  Asperger's Disorder  309.81PTSD  Client Initial Individualized Goals for Treatment: \"Integrate the different aspects of myself  So I don't forget important things; learn how, and do, only behaviors that are helpful to me \".     See Initial Treatment suggestions for the client during the time between Diagnostic Assessment and completion of the Master Individualized Treatment Plan.      Treatment Goals:  1,Client will notify staff when needing assistance to develop or implement a coping plan to manage suicidal or self injurious urges.  Client will use coping plan for safety, as needed.  Follow Safety Plan   Ask for more information, support and/or assistance as needed.  Follow up with providers/community supports as needed:   Report increases or changes in symptoms to staff.  Report any personal safety concerns to staff.   Take medications as prescribed.  Report medication changes and/or side effects to staff.  Attend and participate in groups as scheduled or notify staff if unable to do so.  Report any use of substances to staff as this may impact your symptoms and/or            personal safety.  Notify staff if you have any other issues that need to be addressed. This may include    any current abuse / neglect / exploitation or other vulnerability.  Follow recommendations of your treatment team and discuss concerns if not in  agreement.         Area of Treatment Focus:  Symptom Management      Therapeutic Interventions/Treatment Strategies:  Support, Feedback, Safety Assessments, Structured Activity and Education      Response to Treatment Strategies:  Accepted Feedback, Listened, Attentive, Accepted Support and Alert      Name of Group:  Group " Psychotherapy   1:00 - 1:50 pm, 2:00 - 2:50 pm        9/17/2019     Group Participants:   5      Description and Outcome:                Fannie reported being safe today.  She reported that she feels more like her old self on Geodon, and Lamictal, and feels much better now, and said previously she felt like she was in a cloud.  She talked to the group about this and how she has gained weight on the Zyprexa.. She stated that she is trying to stay at average weight.     She reported that she met with a [facility] worker and they will start her services along with an ILS worker. She reported that she went to the chiropractor and he wants her to take a supplement.    She reported that she walks her cat in a stroller around the building each day and enjoys the time outside.  She stated that she had a relaxing weekend. She reported that she does research on the computer for fun.            She reported that she wants to learn how to play different percussion instruments and talked to the group about this.  She talked with the group about how she feels Rome Weathers is in her body, but hears her own voice, and feels inspired by Rome Weathers.       She reported that she wore two necklaces to help with keeping away radio waves that could be harmful to people, and showed the group the different necklaces.                  She stated that she feels better about herself and made a wish list on-line for others to give her trinkets, and talked about how small things have helped her with the group.  She reported that she is trying to maintain a budget and that she likes shopping and looking up items on the internet.       She did a word find and listened to while the group practiced mindfulness deep breathing.     2:00 - 2:50 pm     The group participated in a structured activity that involved reading a worksheet about shame.  The group discussed their thoughts and feelings about the process and shared their experiences with others.   The group wrote out examples from their lives about how they felt it had affected them.  The group discussed their different stories and those that were similar.  The group validated others who had experienced distress.        The client would benefit from further education about this skill and to demonstrate how it is practiced over time.              Is this a Weekly Review of the Progress on the Treatment Plan?  Yes.        Are Treatment Plan Goals being addressed?  Yes, continue treatment goals          Are Treatment Plan Strategies to Address Goals Effective?  Yes, continue treatment strategies          Are there any current contracts in place?  No

## 2019-09-18 NOTE — PROGRESS NOTES
Adult Mental Health Day Treatment  TRACK: 4C     PATIENT'S NAME:    Fannie Jeter  MRN:                           [id number]  [facility]                           1965  [id number]  DATE OF SERVICE:  19  START TIME: 3:00  END TIME: 3:50  NUMBER OF PARTICIPANTS: 5      Summary of Group / Topics Discussed:  Lifestyle Balance and Structure: : Patients were introduced to the importance of daily occupations in support of mental health management by exploring a balanced lifestyle.  Patients identified how they spend their time and skills to bring this into better balance.  Patients were assisted to establish, restore, and/or modify performance skills and patterns for improved engagement and promotion of positive mental health through meaningful occupations.  Patients gained awareness of the connection between who they are (self identity) with what they do.    Patient Session Goals / Objectives:    Increased awareness of how patient s functioning in identified meaningful occupations are impacted by their mental health status     Developed performance skills and performance patterns to enhance occupational engagement through creating a balanced lifestyle    Explored ways to generalize new awareness and skills to their everyday life    Patient Participation / Response:  Fully participated with the group by sharing personal reflections / insights and openly received / provided feedback with other participants.    Patient presentation: Calm,alert,focused with stable mood and thought process, Verbalized understanding of content and Patient would benefit from additional opportunities to practice the content to be able to generalize it to their everyday life with increased intentionality, consistency, and efficacy in support of their psychiatric recovery    Treatment Plan:  Patient has a current master individualized treatment plan.  See Epic treatment plan for more  information.

## 2019-09-19 ENCOUNTER — HOSPITAL ENCOUNTER (OUTPATIENT)
Dept: BEHAVIORAL HEALTH | Facility: CLINIC | Age: 54
End: 2019-09-19
Attending: PSYCHIATRY & NEUROLOGY
Payer: MEDICARE

## 2019-09-19 PROCEDURE — 90853 GROUP PSYCHOTHERAPY: CPT

## 2019-09-19 PROCEDURE — G0177 OPPS/PHP; TRAIN & EDUC SERV: HCPCS

## 2019-09-19 NOTE — PROGRESS NOTES
"Adult Mental Health Outpatient Group Therapy Progress Note       Psychiatry: Dr. LONG at [facility]  Therapy:  Rola at [facility]  PCP:  Dr. Joann Siegel [facility] [phone]     Diagnosis:  295.70 Schizoaffective Disorder, Bipolar Type  Asperger's Disorder  309.81PTSD  Client Initial Individualized Goals for Treatment: \"Integrate the different aspects of myself  So I don't forget important things; learn how, and do, only behaviors that are helpful to me \".     See Initial Treatment suggestions for the client during the time between Diagnostic Assessment and completion of the Master Individualized Treatment Plan.      Treatment Goals:  1,Client will notify staff when needing assistance to develop or implement a coping plan to manage suicidal or self injurious urges.  Client will use coping plan for safety, as needed.  Follow Safety Plan   Ask for more information, support and/or assistance as needed.  Follow up with providers/community supports as needed:   Report increases or changes in symptoms to staff.  Report any personal safety concerns to staff.   Take medications as prescribed.  Report medication changes and/or side effects to staff.  Attend and participate in groups as scheduled or notify staff if unable to do so.  Report any use of substances to staff as this may impact your symptoms and/or            personal safety.  Notify staff if you have any other issues that need to be addressed. This may include    any current abuse / neglect / exploitation or other vulnerability.  Follow recommendations of your treatment team and discuss concerns if not in  agreement.         Area of Treatment Focus:  Symptom Management      Therapeutic Interventions/Treatment Strategies:  Support, Feedback, Safety Assessments, Structured Activity and Education      Response to Treatment Strategies:  Accepted Feedback, Listened, Attentive, Accepted Support and Alert      Name of Group:  Group " Psychotherapy    2:00 - 2:50 pm, 3:00 - 3:50 pm     9/19/2019     Group Participants:   7      Description and Outcome:                Fannie reported being safe today.  She reported that she feels more like her old self on Geodon, and Lamictal, and feels much better now, and said previously she felt like she was in a cloud.  She talked to the group about this and how she has gained weight on the Zyprexa.. She stated that she is trying to stay at average weight.     She reported that doing T'ai Chi is helpful for her, and she enjoys movement for relaxation. She reported that she will have a RN check tomorrow.  She stated on the weekend, she may call her friend Osiris and have lunch with her.     She reported that she met with a [facility] worker and they will start her services along with an ILS worker. She reported that she went to the chiropractor and he wants her to take a supplement.      She reported that she continues to go for walks with her cat in a stroller around the building each day and enjoys the time outside.  She stated that she had a relaxing weekend. She reported that she does research on the computer for fun.            She reported that she wants to learn how to play different percussion instruments and talked to the group about this.  She talked with the group about how she feels Rome Weathers is in her body, but hears her own voice, and feels inspired by Rome Weathers.       She reported that she wore two necklaces to help with keeping away radio waves that could be harmful to people, and showed the group the different necklaces.         She stated that she feels better about herself and made a wish list on-line for others to give her trinkets, and talked about how small things have helped her with the group.  She reported that she is trying to maintain a budget and that she likes shopping and looking up items on the internet.     She did a word find and listened to while the group practiced  mindfulness deep breathing.    3:00 - 3:50 pm              The group participated in a structured activity that involved reading a worksheet about shame and forgiveness.  The group discussed their thoughts and feelings about the process and shared their experiences with others.  The group wrote out examples from their lives about how they felt it had affected them.  The group discussed their different stories and those that were similar.  The group validated others who had experienced distress.         The client would benefit from further education about this skill and to demonstrate how it is practiced over time.              Is this a Weekly Review of the Progress on the Treatment Plan?  Yes.        Are Treatment Plan Goals being addressed?  Yes, continue treatment goals          Are Treatment Plan Strategies to Address Goals Effective?  Yes, continue treatment strategies          Are there any current contracts in place?  No

## 2019-09-20 NOTE — PROGRESS NOTES
Adult Mental Health Day Treatment  TRACK: 4C     PATIENT'S NAME:    Fannie Jeter  MRN:                           [id number]  :                           1965  ACCT. NUMBER:       [id number]  DATE OF SERVICE:  19  START TIME: 1:00  END TIME: 1:50  NUMBER OF PARTICIPANTS: 6      Summary of Group / Topics Discussed:  Resiliency Development: Coping Skills: Aftercare Coping Cards: Patients were taught how to begin to develop a relapse management kit for both mental and substance use/abuse by creating individualized coping cards.    Patient Session Goals / Objectives:    Identified individualized coping skills they can use for effectively managing both mental health and substance abuse/abuse symptoms     Improved awareness of different types of coping skills and how to personalize them to their unique situation and circumstances      Established a plan for practice of these skills in their own environments    Practiced and reflected on how to generalize taught skills to their everyday life    Patient Participation / Response:  Fully participated with the group by sharing personal reflections / insights and openly received / provided feedback with other participants.    Verbalized understanding of content and Patient would benefit from additional opportunities to practice the content to be able to generalize it to their everyday life with increased intentionality, consistency, and efficacy in support of their psychiatric recovery    Treatment Plan:  Patient has a current master individualized treatment plan.  See Epic treatment plan for more information.

## 2019-09-23 ENCOUNTER — TELEPHONE (OUTPATIENT)
Dept: BEHAVIORAL HEALTH | Facility: CLINIC | Age: 54
End: 2019-09-23

## 2019-09-23 NOTE — TELEPHONE ENCOUNTER
Absence call- Phone call and message to Fannie Jeter  Since Fannie Jteer did not attend today, and she said she was ill.    Cristina Nathan., D,  L.P.

## 2019-09-24 ENCOUNTER — TELEPHONE (OUTPATIENT)
Dept: BEHAVIORAL HEALTH | Facility: CLINIC | Age: 54
End: 2019-09-24

## 2019-09-25 ASSESSMENT — ANXIETY QUESTIONNAIRES
2. NOT BEING ABLE TO STOP OR CONTROL WORRYING: NOT AT ALL
6. BECOMING EASILY ANNOYED OR IRRITABLE: NOT AT ALL
7. FEELING AFRAID AS IF SOMETHING AWFUL MIGHT HAPPEN: NOT AT ALL
1. FEELING NERVOUS, ANXIOUS, OR ON EDGE: NOT AT ALL
3. WORRYING TOO MUCH ABOUT DIFFERENT THINGS: NOT AT ALL
5. BEING SO RESTLESS THAT IT IS HARD TO SIT STILL: SEVERAL DAYS
GAD7 TOTAL SCORE: 1

## 2019-09-25 ASSESSMENT — PATIENT HEALTH QUESTIONNAIRE - PHQ9
5. POOR APPETITE OR OVEREATING: NOT AT ALL
SUM OF ALL RESPONSES TO PHQ QUESTIONS 1-9: 2

## 2019-09-25 NOTE — ADDENDUM NOTE
Encounter addended by: Denise Benitez on: 9/25/2019 7:29 AM   Actions taken: Visit Navigator Flowsheet section accepted

## 2019-09-26 ASSESSMENT — ANXIETY QUESTIONNAIRES: GAD7 TOTAL SCORE: 1

## 2019-09-27 NOTE — PROGRESS NOTES
BEH Programs:[id number]}    PATIENT'S NAME: Fannie Jeter  MRN:   [id number]  :   1965  ACCT. NUMBER: [id number]  DATE OF SERVICE: 19  START TIME: 1:00  END TIME: 1:50    NUMBER OF PARTICIPANTS: 7      Summary of Group / Topics Discussed:  Sensory Approaches in Mental Health: Focused Activity: Patients were provided with verbal and experiential education to identify physical and sensorimotor based activities that can be utilized for stress management, self care, health maintenance, and self regulation.  Patients increased knowledge and awareness of activities that support good self care, build resiliency, and prevent relapse through healthy engagement in mindful focused activities for effective coping with illness and reduction of maladaptive coping skills.     Patient Session Goals / Objectives:    Identified specific physical and sensorimotor based activities for stress management, self care, health maintenance, and self regulation      Improved awareness of activities that are meaningful focused activities that support good self care, build resiliency, and prevent relapse and how this applies to current daily life    Established a plan for practice of these skills in their own environments    Practiced and reflected on how to generalize taught skills to their everyday life    Patient Participation / Response:  Fully participated with the group by sharing personal reflections / insights and openly received / provided feedback with other participants.    Verbalized understanding of content and Patient would benefit from additional opportunities to practice the content to be able to generalize it to their everyday life with increased intentionality, consistency, and efficacy in support of their psychiatric recovery    Treatment Plan:  Patient has a current master individualized treatment plan.  See Epic treatment plan for more information.

## 2019-10-04 ENCOUNTER — HEALTH MAINTENANCE LETTER (OUTPATIENT)
Age: 54
End: 2019-10-04

## 2019-11-29 ENCOUNTER — TELEPHONE (OUTPATIENT)
Dept: FAMILY MEDICINE | Facility: CLINIC | Age: 54
End: 2019-11-29

## 2019-11-29 NOTE — TELEPHONE ENCOUNTER
Form or Letter Request    Type or form/letter needing completion: ICD-10 Dx Verification Form  Provider: Patrizia  Has provider seen patient for office visit related to reason for form request? Does not know  Date form needed: Unknown  Once completed: Fax form to: [phone]     [place]

## 2019-12-02 NOTE — TELEPHONE ENCOUNTER
Form reviewed and this is related to patient's behavioral health diagnosis. Returned the form to the originator to have information completed by psych/behavioral health provider. We do not have recent physical exam on patient, and have seen her for acute symptoms.    Glendy Kat RN  12/02/19  11:23 AM

## 2019-12-18 NOTE — PROGRESS NOTES
[facility] faxed petition for commitment and Bishop to [facility] pre-petition, [phone]. Called pre-petition, left message.    JAYCE Sibley in pre-petition, [phone]. [facility] called back, gave verbal report.    Pt arrives via first response for complaint of heart palpitations that began earlier today. Pt was here visiting brother who  in ICU earlier today. Pt also complaints of chest pain and left arm numbness yesterday. VSS upon arrival.   Scribe Attestation (For Scribes USE Only)...

## 2020-02-09 ENCOUNTER — HEALTH MAINTENANCE LETTER (OUTPATIENT)
Age: 55
End: 2020-02-09

## 2020-03-15 ENCOUNTER — MYC MEDICAL ADVICE (OUTPATIENT)
Dept: FAMILY MEDICINE | Facility: CLINIC | Age: 55
End: 2020-03-15

## 2020-03-15 DIAGNOSIS — J45.21 MILD INTERMITTENT EXACERBATION OF REACTIVE AIRWAY DISEASE: ICD-10-CM

## 2020-03-17 RX ORDER — ALBUTEROL SULFATE 90 UG/1
2 AEROSOL, METERED RESPIRATORY (INHALATION) EVERY 6 HOURS PRN
Qty: 1 INHALER | Refills: 0 | Status: SHIPPED | OUTPATIENT
Start: 2020-03-17 | End: 2020-07-15

## 2020-04-22 ENCOUNTER — TELEPHONE (OUTPATIENT)
Dept: FAMILY MEDICINE | Facility: CLINIC | Age: 55
End: 2020-04-22

## 2020-04-22 NOTE — TELEPHONE ENCOUNTER
Form or Letter Request   Type or form/letter needing completion: over the counter verification form  Provider: SHEREEN Acharya  Has provider seen patient for office visit related to reason for form request: no  Date form needed: no date  Once completed fax to [phone]    Marium Warren RN  [facility]

## 2020-04-29 NOTE — TELEPHONE ENCOUNTER
Form completed - [facility]  [facility]

## 2020-06-07 ENCOUNTER — NURSE TRIAGE (OUTPATIENT)
Dept: NURSING | Facility: CLINIC | Age: 55
End: 2020-06-07

## 2020-06-09 ENCOUNTER — HOSPITAL ENCOUNTER (EMERGENCY)
Facility: CLINIC | Age: 55
Discharge: PSYCHIATRIC OR SUBSTANCE USE FACILITY | End: 2020-06-09
Attending: EMERGENCY MEDICINE | Admitting: EMERGENCY MEDICINE
Payer: MEDICARE

## 2020-06-09 ENCOUNTER — HOSPITAL ENCOUNTER (INPATIENT)
Facility: CLINIC | Age: 55
LOS: 16 days | Discharge: HOME | DRG: 885 | End: 2020-06-25
Attending: PSYCHIATRY & NEUROLOGY | Admitting: PSYCHIATRY & NEUROLOGY
Payer: MEDICARE

## 2020-06-09 VITALS
HEART RATE: 112 BPM | DIASTOLIC BLOOD PRESSURE: 92 MMHG | OXYGEN SATURATION: 99 % | TEMPERATURE: 98.3 F | RESPIRATION RATE: 18 BRPM | SYSTOLIC BLOOD PRESSURE: 127 MMHG

## 2020-06-09 DIAGNOSIS — F25.0 SCHIZOAFFECTIVE DISORDER, BIPOLAR TYPE (H): ICD-10-CM

## 2020-06-09 DIAGNOSIS — M41.9 SCOLIOSIS, UNSPECIFIED SCOLIOSIS TYPE, UNSPECIFIED SPINAL REGION: ICD-10-CM

## 2020-06-09 DIAGNOSIS — G89.29 OTHER CHRONIC PAIN: ICD-10-CM

## 2020-06-09 DIAGNOSIS — E55.9 VITAMIN D DEFICIENCY: Primary | ICD-10-CM

## 2020-06-09 DIAGNOSIS — F30.9 MANIA (H): ICD-10-CM

## 2020-06-09 DIAGNOSIS — E56.9 VITAMIN DEFICIENCY: ICD-10-CM

## 2020-06-09 DIAGNOSIS — F17.200 NICOTINE DEPENDENCE, UNCOMPLICATED, UNSPECIFIED NICOTINE PRODUCT TYPE: ICD-10-CM

## 2020-06-09 DIAGNOSIS — B37.0 CANDIDIASIS OF MOUTH: ICD-10-CM

## 2020-06-09 DIAGNOSIS — J45.21 MILD INTERMITTENT EXACERBATION OF REACTIVE AIRWAY DISEASE: ICD-10-CM

## 2020-06-09 DIAGNOSIS — J45.909 UNCOMPLICATED ASTHMA, UNSPECIFIED ASTHMA SEVERITY, UNSPECIFIED WHETHER PERSISTENT: ICD-10-CM

## 2020-06-09 LAB
ALBUMIN UR-MCNC: NEGATIVE MG/DL
ANION GAP SERPL CALCULATED.3IONS-SCNC: 6 MMOL/L (ref 3–14)
APPEARANCE UR: CLEAR
BASOPHILS # BLD AUTO: 0 10E9/L (ref 0–0.2)
BASOPHILS NFR BLD AUTO: 0.2 %
BILIRUB UR QL STRIP: NEGATIVE
BUN SERPL-MCNC: 7 MG/DL (ref 7–30)
CALCIUM SERPL-MCNC: 9.4 MG/DL (ref 8.5–10.1)
CHLORIDE SERPL-SCNC: 103 MMOL/L (ref 94–109)
CO2 SERPL-SCNC: 29 MMOL/L (ref 20–32)
COLOR UR AUTO: ABNORMAL
CREAT SERPL-MCNC: 0.51 MG/DL (ref 0.52–1.04)
DIFFERENTIAL METHOD BLD: NORMAL
EOSINOPHIL # BLD AUTO: 0.1 10E9/L (ref 0–0.7)
EOSINOPHIL NFR BLD AUTO: 2.2 %
ERYTHROCYTE [DISTWIDTH] IN BLOOD BY AUTOMATED COUNT: 14.5 % (ref 10–15)
GFR SERPL CREATININE-BSD FRML MDRD: >90 ML/MIN/{1.73_M2}
GLUCOSE SERPL-MCNC: 129 MG/DL (ref 70–99)
GLUCOSE UR STRIP-MCNC: NEGATIVE MG/DL
HCT VFR BLD AUTO: 39.1 % (ref 35–47)
HGB BLD-MCNC: 13 G/DL (ref 11.7–15.7)
HGB UR QL STRIP: NEGATIVE
IMM GRANULOCYTES # BLD: 0 10E9/L (ref 0–0.4)
IMM GRANULOCYTES NFR BLD: 0.3 %
KETONES UR STRIP-MCNC: NEGATIVE MG/DL
LEUKOCYTE ESTERASE UR QL STRIP: NEGATIVE
LITHIUM SERPL-SCNC: <0.2 MMOL/L (ref 0.6–1.2)
LYMPHOCYTES # BLD AUTO: 1.3 10E9/L (ref 0.8–5.3)
LYMPHOCYTES NFR BLD AUTO: 22.8 %
MCH RBC QN AUTO: 30.2 PG (ref 26.5–33)
MCHC RBC AUTO-ENTMCNC: 33.2 G/DL (ref 31.5–36.5)
MCV RBC AUTO: 91 FL (ref 78–100)
MONOCYTES # BLD AUTO: 0.6 10E9/L (ref 0–1.3)
MONOCYTES NFR BLD AUTO: 9.5 %
NEUTROPHILS # BLD AUTO: 3.8 10E9/L (ref 1.6–8.3)
NEUTROPHILS NFR BLD AUTO: 65 %
NITRATE UR QL: NEGATIVE
NRBC # BLD AUTO: 0 10*3/UL
NRBC BLD AUTO-RTO: 0 /100
PH UR STRIP: 6.5 PH (ref 5–7)
PLATELET # BLD AUTO: 350 10E9/L (ref 150–450)
POTASSIUM SERPL-SCNC: 3.9 MMOL/L (ref 3.4–5.3)
RBC # BLD AUTO: 4.3 10E12/L (ref 3.8–5.2)
RBC #/AREA URNS AUTO: 1 /HPF (ref 0–2)
SARS-COV-2 PCR COMMENT: NORMAL
SARS-COV-2 RNA SPEC QL NAA+PROBE: NEGATIVE
SARS-COV-2 RNA SPEC QL NAA+PROBE: NORMAL
SODIUM SERPL-SCNC: 138 MMOL/L (ref 133–144)
SOURCE: ABNORMAL
SP GR UR STRIP: 1 (ref 1–1.03)
SPECIMEN SOURCE: NORMAL
SPECIMEN SOURCE: NORMAL
SQUAMOUS #/AREA URNS AUTO: 1 /HPF (ref 0–1)
UROBILINOGEN UR STRIP-MCNC: NORMAL MG/DL (ref 0–2)
WBC # BLD AUTO: 5.9 10E9/L (ref 4–11)
WBC #/AREA URNS AUTO: 1 /HPF (ref 0–5)

## 2020-06-09 PROCEDURE — 80048 BASIC METABOLIC PNL TOTAL CA: CPT | Performed by: EMERGENCY MEDICINE

## 2020-06-09 PROCEDURE — 96360 HYDRATION IV INFUSION INIT: CPT

## 2020-06-09 PROCEDURE — 25800030 ZZH RX IP 258 OP 636: Performed by: EMERGENCY MEDICINE

## 2020-06-09 PROCEDURE — 96361 HYDRATE IV INFUSION ADD-ON: CPT

## 2020-06-09 PROCEDURE — 80307 DRUG TEST PRSMV CHEM ANLYZR: CPT | Performed by: EMERGENCY MEDICINE

## 2020-06-09 PROCEDURE — 12400001 ZZH R&B MH UMMC

## 2020-06-09 PROCEDURE — U0003 INFECTIOUS AGENT DETECTION BY NUCLEIC ACID (DNA OR RNA); SEVERE ACUTE RESPIRATORY SYNDROME CORONAVIRUS 2 (SARS-COV-2) (CORONAVIRUS DISEASE [COVID-19]), AMPLIFIED PROBE TECHNIQUE, MAKING USE OF HIGH THROUGHPUT TECHNOLOGIES AS DESCRIBED BY CMS-2020-01-R: HCPCS | Performed by: EMERGENCY MEDICINE

## 2020-06-09 PROCEDURE — 85025 COMPLETE CBC W/AUTO DIFF WBC: CPT | Performed by: EMERGENCY MEDICINE

## 2020-06-09 PROCEDURE — C9803 HOPD COVID-19 SPEC COLLECT: HCPCS

## 2020-06-09 PROCEDURE — 80178 ASSAY OF LITHIUM: CPT | Performed by: EMERGENCY MEDICINE

## 2020-06-09 PROCEDURE — 25000132 ZZH RX MED GY IP 250 OP 250 PS 637: Mod: GY | Performed by: EMERGENCY MEDICINE

## 2020-06-09 PROCEDURE — 90791 PSYCH DIAGNOSTIC EVALUATION: CPT

## 2020-06-09 PROCEDURE — 81001 URINALYSIS AUTO W/SCOPE: CPT | Mod: 59 | Performed by: EMERGENCY MEDICINE

## 2020-06-09 PROCEDURE — 99285 EMERGENCY DEPT VISIT HI MDM: CPT | Mod: 25

## 2020-06-09 RX ORDER — SODIUM CHLORIDE 9 MG/ML
1000 INJECTION, SOLUTION INTRAVENOUS CONTINUOUS
Status: DISCONTINUED | OUTPATIENT
Start: 2020-06-09 | End: 2020-06-09 | Stop reason: HOSPADM

## 2020-06-09 RX ORDER — ZIPRASIDONE HYDROCHLORIDE 40 MG/1
40 CAPSULE ORAL DAILY
Status: DISCONTINUED | OUTPATIENT
Start: 2020-06-09 | End: 2020-06-09 | Stop reason: HOSPADM

## 2020-06-09 RX ORDER — HYDROXYZINE HYDROCHLORIDE 25 MG/1
25 TABLET, FILM COATED ORAL EVERY 4 HOURS PRN
Status: DISCONTINUED | OUTPATIENT
Start: 2020-06-09 | End: 2020-06-25 | Stop reason: HOSPADM

## 2020-06-09 RX ORDER — ACETAMINOPHEN 325 MG/1
650 TABLET ORAL EVERY 4 HOURS PRN
Status: DISCONTINUED | OUTPATIENT
Start: 2020-06-09 | End: 2020-06-25 | Stop reason: HOSPADM

## 2020-06-09 RX ORDER — POLYETHYLENE GLYCOL 3350 17 G
2 POWDER IN PACKET (EA) ORAL
Status: DISCONTINUED | OUTPATIENT
Start: 2020-06-09 | End: 2020-06-09 | Stop reason: HOSPADM

## 2020-06-09 RX ORDER — OLANZAPINE 10 MG/2ML
10 INJECTION, POWDER, FOR SOLUTION INTRAMUSCULAR
Status: DISCONTINUED | OUTPATIENT
Start: 2020-06-09 | End: 2020-06-25 | Stop reason: HOSPADM

## 2020-06-09 RX ORDER — CLONAZEPAM 0.5 MG/1
0.5 TABLET ORAL 3 TIMES DAILY PRN
Status: DISCONTINUED | OUTPATIENT
Start: 2020-06-09 | End: 2020-06-09 | Stop reason: HOSPADM

## 2020-06-09 RX ORDER — OLANZAPINE 10 MG/1
10 TABLET ORAL
Status: DISCONTINUED | OUTPATIENT
Start: 2020-06-09 | End: 2020-06-25 | Stop reason: HOSPADM

## 2020-06-09 RX ORDER — TRAZODONE HYDROCHLORIDE 50 MG/1
50 TABLET, FILM COATED ORAL
Status: DISCONTINUED | OUTPATIENT
Start: 2020-06-09 | End: 2020-06-10

## 2020-06-09 RX ORDER — ALUMINA, MAGNESIA, AND SIMETHICONE 2400; 2400; 240 MG/30ML; MG/30ML; MG/30ML
30 SUSPENSION ORAL EVERY 4 HOURS PRN
Status: DISCONTINUED | OUTPATIENT
Start: 2020-06-09 | End: 2020-06-25 | Stop reason: HOSPADM

## 2020-06-09 RX ORDER — BISACODYL 10 MG
10 SUPPOSITORY, RECTAL RECTAL DAILY PRN
Status: DISCONTINUED | OUTPATIENT
Start: 2020-06-09 | End: 2020-06-25 | Stop reason: HOSPADM

## 2020-06-09 RX ADMIN — CLONAZEPAM 0.5 MG: 0.5 TABLET ORAL at 18:40

## 2020-06-09 RX ADMIN — ZIPRASIDONE HCL 40 MG: 40 CAPSULE ORAL at 18:51

## 2020-06-09 RX ADMIN — NICOTINE POLACRILEX 2 MG: 2 LOZENGE ORAL at 18:40

## 2020-06-09 RX ADMIN — NICOTINE POLACRILEX 2 MG: 2 LOZENGE ORAL at 16:19

## 2020-06-09 RX ADMIN — SODIUM CHLORIDE 1000 ML: 9 INJECTION, SOLUTION INTRAVENOUS at 10:42

## 2020-06-09 ASSESSMENT — ENCOUNTER SYMPTOMS
SORE THROAT: 1
MYALGIAS: 1

## 2020-06-09 NOTE — ED NOTES
"Patient reports throwing away Lamictal medication because \"it wasn't working anymore and I got mad.\"  "

## 2020-06-09 NOTE — ED TRIAGE NOTES
"Patient arrives to ED via EMS for \"racing thoughts.\" Patient requests to discontinue Lamictal prescription and switch back to Lithium.  "

## 2020-06-09 NOTE — ED NOTES
"pts belongings removed from room at this time pt informed she was on a hold pt not happy \"I have rights and I am voluntary\"  Pt changed into scrubs  [place] security watching pt  "

## 2020-06-09 NOTE — ED NOTES
Bed: ED24  Expected date:   Expected time:   Means of arrival:   Comments:  E 2  54 F psych meds changed/green pt/alert and oriented  2018

## 2020-06-09 NOTE — ED PROVIDER NOTES
"  History   Chief Complaint:  Multiple complaints     HPI   Fannie Jeter is a 54 year old female with history of Bipolar 1 disorder, Schizoaffective disorder, and borderline disorder who presents via EMS with multiple complaints. The patient reports that she had her first symptoms of COVID in March with loss of taste and smell. However, she states that she continues to have a sore throat since that time as well as myalgias. The patient is also looking for more lithium. She called her lithium perscriber yesterday, and was unable to get her medications. She also notes a dramatic increase in thirst recently.     Interval history: the patient states that she has not been on Lithium since for 18 years. 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.\"     Allergies:  Haldol [Haloperidol]  Penicillins    Medications:   Albuterol inhaler  Aspirin 325 mg   Clonazepam  Methylfolate  Geodon  Lamictal     Past Medical History:    ADHD  Asperger's syndrome  Bipolar 1 disorder  Central serous  Retinopathy  Herpes zoster oticus  Osteoarthritis   Schizoaffective disorder  Scoliosis   Arthritis  Psychosis   De Quervain's tenosynovitis left   Asthma  PTSD  Borderline diabetes      Past Surgical History:    Biopsy breast  Dilation and curettage  Mammoplasty reduction bilateral      Family History:    Mother: arthritis, parkinsonism  Father: cerebrovascular disease, substance abuse  Daughter: substance abuse     Social History:  Smoking Status: Current Every Day Smoker, 1.50 packs a day for 35 years   Smokeless Tobacco: current user  Alcohol Use: No  Drug Use: No  PCP: Vikas Acharya     Review of Systems   HENT: Positive for sore throat.         Dry mouth and thirsty.    Musculoskeletal: Positive for myalgias.   All other systems reviewed and are negative.    Physical Exam     Patient Vitals for the past 24 hrs:   " "BP Temp Temp src Pulse Resp SpO2   06/09/20 1130 -- -- -- -- -- 97 %   06/09/20 1115 -- -- -- -- -- 97 %   06/09/20 1100 -- -- -- -- -- 94 %   06/09/20 1045 -- -- -- -- -- 95 %   06/09/20 1044 -- 98.3  F (36.8  C) Oral -- -- --   06/09/20 1030 -- -- -- -- -- 92 %   06/09/20 1010 113/72 99.1  F (37.3  C) Temporal 89 18 92 %   06/09/20 1004 -- -- -- -- -- 96 %       Physical Exam  Vitals: reviewed by me  General: Pt seen on [facility], cooperative, and alert to conversation  Eyes: Tracking well, clear conjunctiva BL  ENT: MMM, midline trachea.   Lungs: No tachypnea, no accessory muscle use. No respiratory distress.   CV: Rate as above, regular rhythm.    MSK: no peripheral edema or joint effusion.  No evidence of trauma  Skin: No rash, normal turgor and temperature  Neuro: Clear speech and no facial droop.  Psych: Not RIS, no e/o AH/VH, slightly pressured speech, delusions of grandeur, tangential thinking.      Emergency Department Course   Laboratory:  Laboratory findings were communicated with the patient who voiced understanding of the findings.    UA with microscopic: specific gravity 1.002(L) o/w WNL     CBC: WBC 5.9, HGB 13.0,   BMP: glucose 129(H), Creatinine 0.51(L) o/w WNL   Lithium level: <0.20(L)    Symptomatic COVID19 Virus PCR by nasopharyngeal swab pending     Interventions:  1042 NS 1000 mL IV    Emergency Department Course:  Nursing notes and vitals reviewed.    1021 I performed an exam of the patient as documented above.     IV was inserted and blood was drawn for laboratory testing, results above.    The patient provided a urine sample here in the emergency department. This was sent for laboratory testing, findings above.     1144 I spoke further with the patient after performing chart review. She states that she has not been on Lithium since for 18 years. 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    COVID19 Virus PCR obtained, results above.     1320 I spoke with DEC regarding the patient's presentation and plan of care. Please see her note for further details.       1338 I rechecked the patient. Explained findings to the Patient.    1500 I transferred care to my colleague Dr. Trierweiler pending bed placement.     Impression & Plan    Covid-19  Fannie Jeter was evaluated during a global COVID-19 pandemic, which necessitated consideration that the patient might be at risk for infection with the SARS-CoV-2 virus that causes COVID-19.   Applicable protocols for evaluation were followed during the patient's care.   COVID-19 was considered as part of the patient's evaluation. The plan for testing is:  a test was obtained during this visit.    Medical Decision Making:  Fannie Jeter is a 54 year old female with history of aixa who presents to the emergency department after having thrown away her Lexapro asking to be restarted on Lithium. Throughout the course of my interview, I began to believe that she was descending into a manic episode. She has delusions of grandeur, about starting a singing career out of state, and also appears to be hyper sexualized, comments she makes about needles, and otherwise has pressured speech. Given her history, I decided to place her on a hold and admit to the next available psychiatrist. As she has been off of her lithium for 18 years, I do not think it is prudent of me to start this again here. I do think that she needs to be admitted. I spoke to out mental health  who saw her as well and agrees. Will plan for admission to the next psychiatric bed. The patient is actually ok with this plan, though again because of the high risk of aixa I placed her on a health officer hold until she can be seen by psychiatry.     Diagnosis:    ICD-10-CM    1. Aixa (H)  F30.9        Disposition:   I " transferred care to my colleague Dr. Trierweiler pending bed placement.     Scribe Disclosure:  I, María Elena Stricklandion, am serving as a scribe at 10:13 AM on 6/9/2020 to document services personally performed by Fab Correa MD based on my observations and the provider's statements to me.   [facility] EMERGENCY DEPARTMENT       Fab Correa MD  06/09/20 6687

## 2020-06-10 LAB
AMPHETAMINES UR QL SCN: NEGATIVE
BARBITURATES UR QL: NEGATIVE
BENZODIAZ UR QL: NEGATIVE
CANNABINOIDS UR QL SCN: NEGATIVE
COCAINE UR QL: NEGATIVE
DEPRECATED S PYO AG THROAT QL EIA: NEGATIVE
OPIATES UR QL SCN: POSITIVE
PCP UR QL SCN: NEGATIVE
SPECIMEN SOURCE: NORMAL
SPECIMEN SOURCE: NORMAL
STREP GROUP A PCR: NOT DETECTED

## 2020-06-10 PROCEDURE — 40001204 ZZHCL STATISTIC STREP A RAPID: Performed by: NURSE PRACTITIONER

## 2020-06-10 PROCEDURE — 25000128 H RX IP 250 OP 636: Performed by: CLINICAL NURSE SPECIALIST

## 2020-06-10 PROCEDURE — G0177 OPPS/PHP; TRAIN & EDUC SERV: HCPCS

## 2020-06-10 PROCEDURE — 99223 1ST HOSP IP/OBS HIGH 75: CPT | Mod: 95 | Performed by: NURSE PRACTITIONER

## 2020-06-10 PROCEDURE — 25000132 ZZH RX MED GY IP 250 OP 250 PS 637: Mod: GY | Performed by: CLINICAL NURSE SPECIALIST

## 2020-06-10 PROCEDURE — 12400001 ZZH R&B MH UMMC

## 2020-06-10 PROCEDURE — 25000132 ZZH RX MED GY IP 250 OP 250 PS 637: Mod: GY | Performed by: NURSE PRACTITIONER

## 2020-06-10 PROCEDURE — 87651 STREP A DNA AMP PROBE: CPT | Performed by: NURSE PRACTITIONER

## 2020-06-10 RX ORDER — ZIPRASIDONE HYDROCHLORIDE 60 MG/1
60 CAPSULE ORAL
Status: DISCONTINUED | OUTPATIENT
Start: 2020-06-10 | End: 2020-06-16

## 2020-06-10 RX ORDER — CLONAZEPAM 0.5 MG/1
0.5 TABLET ORAL 3 TIMES DAILY PRN
Status: DISCONTINUED | OUTPATIENT
Start: 2020-06-10 | End: 2020-06-10

## 2020-06-10 RX ORDER — QUETIAPINE FUMARATE 100 MG/1
100 TABLET, FILM COATED ORAL
Status: DISCONTINUED | OUTPATIENT
Start: 2020-06-10 | End: 2020-06-25 | Stop reason: HOSPADM

## 2020-06-10 RX ORDER — CARBOXYMETHYLCELLULOSE SODIUM 5 MG/ML
1 SOLUTION/ DROPS OPHTHALMIC
Status: DISCONTINUED | OUTPATIENT
Start: 2020-06-10 | End: 2020-06-25 | Stop reason: HOSPADM

## 2020-06-10 RX ORDER — ASPIRIN 325 MG
325 TABLET ORAL EVERY 6 HOURS PRN
Status: DISCONTINUED | OUTPATIENT
Start: 2020-06-10 | End: 2020-06-25 | Stop reason: HOSPADM

## 2020-06-10 RX ORDER — LAMOTRIGINE 25 MG/1
25 TABLET ORAL DAILY
Status: ON HOLD | COMMUNITY
End: 2020-06-24

## 2020-06-10 RX ORDER — CLONAZEPAM 0.5 MG/1
0.5 TABLET ORAL 3 TIMES DAILY
Status: DISCONTINUED | OUTPATIENT
Start: 2020-06-10 | End: 2020-06-25 | Stop reason: HOSPADM

## 2020-06-10 RX ORDER — BENZOCAINE/MENTHOL 6 MG-10 MG
LOZENGE MUCOUS MEMBRANE 2 TIMES DAILY
Status: DISCONTINUED | OUTPATIENT
Start: 2020-06-10 | End: 2020-06-17

## 2020-06-10 RX ORDER — DIVALPROEX SODIUM 500 MG/1
500 TABLET, EXTENDED RELEASE ORAL AT BEDTIME
Status: DISCONTINUED | OUTPATIENT
Start: 2020-06-10 | End: 2020-06-12

## 2020-06-10 RX ORDER — POLYETHYLENE GLYCOL 3350 17 G
2-4 POWDER IN PACKET (EA) ORAL
Status: DISCONTINUED | OUTPATIENT
Start: 2020-06-10 | End: 2020-06-10

## 2020-06-10 RX ORDER — ALBUTEROL SULFATE 0.83 MG/ML
2.5 SOLUTION RESPIRATORY (INHALATION) EVERY 4 HOURS PRN
Status: DISCONTINUED | OUTPATIENT
Start: 2020-06-10 | End: 2020-06-25 | Stop reason: HOSPADM

## 2020-06-10 RX ORDER — ALBUTEROL SULFATE 0.83 MG/ML
2.5 SOLUTION RESPIRATORY (INHALATION)
Status: DISCONTINUED | OUTPATIENT
Start: 2020-06-10 | End: 2020-06-10

## 2020-06-10 RX ORDER — MULTIVITAMIN,THERAPEUTIC
1 TABLET ORAL DAILY
Status: DISCONTINUED | OUTPATIENT
Start: 2020-06-10 | End: 2020-06-25 | Stop reason: HOSPADM

## 2020-06-10 RX ADMIN — HYDROXYZINE HYDROCHLORIDE 25 MG: 25 TABLET, FILM COATED ORAL at 00:21

## 2020-06-10 RX ADMIN — Medication 125 MCG: at 13:02

## 2020-06-10 RX ADMIN — ZIPRASIDONE HCL 60 MG: 60 CAPSULE ORAL at 13:02

## 2020-06-10 RX ADMIN — QUETIAPINE FUMARATE 100 MG: 100 TABLET ORAL at 01:25

## 2020-06-10 RX ADMIN — DIVALPROEX SODIUM 500 MG: 500 TABLET, EXTENDED RELEASE ORAL at 20:52

## 2020-06-10 RX ADMIN — ZIPRASIDONE HCL 60 MG: 60 CAPSULE ORAL at 20:51

## 2020-06-10 RX ADMIN — CLONAZEPAM 0.5 MG: 0.5 TABLET ORAL at 00:21

## 2020-06-10 RX ADMIN — OLANZAPINE 10 MG: 10 TABLET, FILM COATED ORAL at 20:52

## 2020-06-10 RX ADMIN — NICOTINE POLACRILEX 8 MG: 4 LOZENGE ORAL at 21:13

## 2020-06-10 RX ADMIN — CLONAZEPAM 0.5 MG: 0.5 TABLET ORAL at 20:52

## 2020-06-10 RX ADMIN — NICOTINE POLACRILEX 4 MG: 2 LOZENGE ORAL at 08:56

## 2020-06-10 RX ADMIN — NICOTINE POLACRILEX 4 MG: 4 LOZENGE ORAL at 13:13

## 2020-06-10 RX ADMIN — ACETAMINOPHEN 650 MG: 325 TABLET, FILM COATED ORAL at 00:13

## 2020-06-10 RX ADMIN — OLANZAPINE 10 MG: 10 INJECTION, POWDER, LYOPHILIZED, FOR SOLUTION INTRAMUSCULAR at 04:49

## 2020-06-10 RX ADMIN — HYDROCORTISONE: 1 CREAM TOPICAL at 21:17

## 2020-06-10 RX ADMIN — CLONAZEPAM 0.5 MG: 0.5 TABLET ORAL at 08:33

## 2020-06-10 RX ADMIN — CLONAZEPAM 0.5 MG: 0.5 TABLET ORAL at 13:02

## 2020-06-10 RX ADMIN — FLUTICASONE FUROATE AND VILANTEROL TRIFENATATE 1 PUFF: 100; 25 POWDER RESPIRATORY (INHALATION) at 08:55

## 2020-06-10 ASSESSMENT — ACTIVITIES OF DAILY LIVING (ADL)
HYGIENE/GROOMING: INDEPENDENT
DRESS: INDEPENDENT
RETIRED_EATING: 0-->INDEPENDENT
WHICH_OF_THE_ABOVE_FUNCTIONAL_RISKS_HAD_A_RECENT_ONSET_OR_CHANGE?: COGNITION
ORAL_HYGIENE: INDEPENDENT
COGNITION: 0 - NO COGNITION ISSUES REPORTED
RETIRED_COMMUNICATION: 0-->UNDERSTANDS/COMMUNICATES WITHOUT DIFFICULTY
TOILETING: 0-->INDEPENDENT
SWALLOWING: 0-->SWALLOWS FOODS/LIQUIDS WITHOUT DIFFICULTY
TRANSFERRING: 0-->INDEPENDENT
BATHING: 0-->INDEPENDENT
DRESS: 0-->INDEPENDENT
AMBULATION: 0-->INDEPENDENT
FALL_HISTORY_WITHIN_LAST_SIX_MONTHS: NO

## 2020-06-10 NOTE — PROGRESS NOTES
"Approximately 0410, pt in doorway yelling at staff and demanding coffee. Pt coughing without covering mouth in direction of staff. Pt agitated, difficult to redirect and yelling \"No\". Pt mocking PA and appeared to posture at [facility]. Associate redirecting pt. Pt agreed to walk to seclusion with 2 staff and troubleshooter on unit.  "

## 2020-06-10 NOTE — PROVIDER NOTIFICATION
Discontinue seclusion at demonstration and verbal understanding of cessation criteria related to behavior.  -No injury  -NP notified  -No further orders at this time  -R/S care plan completed    Nursing will continue to monitor.

## 2020-06-10 NOTE — ED PROVIDER NOTES
This patient was signed out by Dr. Correa pending transfer to another facility for admission for her cyndi.  She was calm throughout my shift.  I wrote for her home medications.  Eventually, a bed became available at [facility].  Paperwork was filled out and the patient was transferred there in stable condition for ongoing treatment of her mental health issues.     Trierweiler, Chad A, MD  06/09/20 1923

## 2020-06-10 NOTE — PHARMACY-ADMISSION MEDICATION HISTORY
Admission medication history interview status for the 6/9/2020 admission is complete. See Epic admission navigator for allergy information, pharmacy, prior to admission medications and immunization status.     Medication history interview sources:  patient, electronic fill history    Changes made to PTA medication list (reason)  Added: none  Deleted: none  Changed: Advair to PRN (per pt), add instructions for some of the supplements that were blank, melatonin to PRN    Additional medication history information (including reliability of information, actions taken by pharmacist):  -Unsure of reliability of patient given her manic state.  -Patient was unsure of the strength of her vitamins/supplements. Just knew how many tablets she took per day.   -Patient reported she takes her Geodon 40mg TID, but it is only prescribed 40mg daily, so I left how it is prescribed.   -Patient reports she has not taken her Lamictal in 6 months. It appears it was filled by her pharmacy on 5/19/20 though.  -Patient says she has not been taking aspirin lately as she heard it can worsen COVID and make her lungs bleed.  -Patient takes as many niacin tablets as it takes to make her flush. She says the flushing is good and that it flushes the bipolar out of her. Tried to tell her this was unsafe to do.   -Geodone was last filled 5/19/20. Clonazepam 4/9/20.     Prior to Admission medications    Medication Sig Last Dose Taking? Auth Provider   acetaminophen (TYLENOL) 325 MG tablet Take 2 tablets (650 mg) by mouth every 4 hours as needed for mild pain Past Week at Unknown time Yes Cecy Riggins APRN CNP   ADVAIR DISKUS 100-50 MCG/DOSE inhaler Inhale 1 puff into the lungs 2 times daily as needed  Past Month at Unknown time Yes Reported, Patient   albuterol (PROAIR HFA/PROVENTIL HFA/VENTOLIN HFA) 108 (90 Base) MCG/ACT inhaler Inhale 2 puffs into the lungs every 6 hours as needed for wheezing Past Month at Unknown time Yes Vikas Acharya  MD Lenin   B Complex-Folic Acid (B COMPLEX PLUS) TABS Take 1 tablet by mouth daily  6/9/2020 at Unknown time Yes Reported, Patient   clonazePAM (KLONOPIN) 0.5 MG tablet Take 0.5 mg by mouth 3 times daily as needed for anxiety  6/9/2020 at Unknown time Yes Reported, Patient   Homeopathic Products (SIMILASAN DRY EYE RELIEF OP) Place 1 drop into both eyes as needed (for dry eyes)  6/9/2020 at Unknown time Yes Unknown, Entered By History   Levomefolate Glucosamine (METHYLFOLATE PO) Take 1 tablet by mouth daily  6/9/2020 at Unknown time Yes Reported, Patient   MAGNESIUM CHLORIDE PO Take 1-3 tablets by mouth daily as needed (leg cramps) 6/9/2020 at Unknown time Yes Unknown, Entered By History   melatonin 3 MG tablet Take 3 mg by mouth nightly as needed  Past Month at Unknown time Yes Reported, Patient   Multiple Vitamins-Minerals (MULTIVITAMIN ADULT PO) Take 1 tablet by mouth daily  6/9/2020 at Unknown time Yes Reported, Patient   Multiple Vitamins-Minerals (PRESERVISION AREDS 2 PO) Take 1 tablet by mouth daily  6/9/2020 at Unknown time Yes Reported, Patient   NIACIN PO Take 1 tablet by mouth daily 6/9/2020 at Unknown time Yes Unknown, Entered By History   VITAMIN D-VITAMIN K PO Take 1 tablet by mouth daily 6/9/2020 at Unknown time Yes Unknown, Entered By History   ziprasidone (GEODON) 40 MG capsule Take 40 mg by mouth daily 6/9/2020 at 1800 Yes Reported, Patient   aspirin 325 MG tablet Take 325 mg by mouth every 6 hours as needed (for headache) pain More than a month at Unknown time  Reported, Patient   lamoTRIgine (LAMICTAL) 100 MG tablet Take 100 mg by mouth daily In addition to 25mg tablet = 125mg daily More than a month at Unknown time  Reported, Patient   lamoTRIgine (LAMICTAL) 25 MG tablet Take 25 mg by mouth daily In addition to 100mg tablet = 125mg daily More than a month at Unknown time  Unknown, Entered By History     Medication history completed by: Joann Cross, PharmD, BCPS

## 2020-06-10 NOTE — PROGRESS NOTES
"Attended 1 OT group. Intrusive and demanding as evidenced by barging into groups in process/not signed up for and talking over others to get her needs addressing before others. Repetitive orientation and redirection needed. Introduced self as \" Poppy Blue \". Further noted she has \"multiple personalities so you may need to talk to each of them. Try and talk with Radha, she is nicer and more aware\". Attempted to complete self assessment form but, eventually asked for assistance as she didn't understand and or needed explanations. Noted she experienced every emotion, thoughts and behaviors listed on the form. Verbalized she had experienced each before they were fully announced. Indicated she uses every listed coping skill plus\" cry and isolate\". With much discussion and encouragement/ explanation identified goals as:  find support resources, improve focus and concentration and \"integrate personalities\".  OT purpose was explained with a value of having involvement in tx plan, and provided options to meet self identified goals. Will assess further in the areas of organization, problem solving, and concentration.  When selecting a creative task needed options limited 3 items. Was impulsive and irradic in planning and problem solving. Much structure/1:1 assistance and limits needed in planning and problem solving. Will continue to provide reality orientation, graded tasks, structure and awareness of healthy self management options.  "

## 2020-06-10 NOTE — PROGRESS NOTES
FACE TO FACE:  Pt in seclusion, code green called as it was thought patient would have to be transferred to 5 point restraints due to participating in self-harm. It was determined patient was not harming herself in any way, but was instead banging the bedpan on the wall/door. Bedpan was safely removed from the seclusion room to prevent potential escalation with its involvement. Second Face to Face done (previous face to face was completed minutes earlier when seclusion was initiated). Pt declined any pain or injuries. Pt was again briefed on behaviors expected and pt yelling back at staff. Sitter continuing to observe pt.

## 2020-06-10 NOTE — PLAN OF CARE
"Patient admitted on [facility] from [facility] at 2030.  She was angry with ambulance crew because they turned the light on to fill out papers en route.  During check in patient refused to go to bathroom to change and spontaneously disrobed in front of staff, despite requests that she not do this.  She made no verbal response to questions, declined offer of food and fluids, shook her head when asked if she were suicidal, then stated \"I'm not doing any of this.  I'm going to bed.  Where is my room?\"  Patient appeared to be asleep for the rest of the shift.  No coughing noted.  "

## 2020-06-10 NOTE — PLAN OF CARE
"0845: PRN klonopin offered/accepted.    Patient discusses \"I need to go outside and smoke since I am bipolar and schizoaffective the only thing that helps is [employer] tobacco. If I can talk to the person in charge they will  know I will not run.\" Patient continues \"I think I might be pregnant by Fab medina or -that seems unlikely because of menopause.\"    Patient reassured that would reassess for end or seclusion within 30 minutes.    0900: Patient calm, cooperative, disorganized and \"hungry.\" Seclusion discontinued. Patient contracts for safety and provided breakfast. Patient demonstrates ability to locate room independently and communicate needs. NP updated.  "

## 2020-06-10 NOTE — PROGRESS NOTES
FACE TO FACE:  Pt in seclusion, code called, staff secured pt and zyprexa IM administered. Face to Face done. Pt declined any pain or injuries. Pt briefed on behaviors expected and pt yelling back at staff. Labs reviewed, MD notified and sitter observing pt.

## 2020-06-10 NOTE — PROGRESS NOTES
06/09/20 4732   Patient Belongings   Did you bring any home meds/supplements to the hospital?  Yes   Disposition of meds  Sent to security/pharmacy per site process   Patient Belongings locker   Patient Belongings Put in Hospital Secure Location (Security or Locker, etc.) cell phone/electronics;clothing;keys;medication(s);necklace;purse/wallet;shoes   Belongings Search Yes   Clothing Search Yes   Second Staff Elvira Hutchins Writer not part of admission process   In locker 4:  Bag, phone, pair of flip flops, shirt, pair of pants, hat, phone , book, sketchbook, pair of sunglasses, 2 lighters, thermometer, 1 key, pendant  Security:  Medications  A               Admission:  I am responsible for any personal items that are not sent to the safe or pharmacy.  [facility] is not responsible for loss, theft or damage of any property in my possession.    Signature:  _________________________________ Date: _______  Time: _____                                              Staff Signature:  ____________________________ Date: ________  Time: _____      2nd Staff person, if patient is unable/unwilling to sign:    Signature: ________________________________ Date: ________  Time: _____     Discharge:  [facility] has returned all of my personal belongings:    Signature: _________________________________ Date: ________  Time: _____                                          Staff Signature:  ____________________________ Date: ________  Time: _____

## 2020-06-10 NOTE — PROGRESS NOTES
"   06/10/20 0515   Restraint Monitoring Q15 Minutes   Psychological Status YE;PA;O  (calling staff derogatory names; urinated on floor)   Physical Comfort D  (No signs of discomfort)   Circulation NS   Continuous Observation Yes   Restraint Type   Seclusion () Continued   Pt given bedpan and water. Pt immediately threw  water on the ground and urinated on the floor. Yelling at staff and uncooperative. Patient continuously hit bedpan against the walls/door of seclusion room. Code was called to remove bedpan. Patient was not injured prior to or during code response of removing bedpan. Patient demanding coffee and a sweatshirt-when staff explained to pt that was no possible at this time, pt escalated by pounding hands loudly and continuously on door and swearing at staff. Pt is not redirectable. Pt currently disrobing, singing loudly, \"I'm gonna spank you!\"  Will continue to monitor for safety and comfort.    "

## 2020-06-10 NOTE — PROGRESS NOTES
"   06/10/20 0645   Restraint Monitoring Q15 Minutes   Psychological Status O  (singing loudly; perseverating on Jasiel and God)   Physical Comfort D   Circulation NS   Continuous Observation Yes   Restraint Type   Seclusion (BH) Continued   Pt very labile.   Crying to staff, \"Why won't you be my friend, Shayy? You were the prom queen!\" Tried to talk w/ patient and provide comfort, however pt became agitated and started to swear at staff-\"Fuck you, whore!\" Pt perseverating on Jasiel and God, stating \"I'm 's girlfriend and Mother Hailey!\" while also claiming that staff \"killed .\"   Responding to internal stimuli, stating that \"the Vril and ants are coming out of the floor!\" Later was laughing and staring at wall. When asked what was so funny, pt responded, \"the pictures on the wall are of me\" although nothing is there.  Pt asking when she can come out of seclusion. Criteria was explained numerous times; however patient is not cognizant and escalates each time staff attempts to converse w/ her.   Continuing to monitor for safety.   "
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
## 2020-06-10 NOTE — PROGRESS NOTES
"Received patient awake in her bedroom. After night shift staff came out of report, patient rushed out of her room and stated that she \"needs someone to look at my hand and my rash immediately!\" Co- nurse completed a partial assessment of patient's reports (see his note for details). Patient continually requested multiple items (e.g., medications, different scrubs, food, her own clothes, etc.) and would become upset and weepy when it was explained we were working on getting orders or she would need to wait until day shift to access her items.     At 0020, patient requested tylenol for a headache and klonopin and hydroxyzine to promote a decrease in anxiety so she could sleep. Writer offered seroquel but patient refused stating \"I don't take that!\". PRN Tylenol, Klonopin, and hydroxyzine were administered (see MAR).     Patient was noted to have an intermittent coarse, wet cough throughout the shift. However, it was never bothersome during conversations, only when she was in her room alone. She denied shortness of breath/dypsnea. COVID- 19 test was negative. At 0115, patient, patient opened her door and coughed in her doorway. She bent over in her doorway and coughed so intensely she was gagging herself. At that time, she did produce sputum with her cough which was noted to be brown and have a small amount of blood in it (writer did not witness the blood because patient washed it out of the washcloth it was in before I could assess, but co-nurse did see it before patient washed it away). At 0125, PRN Seroquel was administered by co-nurse (see MAR).     Patient was noted to be sleeping for approximately an hour. At 0330, patient came out of her room and asked for coffee. When staff told her that coffee would be available at 0630, she said \"NO! I NEED IT NOW! I'm going to ask Jasiel!\" and walked down the hallway to the kitchen door. Once there, she started banging on it to get the attention of the staff who was in " "there. Eventually, she was able to be redirected away from the door.     For an hour, repeated attempts at redirection continued but ended up being unsuccessful as patient continued to yell in the milieu and down the hallway. Limits and expectations for behavior on the unit were explained (e.g. , being respectful of others while they are sleeping, being respectful towards staff, etc.). Patient responded by mocking staff by mimicing their gestures and yelling \"NO! You're keeping me away from Price!\". Patient became increasingly more and more agitated and not able to be redirected and went into seclusion a at 0435. On call provider was contacted and seclusion order was obtained, along with permission to administer emergency medication of zyprexa 10 mg IM (as was ordered in MAR). A code green was called and IM Zyprexa was administered per order (see MAR) without issue. At 0500, a second code green was called as it was thought patient may have been completing self-harm by hitting the walls. It was determined that she was not committing self-harm and was instead banging her bed pan against the wall. The bed pan was safely removed without issue. No further intervention as it was determined patient was not harming herself.    Patient continued to scream intermittently through the rest of the shift (so loudly it was audible through the unit and at the nurses station). When completing rounds, writer checked on patient's status and she continued to be delusional (e.g., speaking about her \", Prince\"), screaming unintelligibly, and/or disrobing and exposing her genitalia. Patient was not able to be processed out prior to the completion of the overnight shift as behaviors did not improve. See co-nurse's notes for additional information.   "

## 2020-06-10 NOTE — PROGRESS NOTES
Pt up to counter multiple times at beginning of shift with multiple demands. Observed wound on right lower extremity behind heel, red wound bed with serious drainage. Pt requested wound care for this. Pt also had audible course breath sounds, able to cough up copious amount of brown sputum. Pt's audible breath sounds clear after several bouts of coughing. Pt reminded multiple times to cover cough and keep appropriate distance. Pt given mask due to bouts of cough while opening door and standing in hallway, redirected multiple times to stay in room. Pt agreed to take Seroquel after initially refusing and pt encouraged to rest. Pt fixated on getting Geodon ordered and stated she will talk to provider in the morning. Pt also complaining of itching on both hands and right ankle. Cold cloths given and pt stated it helped relieve itch when applied, some swelling present on hands bilaterally. Skin appears intact on hands. Pt states she has an insect bite on her right ankle. Small, reddened, slightly elevated area on right ankle. Pt agreed to rest at this time. Will continue to monitor.

## 2020-06-10 NOTE — PLAN OF CARE
BEHAVIORAL TEAM DISCUSSION    Participants:  Zabrina Riggins, BREANNA; Lucy Rodriguez and Supriya Altman CTC's; Serafin Gilman OT; Ruchi Mcdonough RN; Igor Psych Associate  Progress: new admission to [facility] for this 54 year old female who presents to the ED due to cyndi   Anticipated length of stay: TBD   Continued Stay Criteria/Rationale: pt needs further assessment  Medical/Physical: nothing active  Precautions:   Behavioral Orders   Procedures     Code 1 - Restrict to Unit     Routine Programming     As clinically indicated     Sexual precautions     Status 15     Every 15 minutes.     Plan: assess, monitor and stabilize  Rationale for change in precautions or plan: new admission

## 2020-06-10 NOTE — PROGRESS NOTES
"Patient took scheduled medications declining VITamin B and Multi VITamin because \"would have to read the package insert to assure there is no folate.\"    Patient continues to make frequent requests and remains disorganized. Patient responds to redirection with frequent reminders. Patient continues to discuss being pregnant (acknowleding being post-menopausal).     Patient insight is poor.    Nursing will continue to monitor.  "
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
## 2020-06-11 ENCOUNTER — DOCUMENTATION ONLY (OUTPATIENT)
Dept: OTHER | Facility: CLINIC | Age: 55
End: 2020-06-11

## 2020-06-11 LAB
CHOLEST SERPL-MCNC: 201 MG/DL
HDLC SERPL-MCNC: 71 MG/DL
LDLC SERPL CALC-MCNC: 110 MG/DL
NONHDLC SERPL-MCNC: 130 MG/DL
TRIGL SERPL-MCNC: 102 MG/DL
TSH SERPL DL<=0.005 MIU/L-ACNC: 0.6 MU/L (ref 0.4–4)

## 2020-06-11 PROCEDURE — 84443 ASSAY THYROID STIM HORMONE: CPT | Performed by: CLINICAL NURSE SPECIALIST

## 2020-06-11 PROCEDURE — 36415 COLL VENOUS BLD VENIPUNCTURE: CPT | Performed by: CLINICAL NURSE SPECIALIST

## 2020-06-11 PROCEDURE — 25000132 ZZH RX MED GY IP 250 OP 250 PS 637: Mod: GY | Performed by: CLINICAL NURSE SPECIALIST

## 2020-06-11 PROCEDURE — 25000132 ZZH RX MED GY IP 250 OP 250 PS 637: Mod: GY | Performed by: NURSE PRACTITIONER

## 2020-06-11 PROCEDURE — 12400001 ZZH R&B MH UMMC

## 2020-06-11 PROCEDURE — 90853 GROUP PSYCHOTHERAPY: CPT

## 2020-06-11 PROCEDURE — 25000128 H RX IP 250 OP 636: Performed by: CLINICAL NURSE SPECIALIST

## 2020-06-11 PROCEDURE — 80061 LIPID PANEL: CPT | Performed by: CLINICAL NURSE SPECIALIST

## 2020-06-11 PROCEDURE — 99232 SBSQ HOSP IP/OBS MODERATE 35: CPT | Mod: 95 | Performed by: NURSE PRACTITIONER

## 2020-06-11 RX ORDER — LORAZEPAM 1 MG/1
1-2 TABLET ORAL EVERY 4 HOURS PRN
Status: DISCONTINUED | OUTPATIENT
Start: 2020-06-11 | End: 2020-06-25 | Stop reason: HOSPADM

## 2020-06-11 RX ORDER — SODIUM PHOSPHATE,MONO-DIBASIC 19G-7G/118
500 ENEMA (ML) RECTAL DAILY
Status: DISCONTINUED | OUTPATIENT
Start: 2020-06-11 | End: 2020-06-25 | Stop reason: HOSPADM

## 2020-06-11 RX ORDER — BENZTROPINE MESYLATE 1 MG/1
1 TABLET ORAL 2 TIMES DAILY PRN
Status: DISCONTINUED | OUTPATIENT
Start: 2020-06-11 | End: 2020-06-22

## 2020-06-11 RX ADMIN — ZIPRASIDONE HCL 60 MG: 60 CAPSULE ORAL at 20:12

## 2020-06-11 RX ADMIN — HYDROCORTISONE: 1 CREAM TOPICAL at 20:13

## 2020-06-11 RX ADMIN — Medication 125 MCG: at 07:17

## 2020-06-11 RX ADMIN — CLONAZEPAM 0.5 MG: 0.5 TABLET ORAL at 07:18

## 2020-06-11 RX ADMIN — CLONAZEPAM 0.5 MG: 0.5 TABLET ORAL at 13:58

## 2020-06-11 RX ADMIN — OLANZAPINE 10 MG: 10 INJECTION, POWDER, LYOPHILIZED, FOR SOLUTION INTRAMUSCULAR at 01:17

## 2020-06-11 RX ADMIN — ZIPRASIDONE HCL 60 MG: 60 CAPSULE ORAL at 12:52

## 2020-06-11 RX ADMIN — CLONAZEPAM 0.5 MG: 0.5 TABLET ORAL at 20:13

## 2020-06-11 RX ADMIN — OLANZAPINE 10 MG: 10 TABLET, FILM COATED ORAL at 07:18

## 2020-06-11 RX ADMIN — B-COMPLEX W/ C & FOLIC ACID TAB 1 TABLET: TAB at 07:17

## 2020-06-11 RX ADMIN — LORAZEPAM 2 MG: 1 TABLET ORAL at 16:29

## 2020-06-11 RX ADMIN — HYDROXYZINE HYDROCHLORIDE 25 MG: 25 TABLET, FILM COATED ORAL at 16:29

## 2020-06-11 RX ADMIN — NICOTINE POLACRILEX 4 MG: 4 LOZENGE ORAL at 20:55

## 2020-06-11 RX ADMIN — HYDROCORTISONE: 1 CREAM TOPICAL at 09:20

## 2020-06-11 RX ADMIN — DIVALPROEX SODIUM 500 MG: 500 TABLET, EXTENDED RELEASE ORAL at 20:12

## 2020-06-11 RX ADMIN — QUETIAPINE FUMARATE 100 MG: 100 TABLET ORAL at 01:00

## 2020-06-11 RX ADMIN — Medication 500 MG: at 10:29

## 2020-06-11 RX ADMIN — LORAZEPAM 1 MG: 1 TABLET ORAL at 12:26

## 2020-06-11 RX ADMIN — THERA TABS 1 TABLET: TAB at 07:18

## 2020-06-11 RX ADMIN — FLUTICASONE FUROATE AND VILANTEROL TRIFENATATE 1 PUFF: 100; 25 POWDER RESPIRATORY (INHALATION) at 09:20

## 2020-06-11 RX ADMIN — ALUMINUM HYDROXIDE, MAGNESIUM HYDROXIDE, AND DIMETHICONE 30 ML: 400; 400; 40 SUSPENSION ORAL at 11:34

## 2020-06-11 RX ADMIN — NICOTINE POLACRILEX 8 MG: 4 LOZENGE ORAL at 16:33

## 2020-06-11 ASSESSMENT — ACTIVITIES OF DAILY LIVING (ADL)
HYGIENE/GROOMING: HANDWASHING;INDEPENDENT
ORAL_HYGIENE: INDEPENDENT
DRESS: SCRUBS (BEHAVIORAL HEALTH);INDEPENDENT

## 2020-06-11 NOTE — PROVIDER NOTIFICATION
Pt. walked with staff to seclusion.  Pt. sat on mat, then laid down on mat.  Pt. did not experience any negative  physical outcomes.
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
## 2020-06-11 NOTE — PROGRESS NOTES
"   06/10/20 2200   Behavioral Health   Hallucinations auditory   Thinking delusional;paranoid;distractable;poor concentration   Orientation person, disoriented;place: oriented   Insight poor   Judgement impaired   Eye Contact staring   Affect tense;irritable   Mood labile;grandiose;anxious   Physical Appearance/Attire untidy   Hygiene other (see comment)  (acceptable)   Suicidality other (see comments)  (denies)   1. Wish to be Dead (Recent) No   2. Non-Specific Active Suicidal Thoughts (Recent) No   Change in Protective Factors? No   Enviromental Risk Factors None   Self Injury other (see comment)  (denies)   Elopement Loitering near exit doors   Activity restless;other (see comment)  (more impulsive as the evening went on)   Speech flight of ideas   Psychomotor / Gait paces;balanced   Psycho Education   Type of Intervention 1:1 intervention   Response participates with cues/redirection   Hours 0.5   Treatment Detail check-in     Fannie started the shift more isolative and calm, still pressured speech and claiming that  sharing her body. She ate dinner and evening snack and took a quick nap. When she awoke, she was more irritable, disorganized, and hypersexual. When writer asked Fannie about last night, she started to talk about how  was having intercourse with her while she was in seclusion. As writer attempted to redirect pt, she started making accusations that writer was an actor posing as hospital staff. She then picked up her salsa and walked towards writer and she appeared to try to lift it above him. Writer moved to the side  Writer to Pt started making loudly saying that \"we're all going to die\" in a few hours and warned staff that an earthquake was approaching. Her behavior started to escalate as well. She would  objects like the telephone and slam it. She also rushed after housekeeping, accusing them of planting a bomb in the hospital. Staff were able to redirect with a lot of " "effort. At the end of the night, writer heard patient tell her nurse \"How about I kill you\" and \"Jasiel says I should skin you alive.\" Staff explained why it was inappropriate to say that instructed pt to go to her room or seclusion. Pt eventually went to her room after swearing at staff.   "
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
## 2020-06-11 NOTE — PROVIDER NOTIFICATION
Pt. Was given the criteria for exiting seclusion:  No screaming, yelling, being verbally abusive to staff.  Pt. Indicates that she is able to come out of seclusion and engage with staff and other pts. In an appropriate manner at this time.

## 2020-06-11 NOTE — PROVIDER NOTIFICATION
Pt. Began to scream, yell, escalate her behavior, ie. Becoming very verbally aggressive. Pt. Could not/ would not stop screaming.  Pt. had received medications,  verbal de-escalation attempted without success.  Pt. currently is very manic and is unable to gain control of her emotions.
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
## 2020-06-11 NOTE — PROGRESS NOTES
Pt awake entire shift.  Pt ate meals, excused from groups, and is social with others.  Pt has required redirection numerous times.  Pt was hissing and coughing at RN.  Pt was sent to room for a break.  Pt was calling staff vulgar names.  Pt was fine for a couple hours until she came to desk very labile, screaming and sobbing for a pen to use because God is talking to her.  Pt was told to take a break in room and we would get a safety pen for her.  Pt became hysterical, started screaming and would not be redirected from those behaviors.  Pt was walked to seclusion with mild resistance.  Pt was given criteria from getting out of seclusion.  Pt is very labile, no insight, delusional thought content.         06/11/20 1411   Sleep/Rest/Relaxation   Day/Evening Time Hours up all shift   Behavioral Health   Hallucinations auditory   Thinking distractable;delusional;paranoid   Orientation person: oriented   Memory confabulation   Insight denial of illness;poor   Judgement impaired   Eye Contact staring   Affect angry;incongruent;tense;irritable   Mood grandiose;labile;irritable   Physical Appearance/Attire untidy;disheveled   Hygiene neglected grooming - unclean body, hair, teeth   Suicidality other (see comments)  (denies)   1. Wish to be Dead (Recent) No   2. Non-Specific Active Suicidal Thoughts (Recent) No   Activity hyperactive (agitated, impulsive)   Speech flight of ideas;rambling;tangential   Psychomotor / Gait balanced;steady   Coping/Psychosocial   Verbalized Emotional State anger;anxiety;disbelief;frustration;powerlessness;sadness   Plan of Care Reviewed With patient   Psycho Education   Type of Intervention 1:1 intervention   Response other (see comment)  (should not be allowed into groups)   Group Therapy Session   Group Attendance excused from group session   Activities of Daily Living   Hygiene/Grooming handwashing;independent   Oral Hygiene independent   Dress scrubs (behavioral health);independent    Activity   Activity Assistance Provided independent

## 2020-06-11 NOTE — PROGRESS NOTES
06/11/20 0200   Seclusion or Restraint Order   In Person Face to Face Assessment Conducted Yes-Eval of pt's immediate situation, reaction to intervention, complete review of systems assessment, behavioral assessment & review/assessment of hx, drugs & meds, recent labs, etc, behavioral condition, need to continue/terminate restraint/seclusion   Patient Experienced No adverse physical outcome from seclusion/restraint initiation   Continuation of Seclus/Restraint indicated at this time Yes   Face to face conducted. Assessed pt for injuries. Pt denies any. Pt was observed crawling on the floor, talking to self, mumbling. Continuation of seclusion needed at this time. Will continue to monitor.

## 2020-06-11 NOTE — PROGRESS NOTES
"While in seclusion, pt was actively hallucinating, grabbing at the air and making comments such as \"Im reading my medicine bottles, look.\"  Pt also used bedpan appropriately while in seclusion.   "

## 2020-06-11 NOTE — PROGRESS NOTES
"This morning patient told writer she has  multiple personalities and has 100 people living in her. Later patient was in room stating she was in another dimension and pointing on floor to a \"cockroach.\" Writer assured patient that was not a cockroach on the floor but patient insisted it was. Patient was sitting on floor in hallway and was attempting to make a pencil roll to her without touching it.  Writer was talking to patient at 1215, patient started coughing/hissing in writer's face. Writer told patient to stop. Patient walked away and started hissing in lounge. Patient was told to go to room which patient did. Ativan 1mg administered at that time. Writer told patient not to hiss anymore, \"I was hissing like a cat.\" Writer told patient not to hiss anymore.  "

## 2020-06-12 LAB
ALBUMIN SERPL-MCNC: 3.5 G/DL (ref 3.4–5)
ALP SERPL-CCNC: 53 U/L (ref 40–150)
ALT SERPL W P-5'-P-CCNC: 40 U/L (ref 0–50)
ANION GAP SERPL CALCULATED.3IONS-SCNC: 5 MMOL/L (ref 3–14)
AST SERPL W P-5'-P-CCNC: 32 U/L (ref 0–45)
BILIRUB SERPL-MCNC: 0.4 MG/DL (ref 0.2–1.3)
BUN SERPL-MCNC: 10 MG/DL (ref 7–30)
CALCIUM SERPL-MCNC: 8.8 MG/DL (ref 8.5–10.1)
CHLORIDE SERPL-SCNC: 104 MMOL/L (ref 94–109)
CO2 SERPL-SCNC: 28 MMOL/L (ref 20–32)
CREAT SERPL-MCNC: 0.52 MG/DL (ref 0.52–1.04)
GFR SERPL CREATININE-BSD FRML MDRD: >90 ML/MIN/{1.73_M2}
GLUCOSE SERPL-MCNC: 148 MG/DL (ref 70–99)
POTASSIUM SERPL-SCNC: 3.7 MMOL/L (ref 3.4–5.3)
PROT SERPL-MCNC: 7 G/DL (ref 6.8–8.8)
SODIUM SERPL-SCNC: 137 MMOL/L (ref 133–144)

## 2020-06-12 PROCEDURE — 99232 SBSQ HOSP IP/OBS MODERATE 35: CPT | Mod: 95 | Performed by: NURSE PRACTITIONER

## 2020-06-12 PROCEDURE — 25000132 ZZH RX MED GY IP 250 OP 250 PS 637: Mod: GY | Performed by: NURSE PRACTITIONER

## 2020-06-12 PROCEDURE — G0177 OPPS/PHP; TRAIN & EDUC SERV: HCPCS

## 2020-06-12 PROCEDURE — 25000132 ZZH RX MED GY IP 250 OP 250 PS 637: Mod: GY | Performed by: CLINICAL NURSE SPECIALIST

## 2020-06-12 PROCEDURE — 12400001 ZZH R&B MH UMMC

## 2020-06-12 PROCEDURE — 36415 COLL VENOUS BLD VENIPUNCTURE: CPT | Performed by: NURSE PRACTITIONER

## 2020-06-12 PROCEDURE — 80053 COMPREHEN METABOLIC PANEL: CPT | Performed by: NURSE PRACTITIONER

## 2020-06-12 RX ORDER — GUAIFENESIN/DEXTROMETHORPHAN 100-10MG/5
5 SYRUP ORAL EVERY 4 HOURS PRN
Status: DISCONTINUED | OUTPATIENT
Start: 2020-06-12 | End: 2020-06-25 | Stop reason: HOSPADM

## 2020-06-12 RX ORDER — DIVALPROEX SODIUM 500 MG/1
1000 TABLET, EXTENDED RELEASE ORAL AT BEDTIME
Status: DISCONTINUED | OUTPATIENT
Start: 2020-06-12 | End: 2020-06-16

## 2020-06-12 RX ADMIN — BENZTROPINE MESYLATE 1 MG: 1 TABLET ORAL at 07:57

## 2020-06-12 RX ADMIN — HYDROXYZINE HYDROCHLORIDE 25 MG: 25 TABLET, FILM COATED ORAL at 19:09

## 2020-06-12 RX ADMIN — Medication 500 MG: at 07:49

## 2020-06-12 RX ADMIN — FLUTICASONE FUROATE AND VILANTEROL TRIFENATATE 1 PUFF: 100; 25 POWDER RESPIRATORY (INHALATION) at 07:50

## 2020-06-12 RX ADMIN — NICOTINE POLACRILEX 8 MG: 4 LOZENGE ORAL at 15:17

## 2020-06-12 RX ADMIN — ZIPRASIDONE HCL 60 MG: 60 CAPSULE ORAL at 18:08

## 2020-06-12 RX ADMIN — CLONAZEPAM 0.5 MG: 0.5 TABLET ORAL at 07:49

## 2020-06-12 RX ADMIN — BENZTROPINE MESYLATE 1 MG: 1 TABLET ORAL at 18:08

## 2020-06-12 RX ADMIN — Medication 125 MCG: at 07:49

## 2020-06-12 RX ADMIN — B-COMPLEX W/ C & FOLIC ACID TAB 1 TABLET: TAB at 07:49

## 2020-06-12 RX ADMIN — THERA TABS 1 TABLET: TAB at 07:49

## 2020-06-12 RX ADMIN — NICOTINE POLACRILEX 4 MG: 4 LOZENGE ORAL at 19:09

## 2020-06-12 RX ADMIN — DIVALPROEX SODIUM 1000 MG: 500 TABLET, EXTENDED RELEASE ORAL at 19:08

## 2020-06-12 RX ADMIN — HYDROCORTISONE: 1 CREAM TOPICAL at 07:52

## 2020-06-12 RX ADMIN — ZIPRASIDONE HCL 60 MG: 60 CAPSULE ORAL at 12:16

## 2020-06-12 RX ADMIN — NICOTINE POLACRILEX 8 MG: 4 LOZENGE ORAL at 08:01

## 2020-06-12 RX ADMIN — CLONAZEPAM 0.5 MG: 0.5 TABLET ORAL at 13:54

## 2020-06-12 RX ADMIN — LORAZEPAM 1 MG: 1 TABLET ORAL at 07:59

## 2020-06-12 RX ADMIN — CLONAZEPAM 0.5 MG: 0.5 TABLET ORAL at 19:09

## 2020-06-12 RX ADMIN — LORAZEPAM 2 MG: 1 TABLET ORAL at 18:08

## 2020-06-12 ASSESSMENT — ACTIVITIES OF DAILY LIVING (ADL)
HYGIENE/GROOMING: INDEPENDENT
ORAL_HYGIENE: INDEPENDENT
HYGIENE/GROOMING: INDEPENDENT
ORAL_HYGIENE: INDEPENDENT
LAUNDRY: WITH SUPERVISION
DRESS: SCRUBS (BEHAVIORAL HEALTH)

## 2020-06-12 NOTE — PROGRESS NOTES
06/12/20 1500   Behavioral Health   Hallucinations denies / not responding to hallucinations   Thinking delusional;paranoid;poor concentration;distractable;confused   Orientation time: oriented;date: oriented;place: oriented;person: oriented   Memory confabulation   Insight poor   Judgement impaired   Affect irritable;tense;sad   Mood irritable;anxious   Suicidality other (see comments)  (pt. denies)   1. Wish to be Dead (Recent) No   2. Non-Specific Active Suicidal Thoughts (Recent) No   Activities of Daily Living   Hygiene/Grooming independent   Oral Hygiene independent   Dress scrubs (behavioral health)   Laundry with supervision   Room Organization independent     Pt started shift out anxious, agitated, restless and disorganized. However, she was able to calm down and attend group. Pt read magazines and socialized with peers. Pt denies any SI/ SIB thoughts and depression. Pt continues to be observed for safety and any behavioral issues.

## 2020-06-12 NOTE — PROGRESS NOTES
06/11/20 2000   Group Therapy Session   Group Attendance attended group session   Total Time (minutes) 45   Group Type psychotherapeutic   Patient Participation/Contribution cooperative with task;discussed personal experience with topic;listened actively;verbalizations were off topic   Patient participated in psychotherapy group which focused on personal resiliency by identifying individual strengths and positive coping skills.    Fannie presents in a pleasant mood. Hyperverbal and sometimes tangential, but receptive to redirection. Participated in the activity and shared her responses with the group. She had difficulty reading her writing or remembering what she wrote.  She actively listened to others. Politely asked another group member to speak up and repeat her responses so that she could hear her.  Fannie asked group if anyone knew about [place] culture. She states she is supposed to  an older man from [place] who she has never met.

## 2020-06-12 NOTE — PROGRESS NOTES
"Patient was up most of the night \"waiting to get some coffee.\" She had two episodes of hacking, unproductive cough but had no behaviors.  "
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
## 2020-06-12 NOTE — PLAN OF CARE
"Problem: OT General Care Plan  Goal: OT Goal 1    Pt attended 1 out of 3 OT groups offered. Pt attended occupational therapy clinic. She spent x45 minutes looking through group materials in a disorganized manner, and selected several projects, though declined initiating them, explaining \"they're already finished; I like them the way they are.\" She selected a task to work on in group next week prior to leaving group. Overall, she appeared disorganized, though was calm throughout this group and appropriate in interactions. Will continue to assess.        "

## 2020-06-12 NOTE — PLAN OF CARE
"Slight improvement in behavior noted tonight as patient came to writer  early in shift and stated \"I just want to feel healthy\".  She was then given Ativan 2 mg prn and Hydroxyzine 25 mg PRN.  No negative comments until 2000 just before she went to group.  She took HS medication including Depakote.  Later in shift has attempted to leave her room x 3 wrapped only in a sheet.  Patient is angry and verbally abusive when reminded by staff she must be fully dressed to leave her room.  Patient denied physical problems, suicidal ideation, and medication side effects.  No restlessness noted this evening.Slight improvement in behavior noted tonight as patient came to writer  early in shift and stated \"I just want to feel healthy\".  She was then given Ativan 2 mg prn and Hydroxyzine 25 mg PRN.  No negative comments until 2000 just before she went to group.  She took HS medication including Depakote.  Later in shift has attempted to leave her room x 3 wrapped only in a sheet.  Patient is angry and verbally abusive when reminded by staff she must be fully dressed to leave her room.  Patient denied physical problems, suicidal ideation, and medication side effects.  No restlessness noted this evening.Slight improvement in behavior noted tonight as patient came to writer  early in shift and stated \"I just want to feel healthy\".  She was then given Ativan 2 mg prn and Hydroxyzine 25 mg PRN.  No negative comments until 2000 just before she went to group.  She took HS medication including Depakote.  Later in shift has attempted to leave her room x 3 wrapped only in a sheet.  Patient is angry and verbally abusive when reminded by staff she must be fully dressed to leave her room.  Patient denied physical problems, suicidal ideation, and medication side effects.  No restlessness noted this evening.Slight improvement in behavior noted tonight as patient came to writer  early in shift and stated \"I just want to feel healthy\".  She was " then given Ativan 2 mg prn and Hydroxyzine 25 mg PRN.  No negative comments until 2000 just before she went to group.  She took HS medication including Depakote.  Later in shift has attempted to leave her room x 3 wrapped only in a sheet.  Patient is angry and verbally abusive when reminded by staff she must be fully dressed to leave her room.  Patient denied physical problems, suicidal ideation, and medication side effects.  No restlessness noted this evening.

## 2020-06-12 NOTE — PROGRESS NOTES
Work Completed:  CTC reviewed chart, attended team discussion remotely.        Discharge plan or goal: plan is to discharge back to OP providers once stable  Pt has psychiatry, therapy, case management and PCP as well as cm at her apartment complex                Barriers to discharge: pt's lack of stability

## 2020-06-12 NOTE — PROGRESS NOTES
PT requested that [facility] fax a note giving permission to Carolyn Marce to care for her cat. Pt had written the note on small lined paper. [facility] offered to type it up for her to sign but pt stated that this was sufficient.     The note was faxed to Carolyn Zheng at fax: [phone].

## 2020-06-12 NOTE — PROGRESS NOTES
Pt signed 12 hr intent to leave on 6/11/20 @ 2055 pm. Will continue to reassure and Provider will be updated.

## 2020-06-12 NOTE — PROGRESS NOTES
Patient rescinded her 12-hr Notice of Intent to Leave at 0650 opting instead to talk to the provider later today.

## 2020-06-12 NOTE — PROGRESS NOTES
[facility]   Psychiatric Progress Note      Impression:     Fannie Jeter is a 54-year-old female admitted to [facility] on 6/9/2020.  She was admitted on a health officer hold through [facility], brought in via EMS after her neighbors called 911, due to cyndi and psychosis.  In the ER she was hypersexual and grandiose, talking about going to business school to market herself as a lozano and singing for ER staff.  She said that [employer] and  Dk Swanson helped with her poor credit so she could secure a luxury apartment in [place], where she intended to travel with her cat on first class tickets.  She said she developed COVID-19 symptoms in March but was not tested at the time.  COVID-19 test in the ER was negative.  Upon admission to the unit she was irritable and had several complaints and requests.  She stated her intent to ask Jasiel for coffee.  She was taken to seclusion, where she urinated on the floor, threw water, pounded her fists on the door, swore at staff and disrobed.  She says that she threw away her Lamictal several days ago and that she was intending to start Lithium.  She reports taking Klonopin and Geodon regularly but was not taking Geodon with food to ensure adequate absorption.  UTOX was positive for opiates.  She was also taken to seclusion 6/11 overnight shift due to disruptive behaviors, threatening to kill staff, cut out their eyes, and drag them by the hair.  She remains preoccupied with Jasiel and has delusional thought content, and appears to be hallucinating.  On 6/11 she signed a 12-hour intent to leave but rescinded it the following morning.  Since admission, Klonopin was continued.  Geodon was increased.  Depakote ER was initiated.  PRNs of Seroquel, Zyprexa and Hydroxyzine are available.           Diagnoses:     Schizoaffective disorder bipolar type         Plan:      Medications:  Continue Geodon to 60 mg BID with meals.  Continue Klonopin 0.5 mg TID.  Increase Depakote ER to 1000 mg (previously stable on 1500 mg). Continue PRNs of Zyprexa, Hydroxyzine and Seroquel.       Discharge to home when stable.        Telemedicine Visit: The patient's condition can be safely assessed and treated via synchronous audio and visual telemedicine encounter.       Start Time: 0805  Stop Time: 0819     Reason for Telemedicine Visit: COVID-19 precautions     Originating Site (Patient Location): [facility]     Distant Site (Provider Location): Provider Remote Setting     Consent:  The patient/guardian has verbally consented to: the potential risks and benefits of telemedicine (video visit) versus in person care; bill my insurance or make self-payment for services provided; and responsibility for payment of non-covered services.      Mode of Communication:  Video Conference via Skype     As the provider I attest to compliance with applicable laws and regulations related to telemedicine.      Attestation:  Patient has been seen and evaluated by me, SAMUEL Mejias CNP  The patient was counseled on nature of illness and treatment plan/options  Care was coordinated with treatment team        Clinical Global Impressions  First:  Considering your total clinical experience with this particular patient population, how severe are the patient's symptoms at this time?: 7 (06/10/20 0431)  Compared to the patient's condition at the START of treatment, this patient's condition is: 4 (06/10/20 0431)  Most recent:  Considering your total clinical experience with this particular patient population, how severe are the patient's symptoms at this time?: 7 (06/10/20 0431)  Compared to the patient's condition at the START of treatment, this patient's condition is: 4 (06/10/20 0431)            Interim History:     The patient's care was discussed with the treatment team and chart notes  "were reviewed.  Pt was documented as sleeping 3 hours during the overnight shift.  She was walked to [facility] yesterday afternoon due to screaming and sobbing at the desk area, repeatedly hissing, and coughing in staff's face.  Yesterday she received PRN Maalox x 1, PRN Hydroxyzine x 1, PRN Ativan 1 mg x 1, PRN Ativan 2 mg x 1, PRN PO Zyprexa x 1 and PRN IM Zyprexa x 1.  In spite of saying she would not take Depakote, she did take it last night.  \"Everything I took was helpful.\"  She signed a 12-hour intent to leave last night but rescinded it early this morning after staff informed her that I had stated a 72-hour hold would be initiated if she did not rescind.  Today pt appeared calmer.  Stated she wanted to discharge yesterday because she is concerned about her cat.  Pt stated that I would \"commit caticide\" if her cat  (formerly Mignon) dies while she is hospitalized.  Pt allowed to access her phone to obtain some phone numbers to find someone to care for her cat.  CTC later stated this issue was resolved.  She denies hallucinations.  \"I'm kind of scared someone is going to assassinate me.  Someone who doesn't like [place], probably Satan.\"  States she has been eating well.  Pt reports consuming copious amounts of coffee and water.  CMP ordered to assess electrolytes, which were within normal limits.         Medications:     Current Facility-Administered Medications   Medication     acetaminophen (TYLENOL) tablet 650 mg     albuterol (PROVENTIL) neb solution 2.5 mg     alum & mag hydroxide-simethicone (MAALOX  ES) suspension 30 mL     aspirin (ASA) tablet 325 mg     benztropine (COGENTIN) tablet 1 mg     bisacodyl (DULCOLAX) Suppository 10 mg     carboxymethylcellulose PF (REFRESH PLUS) 0.5 % ophthalmic solution 1 drop     cholecalciferol (VITAMIN D3) 5000 units (125 mcg) capsule 125 mcg     clonazePAM (klonoPIN) tablet 0.5 mg     divalproex sodium extended-release (DEPAKOTE ER) 24 hr tablet 1,000 mg     " fluticasone-vilanterol (BREO ELLIPTA) 100-25 MCG/INH inhaler 1 puff     glucosamine capsule 500 mg     hydrocortisone (CORTAID) 1 % cream     hydrOXYzine (ATARAX) tablet 25 mg     LORazepam (ATIVAN) tablet 1-2 mg     magnesium chloride CR tablet 535 mg     magnesium hydroxide (MILK OF MAGNESIA) suspension 30 mL     multivitamin, therapeutic (THERA-VIT) tablet 1 tablet     nicotine (NICORETTE) lozenge 4-8 mg     OLANZapine (zyPREXA) tablet 10 mg    Or     OLANZapine (zyPREXA) injection 10 mg     QUEtiapine (SEROquel) tablet 100 mg     vitamin B complex with vitamin C (STRESS TAB) tablet 1 tablet     ziprasidone (GEODON) capsule 60 mg             Allergies:     Allergies   Allergen Reactions     Animal Dander      Haldol Difficulty breathing     Haldol [Haloperidol]      Penicillins      Unsure of what happens. Has been told she has allergies since she was a kid.     Penicillins      Seasonal Allergies             Psychiatric Examination:     /84 (BP Location: Left arm)   Pulse 92   Temp 98.7  F (37.1  C) (Oral)   Resp 16   Wt 85 kg (187 lb 8 oz)   LMP 07/03/2014   SpO2 95%   BMI 30.26 kg/m       Appearance:  awake, alert and adequately groomed  Attitude:  cooperative  Eye Contact:  good  Mood:  less irritable  Affect:  calmer  Speech:  clear, coherent, not pressured or loud today  Psychomotor Behavior:  no evidence of tardive dyskinesia, dystonia, or tics, less psychomotor agitation present  Thought Process:  less tangential  Associations:  less loosening of associations present  Thought Content:  no evidence of suicidal ideation or homicidal ideation, grandiose, delusional thought content, denies hallucinations but cannot rule out  Insight:  partial  Judgment:  poor  Oriented to:  date, time, person, and place  Attention Span and Concentration:  poor  Recent and Remote Memory:  limited  Language:  intact  Fund of Knowledge:  appropriate  Muscle Strength and Tone:  normal  Gait and Station:  normal           Labs:     Recent Results (from the past 24 hour(s))   Comprehensive metabolic panel    Collection Time: 06/12/20  8:52 AM   Result Value Ref Range    Sodium 137 133 - 144 mmol/L    Potassium 3.7 3.4 - 5.3 mmol/L    Chloride 104 94 - 109 mmol/L    Carbon Dioxide 28 20 - 32 mmol/L    Anion Gap 5 3 - 14 mmol/L    Glucose 148 (H) 70 - 99 mg/dL    Urea Nitrogen 10 7 - 30 mg/dL    Creatinine 0.52 0.52 - 1.04 mg/dL    GFR Estimate >90 >60 mL/min/[1.73_m2]    GFR Estimate If Black >90 >60 mL/min/[1.73_m2]    Calcium 8.8 8.5 - 10.1 mg/dL    Bilirubin Total 0.4 0.2 - 1.3 mg/dL    Albumin 3.5 3.4 - 5.0 g/dL    Protein Total 7.0 6.8 - 8.8 g/dL    Alkaline Phosphatase 53 40 - 150 U/L    ALT 40 0 - 50 U/L    AST 32 0 - 45 U/L

## 2020-06-13 LAB
BASOPHILS # BLD AUTO: 0 10E9/L (ref 0–0.2)
BASOPHILS NFR BLD AUTO: 0.2 %
DEPRECATED S PYO AG THROAT QL EIA: NEGATIVE
DIFFERENTIAL METHOD BLD: NORMAL
EOSINOPHIL # BLD AUTO: 0.2 10E9/L (ref 0–0.7)
EOSINOPHIL NFR BLD AUTO: 2.5 %
ERYTHROCYTE [DISTWIDTH] IN BLOOD BY AUTOMATED COUNT: 14 % (ref 10–15)
HCT VFR BLD AUTO: 39.5 % (ref 35–47)
HGB BLD-MCNC: 13 G/DL (ref 11.7–15.7)
IMM GRANULOCYTES # BLD: 0 10E9/L (ref 0–0.4)
IMM GRANULOCYTES NFR BLD: 0.3 %
LYMPHOCYTES # BLD AUTO: 1.2 10E9/L (ref 0.8–5.3)
LYMPHOCYTES NFR BLD AUTO: 18.7 %
MCH RBC QN AUTO: 30.5 PG (ref 26.5–33)
MCHC RBC AUTO-ENTMCNC: 32.9 G/DL (ref 31.5–36.5)
MCV RBC AUTO: 93 FL (ref 78–100)
MONOCYTES # BLD AUTO: 0.6 10E9/L (ref 0–1.3)
MONOCYTES NFR BLD AUTO: 9.4 %
NEUTROPHILS # BLD AUTO: 4.4 10E9/L (ref 1.6–8.3)
NEUTROPHILS NFR BLD AUTO: 68.9 %
NRBC # BLD AUTO: 0 10*3/UL
NRBC BLD AUTO-RTO: 0 /100
PLATELET # BLD AUTO: 291 10E9/L (ref 150–450)
RBC # BLD AUTO: 4.26 10E12/L (ref 3.8–5.2)
SPECIMEN SOURCE: NORMAL
SPECIMEN SOURCE: NORMAL
STREP GROUP A PCR: NOT DETECTED
WBC # BLD AUTO: 6.3 10E9/L (ref 4–11)

## 2020-06-13 PROCEDURE — 25000132 ZZH RX MED GY IP 250 OP 250 PS 637: Mod: GY | Performed by: CLINICAL NURSE SPECIALIST

## 2020-06-13 PROCEDURE — 25000128 H RX IP 250 OP 636: Performed by: CLINICAL NURSE SPECIALIST

## 2020-06-13 PROCEDURE — 25000132 ZZH RX MED GY IP 250 OP 250 PS 637: Mod: GY | Performed by: NURSE PRACTITIONER

## 2020-06-13 PROCEDURE — 25000125 ZZHC RX 250: Performed by: CLINICAL NURSE SPECIALIST

## 2020-06-13 PROCEDURE — 85025 COMPLETE CBC W/AUTO DIFF WBC: CPT | Performed by: NURSE PRACTITIONER

## 2020-06-13 PROCEDURE — 36415 COLL VENOUS BLD VENIPUNCTURE: CPT | Performed by: NURSE PRACTITIONER

## 2020-06-13 PROCEDURE — 12400001 ZZH R&B MH UMMC

## 2020-06-13 PROCEDURE — 87651 STREP A DNA AMP PROBE: CPT | Performed by: NURSE PRACTITIONER

## 2020-06-13 PROCEDURE — 40001204 ZZHCL STATISTIC STREP A RAPID: Performed by: NURSE PRACTITIONER

## 2020-06-13 RX ADMIN — CLONAZEPAM 0.5 MG: 0.5 TABLET ORAL at 22:36

## 2020-06-13 RX ADMIN — BENZOCAINE AND MENTHOL 1 LOZENGE: 15; 3.6 LOZENGE ORAL at 22:37

## 2020-06-13 RX ADMIN — HYDROCORTISONE: 1 CREAM TOPICAL at 08:02

## 2020-06-13 RX ADMIN — HYDROCORTISONE: 1 CREAM TOPICAL at 22:36

## 2020-06-13 RX ADMIN — BENZOCAINE AND MENTHOL 1 LOZENGE: 15; 3.6 LOZENGE ORAL at 16:53

## 2020-06-13 RX ADMIN — QUETIAPINE FUMARATE 100 MG: 100 TABLET ORAL at 00:07

## 2020-06-13 RX ADMIN — Medication 500 MG: at 08:02

## 2020-06-13 RX ADMIN — BENZOCAINE AND MENTHOL 1 LOZENGE: 15; 3.6 LOZENGE ORAL at 14:25

## 2020-06-13 RX ADMIN — ZIPRASIDONE HCL 60 MG: 60 CAPSULE ORAL at 16:52

## 2020-06-13 RX ADMIN — B-COMPLEX W/ C & FOLIC ACID TAB 1 TABLET: TAB at 08:02

## 2020-06-13 RX ADMIN — Medication 125 MCG: at 08:02

## 2020-06-13 RX ADMIN — ACETAMINOPHEN 650 MG: 325 TABLET, FILM COATED ORAL at 08:04

## 2020-06-13 RX ADMIN — FLUTICASONE FUROATE AND VILANTEROL TRIFENATATE 1 PUFF: 100; 25 POWDER RESPIRATORY (INHALATION) at 08:02

## 2020-06-13 RX ADMIN — OLANZAPINE 10 MG: 10 INJECTION, POWDER, LYOPHILIZED, FOR SOLUTION INTRAMUSCULAR at 04:56

## 2020-06-13 RX ADMIN — ALBUTEROL SULFATE 2.5 MG: 2.5 SOLUTION RESPIRATORY (INHALATION) at 18:20

## 2020-06-13 RX ADMIN — ACETAMINOPHEN 650 MG: 325 TABLET, FILM COATED ORAL at 22:37

## 2020-06-13 RX ADMIN — HYDROXYZINE HYDROCHLORIDE 25 MG: 25 TABLET, FILM COATED ORAL at 16:53

## 2020-06-13 RX ADMIN — LORAZEPAM 2 MG: 1 TABLET ORAL at 16:53

## 2020-06-13 RX ADMIN — THERA TABS 1 TABLET: TAB at 08:02

## 2020-06-13 RX ADMIN — CLONAZEPAM 0.5 MG: 0.5 TABLET ORAL at 14:24

## 2020-06-13 RX ADMIN — HYDROXYZINE HYDROCHLORIDE 25 MG: 25 TABLET, FILM COATED ORAL at 22:37

## 2020-06-13 RX ADMIN — DIVALPROEX SODIUM 1000 MG: 500 TABLET, EXTENDED RELEASE ORAL at 22:36

## 2020-06-13 RX ADMIN — BENZTROPINE MESYLATE 1 MG: 1 TABLET ORAL at 16:53

## 2020-06-13 RX ADMIN — CLONAZEPAM 0.5 MG: 0.5 TABLET ORAL at 08:02

## 2020-06-13 RX ADMIN — ZIPRASIDONE HCL 60 MG: 60 CAPSULE ORAL at 12:04

## 2020-06-13 RX ADMIN — NICOTINE POLACRILEX 8 MG: 4 LOZENGE ORAL at 11:08

## 2020-06-13 RX ADMIN — ACETAMINOPHEN 650 MG: 325 TABLET, FILM COATED ORAL at 16:53

## 2020-06-13 RX ADMIN — LORAZEPAM 2 MG: 1 TABLET ORAL at 06:57

## 2020-06-13 ASSESSMENT — ACTIVITIES OF DAILY LIVING (ADL)
DRESS: INDEPENDENT
ORAL_HYGIENE: INDEPENDENT
DRESS: SCRUBS (BEHAVIORAL HEALTH)
HYGIENE/GROOMING: INDEPENDENT
ORAL_HYGIENE: INDEPENDENT
LAUNDRY: WITH SUPERVISION
HYGIENE/GROOMING: INDEPENDENT

## 2020-06-13 NOTE — PROGRESS NOTES
FACE TO FACE:  Pt in seclusion, code called. Face to Face done. Pt declined any pain or injuries, and none observed.  Labs, medications, progress notes reviewed, MD notified and sitter observing pt. Current condition and behavioral situation was discussed with attending provider. Patient did not experience physical sequelae. Pt briefed on behaviors expected while she laid awake on the mat in seclusion and refused to respond.

## 2020-06-13 NOTE — PROGRESS NOTES
Debriefing:  Patient was given the criteria for exiting seclusion:  No screaming, yelling, being verbally/physically abusive to staff.  Patient indicates that she is able to come out of seclusion and engage with staff and other patients in an appropriate manner at this time. Patient also requested to receive PRN IM Zyprexa (see MAR) to aid her with meeting the expectations as agreed.

## 2020-06-13 NOTE — PROGRESS NOTES
[facility] returning to room intermittently. She attempted to make a phone call, coughing in lounge not covering cough, brought her linens out of her room and laid them on the floor, appearing to be agitated and difficult to redirect.

## 2020-06-13 NOTE — PROGRESS NOTES
Pt has been yelling in room and needs redirection from sitter. Pt became agitated with sitter and requested ativan. Pt refusing to move from doorway and sitting in doorway. Pt appeared to swallow medication and cooperated with mouth checks. Pt continuing to sit in doorway questioning staff. Will continue to redirect and monitor.

## 2020-06-13 NOTE — PLAN OF CARE
"Patient has been less angry and hostile.  She still believes I am  to someone named Ghulam Potter but when I told her the only person I knew by that name  when he was 84, replied \"then that jacque stole his identity, and he stole my money too.\"  She received Ativan 2 mg PRN with 1800 meds and Vistaril 50 mg PRN with HS meds and slept for a few hours.  Patient made two hostile remarks when told she didn't have any antibiotics ordered, and her nicotine replacement was in the form of a lozenge.  When told she couldn't have any lozenges unless they went directly into her mouth due to hoarding them in her room, she thanked me for explaining the rationale.  "

## 2020-06-13 NOTE — PROGRESS NOTES
"Patient remains on SIO as does not contract for safety.    \"Staff\" reported \"awake all night\" and napped 1 hour this shift.    Patient c/o \"sore throat.\"     -NP notified  -IM consult w/see Sunday  -Rapid Step negative  -Strep Group pending  -cepacol ordered    No new bruising noted today.    Nursing will continue to monitor.  "
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
## 2020-06-13 NOTE — PROGRESS NOTES
"FACE TO FACE:  Pt in seclusion. Face to Face done. Pt declined any pain or injuries, and none observed by writer.  Labs, medications, progress notes reviewed, MD notified and sitter observing pt. Current condition and behavioral situation was discussed with attending provider. Patient did not experience physical sequelae. Pt briefed on behaviors expected for her to be processed out of seclusion. Patient was not able to agree/did not agree meet those expectations (she responded by yelling \"You are the Jasiel fucker!\").   "
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
## 2020-06-14 LAB — HIV 1+2 AB+HIV1 P24 AG SERPL QL IA: NONREACTIVE

## 2020-06-14 PROCEDURE — 36415 COLL VENOUS BLD VENIPUNCTURE: CPT | Performed by: PHYSICIAN ASSISTANT

## 2020-06-14 PROCEDURE — 25000132 ZZH RX MED GY IP 250 OP 250 PS 637: Mod: GY | Performed by: PSYCHIATRY & NEUROLOGY

## 2020-06-14 PROCEDURE — 99207 ZZC CONSULT E&M CHANGED TO INITIAL LEVEL: CPT | Performed by: PHYSICIAN ASSISTANT

## 2020-06-14 PROCEDURE — 99221 1ST HOSP IP/OBS SF/LOW 40: CPT | Performed by: PHYSICIAN ASSISTANT

## 2020-06-14 PROCEDURE — 25000132 ZZH RX MED GY IP 250 OP 250 PS 637: Mod: GY | Performed by: CLINICAL NURSE SPECIALIST

## 2020-06-14 PROCEDURE — 87102 FUNGUS ISOLATION CULTURE: CPT | Performed by: PHYSICIAN ASSISTANT

## 2020-06-14 PROCEDURE — 25000132 ZZH RX MED GY IP 250 OP 250 PS 637: Mod: GY | Performed by: NURSE PRACTITIONER

## 2020-06-14 PROCEDURE — 12400001 ZZH R&B MH UMMC

## 2020-06-14 PROCEDURE — 87106 FUNGI IDENTIFICATION YEAST: CPT | Performed by: PHYSICIAN ASSISTANT

## 2020-06-14 PROCEDURE — 87389 HIV-1 AG W/HIV-1&-2 AB AG IA: CPT | Performed by: PHYSICIAN ASSISTANT

## 2020-06-14 RX ADMIN — HYDROCORTISONE: 1 CREAM TOPICAL at 20:52

## 2020-06-14 RX ADMIN — Medication 125 MCG: at 13:50

## 2020-06-14 RX ADMIN — ZIPRASIDONE HCL 60 MG: 60 CAPSULE ORAL at 11:47

## 2020-06-14 RX ADMIN — B-COMPLEX W/ C & FOLIC ACID TAB 1 TABLET: TAB at 08:35

## 2020-06-14 RX ADMIN — BENZOCAINE AND MENTHOL 1 LOZENGE: 15; 3.6 LOZENGE ORAL at 13:51

## 2020-06-14 RX ADMIN — BENZOCAINE AND MENTHOL 1 LOZENGE: 15; 3.6 LOZENGE ORAL at 08:34

## 2020-06-14 RX ADMIN — ZIPRASIDONE HCL 60 MG: 60 CAPSULE ORAL at 20:57

## 2020-06-14 RX ADMIN — NICOTINE POLACRILEX 8 MG: 4 GUM, CHEWING BUCCAL at 14:56

## 2020-06-14 RX ADMIN — NICOTINE POLACRILEX 8 MG: 4 GUM, CHEWING BUCCAL at 11:47

## 2020-06-14 RX ADMIN — LORAZEPAM 2 MG: 1 TABLET ORAL at 04:39

## 2020-06-14 RX ADMIN — CLONAZEPAM 0.5 MG: 0.5 TABLET ORAL at 13:50

## 2020-06-14 RX ADMIN — ACETAMINOPHEN 650 MG: 325 TABLET, FILM COATED ORAL at 16:31

## 2020-06-14 RX ADMIN — THERA TABS 1 TABLET: TAB at 08:34

## 2020-06-14 RX ADMIN — NICOTINE POLACRILEX 8 MG: 4 GUM, CHEWING BUCCAL at 16:31

## 2020-06-14 RX ADMIN — NICOTINE POLACRILEX 8 MG: 4 GUM, CHEWING BUCCAL at 07:05

## 2020-06-14 RX ADMIN — HYDROXYZINE HYDROCHLORIDE 25 MG: 25 TABLET, FILM COATED ORAL at 20:58

## 2020-06-14 RX ADMIN — BENZOCAINE AND MENTHOL 1 LOZENGE: 15; 3.6 LOZENGE ORAL at 16:31

## 2020-06-14 RX ADMIN — HYDROXYZINE HYDROCHLORIDE 25 MG: 25 TABLET, FILM COATED ORAL at 08:35

## 2020-06-14 RX ADMIN — Medication 500 MG: at 08:33

## 2020-06-14 RX ADMIN — HYDROCORTISONE: 1 CREAM TOPICAL at 08:36

## 2020-06-14 RX ADMIN — FLUTICASONE FUROATE AND VILANTEROL TRIFENATATE 1 PUFF: 100; 25 POWDER RESPIRATORY (INHALATION) at 08:35

## 2020-06-14 RX ADMIN — BENZOCAINE AND MENTHOL 1 LOZENGE: 15; 3.6 LOZENGE ORAL at 20:58

## 2020-06-14 RX ADMIN — ACETAMINOPHEN 650 MG: 325 TABLET, FILM COATED ORAL at 20:58

## 2020-06-14 RX ADMIN — CLONAZEPAM 0.5 MG: 0.5 TABLET ORAL at 08:33

## 2020-06-14 RX ADMIN — DIVALPROEX SODIUM 1000 MG: 500 TABLET, EXTENDED RELEASE ORAL at 20:57

## 2020-06-14 RX ADMIN — CLONAZEPAM 0.5 MG: 0.5 TABLET ORAL at 20:58

## 2020-06-14 ASSESSMENT — ACTIVITIES OF DAILY LIVING (ADL)
ORAL_HYGIENE: INDEPENDENT
ORAL_HYGIENE: INDEPENDENT
DRESS: SCRUBS (BEHAVIORAL HEALTH);INDEPENDENT
HYGIENE/GROOMING: HANDWASHING;INDEPENDENT
HYGIENE/GROOMING: INDEPENDENT
DRESS: INDEPENDENT

## 2020-06-14 NOTE — PROGRESS NOTES
"Patient SIO continues for assault risk.    Patient is periodically irritable and labile. Patient more isolative to room-\"sore throat\" and napping 3-4 HOURS (NO sleep last night). Patient disorganized/confused with paranoia and delusions-Believes pregnant by Jasiel, feels unsure why people are here and who is following her, denies any behaviors, denies mental health s/s.    Patient is compliant with medications and oral checks. Patient cooperative with IM and labs pending.    /77 (BP Location: Left arm)   Pulse 101   Temp 98.8  F (37.1  C) (Oral)   Resp 16   Wt 85.4 kg (188 lb 4.8 oz)   LMP 07/03/2014   SpO2 98%   BMI 30.39 kg/m  .    Nursing will continue to monitor.    "

## 2020-06-14 NOTE — PROGRESS NOTES
"Patient up at 0000 and stated she wanted water. Patient appeared unsteady on her feet to staff brought ice water to her room. By the time patient made it back to patient's room she had fallen asleep. Patient back up at 0020 and stated she needed to \"walk in the hallway\". Writer, patient's SIO, and other staff explained that because she appeared to be unsteady it was a safer option for her to remain in her room (to prevent a fall) and encouraged her to get some sleep. Patient barged past her SIO and quickly walked (with a slight unsteady gait) to get more water loudly stating \"It doesn't have ice! I need ice!\" and quickly walked to the ice machine and back to her room. Patient was irritable, argumentative, and inappropriate during interactions with staff during this time.When offered medications (for agitation and/or her sore throat) she told this writer to \"PUT A CONDOM IN MY BAG!\". Co-nurse offered her medications also and she told him \"I want throat cum!\". She continued to repeatedly ask for \"throat cum\" for her sore throat that she stated was in her bag and refused offers of throat lozenges and other available medications.     Patient continued to be awake for a majority of the shift, talking and singing. At 0430, patient displayed signs of increased irritability and agitation (angry tone, increasingly argumentative with staff) so PRN ativan was offered to, accepted by, and administered to patient (see MAR). With encouragement patient laid down.      Patient slept 0.5 hours. She required constant redirection and reinforcement regarding expectations of the unit but was able to accept both (and PRN medication) without escalation of the situation.  "

## 2020-06-14 NOTE — PROGRESS NOTES
"   06/13/20 2242   Behavioral Health   Hallucinations denies / not responding to hallucinations   Thinking confused;delusional;paranoid   Orientation person, disoriented;date, disoriented;time, disoriented;place: oriented   Memory confabulation   Insight poor   Judgement impaired   Eye Contact staring   Affect blunted, flat;tense;irritable   Mood anxious;labile;irritable   Physical Appearance/Attire untidy;disheveled   Hygiene neglected grooming - unclean body, hair, teeth   Suicidality other (see comments)  (Patient denies)   1. Wish to be Dead (Recent) No   2. Non-Specific Active Suicidal Thoughts (Recent) No   Self Injury other (see comment)  (Patient denies)   Elopement   (None observed)   Activity other (see comment)  (Patient present at times in the milieu)   Speech flight of ideas;pressured   Psychomotor / Gait slow   Safety   Sexual status 15   Activities of Daily Living   Hygiene/Grooming independent   Oral Hygiene independent   Dress scrubs (behavioral health)   Laundry with supervision   Room Organization independent   Patient present and social in the milieu at times. Patient ate dinner, and spent some time watching tv with other patients. Patient spent most of the evening sleeping in her room. Patient woke up towards the end of the shift and headed out of her room. Patient mentioned she needed to use the bathroom. Patient was redirected to the bathroom with the assistance of psych associate and a nurse. Patient reported feeling \"drugged\" and \"confused\".   "

## 2020-06-14 NOTE — PLAN OF CARE
Patient was irritable and belligerent at the start of the shift.  She was given PRN tylenol, throat lozenge, and ativan with her scheduled geodon.  She then complained of labored breathing, and received an albuterol neb.  Quiet after eating dinner and had to be awakened for HS meds.  She reported some dizziness while walking to the bathroom, which abated on the return to bed.  She was again given tylenol and throat lozenge, along with PRN vistaril.  Patient afebrile all shift, although she endorsed throat pain.  She made  her usual remarks about feeling like she was in another reality, and men who were not actually present here in the hospital.

## 2020-06-14 NOTE — PLAN OF CARE
"Pt is pleasant and calm with full range affect. She states her mood is \"hopeful.\" She denies si, or s/sx psychosis. She endorses feeling somewhat manic, also feeling \"scattered.\" She does go off on tangents at times, but mainly is appropriate. Pt rates her depression 4, anxiety 3. She has been in the milieu, to DR for both meals, and was selectively social w peers. Pt said she may nap some today, and did so at intervals. She said she would attend the evening grp, as none available during the day. Pt reports having some difficulty both getting to, and staying asleep, at night. She has a sore throat and nonproductive cough, and was seen by PARISH Liu from internal med this am; OP swab was to be ordered. Pt is afebrile. Her goal for the day was \"to write a song.\" She remains on sio.  "

## 2020-06-14 NOTE — CONSULTS
[facility]  Internal Medicine Consult     Fannie Jeter MRN# [id number]   Age: 54 year old YOB: 1965     Date of Admission: 6/9/2020  Date of Consult:  6/14/2020    Requesting Service: Behavioral Health - Ceasar Allen MD         Reason for Consult:   Throat pain and redness         Assessment and Recommendations:   Fannie Jeter is a 54 year old female with a hx of bipolar disorder, schizoaffective disorder, and borderline personality disorder who was admitted to [facility] station [facility] on 6/9 with acute decompensation of her psychiatric illness.     # Ongoing sore throat. Pt notes ongoing sore throat since April. Now with dysphagia, but able to eat and drink without difficulty. Erythematous mucosa noted on exam, without plaques. No tonsillar adenopathy noted. Rapid strep x 2 negative with no growth on culture. Lab workup unremarkable. Afebrile.   - Obtain fungal culture for possible thrush as exam not convincing  - HIV ordered      IM will follow up on above tests.    Megan Kim PA-C  Hospitalist Service  [phone]           History of Present Illness:   Fannie Jeter is a 54 year old female with a hx of bipolar disorder, schizoaffective disorder, and borderline personality disorder who was admitted to [facility] station 32 on 6/9 with acute decompensation of her psychiatric illness.   Pt endorses a sore throat that has been ongoing since April this year when she states she had covid. She believes her sore throat started after having a covid test. She denies any associated fevers, body aches, myalgias, rhinorrhea or congestion. She endorses pain with swallowing, but has been able to eat and drink without difficulty. No other complains or associated s/s.          Review of Systems:   A 10 point review of systems was performed and is negative unless otherwise noted in HPI.          Past Medical History:     Past Medical History:   Diagnosis Date     ADHD (attention deficit  hyperactivity disorder)      Asperger syndrome      Bipolar 1 disorder (H)      Central serous retinopathy      HZO (herpes zoster oticus)     Left eye     Osteoarthritis      Schizoaffective disorder (H)      Scoliosis              Past Surgical History:      Past Surgical History:   Procedure Laterality Date     BIOPSY      breast     BREAST SURGERY      decreased- lots of pain     GYN SURGERY      d and c     MAMMOPLASTY REDUCTION BILATERAL               Family History:   Family history was reviewed.      Family History   Problem Relation Age of Onset     Arthritis Mother      Parkinsonism Mother      Cerebrovascular Disease Father      Substance Abuse Father      Substance Abuse Maternal Grandfather      Substance Abuse Paternal Grandmother      Substance Abuse Paternal Grandfather      Unknown/Adopted Brother      Substance Abuse Daughter              Social History:     Social History     Tobacco Use     Smoking status: Current Every Day Smoker     Packs/day: 1.50     Years: 35.00     Pack years: 52.50     Types: Cigarettes     Last attempt to quit: 8/11/2009     Years since quitting: 10.8     Smokeless tobacco: Current User   Substance Use Topics     Alcohol use: No             Medications:   No current facility-administered medications on file prior to encounter.   acetaminophen (TYLENOL) 325 MG tablet, Take 2 tablets (650 mg) by mouth every 4 hours as needed for mild pain  ADVAIR DISKUS 100-50 MCG/DOSE inhaler, Inhale 1 puff into the lungs 2 times daily as needed   albuterol (PROAIR HFA/PROVENTIL HFA/VENTOLIN HFA) 108 (90 Base) MCG/ACT inhaler, Inhale 2 puffs into the lungs every 6 hours as needed for wheezing  B Complex-Folic Acid (B COMPLEX PLUS) TABS, Take 1 tablet by mouth daily   clonazePAM (KLONOPIN) 0.5 MG tablet, Take 0.5 mg by mouth 3 times daily as needed for anxiety   Homeopathic Products (SIMILASAN DRY EYE RELIEF OP), Place 1 drop into both eyes as needed (for dry eyes)   Levomefolate  Glucosamine (METHYLFOLATE PO), Take 1 tablet by mouth daily   MAGNESIUM CHLORIDE PO, Take 1-3 tablets by mouth daily as needed (leg cramps)  melatonin 3 MG tablet, Take 3 mg by mouth nightly as needed   Multiple Vitamins-Minerals (MULTIVITAMIN ADULT PO), Take 1 tablet by mouth daily   Multiple Vitamins-Minerals (PRESERVISION AREDS 2 PO), Take 1 tablet by mouth daily   NIACIN PO, Take 1 tablet by mouth daily  VITAMIN D-VITAMIN K PO, Take 1 tablet by mouth daily  ziprasidone (GEODON) 40 MG capsule, Take 40 mg by mouth daily  aspirin 325 MG tablet, Take 325 mg by mouth every 6 hours as needed (for headache) pain  lamoTRIgine (LAMICTAL) 100 MG tablet, Take 100 mg by mouth daily In addition to 25mg tablet = 125mg daily  lamoTRIgine (LAMICTAL) 25 MG tablet, Take 25 mg by mouth daily In addition to 100mg tablet = 125mg daily        Current Facility-Administered Medications   Medication     acetaminophen (TYLENOL) tablet 650 mg     albuterol (PROVENTIL) neb solution 2.5 mg     alum & mag hydroxide-simethicone (MAALOX  ES) suspension 30 mL     aspirin (ASA) tablet 325 mg     benzocaine-menthol (CEPACOL) 15-3.6 MG lozenge 1 lozenge     benztropine (COGENTIN) tablet 1 mg     bisacodyl (DULCOLAX) Suppository 10 mg     carboxymethylcellulose PF (REFRESH PLUS) 0.5 % ophthalmic solution 1 drop     cholecalciferol (VITAMIN D3) 5000 units (125 mcg) capsule 125 mcg     clonazePAM (klonoPIN) tablet 0.5 mg     divalproex sodium extended-release (DEPAKOTE ER) 24 hr tablet 1,000 mg     fluticasone-vilanterol (BREO ELLIPTA) 100-25 MCG/INH inhaler 1 puff     glucosamine capsule 500 mg     guaiFENesin-dextromethorphan (ROBITUSSIN DM) 100-10 MG/5ML syrup 5 mL     hydrocortisone (CORTAID) 1 % cream     hydrOXYzine (ATARAX) tablet 25 mg     LORazepam (ATIVAN) tablet 1-2 mg     magnesium chloride CR tablet 535 mg     magnesium hydroxide (MILK OF MAGNESIA) suspension 30 mL     multivitamin, therapeutic (THERA-VIT) tablet 1 tablet     nicotine  polacrilex (NICORETTE) gum 4-8 mg     OLANZapine (zyPREXA) tablet 10 mg    Or     OLANZapine (zyPREXA) injection 10 mg     QUEtiapine (SEROquel) tablet 100 mg     vitamin B complex with vitamin C (STRESS TAB) tablet 1 tablet     ziprasidone (GEODON) capsule 60 mg            Allergies:     Allergies   Allergen Reactions     Animal Dander      Haldol Difficulty breathing     Haldol [Haloperidol]      Penicillins      Unsure of what happens. Has been told she has allergies since she was a kid.     Penicillins      Seasonal Allergies              Physical Exam:   /77 (BP Location: Left arm)   Pulse 101   Temp 98.8  F (37.1  C) (Oral)   Resp 16   Wt 85.4 kg (188 lb 4.8 oz)   LMP 07/03/2014   SpO2 98%   BMI 30.39 kg/m     GENERAL: Alert and oriented x 3. NAD.   HEENT: Anicteric sclera. PERRL. Mucous membranes erythematous, no evidence of plaque. No tonsillar adenopathy.   CV: RRR. S1, S2. No murmurs appreciated.   RESPIRATORY: Effort normal. Lungs CTAB with no wheezing, rales, rhonchi.   GI: Abdomen soft and non distended with normoactive bowel sounds present in all quadrants. No tenderness, rebound, guarding.   MUSCULOSKELETAL: No joint swelling or tenderness.   NEUROLOGICAL: No focal deficits. Moves all extremities.   EXTREMITIES: No peripheral edema. Intact bilateral pedal pulses.   SKIN: No jaundice. No rashes.          Labs:   CBC:  Recent Labs   Lab Test 06/13/20  1438   WBC 6.3   RBC 4.26   HGB 13.0   HCT 39.5   MCV 93   MCH 30.5   MCHC 32.9   RDW 14.0          CMP:  Recent Labs   Lab Test 06/12/20  0852      POTASSIUM 3.7   CHLORIDE 104   SARAY 8.8   CO2 28   BUN 10   CR 0.52   *   AST 32   ALT 40   BILITOTAL 0.4   ALBUMIN 3.5   PROTTOTAL 7.0   ALKPHOS 53       TSH:  TSH   Date Value Ref Range Status   06/11/2020 0.60 0.40 - 4.00 mU/L Final             Megan Kim PA-C  Hospitalist Service  [phone]

## 2020-06-14 NOTE — PROGRESS NOTES
Pt delusional and reports she is  to . Pt believed  was on the unit. Pt ate dinner. Pt was restless - in and out of [place].     06/13/20 2302   Behavioral Health   Hallucinations visual;appears responding  (pt believe  is speaking to her)   Thinking confused;distractable;delusional   Orientation person: oriented   Memory confabulation   Insight poor   Judgement impaired   Affect incongruent;irritable   Mood irritable   Suicidality other (see comments)  (pt denies)   Self Injury other (see comment)  (none reported or observed)   Activities of Daily Living   Hygiene/Grooming independent   Oral Hygiene independent   Dress independent   Room Organization independent

## 2020-06-15 PROCEDURE — 25000132 ZZH RX MED GY IP 250 OP 250 PS 637: Mod: GY | Performed by: NURSE PRACTITIONER

## 2020-06-15 PROCEDURE — 25000132 ZZH RX MED GY IP 250 OP 250 PS 637: Mod: GY | Performed by: PSYCHIATRY & NEUROLOGY

## 2020-06-15 PROCEDURE — 25000132 ZZH RX MED GY IP 250 OP 250 PS 637: Mod: GY | Performed by: CLINICAL NURSE SPECIALIST

## 2020-06-15 PROCEDURE — G0177 OPPS/PHP; TRAIN & EDUC SERV: HCPCS

## 2020-06-15 PROCEDURE — 99232 SBSQ HOSP IP/OBS MODERATE 35: CPT | Mod: 95 | Performed by: NURSE PRACTITIONER

## 2020-06-15 PROCEDURE — 12400001 ZZH R&B MH UMMC

## 2020-06-15 RX ADMIN — DIVALPROEX SODIUM 1000 MG: 500 TABLET, EXTENDED RELEASE ORAL at 22:14

## 2020-06-15 RX ADMIN — HYDROXYZINE HYDROCHLORIDE 25 MG: 25 TABLET, FILM COATED ORAL at 06:11

## 2020-06-15 RX ADMIN — NICOTINE POLACRILEX 8 MG: 4 GUM, CHEWING BUCCAL at 15:07

## 2020-06-15 RX ADMIN — LORAZEPAM 2 MG: 1 TABLET ORAL at 18:42

## 2020-06-15 RX ADMIN — FLUTICASONE FUROATE AND VILANTEROL TRIFENATATE 1 PUFF: 100; 25 POWDER RESPIRATORY (INHALATION) at 08:52

## 2020-06-15 RX ADMIN — ZIPRASIDONE HCL 60 MG: 60 CAPSULE ORAL at 12:59

## 2020-06-15 RX ADMIN — HYDROXYZINE HYDROCHLORIDE 25 MG: 25 TABLET, FILM COATED ORAL at 22:15

## 2020-06-15 RX ADMIN — B-COMPLEX W/ C & FOLIC ACID TAB 1 TABLET: TAB at 08:52

## 2020-06-15 RX ADMIN — CLONAZEPAM 0.5 MG: 0.5 TABLET ORAL at 22:15

## 2020-06-15 RX ADMIN — Medication 125 MCG: at 08:52

## 2020-06-15 RX ADMIN — Medication 500 MG: at 08:52

## 2020-06-15 RX ADMIN — CLONAZEPAM 0.5 MG: 0.5 TABLET ORAL at 08:52

## 2020-06-15 RX ADMIN — NICOTINE POLACRILEX 8 MG: 4 GUM, CHEWING BUCCAL at 08:57

## 2020-06-15 RX ADMIN — ZIPRASIDONE HCL 60 MG: 60 CAPSULE ORAL at 18:42

## 2020-06-15 RX ADMIN — LORAZEPAM 2 MG: 1 TABLET ORAL at 02:40

## 2020-06-15 RX ADMIN — CLONAZEPAM 0.5 MG: 0.5 TABLET ORAL at 15:07

## 2020-06-15 RX ADMIN — THERA TABS 1 TABLET: TAB at 08:52

## 2020-06-15 ASSESSMENT — ACTIVITIES OF DAILY LIVING (ADL)
LAUNDRY: WITH SUPERVISION
HYGIENE/GROOMING: INDEPENDENT
HYGIENE/GROOMING: INDEPENDENT
ORAL_HYGIENE: INDEPENDENT
ORAL_HYGIENE: INDEPENDENT;PROMPTS
DRESS: INDEPENDENT
LAUNDRY: WITH SUPERVISION
DRESS: INDEPENDENT

## 2020-06-15 NOTE — PROGRESS NOTES
Brief Medicine Note  Hoa 15, 2020      HIV negative. Fungus culture pending - will cont to follow and treat if needed.    Francisca Paige PA-C

## 2020-06-15 NOTE — PLAN OF CARE
Patient was awake most of shift and made no threatening, irritable or verbally abusive remarks.  She was consistently pleasant, and intermittently social with staff.  Delusions remain unchanged as per previous notes.  Patient received PRN tylenol and menthol lozenge for sore throat x 2, and PRN vistaril at HS.  No prn ativan needed this evening.

## 2020-06-15 NOTE — PROGRESS NOTES
Work Completed: reviewed chart and remotely attended team discussion.  Pt remains delusional.    Discharge plan or goal: stabilize and discharge to OP level of care                Barriers to discharge: pt remains in need of acute care.

## 2020-06-15 NOTE — PROGRESS NOTES
[facility]   Psychiatric Progress Note      Impression:     Fannie Jeter is a 54-year-old female admitted to [facility] on 6/9/2020.  She was admitted on a health officer hold through [facility], brought in via EMS after her neighbors called 911, due to cyndi and psychosis.  In the ER she was hypersexual and grandiose, talking about going to business school to market herself as a lozano and singing for ER staff.  She said that Governor [place] and  Dk Swanson helped with her poor credit so she could secure a luxury apartment in [place], where she intended to travel with her cat on first class tickets.  She said she developed COVID-19 symptoms in March but was not tested at the time.  COVID-19 test in the ER was negative.  Upon admission to the unit she was irritable and had several complaints and requests.  She stated her intent to ask Jasiel for coffee.  She was taken to seclusion, where she urinated on the floor, threw water, pounded her fists on the door, swore at staff and disrobed.  She says that she threw away her Lamictal several days ago and that she was intending to start Lithium.  She reports taking Klonopin and Geodon regularly but was not taking Geodon with food to ensure adequate absorption.  UTOX was positive for opiates.  She was also taken to seclusion 6/11 overnight shift due to disruptive behaviors, threatening to kill staff, cut out their eyes, and drag them by the hair.  She remains preoccupied with Jasiel and has delusional thought content.  On 6/11 she signed a 12-hour intent to leave but rescinded it the following morning.  On 6/13 she was escorted to seclusion and placed on SIO after she attempted to strike staff.  Since admission, Klonopin was continued.  Geodon was increased.  Depakote ER was initiated.  PRNs of Seroquel, Zyprexa and Hydroxyzine are available.           Diagnoses:      Schizoaffective disorder bipolar type         Plan:     Medications:  Continue Geodon to 60 mg BID with meals.  Continue Klonopin 0.5 mg TID.  Continue Depakote ER 1000 mg (previously stable on 1500 mg). Continue PRNs of Zyprexa, Hydroxyzine and Seroquel.       If she requests discharge, she meets criteria for a 72-hour hold.  Discharge to home when stable.        Telemedicine Visit: The patient's condition can be safely assessed and treated via synchronous audio and visual telemedicine encounter.       Start Time: 0733  Stop Time: 0751     Reason for Telemedicine Visit: COVID-19 precautions     Originating Site (Patient Location): [facility]     Distant Site (Provider Location): Provider Remote Setting     Consent:  The patient/guardian has verbally consented to: the potential risks and benefits of telemedicine (video visit) versus in person care; bill my insurance or make self-payment for services provided; and responsibility for payment of non-covered services.      Mode of Communication:  Video Conference via Skype     As the provider I attest to compliance with applicable laws and regulations related to telemedicine.      Attestation:  Patient has been seen and evaluated by me, SAMUEL Mejias CNP  The patient was counseled on nature of illness and treatment plan/options  Care was coordinated with treatment team        Clinical Global Impressions  First:  Considering your total clinical experience with this particular patient population, how severe are the patient's symptoms at this time?: 7 (06/10/20 0431)  Compared to the patient's condition at the START of treatment, this patient's condition is: 4 (06/10/20 0431)  Most recent:  Considering your total clinical experience with this particular patient population, how severe are the patient's symptoms at this time?: 7 (06/10/20 0431)  Compared to the patient's condition at the START of treatment, this patient's condition is: 4  "(06/10/20 2643)            Interim History:     The patient's care was discussed with the treatment team and chart notes were reviewed.  Pt was documented as sleeping 0, 0.5 and 3.25 hours during the weekend overnight shifts.  On 6/13 she attempted to strike staff, was escorted to seclusion and received IM Zyprexa.  She has been on SIO since then.  Staff report she has been irritable, preoccupied with Jasiel, and making inappropriate sexual comments.  She has been occasionally using PRN Seroquel.  She has been taking PRN Hydroxyzine 1-2 times per day and PRN Ativan 1-2 times per day.  Pt appeared calmer during today's conversation.  Stated she could not remember the events precipitating seclusion over the weekend and that her \"alter\" must have been responsible for this.  Pt asked for the identity of the staff member she tried to strike so that she could apologize.  Pt said she is \"scared taking the extra meds.\"  Discussed that some of them are only temporary to help her achieve stability more quickly.  States she feels less irritable.  Her mood is \"really happy.\"  She denies auditory hallucinations.  Continues to be preoccupied with Jasiel and continues to state that Governor [place] and  Dk Swanson helped her secure an apartment in [facility].  She said she is going to business school through [employer].  She denies suicidal and homicidal ideation.  Reports her throat pain is somewhat improved.  Pt now states that she did not find someone to care for her cat last Friday.  \"That was a hallucination.\"  CTCs were apprised of this.           Medications:     Current Facility-Administered Medications   Medication     acetaminophen (TYLENOL) tablet 650 mg     albuterol (PROVENTIL) neb solution 2.5 mg     alum & mag hydroxide-simethicone (MAALOX  ES) suspension 30 mL     aspirin (ASA) tablet 325 mg     benzocaine-menthol (CEPACOL) 15-3.6 MG lozenge 1 lozenge     benztropine (COGENTIN) tablet 1 mg " "    bisacodyl (DULCOLAX) Suppository 10 mg     carboxymethylcellulose PF (REFRESH PLUS) 0.5 % ophthalmic solution 1 drop     cholecalciferol (VITAMIN D3) 5000 units (125 mcg) capsule 125 mcg     clonazePAM (klonoPIN) tablet 0.5 mg     divalproex sodium extended-release (DEPAKOTE ER) 24 hr tablet 1,000 mg     fluticasone-vilanterol (BREO ELLIPTA) 100-25 MCG/INH inhaler 1 puff     glucosamine capsule 500 mg     guaiFENesin-dextromethorphan (ROBITUSSIN DM) 100-10 MG/5ML syrup 5 mL     hydrocortisone (CORTAID) 1 % cream     hydrOXYzine (ATARAX) tablet 25 mg     LORazepam (ATIVAN) tablet 1-2 mg     magnesium chloride CR tablet 535 mg     magnesium hydroxide (MILK OF MAGNESIA) suspension 30 mL     multivitamin, therapeutic (THERA-VIT) tablet 1 tablet     nicotine polacrilex (NICORETTE) gum 4-8 mg     OLANZapine (zyPREXA) tablet 10 mg    Or     OLANZapine (zyPREXA) injection 10 mg     QUEtiapine (SEROquel) tablet 100 mg     vitamin B complex with vitamin C (STRESS TAB) tablet 1 tablet     ziprasidone (GEODON) capsule 60 mg             Allergies:     Allergies   Allergen Reactions     Animal Dander      Haldol Difficulty breathing     Haldol [Haloperidol]      Penicillins      Unsure of what happens. Has been told she has allergies since she was a kid.     Penicillins      Seasonal Allergies             Psychiatric Examination:     /79 (BP Location: Left arm)   Pulse 88   Temp 98.5  F (36.9  C) (Oral)   Resp 18   Wt 85.4 kg (188 lb 4.8 oz)   LMP 07/03/2014   SpO2 99%   BMI 30.39 kg/m       Appearance:  awake, alert and adequately groomed  Attitude:  cooperative  Eye Contact:  good  Mood:  \"calmer, really happy\"  Affect:  normal range  Speech:  clear, coherent, not pressured or loud today  Psychomotor Behavior:  no evidence of tardive dyskinesia, dystonia, or tics, less psychomotor agitation present  Thought Process:  linear, less than logical  Associations:  no loosening of associations present  Thought " Content:  no evidence of suicidal ideation or homicidal ideation, grandiose, delusional thought content, denies hallucinations but cannot rule out  Insight:  partial  Judgment:  limited  Oriented to:  date, time, person, and place  Attention Span and Concentration:  limited  Recent and Remote Memory:  limited  Language:  intact  Fund of Knowledge:  appropriate  Muscle Strength and Tone:  normal  Gait and Station:  normal          Labs:     No results found for this or any previous visit (from the past 24 hour(s)).

## 2020-06-15 NOTE — PLAN OF CARE
Problem: OT General Care Plan  Goal: OT Goal 1    Pt attended 2 out of 3 OT groups offered. Pt attended occupational therapy clinic. She initially spent a significant amount of time sorting through task materials she had selected in previous groups, though eventually initiated a self-directed task. She was slightly more goal-oriented during group today in comparison to last week, as she was able to follow through with her plan for her task, though continues to present as disorganized. For example, she rubbed excess glitter from her project onto her arms instead of throwing it away. Social with peers and writer throughout, and intermittently sought feedback from peers regarding her project. She mentioned that she recently added her ex- on Facebook, and was curious to find out if he accepted her friend request. Pleasant in interactions, and occasionally observed laughing to herself, appearing internally preoccupied. Affect was primarily bright in interactions. Will continue to assess.

## 2020-06-15 NOTE — PROGRESS NOTES
"Jessica was awake and in and out of milieu.  Did attend OT group and in her project basket, pt has many of the same items, unpainted or nothing done to them, but she talks about how she will put them on her [place] tree and they will be so pretty.  Pt also has a collection of many different objects and crafts that she hasn't even started but has collected and then she had several attempts to try and start a whole new project.  Pt did not listen to redirection but instead just kept looking until she found something she could put in her basket to take home with her.  Later while resting in her room, pt became angry and threw her cup on the floor and sat staring out of the window.  She said she was upset because she really wants to smoke a cigarette and she could see the place where people stand outside to smoke.  Pt said, \"can't you just break the rules one time for me and take me outside\".  Pt denied having any thoughts of wanting to harm herself, instead she was very focused on moving to [place] and starting a \"fresh life\".  Pt is friendly and and pleasant at times and then she quickly becomes upset and slammed her door and threw her cup on the floor and became sarcastic with staff.  Pt did apologize shortly thereafter for her sarcasm.     06/15/20 1503   Behavioral Health   Hallucinations denies / not responding to hallucinations   Thinking distractable   Orientation person: oriented;place: oriented   Memory confabulation   Insight poor   Judgement impaired   Eye Contact at examiner   Affect full range affect   Mood labile   Physical Appearance/Attire appears stated age   Hygiene other (see comment)  (no shower today)   Suicidality other (see comments)  (denies)   1. Wish to be Dead (Recent) No   2. Non-Specific Active Suicidal Thoughts (Recent) No   Activity refusal   Psycho Education   Type of Intervention 1:1 intervention   Response refuses  (pt talks a lot but she chooses what to talk about)   Hours " 0.5   Treatment Detail check-in   Activities of Daily Living   Hygiene/Grooming independent   Oral Hygiene independent;prompts   Dress independent   Laundry with supervision   Room Organization independent   Activity   Activity Assistance Provided independent

## 2020-06-15 NOTE — PROGRESS NOTES
"Patient awake for a majority of the night. Patient received PRN ativan at 0240 for increased agitation (see co-nurse note, MAR). At 0600, patient requested a medication because she was \"restless\" and becoming anxious; PRN hydroxyzine was administered as ordered (see MAR).     Patient continues to have delusions about Jasiel and other topics. She continues to have frequent requests of staff for various items and requires frequent redirection/reorientation. Although still delusional and experiencing/displaying signs of agitation/anxiety, patient was more appropriate this shift than previous 2 overnight shifts. SIO played an essential part in aiding with interventions through the shift.  "

## 2020-06-15 NOTE — PROGRESS NOTES
Pt becoming agitated with sitter, stating she is not treating her right and her approach is bad and stating she is being treated as a prisoner. Pt given ativan, talking loudly in room to self.

## 2020-06-16 LAB
FUNGUS SPEC CULT: NORMAL
FUNGUS SPEC CULT: NORMAL
Lab: NORMAL
SPECIMEN SOURCE: NORMAL

## 2020-06-16 PROCEDURE — 25000132 ZZH RX MED GY IP 250 OP 250 PS 637: Mod: GY | Performed by: NURSE PRACTITIONER

## 2020-06-16 PROCEDURE — 12400001 ZZH R&B MH UMMC

## 2020-06-16 PROCEDURE — 25000132 ZZH RX MED GY IP 250 OP 250 PS 637: Mod: GY | Performed by: PSYCHIATRY & NEUROLOGY

## 2020-06-16 PROCEDURE — 99232 SBSQ HOSP IP/OBS MODERATE 35: CPT | Mod: 95 | Performed by: NURSE PRACTITIONER

## 2020-06-16 PROCEDURE — 25000132 ZZH RX MED GY IP 250 OP 250 PS 637: Mod: GY | Performed by: PHYSICIAN ASSISTANT

## 2020-06-16 RX ORDER — FLUCONAZOLE 200 MG/1
200 TABLET ORAL ONCE
Status: COMPLETED | OUTPATIENT
Start: 2020-06-16 | End: 2020-06-16

## 2020-06-16 RX ORDER — NICOTINE 21 MG/24HR
1 PATCH, TRANSDERMAL 24 HOURS TRANSDERMAL DAILY
Status: DISCONTINUED | OUTPATIENT
Start: 2020-06-16 | End: 2020-06-25 | Stop reason: HOSPADM

## 2020-06-16 RX ORDER — FLUCONAZOLE 100 MG/1
100 TABLET ORAL DAILY
Status: DISCONTINUED | OUTPATIENT
Start: 2020-06-17 | End: 2020-06-25 | Stop reason: HOSPADM

## 2020-06-16 RX ORDER — DIVALPROEX SODIUM 500 MG/1
1500 TABLET, EXTENDED RELEASE ORAL AT BEDTIME
Status: DISCONTINUED | OUTPATIENT
Start: 2020-06-16 | End: 2020-06-25 | Stop reason: HOSPADM

## 2020-06-16 RX ORDER — ZIPRASIDONE HYDROCHLORIDE 60 MG/1
60 CAPSULE ORAL 2 TIMES DAILY WITH MEALS
Status: DISCONTINUED | OUTPATIENT
Start: 2020-06-16 | End: 2020-06-22

## 2020-06-16 RX ADMIN — ZIPRASIDONE HCL 60 MG: 60 CAPSULE ORAL at 09:23

## 2020-06-16 RX ADMIN — CLONAZEPAM 0.5 MG: 0.5 TABLET ORAL at 09:22

## 2020-06-16 RX ADMIN — Medication 125 MCG: at 09:23

## 2020-06-16 RX ADMIN — NICOTINE 1 PATCH: 21 PATCH, EXTENDED RELEASE TRANSDERMAL at 11:09

## 2020-06-16 RX ADMIN — NICOTINE POLACRILEX 8 MG: 4 GUM, CHEWING BUCCAL at 09:23

## 2020-06-16 RX ADMIN — HYDROCORTISONE: 1 CREAM TOPICAL at 21:15

## 2020-06-16 RX ADMIN — Medication 500 MG: at 09:23

## 2020-06-16 RX ADMIN — B-COMPLEX W/ C & FOLIC ACID TAB 1 TABLET: TAB at 09:23

## 2020-06-16 RX ADMIN — FLUCONAZOLE 200 MG: 200 TABLET ORAL at 15:00

## 2020-06-16 RX ADMIN — NICOTINE POLACRILEX 2 MG: 2 GUM, CHEWING BUCCAL at 18:55

## 2020-06-16 RX ADMIN — LORAZEPAM 2 MG: 1 TABLET ORAL at 06:30

## 2020-06-16 RX ADMIN — ZIPRASIDONE HCL 60 MG: 60 CAPSULE ORAL at 18:52

## 2020-06-16 RX ADMIN — CLONAZEPAM 0.5 MG: 0.5 TABLET ORAL at 21:14

## 2020-06-16 RX ADMIN — DIVALPROEX SODIUM 1500 MG: 500 TABLET, EXTENDED RELEASE ORAL at 21:14

## 2020-06-16 RX ADMIN — FLUTICASONE FUROATE AND VILANTEROL TRIFENATATE 1 PUFF: 100; 25 POWDER RESPIRATORY (INHALATION) at 09:24

## 2020-06-16 RX ADMIN — THERA TABS 1 TABLET: TAB at 09:23

## 2020-06-16 RX ADMIN — CLONAZEPAM 0.5 MG: 0.5 TABLET ORAL at 13:47

## 2020-06-16 ASSESSMENT — ACTIVITIES OF DAILY LIVING (ADL)
DRESS: SCRUBS (BEHAVIORAL HEALTH);INDEPENDENT
HYGIENE/GROOMING: HANDWASHING;INDEPENDENT
ORAL_HYGIENE: INDEPENDENT

## 2020-06-16 NOTE — PROGRESS NOTES
"   06/15/20 2510   Behavioral Health   Hallucinations denies / not responding to hallucinations   Thinking distractable   Orientation person: oriented;place: oriented;date: oriented   Memory confabulation   Insight poor   Judgement impaired   Eye Contact at examiner   Affect full range affect   Mood grandiose;anxious   Physical Appearance/Attire attire appropriate to age and situation   Hygiene other (see comment)  (No shower this evening)   Suicidality   (MARISA)   1. Wish to be Dead (Recent)   (MARISA)   2. Non-Specific Active Suicidal Thoughts (Recent)   (MARISA)   Activities of Daily Living   Hygiene/Grooming independent   Oral Hygiene independent   Dress independent   Laundry with supervision   Room Organization independent     Pt spent most of the evening in her room sleeping. She was out early on in the evening for dinner and to use the phone. She shared that she had a good conversation with her mother. She also talked about how nostalgic she was feeling while listening to music. She continued on and talked about how she would like to \"start over\" and said her mother is worried about her giving all of her belongings away. She was withdrawn to her room all evening so writer was unable to check in with patient. No behaviors concerning for SI/SIB were observed and she did not appear to be responding to hallucinations.   "

## 2020-06-16 NOTE — PROGRESS NOTES
VM from May Schuler,  at the apartment complex. Wondering about time line. Is in contact with the neighbor who is taking care of cat and can't take care of the cat past Friday. [phone]    Spoke with May. Need written permission for Jennifer Stevenson to take her cat.     Fax: [phone]    CTC wrote letter; pt signed; CTC faxed letter

## 2020-06-16 NOTE — PROGRESS NOTES
Work Completed:  CTC reviewed chart, remotely attended team discussion.   Populated AVS in prep for discharge.  Pt has established OP providers and case management.    Discharge plan or goal: stabilize and discharge.  Pt remains voluntary but eligible for a 72HH if needed.              Barriers to discharge: pt's mental health

## 2020-06-16 NOTE — PROGRESS NOTES
Work Completed:Pt states still has not gotten anyone to take care of her cat. Gave CTC names of people to call for help. Signed JANINE.    CTC called Osiris Macias at [phone]. Left message    Marsha at [phone]. Unable to leave a message as voice mailbox is not set up.    Carolyn at [phone]. Carolyn states that she is already caring for the cat; goes to the apartment 3x/day. There is a woman (Jennifer--Carolyn's friend) in the apartment building who could watch pt's cat in her own apartment.    CTC and pt made a call to Carolyn. Pt agreed to let Jennifer take Chloe until she is out of the hospital. Gave very detailed directions about food and treats to Carolyn.       Discharge plan or goal: Pt continues to display confusion, impaired judgement and insight.                  Barriers to discharge: pt continues to need stabilization
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
## 2020-06-16 NOTE — PROGRESS NOTES
Pt awake most of the shift.  Pt ate meals in room.  Pt tried one group but could not complete it.  Pt was tangential and distractable this shift.  Pt started on a 1:1 but was taken off towards late morning.  Pt is happy that her cat is being looked after by friends.  Pt does have some paranoia yet especially towards males.  No behavioral problems.       06/16/20 1331   Sleep/Rest/Relaxation   Day/Evening Time Hours up all shift   Behavioral Health   Hallucinations denies / not responding to hallucinations   Thinking distractable;paranoid   Orientation person: oriented;place: oriented   Memory confabulation   Insight poor   Judgement impaired   Eye Contact at examiner   Affect full range affect   Mood grandiose;anxious;labile   Physical Appearance/Attire attire appropriate to age and situation   Hygiene other (see comment)  (adequate)   Suicidality other (see comments)  (denies)   1. Wish to be Dead (Recent) No   2. Non-Specific Active Suicidal Thoughts (Recent) No   Activity other (see comment)  (in and out of milieu)   Speech circumstantial;flight of ideas;clear;coherent   Psychomotor / Gait slow;balanced;steady   Coping/Psychosocial   Verbalized Emotional State anxiety;disbelief;frustration;powerlessness   Plan of Care Reviewed With patient   Psycho Education   Type of Intervention 1:1 intervention   Response participates with encouragement   Group Therapy Session   Group Attendance attended group session   Activities of Daily Living   Hygiene/Grooming handwashing;independent   Oral Hygiene independent   Dress scrubs (behavioral health);independent   Activity   Activity Assistance Provided independent

## 2020-06-16 NOTE — PROGRESS NOTES
Brief Medicine Note  June 16, 2020    Fungal throat culture returned positive for heavy growth yeast. Will start treatment for esophageal candidiasis with diflucan 200mg x 1 today, followed by 100mg daily x 13 days. LFTs wnl on admission.     I contacted patient via telephone while on unit to explain the above findings and treatment with her.     Francisca Paige PA-C

## 2020-06-17 PROCEDURE — 99233 SBSQ HOSP IP/OBS HIGH 50: CPT | Mod: 95 | Performed by: NURSE PRACTITIONER

## 2020-06-17 PROCEDURE — 99207 ZZC CDG-MDM COMPONENT: MEETS HIGH - UP CODED: CPT | Performed by: NURSE PRACTITIONER

## 2020-06-17 PROCEDURE — 12400001 ZZH R&B MH UMMC

## 2020-06-17 PROCEDURE — H2032 ACTIVITY THERAPY, PER 15 MIN: HCPCS

## 2020-06-17 PROCEDURE — 25000132 ZZH RX MED GY IP 250 OP 250 PS 637: Mod: GY | Performed by: CLINICAL NURSE SPECIALIST

## 2020-06-17 PROCEDURE — 25000132 ZZH RX MED GY IP 250 OP 250 PS 637: Mod: GY | Performed by: NURSE PRACTITIONER

## 2020-06-17 PROCEDURE — 25000132 ZZH RX MED GY IP 250 OP 250 PS 637: Mod: GY | Performed by: PHYSICIAN ASSISTANT

## 2020-06-17 RX ORDER — BENZOCAINE/MENTHOL 6 MG-10 MG
LOZENGE MUCOUS MEMBRANE 2 TIMES DAILY PRN
Status: DISCONTINUED | OUTPATIENT
Start: 2020-06-17 | End: 2020-06-25 | Stop reason: HOSPADM

## 2020-06-17 RX ADMIN — HYDROXYZINE HYDROCHLORIDE 25 MG: 25 TABLET, FILM COATED ORAL at 04:32

## 2020-06-17 RX ADMIN — ZIPRASIDONE HCL 60 MG: 60 CAPSULE ORAL at 18:54

## 2020-06-17 RX ADMIN — DIVALPROEX SODIUM 1500 MG: 500 TABLET, EXTENDED RELEASE ORAL at 21:15

## 2020-06-17 RX ADMIN — ZIPRASIDONE HCL 60 MG: 60 CAPSULE ORAL at 08:16

## 2020-06-17 RX ADMIN — NICOTINE POLACRILEX 2 MG: 2 GUM, CHEWING BUCCAL at 06:29

## 2020-06-17 RX ADMIN — NICOTINE POLACRILEX 2 MG: 2 GUM, CHEWING BUCCAL at 19:41

## 2020-06-17 RX ADMIN — THERA TABS 1 TABLET: TAB at 08:15

## 2020-06-17 RX ADMIN — NICOTINE POLACRILEX 2 MG: 2 GUM, CHEWING BUCCAL at 12:06

## 2020-06-17 RX ADMIN — NICOTINE POLACRILEX 2 MG: 2 GUM, CHEWING BUCCAL at 12:58

## 2020-06-17 RX ADMIN — CLONAZEPAM 0.5 MG: 0.5 TABLET ORAL at 13:43

## 2020-06-17 RX ADMIN — FLUTICASONE FUROATE AND VILANTEROL TRIFENATATE 1 PUFF: 100; 25 POWDER RESPIRATORY (INHALATION) at 08:15

## 2020-06-17 RX ADMIN — Medication 500 MG: at 08:15

## 2020-06-17 RX ADMIN — NICOTINE POLACRILEX 2 MG: 2 GUM, CHEWING BUCCAL at 21:16

## 2020-06-17 RX ADMIN — NICOTINE 1 PATCH: 21 PATCH, EXTENDED RELEASE TRANSDERMAL at 08:27

## 2020-06-17 RX ADMIN — B-COMPLEX W/ C & FOLIC ACID TAB 1 TABLET: TAB at 08:16

## 2020-06-17 RX ADMIN — HYDROXYZINE HYDROCHLORIDE 25 MG: 25 TABLET, FILM COATED ORAL at 12:06

## 2020-06-17 RX ADMIN — Medication 125 MCG: at 08:14

## 2020-06-17 RX ADMIN — FLUCONAZOLE 100 MG: 100 TABLET ORAL at 08:15

## 2020-06-17 RX ADMIN — CLONAZEPAM 0.5 MG: 0.5 TABLET ORAL at 08:15

## 2020-06-17 RX ADMIN — CLONAZEPAM 0.5 MG: 0.5 TABLET ORAL at 21:15

## 2020-06-17 ASSESSMENT — ACTIVITIES OF DAILY LIVING (ADL)
LAUNDRY: WITH SUPERVISION
HYGIENE/GROOMING: INDEPENDENT
DRESS: SCRUBS (BEHAVIORAL HEALTH)
ORAL_HYGIENE: INDEPENDENT

## 2020-06-17 NOTE — PROGRESS NOTES
Patient awake for the remainder of the shift beginning at 0300. Patient was calm, cooperative, and appropriate during interactions with staff. At 0430, patient requested a medication for anxiety as she was feeling restless so PRN Hydroxyzine was administered per order (see MAR). Patient also requested to review some lists she made with a staff member, so writer sat with her while she went through the list of things she would like the CTC to help her with. Once done, patient ambulated on and off in the halls and milieu listening to headphones. Will continue to monitor.

## 2020-06-17 NOTE — PROGRESS NOTES
"SPIRITUAL HEALTH SERVICES: Tele-Encounter  Patient Location [facility]  Spoke with patient      Referral Source: Responded to a pending request      DATA:  Fannie and I spoke about her hospitalization as well as some questions she had concerning prayer. Fannie spoke about feeling \"more grounded and more focus\" which I validated with her and she attributed this to \"trying to trust the doctors this time with the medication.\" She stated that she felt heard by the team and that is why she feels she can trust them more. We briefly spoke about prayer, \"sending love to others\", and using singing as a way to connect to others which she stated was important to her.        PLAN:  I will attempt to follow up later in the week per Fannie's request.     Chaplain Naveen Diallo    ______________________________    Type of service:  Telephone Visit     has received verbal consent for a TelephoneVisit from the patient? Yes    Distance Provider Location: designated [place] office    Mode of Communication: telephone         "
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
## 2020-06-17 NOTE — PROGRESS NOTES
CTC called pt , Ol[place], [phone]. Left message asking about pt's stated plan to move to [place] next month.

## 2020-06-17 NOTE — PLAN OF CARE
Pt. was calm, visible in milieu, and social with peers. Pt. ate dinner and snacks. Pt. took a short nap after dinner. Pt. states she has gas. Pt. states no other concerns. Pt. denies SI, SIB, hallucinations, anxiety, and depression. Pt. was medication compliant. No behavioral concerns.

## 2020-06-17 NOTE — PROGRESS NOTES
"Jessica was up and in the lounge first thing this morning, socializing with her peers.  Mid-morning pt came to staff and said she was pretty upset because a male peer had told her to be quiet that she talked too much and that he couldn't get any words in while she was always talking.  Pt talked about being upset that \"he was rude to me\".  When discussing situation with pt  and the idea was mentioned that she does talk quite a bit, pt just rolled her eyes and walked aw     06/17/20 1413   Behavioral Health   Thoughts/Cognition (WDL) eye contact   Hallucinations denies / not responding to hallucinations   Thinking distractable   Orientation person: oriented;place: oriented   Memory confabulation   Insight poor   Judgement impaired   Eye Contact at examiner   Affect full range affect   Mood mood is calm   Physical Appearance/Attire appears stated age   Hygiene other (see comment)  (did not shower but brushed hair)   Suicidality other (see comments)  (Denies)   1. Wish to be Dead (Recent) No   2. Non-Specific Active Suicidal Thoughts (Recent) No   Psycho Education   Type of Intervention 1:1 intervention   Response participates with encouragement   Hours 0.5   Treatment Detail check-in   Activities of Daily Living   Hygiene/Grooming independent   Oral Hygiene independent   Dress scrubs (behavioral health)   Laundry with supervision   Room Organization independent   Activity   Activity Assistance Provided independent   ay.  Pt was falling asleep in her chair in the lounge shortly before lunch and then almost fell asleep eating lunch.  Pt denied any thoughts of suicide or self-harm.  States she is feeling better, just more tired.  Pt seems to be a little less reactive to comments made by others that she finds offensive or disagrees with.  Pt did not get into any arguments today with anyone.  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

## 2020-06-17 NOTE — PROGRESS NOTES
06/17/20 1500   General Information   Art Directive other (see comments)   Pt attended 3 out of 3 Art Therapy groups.   AT group directives included: Safe place drawing, DBT skills group (calm mind/anxious mind), and Coat of Arms/Personal strengths shield.  Goals of directives: emotional expression, trauma containment, DBT skills, (mindfulness), coping through art.  Pt was a positive participant, focused on task for the full duration of group. Pt finished the first two directives and briefly shared with group. Pt harish her heart for her safe place drawing and shared with group her process of healing her emotions and coping skills that work for pt. Pt was engaged in artmaking and enjoyed selecting music for groups.   Pts mood was calm.

## 2020-06-17 NOTE — PROGRESS NOTES
[facility]   Psychiatric Progress Note      Impression:     Fannie Jeter is a 54-year-old female admitted to [facility] on 6/9/2020.  She was admitted on a health officer hold through [facility], brought in via EMS after her neighbors called 911, due to cyndi and psychosis.  In the ER she was hypersexual and grandiose, talking about going to business school to market herself as a lozano and singing for ER staff.  She said that [employer] Millan and  Dk Swanson helped with her poor credit so she could secure a luxury apartment in [place], where she intended to travel with her cat on first class tickets.  She said she developed COVID-19 symptoms in March but was not tested at the time.  COVID-19 test in the ER was negative.  Upon admission to the unit she was irritable and had several complaints and requests.  She stated her intent to ask Jasiel for coffee.  She was taken to seclusion, where she urinated on the floor, threw water, pounded her fists on the door, swore at staff and disrobed.  She says that she threw away her Lamictal several days ago and that she was intending to start Lithium.  She reports taking Klonopin and Geodon regularly but was not taking Geodon with food to ensure adequate absorption.  UTOX was positive for opiates.  She was also taken to seclusion 6/11 overnight shift due to disruptive behaviors, threatening to kill staff, cut out their eyes, and drag them by the hair.  She is less agitated and less preoccupied with Jasiel but continues to have delusional thought content.  On 6/11 she signed a 12-hour intent to leave but rescinded it the following morning.  On 6/13 she was escorted to seclusion and placed on SIO after she attempted to strike staff.  SIO was discontinued 6/16.  Since admission, Klonopin was continued.  Geodon was increased.  Depakote ER was initiated.  PRNs of Seroquel, Zyprexa  and Hydroxyzine are available.           Diagnoses:     Schizoaffective disorder bipolar type  Esophageal candidiasis         Plan:     Medications:  Continue Geodon to 60 mg BID with meals.  Continue Klonopin 0.5 mg TID.  Continue Depakote ER 1500 mg (previously stable on 1500 mg at which dose level was 84 in 8/2019). Continue PRNs of Zyprexa, Hydroxyzine and Seroquel.  Change Hydrocortisone cream to PRN.    VPA level 6/21 evening.     If she requests discharge, she meets criteria for a 72-hour hold.  Discharge to home when stable.        Telemedicine Visit: The patient's condition can be safely assessed and treated via synchronous audio and visual telemedicine encounter.       Start Time: 0745  Stop Time: 0803     Reason for Telemedicine Visit: COVID-19 precautions     Originating Site (Patient Location): [facility]     Distant Site (Provider Location): Provider Remote Setting     Consent:  The patient/guardian has verbally consented to: the potential risks and benefits of telemedicine (video visit) versus in person care; bill my insurance or make self-payment for services provided; and responsibility for payment of non-covered services.      Mode of Communication:  Video Conference via Skype     As the provider I attest to compliance with applicable laws and regulations related to telemedicine.      Attestation:  Patient has been seen and evaluated by me, SAMUEL Mejias CNP  The patient was counseled on nature of illness and treatment plan/options  Care was coordinated with treatment team        Clinical Global Impressions  First:  Considering your total clinical experience with this particular patient population, how severe are the patient's symptoms at this time?: 7 (06/10/20 0431)  Compared to the patient's condition at the START of treatment, this patient's condition is: 4 (06/10/20 0431)  Most recent:  Considering your total clinical experience with this particular patient  "population, how severe are the patient's symptoms at this time?: 7 (06/10/20 0431)  Compared to the patient's condition at the START of treatment, this patient's condition is: 4 (06/10/20 0431)            Interim History:     The patient's care was discussed with the treatment team and chart notes were reviewed.  Pt was documented as sleeping 3 hours during the overnight shift.  Yesterday she took PRN Ativan x 1 and took PRN Hydroxyzine during the overnight shift.  Staff report that she has been less agitated.  Today she was prepared with a list of questions.  Requested the phone number for her bank so she can check the balance of her checking account and ensure that her rent was paid.  She then asked for my opinion on contacting her daughter.  They have not had contact since 2008.  \"My astrologer has never seen a chart so dark in his life.  She's really dark as a soul.  He said I conjured a demon.  She threatened to kill me.  Hannacroix is in retrograde so loved ones show up from the past.  She casts spells.\"  She said that she saw her daughter's profile on Facebook a few years ago.  Her daughter has a 7-year-old daughter.  \"She looks like me.  She conjured a version of me to torture.\"  Pt states she wants to go to [employer] to put some distance between her and her daughter who lives in the [facility].  States she has a 1st class ticket there on July 31st and sought reassurance she will be released from the hospital by then.  She says she feels \"rested, focused, really good.\"  She endorses intermittent irritability.  States her sleep has been fair.  She denies hallucinations.  States when she hears male vocalists on the radio she believes they are God, Jasiel or Fab Yeung \"trying to teach me something.\"  She denies side effects from medications.  She asked to consolidate med times.  We had the discussion about taking Geodon with food yesterday but she did not recall it.           Medications:     Current " Facility-Administered Medications   Medication     acetaminophen (TYLENOL) tablet 650 mg     albuterol (PROVENTIL) neb solution 2.5 mg     alum & mag hydroxide-simethicone (MAALOX  ES) suspension 30 mL     aspirin (ASA) tablet 325 mg     benzocaine-menthol (CEPACOL) 15-3.6 MG lozenge 1 lozenge     benztropine (COGENTIN) tablet 1 mg     bisacodyl (DULCOLAX) Suppository 10 mg     carboxymethylcellulose PF (REFRESH PLUS) 0.5 % ophthalmic solution 1 drop     cholecalciferol (VITAMIN D3) 5000 units (125 mcg) capsule 125 mcg     clonazePAM (klonoPIN) tablet 0.5 mg     divalproex sodium extended-release (DEPAKOTE ER) 24 hr tablet 1,500 mg     fluconazole (DIFLUCAN) tablet 100 mg     fluticasone-vilanterol (BREO ELLIPTA) 100-25 MCG/INH inhaler 1 puff     glucosamine capsule 500 mg     guaiFENesin-dextromethorphan (ROBITUSSIN DM) 100-10 MG/5ML syrup 5 mL     hydrocortisone (CORTAID) 1 % cream     hydrOXYzine (ATARAX) tablet 25 mg     LORazepam (ATIVAN) tablet 1-2 mg     magnesium chloride CR tablet 535 mg     magnesium hydroxide (MILK OF MAGNESIA) suspension 30 mL     multivitamin, therapeutic (THERA-VIT) tablet 1 tablet     nicotine (NICODERM CQ) 21 MG/24HR 24 hr patch 1 patch     nicotine (NICORETTE) gum 2 mg     nicotine Patch in Place     OLANZapine (zyPREXA) tablet 10 mg    Or     OLANZapine (zyPREXA) injection 10 mg     QUEtiapine (SEROquel) tablet 100 mg     vitamin B complex with vitamin C (STRESS TAB) tablet 1 tablet     ziprasidone (GEODON) capsule 60 mg             Allergies:     Allergies   Allergen Reactions     Animal Dander      Haldol Difficulty breathing     Haldol [Haloperidol]      Penicillins      Unsure of what happens. Has been told she has allergies since she was a kid.     Penicillins      Seasonal Allergies             Psychiatric Examination:     /85 (BP Location: Right arm)   Pulse 86   Temp 97.8  F (36.6  C) (Oral)   Resp 16   Wt 85.1 kg (187 lb 11.2 oz)   LMP 07/03/2014   SpO2 97%    "BMI 30.30 kg/m       Appearance:  awake, alert and adequately groomed  Attitude:  cooperative  Eye Contact:  good  Mood:  \"really good\"  Affect:  normal range  Speech:  clear, coherent, hyperverbal but not pressured or loud   Psychomotor Behavior:  no evidence of tardive dyskinesia, dystonia, or tics  Thought Process:  linear and goal-oriented but illogical  Associations:  no loosening of associations present  Thought Content:  no evidence of suicidal ideation or homicidal ideation, grandiose, delusional thought content is evident, reports occasional visual hallucinations, denies auditory hallucinations  Insight:  partial  Judgment:  limited  Oriented to:  date, time, person, and place  Attention Span and Concentration:  limited  Recent and Remote Memory:  limited  Language:  intact  Fund of Knowledge:  appropriate  Muscle Strength and Tone:  normal  Gait and Station:  normal          Labs:     No results found for this or any previous visit (from the past 24 hour(s)).  "

## 2020-06-18 PROCEDURE — 25000132 ZZH RX MED GY IP 250 OP 250 PS 637: Mod: GY | Performed by: NURSE PRACTITIONER

## 2020-06-18 PROCEDURE — 12400001 ZZH R&B MH UMMC

## 2020-06-18 PROCEDURE — 99232 SBSQ HOSP IP/OBS MODERATE 35: CPT | Mod: 95 | Performed by: NURSE PRACTITIONER

## 2020-06-18 PROCEDURE — 25000132 ZZH RX MED GY IP 250 OP 250 PS 637: Mod: GY | Performed by: PHYSICIAN ASSISTANT

## 2020-06-18 RX ORDER — BENZTROPINE MESYLATE 0.5 MG/1
0.5 TABLET ORAL 2 TIMES DAILY
Status: DISCONTINUED | OUTPATIENT
Start: 2020-06-18 | End: 2020-06-19

## 2020-06-18 RX ADMIN — Medication 125 MCG: at 08:41

## 2020-06-18 RX ADMIN — FLUCONAZOLE 100 MG: 100 TABLET ORAL at 08:41

## 2020-06-18 RX ADMIN — FLUTICASONE FUROATE AND VILANTEROL TRIFENATATE 1 PUFF: 100; 25 POWDER RESPIRATORY (INHALATION) at 08:44

## 2020-06-18 RX ADMIN — Medication 500 MG: at 08:41

## 2020-06-18 RX ADMIN — NICOTINE 1 PATCH: 21 PATCH, EXTENDED RELEASE TRANSDERMAL at 08:43

## 2020-06-18 RX ADMIN — DIVALPROEX SODIUM 1500 MG: 500 TABLET, EXTENDED RELEASE ORAL at 21:35

## 2020-06-18 RX ADMIN — CLONAZEPAM 0.5 MG: 0.5 TABLET ORAL at 13:00

## 2020-06-18 RX ADMIN — NICOTINE POLACRILEX 2 MG: 2 GUM, CHEWING BUCCAL at 06:09

## 2020-06-18 RX ADMIN — BENZTROPINE MESYLATE 0.5 MG: 0.5 TABLET ORAL at 09:36

## 2020-06-18 RX ADMIN — ZIPRASIDONE HCL 60 MG: 60 CAPSULE ORAL at 18:33

## 2020-06-18 RX ADMIN — NICOTINE POLACRILEX 2 MG: 2 GUM, CHEWING BUCCAL at 18:33

## 2020-06-18 RX ADMIN — CLONAZEPAM 0.5 MG: 0.5 TABLET ORAL at 21:35

## 2020-06-18 RX ADMIN — B-COMPLEX W/ C & FOLIC ACID TAB 1 TABLET: TAB at 08:41

## 2020-06-18 RX ADMIN — CLONAZEPAM 0.5 MG: 0.5 TABLET ORAL at 08:41

## 2020-06-18 RX ADMIN — ZIPRASIDONE HCL 60 MG: 60 CAPSULE ORAL at 08:41

## 2020-06-18 RX ADMIN — NICOTINE POLACRILEX 2 MG: 2 GUM, CHEWING BUCCAL at 21:43

## 2020-06-18 RX ADMIN — NICOTINE POLACRILEX 2 MG: 2 GUM, CHEWING BUCCAL at 12:55

## 2020-06-18 RX ADMIN — THERA TABS 1 TABLET: TAB at 08:41

## 2020-06-18 ASSESSMENT — ACTIVITIES OF DAILY LIVING (ADL)
DRESS: SCRUBS (BEHAVIORAL HEALTH)
DRESS: STREET CLOTHES;SCRUBS (BEHAVIORAL HEALTH);INDEPENDENT
HYGIENE/GROOMING: HANDWASHING;INDEPENDENT
ORAL_HYGIENE: INDEPENDENT
ORAL_HYGIENE: INDEPENDENT
HYGIENE/GROOMING: INDEPENDENT
LAUNDRY: WITH SUPERVISION

## 2020-06-18 NOTE — PROGRESS NOTES
"Reviewed need for oral hygiene as patient states \"I never remember\"    Reviewed need for rinsing after inhaler-states \"I will try\"  -replaced tooth paste  -supplied mouthwash    "

## 2020-06-18 NOTE — PROGRESS NOTES
JAYCE from [employer], pt's . States that, to the best of her knowledge, pt is planning to move to [place] and has put down a deposit of 3 months on an apartment.     [facility] called back, [phone]. [facility] states that pt has been planning this for a couple of months. [facility] states that pt has reported this is something she's wanted to do for a long time. However, thinks this is still a somewhat impulsive decision during a hypomanic stage. 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     [facility] has spoken with her many times about this and expressed her concerns. Encourages her to be connected with  in [place] but states that pt is tired of dealing with .

## 2020-06-18 NOTE — PROGRESS NOTES
[facility]   Psychiatric Progress Note      Impression:     Fannie Jeter is a 54-year-old female admitted to [facility] on 6/9/2020.  She was admitted on a health officer hold through [facility], brought in via EMS after her neighbors called 911, due to cyndi and psychosis.  In the ER she was hypersexual and grandiose, talking about going to business school to market herself as a lozano and singing for ER staff.  She said that [employer] and  Dk Swanson helped with her poor credit so she could secure a luxury apartment in [place], where she intended to travel with her cat on first class tickets.  She said she developed COVID-19 symptoms in March but was not tested at the time.  COVID-19 test in the ER was negative.  Upon admission to the unit she was irritable and had several complaints and requests.  She stated her intent to ask Jasiel for coffee.  She was taken to seclusion, where she urinated on the floor, threw water, pounded her fists on the door, swore at staff and disrobed.  She says that she threw away her Lamictal several days ago and that she was intending to start Lithium.  She reports taking Klonopin and Geodon regularly but was not taking Geodon with food to ensure adequate absorption.  UTOX was positive for opiates.  She was also taken to seclusion 6/11 overnight shift due to disruptive behaviors, threatening to kill staff, cut out their eyes, and drag them by the hair.  She is less agitated and less preoccupied with Jasiel but continues to have delusional thought content.  On 6/11 she signed a 12-hour intent to leave but rescinded it the following morning.  On 6/13 she was escorted to seclusion and placed on SIO after she attempted to strike staff.  SIO was discontinued 6/16.  Since admission, Klonopin was continued.  Geodon was increased.  Depakote ER was initiated.  PRNs of Seroquel, Zyprexa  and Hydroxyzine are available.           Diagnoses:     Schizoaffective disorder bipolar type  Esophageal candidiasis         Plan:     Medications:  Continue Geodon to 60 mg BID with meals.  Continue Klonopin 0.5 mg TID.  Continue Depakote ER 1500 mg (previously stable on 1500 mg at which dose level was 84 in 8/2019). Continue PRNs of Zyprexa, Hydroxyzine and Seroquel.  Add Cogentin 0.5 mg BID along with 1 mg BID PRN.    VPA level 6/21 evening.     If she requests discharge, she meets criteria for a 72-hour hold.  Discharge to home when stable.  Her  confirms that she plans to move to [facility] next month and does have an apartment there.         Telemedicine Visit: The patient's condition can be safely assessed and treated via synchronous audio and visual telemedicine encounter.       Start Time: 0857  Stop Time: 0910     Reason for Telemedicine Visit: COVID-19 precautions     Originating Site (Patient Location): [facility]     Distant Site (Provider Location): Provider Remote Setting     Consent:  The patient/guardian has verbally consented to: the potential risks and benefits of telemedicine (video visit) versus in person care; bill my insurance or make self-payment for services provided; and responsibility for payment of non-covered services.      Mode of Communication:  Video Conference via Skype     As the provider I attest to compliance with applicable laws and regulations related to telemedicine.      Attestation:  Patient has been seen and evaluated by me, SAMUEL Mejias CNP  The patient was counseled on nature of illness and treatment plan/options  Care was coordinated with treatment team        Clinical Global Impressions  First:  Considering your total clinical experience with this particular patient population, how severe are the patient's symptoms at this time?: 7 (06/10/20 7055)  Compared to the patient's condition at the START of treatment, this  "patient's condition is: 4 (06/10/20 0431)  Most recent:  Considering your total clinical experience with this particular patient population, how severe are the patient's symptoms at this time?: 6 (06/18/20 0602)  Compared to the patient's condition at the START of treatment, this patient's condition is: 3 (06/18/20 0602)            Interim History:     The patient's care was discussed with the treatment team and chart notes were reviewed.  Pt was documented as sleeping 3.5 hours during the overnight shift.  She took PRN Hydroxyzine x 2 yesterday.  She has been attending groups.  Staff report no behavioral concerns, though she continues to make delusional statements.  Pt requested the opportunity to check her email regarding housing.  She requested the opportunity to wear her own clothing.  Both these requests were approved.  States her throat is no longer sore but that she has tooth pain and requested Sensodyne; staff will ascertain whether this is available.  Pt was rocking back and forth and does admit to feeling restless.  Agrees to scheduled Cogentin.  No hand tremor observed.  Stated that she looked at a \"Jasiel picture book\" yesterday which raised her spirits.  Mood is intermittently anxious.  States she feels irritable when she observes a male patient being critical of staff.  She reports thinking about Jasiel less often.  She said she is less often interpreting male vocalists on the radio as being God, Jasiel or Fab Yeung sending her messages.  She denies hallucinations.  She has been eating well.           Medications:     Current Facility-Administered Medications   Medication     acetaminophen (TYLENOL) tablet 650 mg     albuterol (PROVENTIL) neb solution 2.5 mg     alum & mag hydroxide-simethicone (MAALOX  ES) suspension 30 mL     aspirin (ASA) tablet 325 mg     benzocaine-menthol (CEPACOL) 15-3.6 MG lozenge 1 lozenge     benztropine (COGENTIN) tablet 0.5 mg     benztropine (COGENTIN) tablet 1 mg "     bisacodyl (DULCOLAX) Suppository 10 mg     carboxymethylcellulose PF (REFRESH PLUS) 0.5 % ophthalmic solution 1 drop     cholecalciferol (VITAMIN D3) 5000 units (125 mcg) capsule 125 mcg     clonazePAM (klonoPIN) tablet 0.5 mg     divalproex sodium extended-release (DEPAKOTE ER) 24 hr tablet 1,500 mg     fluconazole (DIFLUCAN) tablet 100 mg     fluticasone-vilanterol (BREO ELLIPTA) 100-25 MCG/INH inhaler 1 puff     glucosamine capsule 500 mg     guaiFENesin-dextromethorphan (ROBITUSSIN DM) 100-10 MG/5ML syrup 5 mL     hydrocortisone (CORTAID) 1 % cream     hydrOXYzine (ATARAX) tablet 25 mg     LORazepam (ATIVAN) tablet 1-2 mg     magnesium chloride CR tablet 535 mg     magnesium hydroxide (MILK OF MAGNESIA) suspension 30 mL     multivitamin, therapeutic (THERA-VIT) tablet 1 tablet     nicotine (NICODERM CQ) 21 MG/24HR 24 hr patch 1 patch     nicotine (NICORETTE) gum 2 mg     nicotine Patch in Place     OLANZapine (zyPREXA) tablet 10 mg    Or     OLANZapine (zyPREXA) injection 10 mg     QUEtiapine (SEROquel) tablet 100 mg     vitamin B complex with vitamin C (STRESS TAB) tablet 1 tablet     ziprasidone (GEODON) capsule 60 mg             Allergies:     Allergies   Allergen Reactions     Animal Dander      Haldol Difficulty breathing     Haldol [Haloperidol]      Penicillins      Unsure of what happens. Has been told she has allergies since she was a kid.     Penicillins      Seasonal Allergies             Psychiatric Examination:     /75 (BP Location: Left arm)   Pulse 87   Temp 98.5  F (36.9  C) (Oral)   Resp 16   Wt 84.9 kg (187 lb 3.2 oz)   LMP 07/03/2014   SpO2 98%   BMI 30.21 kg/m       Appearance:  awake, alert and adequately groomed  Attitude:  cooperative  Eye Contact:  good  Mood:  intermittently anxious and irritable  Affect:  normal range, briefly tearful  Speech:  clear, coherent, less hyperverbal  Psychomotor Behavior:  no evidence of tardive dyskinesia, dystonia, or tics  Thought  Process:  linear and goal-oriented but illogical  Associations:  no loosening of associations present  Thought Content:  no evidence of suicidal ideation or homicidal ideation, grandiose, delusional thought content is evident, denies visual hallucinations, denies auditory hallucinations  Insight:  partial  Judgment:  limited  Oriented to:  date, time, person, and place  Attention Span and Concentration:  limited  Recent and Remote Memory:  limited  Language:  intact  Fund of Knowledge:  appropriate  Muscle Strength and Tone:  normal  Gait and Station:  normal          Labs:     No results found for this or any previous visit (from the past 24 hour(s)).

## 2020-06-18 NOTE — PROGRESS NOTES
Patient observed mostly in the milieu.  Ate breakfast and lunch trays.  Attended groups today.  Checked her email with staff supervision.  Also, sorted through her clothes.  Denies SI or thoughts or harm to self or others.  Denies any depression or anxiety at this time.  Generally pleasant and cooperative.

## 2020-06-19 LAB
Lab: ABNORMAL
SPECIMEN SOURCE: ABNORMAL
YEAST SPEC QL CULT: ABNORMAL

## 2020-06-19 PROCEDURE — 25000132 ZZH RX MED GY IP 250 OP 250 PS 637: Mod: GY | Performed by: NURSE PRACTITIONER

## 2020-06-19 PROCEDURE — 25000132 ZZH RX MED GY IP 250 OP 250 PS 637: Mod: GY | Performed by: PHYSICIAN ASSISTANT

## 2020-06-19 PROCEDURE — 99232 SBSQ HOSP IP/OBS MODERATE 35: CPT | Mod: 95 | Performed by: NURSE PRACTITIONER

## 2020-06-19 PROCEDURE — 12400001 ZZH R&B MH UMMC

## 2020-06-19 RX ADMIN — NICOTINE POLACRILEX 2 MG: 2 GUM, CHEWING BUCCAL at 08:34

## 2020-06-19 RX ADMIN — CLONAZEPAM 0.5 MG: 0.5 TABLET ORAL at 08:34

## 2020-06-19 RX ADMIN — NICOTINE 1 PATCH: 21 PATCH, EXTENDED RELEASE TRANSDERMAL at 08:34

## 2020-06-19 RX ADMIN — CLONAZEPAM 0.5 MG: 0.5 TABLET ORAL at 14:55

## 2020-06-19 RX ADMIN — CLONAZEPAM 0.5 MG: 0.5 TABLET ORAL at 19:03

## 2020-06-19 RX ADMIN — DIVALPROEX SODIUM 1500 MG: 500 TABLET, EXTENDED RELEASE ORAL at 19:03

## 2020-06-19 RX ADMIN — ZIPRASIDONE HCL 60 MG: 60 CAPSULE ORAL at 19:03

## 2020-06-19 RX ADMIN — B-COMPLEX W/ C & FOLIC ACID TAB 1 TABLET: TAB at 08:34

## 2020-06-19 RX ADMIN — NICOTINE POLACRILEX 2 MG: 2 GUM, CHEWING BUCCAL at 14:55

## 2020-06-19 RX ADMIN — THERA TABS 1 TABLET: TAB at 08:34

## 2020-06-19 RX ADMIN — FLUCONAZOLE 100 MG: 100 TABLET ORAL at 08:34

## 2020-06-19 RX ADMIN — Medication 500 MG: at 08:34

## 2020-06-19 RX ADMIN — Medication 125 MCG: at 08:34

## 2020-06-19 RX ADMIN — FLUTICASONE FUROATE AND VILANTEROL TRIFENATATE 1 PUFF: 100; 25 POWDER RESPIRATORY (INHALATION) at 08:37

## 2020-06-19 RX ADMIN — ZIPRASIDONE HCL 60 MG: 60 CAPSULE ORAL at 08:34

## 2020-06-19 ASSESSMENT — ACTIVITIES OF DAILY LIVING (ADL)
DRESS: SCRUBS (BEHAVIORAL HEALTH)
HYGIENE/GROOMING: INDEPENDENT
LAUNDRY: WITH SUPERVISION
ORAL_HYGIENE: INDEPENDENT
DRESS: INDEPENDENT
ORAL_HYGIENE: INDEPENDENT
LAUNDRY: WITH SUPERVISION
HYGIENE/GROOMING: INDEPENDENT

## 2020-06-19 NOTE — PROGRESS NOTES
"Pt is visible in milieu throughout this shift. She spends this shift watching tv with peers and talking on the phone. Pt is cooperative with peers and staff.  Pt requested that she \"only check-in with Zabrina\".     Pt denies SI/SIB/AH/VH. Pt also denies anxiety but is \"very sad\" when asked about her depression.     06/18/20 1900   Behavioral Health   Hallucinations denies / not responding to hallucinations   Thinking poor concentration   Orientation person: oriented;place: oriented   Memory baseline memory   Insight poor   Judgement impaired   Eye Contact at examiner   Affect blunted, flat   Mood mood is calm   Physical Appearance/Attire attire appropriate to age and situation   Hygiene   (adequete)   Suicidality other (see comments)  (Pt denies SI)   1. Wish to be Dead (Recent) No   2. Non-Specific Active Suicidal Thoughts (Recent) No   Self Injury other (see comment)  (Pt denies SIB)   Elopement   (None observed)   Activity other (see comment)  (visible in milieu)   Speech clear;coherent   Medication Sensitivity no stated side effects   Psychomotor / Gait balanced;steady   Activities of Daily Living   Hygiene/Grooming independent   Oral Hygiene independent   Dress scrubs (behavioral health)   Room Organization independent     "

## 2020-06-19 NOTE — PLAN OF CARE
"RN 48 hour assessment:  Patient was irritable upon this writer checking in with her. She ignored writer at first, then said, \"I just woke up, it's not a good time for me to check in.\" Patient has been observed in the milieu. She is social with peers. Patient was compliant with all her medications this shift. She didn't request any prn medications. Patient's vital signs this morning were WNL. This writer did not hear any delusional comments from patient this shift.  "

## 2020-06-19 NOTE — PROGRESS NOTES
"Pt. irritable at bedtime. Pt. was napping in her room. Pt woken up by writer so that she could receive her medications. Pt. at medication window to receive medications. Pt. yelled \"I can't hear you the TV is so loud\". Pt. was asked by writer if she still had nicotine patch on. Pt. went back to her room, removed the nicotine patch and gave it to writer at the medication window. Pt. took all her scheduled medications with no issues. Pt. declined Cogentin and stated that she does not like how it makes her feel. Pt. went to her room and came out a few minutes later. Pt. came to medication window and was yelling \"you didn't give me another patch and a piece of nicotine gum\". PRN nicotine gum given. Pt. informed new patch available in morning. Pt. states \"you tricked me to remove my patch,  bitch, fuck you\". Pt. walked away to her room.   "

## 2020-06-19 NOTE — PROGRESS NOTES
[facility]   Psychiatric Progress Note      Impression:     Fannie Jeter is a 54-year-old female admitted to [facility] on 6/9/2020.  She was admitted on a health officer hold through [facility], brought in via EMS after her neighbors called 911, due to cyndi and psychosis.  In the ER she was hypersexual and grandiose, talking about going to business school to market herself as a lozano and singing for ER staff.  She said that [employer] Millan and  Dk Swanson helped with her poor credit so she could secure a luxury apartment in [place], where she intended to travel with her cat on first class tickets.  She said she developed COVID-19 symptoms in March but was not tested at the time.  COVID-19 test in the ER was negative.  Upon admission to the unit she was irritable and had several complaints and requests.  She stated her intent to ask Jasiel for coffee.  She was taken to seclusion, where she urinated on the floor, threw water, pounded her fists on the door, swore at staff and disrobed.  She says that she threw away her Lamictal several days ago and that she was intending to start Lithium.  She reports taking Klonopin and Geodon regularly but was not taking Geodon with food to ensure adequate absorption.  UTOX was positive for opiates.  She was also taken to seclusion 6/11 overnight shift due to disruptive behaviors, threatening to kill staff, cut out their eyes, and drag them by the hair.  She is less agitated and less preoccupied with Jasiel but continues to have delusional thought content.  On 6/11 she signed a 12-hour intent to leave but rescinded it the following morning.  On 6/13 she was escorted to seclusion and placed on SIO after she attempted to strike staff.  SIO was discontinued 6/16.  Since admission, Klonopin was continued.  Geodon was increased.  Depakote ER was initiated.  PRNs of Seroquel, Zyprexa  and Hydroxyzine are available.           Diagnoses:     Schizoaffective disorder bipolar type  Esophageal candidiasis         Plan:     Medications:  Continue Geodon to 60 mg BID with meals.  Continue Klonopin 0.5 mg TID.  Continue Depakote ER 1500 mg (previously stable on 1500 mg at which dose level was 84 in 8/2019). Continue PRNs of Zyprexa, Hydroxyzine and Seroquel.  Discontinue scheduled Cogentin but continue PRN Cogentin.    VPA level 6/21 evening.     If she requests discharge, she meets criteria for a 72-hour hold.  Discharge to home when stable.  Her  confirms that she plans to move to [facility] next month and does have an apartment there.         Telemedicine Visit: The patient's condition can be safely assessed and treated via synchronous audio and visual telemedicine encounter.       Start Time: 0740  Stop Time: 0753     Reason for Telemedicine Visit: COVID-19 precautions     Originating Site (Patient Location): [facility]     Distant Site (Provider Location): Provider Remote Setting     Consent:  The patient/guardian has verbally consented to: the potential risks and benefits of telemedicine (video visit) versus in person care; bill my insurance or make self-payment for services provided; and responsibility for payment of non-covered services.      Mode of Communication:  Video Conference via Skype     As the provider I attest to compliance with applicable laws and regulations related to telemedicine.      Attestation:  Patient has been seen and evaluated by me, SAMUEL Mejias CNP  The patient was counseled on nature of illness and treatment plan/options  Care was coordinated with treatment team        Clinical Global Impressions  First:  Considering your total clinical experience with this particular patient population, how severe are the patient's symptoms at this time?: 7 (06/10/20 3747)  Compared to the patient's condition at the START of treatment, this  "patient's condition is: 4 (06/10/20 0431)  Most recent:  Considering your total clinical experience with this particular patient population, how severe are the patient's symptoms at this time?: 6 (06/18/20 0602)  Compared to the patient's condition at the START of treatment, this patient's condition is: 3 (06/18/20 0602)            Interim History:     The patient's care was discussed with the treatment team and chart notes were reviewed.  Pt was documented as sleeping 6 hours during the overnight shift.  Pt has been spending time talking on the phone and watching TV.  She was irritable and utilized vulgar language during an interaction with her RN last evening during which she demanded a new nicotine patch.  Informed pt that the patch is scheduled to be removed and applied in the AM and lasts for 24 hours.  Pt said, \"I thought I had multiple personality disorder\" but no longer believes this.  Discussed her actual diagnosis and she said she was in agreement with this.  Stated her mood is \"good.\"  She said, \"I'm confused, lost in space and time.\"  However she was fully oriented.  Stated that she experienced constipation after her first Cogentin dose.  \"My body has an aversion to Cogentin.\"  Discussed that she does appear less restless today, however discontinued scheduled Cogentin at her request and advised her that the Cogentin PRN remains available.  Pt denies auditory and visual hallucinations.  States she no longer believes that Jasiel is inhabiting her body or sending her messages.           Medications:     Current Facility-Administered Medications   Medication     acetaminophen (TYLENOL) tablet 650 mg     albuterol (PROVENTIL) neb solution 2.5 mg     alum & mag hydroxide-simethicone (MAALOX  ES) suspension 30 mL     aspirin (ASA) tablet 325 mg     benzocaine-menthol (CEPACOL) 15-3.6 MG lozenge 1 lozenge     benztropine (COGENTIN) tablet 1 mg     bisacodyl (DULCOLAX) Suppository 10 mg     carboxymethylcellulose " "PF (REFRESH PLUS) 0.5 % ophthalmic solution 1 drop     cholecalciferol (VITAMIN D3) 5000 units (125 mcg) capsule 125 mcg     clonazePAM (klonoPIN) tablet 0.5 mg     divalproex sodium extended-release (DEPAKOTE ER) 24 hr tablet 1,500 mg     fluconazole (DIFLUCAN) tablet 100 mg     fluticasone-vilanterol (BREO ELLIPTA) 100-25 MCG/INH inhaler 1 puff     glucosamine capsule 500 mg     guaiFENesin-dextromethorphan (ROBITUSSIN DM) 100-10 MG/5ML syrup 5 mL     hydrocortisone (CORTAID) 1 % cream     hydrOXYzine (ATARAX) tablet 25 mg     LORazepam (ATIVAN) tablet 1-2 mg     magnesium chloride CR tablet 535 mg     magnesium hydroxide (MILK OF MAGNESIA) suspension 30 mL     multivitamin, therapeutic (THERA-VIT) tablet 1 tablet     nicotine (NICODERM CQ) 21 MG/24HR 24 hr patch 1 patch     nicotine (NICORETTE) gum 2 mg     nicotine Patch in Place     OLANZapine (zyPREXA) tablet 10 mg    Or     OLANZapine (zyPREXA) injection 10 mg     QUEtiapine (SEROquel) tablet 100 mg     vitamin B complex with vitamin C (STRESS TAB) tablet 1 tablet     ziprasidone (GEODON) capsule 60 mg             Allergies:     Allergies   Allergen Reactions     Animal Dander      Haldol Difficulty breathing     Haldol [Haloperidol]      Penicillins      Unsure of what happens. Has been told she has allergies since she was a kid.     Penicillins      Seasonal Allergies             Psychiatric Examination:     /89 (BP Location: Left arm)   Pulse 90   Temp 98.4  F (36.9  C) (Oral)   Resp 16   Wt 84.9 kg (187 lb 3.2 oz)   LMP 07/03/2014   SpO2 98%   BMI 30.21 kg/m       Appearance:  awake, alert and adequately groomed  Attitude:  cooperative  Eye Contact:  good  Mood:  \"good\"  Affect:  intensity is mildly elevated  Speech:  clear, coherent, somewhat hyperverbal  Psychomotor Behavior:  no evidence of tardive dyskinesia, dystonia, or tics  Thought Process:  linear and goal-oriented but illogical  Associations:  no loosening of associations " present  Thought Content:  no evidence of suicidal ideation or homicidal ideation, delusional thought content is less pervasive, denies visual hallucinations, denies auditory hallucinations  Insight:  partial  Judgment:  limited  Oriented to:  date, time, person, and place  Attention Span and Concentration:  limited  Recent and Remote Memory:  limited  Language:  intact  Fund of Knowledge:  appropriate  Muscle Strength and Tone:  normal  Gait and Station:  normal          Labs:     No results found for this or any previous visit (from the past 24 hour(s)).

## 2020-06-20 ENCOUNTER — APPOINTMENT (OUTPATIENT)
Dept: GENERAL RADIOLOGY | Facility: CLINIC | Age: 55
DRG: 885 | End: 2020-06-20
Attending: PSYCHIATRY & NEUROLOGY
Payer: MEDICARE

## 2020-06-20 PROCEDURE — 99207 ZZC MOONLIGHTING INDICATOR: CPT | Performed by: INTERNAL MEDICINE

## 2020-06-20 PROCEDURE — 25000132 ZZH RX MED GY IP 250 OP 250 PS 637: Mod: GY | Performed by: PHYSICIAN ASSISTANT

## 2020-06-20 PROCEDURE — 72220 X-RAY EXAM SACRUM TAILBONE: CPT

## 2020-06-20 PROCEDURE — 12400001 ZZH R&B MH UMMC

## 2020-06-20 PROCEDURE — 25000132 ZZH RX MED GY IP 250 OP 250 PS 637: Mod: GY | Performed by: NURSE PRACTITIONER

## 2020-06-20 PROCEDURE — H2032 ACTIVITY THERAPY, PER 15 MIN: HCPCS

## 2020-06-20 PROCEDURE — 25000132 ZZH RX MED GY IP 250 OP 250 PS 637: Mod: GY | Performed by: CLINICAL NURSE SPECIALIST

## 2020-06-20 RX ADMIN — FLUCONAZOLE 100 MG: 100 TABLET ORAL at 08:55

## 2020-06-20 RX ADMIN — NICOTINE 1 PATCH: 21 PATCH, EXTENDED RELEASE TRANSDERMAL at 08:56

## 2020-06-20 RX ADMIN — ZIPRASIDONE HCL 60 MG: 60 CAPSULE ORAL at 18:56

## 2020-06-20 RX ADMIN — CLONAZEPAM 0.5 MG: 0.5 TABLET ORAL at 08:55

## 2020-06-20 RX ADMIN — ACETAMINOPHEN 650 MG: 325 TABLET, FILM COATED ORAL at 03:15

## 2020-06-20 RX ADMIN — THERA TABS 1 TABLET: TAB at 08:55

## 2020-06-20 RX ADMIN — FLUTICASONE FUROATE AND VILANTEROL TRIFENATATE 1 PUFF: 100; 25 POWDER RESPIRATORY (INHALATION) at 08:59

## 2020-06-20 RX ADMIN — Medication 125 MCG: at 08:55

## 2020-06-20 RX ADMIN — ACETAMINOPHEN 650 MG: 325 TABLET, FILM COATED ORAL at 17:34

## 2020-06-20 RX ADMIN — NICOTINE POLACRILEX 2 MG: 2 GUM, CHEWING BUCCAL at 10:00

## 2020-06-20 RX ADMIN — ZIPRASIDONE HCL 60 MG: 60 CAPSULE ORAL at 08:55

## 2020-06-20 RX ADMIN — DIVALPROEX SODIUM 1500 MG: 500 TABLET, EXTENDED RELEASE ORAL at 20:58

## 2020-06-20 RX ADMIN — CLONAZEPAM 0.5 MG: 0.5 TABLET ORAL at 20:58

## 2020-06-20 RX ADMIN — Medication 500 MG: at 08:55

## 2020-06-20 RX ADMIN — NICOTINE POLACRILEX 2 MG: 2 GUM, CHEWING BUCCAL at 18:56

## 2020-06-20 RX ADMIN — CLONAZEPAM 0.5 MG: 0.5 TABLET ORAL at 14:08

## 2020-06-20 RX ADMIN — B-COMPLEX W/ C & FOLIC ACID TAB 1 TABLET: TAB at 08:55

## 2020-06-20 RX ADMIN — ACETAMINOPHEN 650 MG: 325 TABLET, FILM COATED ORAL at 21:28

## 2020-06-20 RX ADMIN — ACETAMINOPHEN 650 MG: 325 TABLET, FILM COATED ORAL at 11:05

## 2020-06-20 ASSESSMENT — ACTIVITIES OF DAILY LIVING (ADL)
HYGIENE/GROOMING: INDEPENDENT
DRESS: SCRUBS (BEHAVIORAL HEALTH)
ORAL_HYGIENE: INDEPENDENT
DRESS: SCRUBS (BEHAVIORAL HEALTH)
LAUNDRY: WITH SUPERVISION
ORAL_HYGIENE: INDEPENDENT
HYGIENE/GROOMING: INDEPENDENT
LAUNDRY: WITH SUPERVISION

## 2020-06-20 NOTE — PROGRESS NOTES
Pt approached desk and reported that headache was 5 and tail bone pain had decreased from 10 to 7 after taking Tylenol. Sat in chair in lounge,to watch sunrise,reported decreased pain when in sitting position, laughing and talking to another patient while sitting in lounge.

## 2020-06-20 NOTE — PROGRESS NOTES
RESPONSE NOTE :     Brief history:   53 yo admitted to psychiatry. Fell and found on floor with bump on head.     Denies any LOC. Says was dreaming of being Nati jung and fell to ground and hit her head and tail bone. No LOC  Denies Chest pain/ SOB/ cough, Denies any N&V/ bowel problems  Denies urinary problems , Denies fever/chills/rigors     Denies headache/ tingling/ numbness    O/A: A- Alert , talking           B- CTAB          C- I+II+ no m/r/g           GCS-15/15. Oriented to person/ place and time.      Secondary survey done: 4x3 cm bulge on rt occipital area.    Old bruise left elbow  Cr nerves II-XII- grossly normal   AO x 3    No specific spine tenderness. SLR 90 degree both legs  Full ROM       Assessment: # Fall                          # Hematoma occipital with normal GCS     Plan: - D/W RN . Close neuro checks Q2 hr. If any change for CT head stat     - Tail bone/ L12/S1- no specific bony tenderness. Walking w/o discomfort. Monitor for now and use ice. If not better for X-ay in morning.      Real San MD, FACP   Internal Medicine/ Hospitalist / [facility]

## 2020-06-20 NOTE — PROGRESS NOTES
Pt. complained of bruising that she states occurred some time back when she was in seclusion. Pt. states that she had bruises on her arms and back but they are not there anymore. Pt. has two bruises on the left elbow area that are healing. Pt. states no pain. No tenderness. No break in skin.

## 2020-06-20 NOTE — PROGRESS NOTES
"Pt denies SI/SIB thoughts. Pt was out in the milieu most of the evening shift. Pt wanted staff to take picture of her bruise on her arm. Pt states it had happened during a code.     Writer heard a loud sound at 10:50P.M., followed by a loud cry. Pt was found on the ground. Pt stated she was having a dream where the \"corset was too tight and she could not sing\". An RN was notified and vitals were taken.        06/19/20 2200   Behavioral Health   Hallucinations denies / not responding to hallucinations   Thinking poor concentration   Orientation person: oriented;time: oriented;date: oriented;place: oriented   Memory baseline memory   Insight poor   Judgement impaired   Eye Contact at examiner   Affect tense   Mood irritable   Physical Appearance/Attire disheveled   Hygiene neglected grooming - unclean body, hair, teeth   Suicidality other (see comments)  (Denies )   1. Wish to be Dead (Recent) No   2. Non-Specific Active Suicidal Thoughts (Recent) No   Change in Protective Factors? No   Enviromental Risk Factors None   Self Injury other (see comment)  (Denies and none observed )   Elopement   (none observed )   Activity other (see comment)  (visible in the milieu )   Speech clear;coherent   Medication Sensitivity no stated side effects;no observed side effects   Psychomotor / Gait balanced;steady   Psycho Education   Type of Intervention 1:1 intervention   Response participates with encouragement   Hours 0.5   Treatment Detail check in    Activities of Daily Living   Hygiene/Grooming independent   Oral Hygiene independent   Dress independent   Laundry with supervision   Room Organization independent     "

## 2020-06-20 NOTE — SIGNIFICANT EVENT
Pt. had an unwitnessed fall. Pt. states that she fell off the bed because she was dreaming about dancing. Pt states that she hit her head and her tail bone. Pt states that she is in pain. On assessment pt. is alert and oriented and is able to engage in conversation. Vital signs taken are: /77, Pulse 94, RR 16, SO2 93%. Right side of head has a swelling and is tender. Pt. given ice pack for head. Psychiatrist on call informed. Senior House Officer paged. Nursing supervisor notified.

## 2020-06-20 NOTE — PROGRESS NOTES
"Patient was complaining of pain in her \"tailbone\" from last night. Patient said it hurt to stand up and sit down. Patient was offered a cold pack, which she declined. She was offered prn medication and she took Acetaminophen. Dr Shelton ordered a coccyx xray. Patient did go have that done this shift. Result is pending at this time. Patient is alert and oriented, denies head pain from where she bumped her head last night. Will continue to monitor. Internal Med was informed of patient's status. They will follow up on Xray results.   "

## 2020-06-20 NOTE — PROGRESS NOTES
"   06/20/20 0330   Vital Signs   Temp 98  F (36.7  C)   Temp src Oral   Resp 18   Pulse 90   /71   Sitting Orthostatic BP   Sitting Orthostatic /71   Sitting Orthostatic Pulse 90 bpm   Standing Orthostatic BP   Standing Orthostatic BP 82/55   Standing Orthostatic Pulse 103 bpm   Oxygen Therapy   SpO2 98 %   O2 Device None (Room air)   Pain/Comfort   Patient Currently in Pain yes   Pain Management Interventions medication (see MAR);cold applied   Pt stumbled out of room into viera  intervened by staff, unsteady on feet, assisted to chair by staff. Reported Headache, stated \"my tailbone hurts more than my headache.\" Tailbone pain rated 10, requested Tylenol for pain and ice pack for tailbone. Vital signs and neuro check  NORRIS, oriented x 3. Room door open for full visualization by staff and bathroom light left on for safety.  "

## 2020-06-21 LAB — VALPROATE SERPL-MCNC: 80 MG/L (ref 50–100)

## 2020-06-21 PROCEDURE — 12400001 ZZH R&B MH UMMC

## 2020-06-21 PROCEDURE — 36415 COLL VENOUS BLD VENIPUNCTURE: CPT | Performed by: NURSE PRACTITIONER

## 2020-06-21 PROCEDURE — 25000132 ZZH RX MED GY IP 250 OP 250 PS 637: Mod: GY | Performed by: CLINICAL NURSE SPECIALIST

## 2020-06-21 PROCEDURE — 25000132 ZZH RX MED GY IP 250 OP 250 PS 637: Mod: GY | Performed by: PHYSICIAN ASSISTANT

## 2020-06-21 PROCEDURE — 80164 ASSAY DIPROPYLACETIC ACD TOT: CPT | Performed by: NURSE PRACTITIONER

## 2020-06-21 PROCEDURE — 25000132 ZZH RX MED GY IP 250 OP 250 PS 637: Mod: GY | Performed by: NURSE PRACTITIONER

## 2020-06-21 RX ADMIN — THERA TABS 1 TABLET: TAB at 08:39

## 2020-06-21 RX ADMIN — ACETAMINOPHEN 650 MG: 325 TABLET, FILM COATED ORAL at 21:41

## 2020-06-21 RX ADMIN — ACETAMINOPHEN 650 MG: 325 TABLET, FILM COATED ORAL at 04:02

## 2020-06-21 RX ADMIN — Medication 500 MG: at 08:39

## 2020-06-21 RX ADMIN — ACETAMINOPHEN 650 MG: 325 TABLET, FILM COATED ORAL at 13:43

## 2020-06-21 RX ADMIN — DIVALPROEX SODIUM 1500 MG: 500 TABLET, EXTENDED RELEASE ORAL at 20:31

## 2020-06-21 RX ADMIN — CLONAZEPAM 0.5 MG: 0.5 TABLET ORAL at 08:39

## 2020-06-21 RX ADMIN — NICOTINE 1 PATCH: 21 PATCH, EXTENDED RELEASE TRANSDERMAL at 08:39

## 2020-06-21 RX ADMIN — FLUCONAZOLE 100 MG: 100 TABLET ORAL at 08:39

## 2020-06-21 RX ADMIN — B-COMPLEX W/ C & FOLIC ACID TAB 1 TABLET: TAB at 08:39

## 2020-06-21 RX ADMIN — ZIPRASIDONE HCL 60 MG: 60 CAPSULE ORAL at 19:16

## 2020-06-21 RX ADMIN — FLUTICASONE FUROATE AND VILANTEROL TRIFENATATE 1 PUFF: 100; 25 POWDER RESPIRATORY (INHALATION) at 08:42

## 2020-06-21 RX ADMIN — CLONAZEPAM 0.5 MG: 0.5 TABLET ORAL at 20:31

## 2020-06-21 RX ADMIN — Medication 125 MCG: at 08:39

## 2020-06-21 RX ADMIN — ZIPRASIDONE HCL 60 MG: 60 CAPSULE ORAL at 08:39

## 2020-06-21 RX ADMIN — CLONAZEPAM 0.5 MG: 0.5 TABLET ORAL at 13:43

## 2020-06-21 RX ADMIN — NICOTINE POLACRILEX 2 MG: 2 GUM, CHEWING BUCCAL at 16:53

## 2020-06-21 ASSESSMENT — ACTIVITIES OF DAILY LIVING (ADL)
DRESS: INDEPENDENT
HYGIENE/GROOMING: INDEPENDENT
ORAL_HYGIENE: INDEPENDENT
ORAL_HYGIENE: INDEPENDENT
DRESS: INDEPENDENT
HYGIENE/GROOMING: INDEPENDENT
LAUNDRY: WITH SUPERVISION

## 2020-06-21 NOTE — PROGRESS NOTES
Pt woke up at 0400 requested/given tylenol  650 mg for neck and tail bone which she rated at 8/10. Will continue to monitor.

## 2020-06-21 NOTE — PROGRESS NOTES
"Pt was visible in the milieu for the majority of this shift watching TV, and socializing with peers. Pt was emotional this morning when she found out it was father's day . Pt started to cry \"I miss my dad\". Pt reported some depression and  anxiety, denies SI/SIB thoughts and HI.  Calm , polite and cooperative, no further concerns.         06/21/20 1230   Behavioral Health   Hallucinations denies / not responding to hallucinations   Thinking poor concentration   Orientation place: oriented;date: oriented;time: oriented   Memory baseline memory   Insight poor   Judgement impaired   Eye Contact at examiner   Affect full range affect   Mood depressed;anxious   Physical Appearance/Attire attire appropriate to age and situation   Hygiene well groomed   Suicidality other (see comments)  (Pt denies)   1. Wish to be Dead (Recent) No   Wish to be Dead Description (Recent)   (none stated or observed.)   2. Non-Specific Active Suicidal Thoughts (Recent) No   Self Injury other (see comment)  (None stated [facility] bserv[facility].)   Activity other (see comment)  (visible in milieu, social with peers watching TV.)   Speech clear;coherent   Medication Sensitivity no stated side effects;no observed side effects   Psychomotor / Gait balanced;steady   Psycho Education   Type of Intervention 1:1 intervention   Response participates, initiates socially appropriate   Hours 0.5   Treatment Detail   (check in / observational. )   Activities of Daily Living   Hygiene/Grooming independent   Oral Hygiene independent   Dress independent   Laundry with supervision   Room Organization independent     "

## 2020-06-21 NOTE — PLAN OF CARE
"Patient had a mostly pleasant shift, agitated once when a staff member entered room \"without permission\" for room check.  Slighly hyperverbal. Would not answer safety questions, \"I have a master's in psychology so I don't have to answer all those questions.\" No unsafe behaviors observed. Has continued c/o pain in coccyx, head tender on touch but denies headache. Denies vision disturbances. Patient verbally agreed to alert staff if this changes. When asked about her mood, states she feels \"thrown off\" attributes this to the fall.  Would not overtly rate her anxiety and/or depression. Appetite is good. Ate 100% of dinner. Did not express concerns with sleep. Medication compliant. Did not require PRN medication.  "

## 2020-06-21 NOTE — PROGRESS NOTES
"   06/20/20 2240   Behavioral Health   Hallucinations denies / not responding to hallucinations   Thinking poor concentration;distractable   Orientation person: oriented;place: oriented   Memory baseline memory   Insight poor   Judgement impaired   Eye Contact at examiner   Affect irritable   Mood irritable;anxious   Physical Appearance/Attire untidy;disheveled   Hygiene neglected grooming - unclean body, hair, teeth   Suicidality   (Pt denies)   1. Wish to be Dead (Recent) No   2. Non-Specific Active Suicidal Thoughts (Recent) No   Self Injury other (see comment)  (Pt denies)   Elopement   (None observed)   Activity restless   Speech flight of ideas   Medication Sensitivity no stated side effects   Psychomotor / Gait slow;unsteady   Psycho Education   Type of Intervention 1:1 intervention   Response participates with encouragement   Hours 0.5   Treatment Detail check-in   Activities of Daily Living   Hygiene/Grooming independent   Oral Hygiene independent   Dress scrubs (behavioral health)   Laundry with supervision   Room Organization independent     Pt denies SI/SIB and hallucinations. She spent most of her time out in the milieu and attended group this evening. She rated her anxiety a 4/10 and said her depression is \"bad today\" because of her low back pain. She seemed to have trouble walking and looked uncomfortable while she was sitting. Pt also denied hallucinations. She acted irritable while checking in, and explained it was because she just wanted to watch the movie and kept getting interrupted. Shortly after checking in she came to the desk and apologized to staff for acting irritated, adding that she was acting this way due to her back pain from falling last night.   "

## 2020-06-21 NOTE — PROGRESS NOTES
06/20/20 [facility]   Therapeutic Recreation   Type of Intervention structured groups   Activity game   Response Participates, initiates socially appropriate   Hours 1     Pt participated in Therapeutic Recreation group with focus on leisure participation, social engagement, and critical thinking. Engaged and focused in the group recreational activity via a group game.  Pt was a full participant throughout the entire duration of group.  Pt was a quiet and calm participant, but remained completely involved. Pt needed some extra clues and hints during her turns and occasionally added clues for others as they needed.

## 2020-06-21 NOTE — PROGRESS NOTES
Pt. alert and oriented all shift. Pt. complains of pain in her back and tailbone from the fall. Pt. took PRN acetaminophen twice for pain relief. Pt was requesting hydrocodone. Pt advised to discuss with provider.

## 2020-06-22 PROCEDURE — G0177 OPPS/PHP; TRAIN & EDUC SERV: HCPCS

## 2020-06-22 PROCEDURE — 12400001 ZZH R&B MH UMMC

## 2020-06-22 PROCEDURE — 25000132 ZZH RX MED GY IP 250 OP 250 PS 637: Mod: GY | Performed by: PHYSICIAN ASSISTANT

## 2020-06-22 PROCEDURE — 25000132 ZZH RX MED GY IP 250 OP 250 PS 637: Mod: GY | Performed by: NURSE PRACTITIONER

## 2020-06-22 PROCEDURE — 90853 GROUP PSYCHOTHERAPY: CPT

## 2020-06-22 PROCEDURE — 99232 SBSQ HOSP IP/OBS MODERATE 35: CPT | Mod: 95 | Performed by: NURSE PRACTITIONER

## 2020-06-22 PROCEDURE — 25000132 ZZH RX MED GY IP 250 OP 250 PS 637: Mod: GY | Performed by: CLINICAL NURSE SPECIALIST

## 2020-06-22 RX ORDER — METHOCARBAMOL 500 MG/1
500 TABLET, FILM COATED ORAL EVERY 6 HOURS PRN
Status: DISCONTINUED | OUTPATIENT
Start: 2020-06-22 | End: 2020-06-25

## 2020-06-22 RX ORDER — DIPHENHYDRAMINE HCL 25 MG
25 CAPSULE ORAL EVERY 4 HOURS PRN
Status: DISCONTINUED | OUTPATIENT
Start: 2020-06-22 | End: 2020-06-25 | Stop reason: HOSPADM

## 2020-06-22 RX ORDER — IBUPROFEN 600 MG/1
600 TABLET, FILM COATED ORAL EVERY 6 HOURS PRN
Status: DISCONTINUED | OUTPATIENT
Start: 2020-06-22 | End: 2020-06-25 | Stop reason: HOSPADM

## 2020-06-22 RX ADMIN — ACETAMINOPHEN 650 MG: 325 TABLET, FILM COATED ORAL at 04:20

## 2020-06-22 RX ADMIN — B-COMPLEX W/ C & FOLIC ACID TAB 1 TABLET: TAB at 09:15

## 2020-06-22 RX ADMIN — ZIPRASIDONE HCL 80 MG: 60 CAPSULE ORAL at 09:15

## 2020-06-22 RX ADMIN — DIVALPROEX SODIUM 1500 MG: 500 TABLET, EXTENDED RELEASE ORAL at 21:33

## 2020-06-22 RX ADMIN — Medication 500 MG: at 09:15

## 2020-06-22 RX ADMIN — METHOCARBAMOL 500 MG: 500 TABLET, FILM COATED ORAL at 09:22

## 2020-06-22 RX ADMIN — NICOTINE POLACRILEX 2 MG: 2 GUM, CHEWING BUCCAL at 17:52

## 2020-06-22 RX ADMIN — NICOTINE POLACRILEX 2 MG: 2 GUM, CHEWING BUCCAL at 09:22

## 2020-06-22 RX ADMIN — NICOTINE POLACRILEX 2 MG: 2 GUM, CHEWING BUCCAL at 12:55

## 2020-06-22 RX ADMIN — THERA TABS 1 TABLET: TAB at 09:15

## 2020-06-22 RX ADMIN — ZIPRASIDONE HCL 80 MG: 60 CAPSULE ORAL at 18:52

## 2020-06-22 RX ADMIN — FLUTICASONE FUROATE AND VILANTEROL TRIFENATATE 1 PUFF: 100; 25 POWDER RESPIRATORY (INHALATION) at 09:18

## 2020-06-22 RX ADMIN — IBUPROFEN 600 MG: 600 TABLET ORAL at 12:54

## 2020-06-22 RX ADMIN — DIPHENHYDRAMINE HYDROCHLORIDE 25 MG: 25 CAPSULE ORAL at 12:54

## 2020-06-22 RX ADMIN — NICOTINE 1 PATCH: 21 PATCH, EXTENDED RELEASE TRANSDERMAL at 09:32

## 2020-06-22 RX ADMIN — CLONAZEPAM 0.5 MG: 0.5 TABLET ORAL at 21:33

## 2020-06-22 RX ADMIN — Medication 125 MCG: at 09:15

## 2020-06-22 RX ADMIN — CLONAZEPAM 0.5 MG: 0.5 TABLET ORAL at 09:15

## 2020-06-22 RX ADMIN — FLUCONAZOLE 100 MG: 100 TABLET ORAL at 09:17

## 2020-06-22 ASSESSMENT — ACTIVITIES OF DAILY LIVING (ADL)
LAUNDRY: WITH SUPERVISION
ORAL_HYGIENE: INDEPENDENT
ORAL_HYGIENE: INDEPENDENT
HYGIENE/GROOMING: INDEPENDENT
DRESS: INDEPENDENT
HYGIENE/GROOMING: INDEPENDENT
LAUNDRY: WITH SUPERVISION
DRESS: INDEPENDENT

## 2020-06-22 NOTE — PROGRESS NOTES
[facility]   Psychiatric Progress Note      Impression:     Fannie Jeter is a 54-year-old female admitted to [facility] on 6/9/2020.  She was admitted on a health officer hold through [facility], brought in via EMS after her neighbors called 911, due to cyndi and psychosis.  In the ER she was hypersexual and grandiose, talking about going to business school to market herself as a lozano and singing for ER staff.  She said that [employer] Millan and  Dk Swanson helped with her poor credit so she could secure a luxury apartment in [place], where she intended to travel with her cat on first class tickets.  She said she developed COVID-19 symptoms in March but was not tested at the time.  COVID-19 test in the ER was negative.  Upon admission to the unit she was irritable and had several complaints and requests.  She stated her intent to ask Jasiel for coffee.  She was taken to seclusion, where she urinated on the floor, threw water, pounded her fists on the door, swore at staff and disrobed.  She says that she threw away her Lamictal several days ago and that she was intending to start Lithium.  She reports taking Klonopin and Geodon regularly but was not taking Geodon with food to ensure adequate absorption.  UTOX was positive for opiates.  She was also taken to seclusion 6/11 overnight shift due to disruptive behaviors, threatening to kill staff, cut out their eyes, and drag them by the hair.  She is less agitated and less preoccupied with Jasiel but continues to have delusional thought content.  On 6/11 she signed a 12-hour intent to leave but rescinded it the following morning.  On 6/13 she was escorted to seclusion and placed on SIO after she attempted to strike staff.  SIO was discontinued 6/16.  Since admission, Klonopin was continued.  Geodon was increased.  Depakote ER was initiated.  PRNs of Seroquel, Zyprexa  and Hydroxyzine are available.           Diagnoses:     Schizoaffective disorder bipolar type  Esophageal candidiasis         Plan:     Medications:  Increase Geodon to 80 mg BID with meals.  Continue Klonopin 0.5 mg TID.  Continue Depakote ER 1500 mg (level 80 and was previously stable on this same dose in conjunction with Zyprexa). Continue PRNs of Zyprexa, Hydroxyzine and Seroquel.  Discontinue PRN Cogentin and begin PRN Benadryl for akathisia.  Add PRNs of Ibuprofen and Robaxin.     If she requests discharge, she meets criteria for a 72-hour hold.  Discharge to home when stable.  Her  confirms that she plans to move to [facility] next month and does have an apartment there.         Telemedicine Visit: The patient's condition can be safely assessed and treated via synchronous audio and visual telemedicine encounter.       Start Time: 0852  Stop Time: 0908     Reason for Telemedicine Visit: COVID-19 precautions     Originating Site (Patient Location): [facility]     Distant Site (Provider Location): Provider Remote Setting     Consent:  The patient/guardian has verbally consented to: the potential risks and benefits of telemedicine (video visit) versus in person care; bill my insurance or make self-payment for services provided; and responsibility for payment of non-covered services.      Mode of Communication:  Video Conference via Skype     As the provider I attest to compliance with applicable laws and regulations related to telemedicine.      Attestation:  Patient has been seen and evaluated by me, SAMUEL Mejias CNP  The patient was counseled on nature of illness and treatment plan/options  Care was coordinated with treatment team        Clinical Global Impressions  First:  Considering your total clinical experience with this particular patient population, how severe are the patient's symptoms at this time?: 7 (06/10/20 6346)  Compared to the patient's condition at  "the START of treatment, this patient's condition is: 4 (06/10/20 0431)  Most recent:  Considering your total clinical experience with this particular patient population, how severe are the patient's symptoms at this time?: 6 (06/18/20 0602)  Compared to the patient's condition at the START of treatment, this patient's condition is: 3 (06/18/20 0602)            Interim History:     The patient's care was discussed with the treatment team and chart notes were reviewed.  Pt was documented as sleeping 5.75, 4.5 and 6 hours during the weekend overnight shifts.  During the weekend she was \"lucid dreaming that I was Nati Burrows\" and fell off her bed, injuring her spine and head.  She was seen by the moonlighter and medically cleared.  States that she continues to experience diffuse spine pain as well as pain near her right ear and a toothache.  She has been taking PRN Tylenol 3-4 times daily.  She requested Codeine or Norco but was satisfied with Ibuprofen and Robaxin.  She expressed concerns about \"multiple personality disorder\" and described having found files on her computer about \"a whole other life\" as well as finding \"another person's writing\" in her notebook on the unit.  Discussed that DID is extremely rare, and that the focus of her treatment is on schizoaffective disorder and it is difficult to evaluate other issues until that is adequately managed.  Pt requested the opportunity to use the computer for a half hour daily for her [employer] coursework on which she spends $97/month.  Discussed that this cannot be accommodated on the unit but that she could cancel if she chooses to do so.  She declined.  She denies hallucinations.  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  Reports being less preoccupied with Jasiel but states she pretends her pillow is his body when she lies on the floor to sleep.  She said she is afraid to lie in her bed after falling off over the weekend.  Stated her mood is \"frustrated.\" "  Pt was tearful and stated that she has noticed other people arriving and being discharged and that she is frustrated with the length of her hospitalization, however is motivated to avoid commitment.  She agreed to increase Geodon.  Pt continues to appear restless.  She will try PRN Benadryl as she refuses to take Cogentin.           Medications:     Current Facility-Administered Medications   Medication     acetaminophen (TYLENOL) tablet 650 mg     albuterol (PROVENTIL) neb solution 2.5 mg     alum & mag hydroxide-simethicone (MAALOX  ES) suspension 30 mL     aspirin (ASA) tablet 325 mg     benzocaine-menthol (CEPACOL) 15-3.6 MG lozenge 1 lozenge     bisacodyl (DULCOLAX) Suppository 10 mg     carboxymethylcellulose PF (REFRESH PLUS) 0.5 % ophthalmic solution 1 drop     cholecalciferol (VITAMIN D3) 5000 units (125 mcg) capsule 125 mcg     clonazePAM (klonoPIN) tablet 0.5 mg     diphenhydrAMINE (BENADRYL) capsule 25 mg     divalproex sodium extended-release (DEPAKOTE ER) 24 hr tablet 1,500 mg     fluconazole (DIFLUCAN) tablet 100 mg     fluticasone-vilanterol (BREO ELLIPTA) 100-25 MCG/INH inhaler 1 puff     glucosamine capsule 500 mg     guaiFENesin-dextromethorphan (ROBITUSSIN DM) 100-10 MG/5ML syrup 5 mL     hydrocortisone (CORTAID) 1 % cream     hydrOXYzine (ATARAX) tablet 25 mg     ibuprofen (ADVIL/MOTRIN) tablet 600 mg     LORazepam (ATIVAN) tablet 1-2 mg     magnesium chloride CR tablet 535 mg     magnesium hydroxide (MILK OF MAGNESIA) suspension 30 mL     methocarbamol (ROBAXIN) tablet 500 mg     multivitamin, therapeutic (THERA-VIT) tablet 1 tablet     nicotine (NICODERM CQ) 21 MG/24HR 24 hr patch 1 patch     nicotine (NICORETTE) gum 2 mg     nicotine Patch in Place     OLANZapine (zyPREXA) tablet 10 mg    Or     OLANZapine (zyPREXA) injection 10 mg     QUEtiapine (SEROquel) tablet 100 mg     vitamin B complex with vitamin C (STRESS TAB) tablet 1 tablet     ziprasidone (GEODON) capsule 80 mg              "Allergies:     Allergies   Allergen Reactions     Animal Dander      Haldol Difficulty breathing     Penicillins      Unsure of what happens. Has been told she has allergies since she was a kid.     Seasonal Allergies             Psychiatric Examination:     /79 (BP Location: Left arm)   Pulse 84   Temp 98.1  F (36.7  C) (Tympanic)   Resp 16   Wt 84.4 kg (186 lb 1.6 oz)   LMP 07/03/2014   SpO2 96%   BMI 30.04 kg/m       Appearance:  awake, alert and adequately groomed  Attitude:  cooperative  Eye Contact:  good  Mood:  \"frustrated\"  Affect:  intensity increased, tearful  Speech:  clear, coherent  Psychomotor Behavior:  no evidence of tardive dyskinesia, dystonia, or tics  Thought Process:  linear and goal-oriented but illogical  Associations:  no loosening of associations present  Thought Content:  no evidence of suicidal ideation or homicidal ideation, delusional thought content is less pervasive, denies visual hallucinations, denies auditory hallucinations  Insight:  partial  Judgment:  limited  Oriented to:  date, time, person, and place  Attention Span and Concentration:  limited  Recent and Remote Memory:  limited  Language:  intact  Fund of Knowledge:  appropriate  Muscle Strength and Tone:  normal  Gait and Station:  normal          Labs:     Recent Results (from the past 24 hour(s))   Valproic acid    Collection Time: 06/21/20  6:08 PM   Result Value Ref Range    Valproic Acid Level 80 50 - 100 mg/L     "

## 2020-06-22 NOTE — PROGRESS NOTES
VM from the , May, at pt's apartment building, [place]. States that there has been misinformation about pt's cat, Mignon. Rumor that Carolyn was letting someone into pt's apartment to let them take care of Mignon. Would like to clarify    [phone]    Can take care of Mignon for another week but then cannot do it any further.    [facility]. Jennifer had allergies with Mignon so couldn't keep him. So Carolyn kept taking care of Mignon but can't past Friday.    Pt somehow got the impression that Carolyn was letting other people into the apartment    There is another neighbor who has offered. May would like to know if there is any sense of pt discharge date.     [facility] called [facility]. Advised that it is not certain if pt will be discharged by Friday. May states will make some arrangements for pt's cat in case pt is still here after Friday.

## 2020-06-22 NOTE — PROGRESS NOTES
Attended 1 OT group. Hyper-verbal. Asked questions and wouldn't wait for answer and/or talked over staff explanation. Intrusive when writer was working with peers. Worked quickly and made no attempts to problem solve independently. Attention limited to 15 minutes.

## 2020-06-22 NOTE — PLAN OF CARE
"Nursing Assessment:  Rehana was up ad jayson, spent 50% of the shift resting in her room, most of which occurred after lunch. Pt complained of being sore in her coccyx region, \"it feels better to rest sometimes.\" Pt denied having suicidal ideation, denied self harm thoughts. Pt reported feeling depressed, \"not a lot, a little though.\" Rehana attended OT group in the am, was selectively social with peers.   "

## 2020-06-23 PROCEDURE — 25000132 ZZH RX MED GY IP 250 OP 250 PS 637: Mod: GY | Performed by: PHYSICIAN ASSISTANT

## 2020-06-23 PROCEDURE — 12400001 ZZH R&B MH UMMC

## 2020-06-23 PROCEDURE — 25000132 ZZH RX MED GY IP 250 OP 250 PS 637: Mod: GY | Performed by: NURSE PRACTITIONER

## 2020-06-23 PROCEDURE — 99232 SBSQ HOSP IP/OBS MODERATE 35: CPT | Mod: 95 | Performed by: NURSE PRACTITIONER

## 2020-06-23 PROCEDURE — 25000132 ZZH RX MED GY IP 250 OP 250 PS 637: Mod: GY | Performed by: CLINICAL NURSE SPECIALIST

## 2020-06-23 RX ORDER — DIPHENHYDRAMINE HCL 25 MG
25 CAPSULE ORAL
Status: DISCONTINUED | OUTPATIENT
Start: 2020-06-23 | End: 2020-06-25 | Stop reason: HOSPADM

## 2020-06-23 RX ADMIN — DIPHENHYDRAMINE HYDROCHLORIDE 25 MG: 25 CAPSULE ORAL at 08:24

## 2020-06-23 RX ADMIN — THERA TABS 1 TABLET: TAB at 07:57

## 2020-06-23 RX ADMIN — DIPHENHYDRAMINE HYDROCHLORIDE 25 MG: 25 CAPSULE ORAL at 18:47

## 2020-06-23 RX ADMIN — Medication 125 MCG: at 07:56

## 2020-06-23 RX ADMIN — IBUPROFEN 600 MG: 600 TABLET ORAL at 14:10

## 2020-06-23 RX ADMIN — NICOTINE 1 PATCH: 21 PATCH, EXTENDED RELEASE TRANSDERMAL at 07:57

## 2020-06-23 RX ADMIN — NICOTINE POLACRILEX 2 MG: 2 GUM, CHEWING BUCCAL at 18:46

## 2020-06-23 RX ADMIN — ZIPRASIDONE HCL 80 MG: 60 CAPSULE ORAL at 07:56

## 2020-06-23 RX ADMIN — METHOCARBAMOL 500 MG: 500 TABLET, FILM COATED ORAL at 01:09

## 2020-06-23 RX ADMIN — NICOTINE POLACRILEX 2 MG: 2 GUM, CHEWING BUCCAL at 13:15

## 2020-06-23 RX ADMIN — Medication 500 MG: at 07:56

## 2020-06-23 RX ADMIN — ZIPRASIDONE HCL 80 MG: 60 CAPSULE ORAL at 18:46

## 2020-06-23 RX ADMIN — FLUCONAZOLE 100 MG: 100 TABLET ORAL at 07:56

## 2020-06-23 RX ADMIN — DIVALPROEX SODIUM 1500 MG: 500 TABLET, EXTENDED RELEASE ORAL at 21:26

## 2020-06-23 RX ADMIN — B-COMPLEX W/ C & FOLIC ACID TAB 1 TABLET: TAB at 07:56

## 2020-06-23 RX ADMIN — CLONAZEPAM 0.5 MG: 0.5 TABLET ORAL at 07:56

## 2020-06-23 RX ADMIN — CLONAZEPAM 0.5 MG: 0.5 TABLET ORAL at 13:15

## 2020-06-23 RX ADMIN — CLONAZEPAM 0.5 MG: 0.5 TABLET ORAL at 21:27

## 2020-06-23 RX ADMIN — FLUTICASONE FUROATE AND VILANTEROL TRIFENATATE 1 PUFF: 100; 25 POWDER RESPIRATORY (INHALATION) at 07:59

## 2020-06-23 RX ADMIN — ACETAMINOPHEN 650 MG: 325 TABLET, FILM COATED ORAL at 01:09

## 2020-06-23 ASSESSMENT — ACTIVITIES OF DAILY LIVING (ADL)
DRESS: INDEPENDENT
HYGIENE/GROOMING: INDEPENDENT
ORAL_HYGIENE: INDEPENDENT
LAUNDRY: WITH SUPERVISION
ORAL_HYGIENE: INDEPENDENT
HYGIENE/GROOMING: INDEPENDENT
DRESS: STREET CLOTHES;SCRUBS (BEHAVIORAL HEALTH)

## 2020-06-23 NOTE — PROGRESS NOTES
.Patient had a good shift.    Patient did require seclusion/restraints to manage behavior.    Fannie Jeter did participate in groups and was visible in the milieu.    Notable mental health symptoms during this shift:highly active  impulsive    Patient is working on these coping/social skills: Distraction  Positive social behaviors    Visitors during this shift included none.    Other information about this shift: Pt had a good shift. Pt was visible in the milieu, pt attended groups, pt interacted with peers pt did not need any redirections during shift.        06/22/20 2234   Behavioral Health   Hallucinations denies / not responding to hallucinations   Thinking poor concentration;distractable   Orientation person: oriented;place: oriented;date: oriented   Memory baseline memory   Insight insight appropriate to situation   Judgement impaired   Eye Contact at examiner   Affect blunted, flat   Mood mood is calm   Physical Appearance/Attire attire appropriate to age and situation   Hygiene well groomed   Suicidality other (see comments)  (Non stated)   1. Wish to be Dead (Recent) No   2. Non-Specific Active Suicidal Thoughts (Recent) No   Self Injury other (see comment)  (Pt denies)   Activity isolative;withdrawn   Speech clear;coherent   Psychomotor / Gait balanced;steady   Activities of Daily Living   Hygiene/Grooming independent   Oral Hygiene independent   Dress independent   Laundry with supervision   Room Organization independent

## 2020-06-23 NOTE — PROGRESS NOTES
06/23/20 1500   Behavioral Health   Hallucinations denies / not responding to hallucinations   Thinking poor concentration;distractable   Orientation person: oriented;place: oriented;date: oriented;time: oriented   Memory baseline memory   Insight insight appropriate to situation   Judgement impaired   Eye Contact at examiner   Affect blunted, flat   Mood mood is calm   Physical Appearance/Attire appears stated age;attire appropriate to age and situation   Hygiene well groomed   1. Wish to be Dead (Recent) No   Wish to be Dead Description (Recent)   (none stated or observed)   2. Non-Specific Active Suicidal Thoughts (Recent) No   Activities of Daily Living   Hygiene/Grooming independent   Oral Hygiene independent   Dress street clothes;scrubs (behavioral health)   Laundry with supervision   Room Organization independent     Pt was visible in the milieu for this shift watching TV, and socializing with peers in the lounge. Pt reported some depression 5/10 and  anxiety 4/10, denies SI/SIB thoughts and HI.   Pt attended groups today and ate all meals. She also took naps to make up for her not sleeping during the night. Pt had a great a shift overall; no further concerns.

## 2020-06-23 NOTE — PROGRESS NOTES
06/22/20 2005   Group Therapy Session   Group Attendance attended group session   Total Time (minutes) 50   Group Type psychotherapeutic   Patient Participation/Contribution cooperative with task;discussed personal experience with topic;listened actively;offered helpful suggestions to peers   Pt participated in psychotherapy group where patients were asked to draw a picture of something that makes them feel safe/secure, then process with the group.    Fannie reports feeling calm this evening. She engaged in the group activity and thoughtfully processed with the group. She presents as calm and engaged.
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
## 2020-06-23 NOTE — PROGRESS NOTES
"Patient requested/provided PRN ibuprofen for \"tail bone\" pain adding \"Last weekend I was dreaming I was Nati Burrows and fell out of bed while singing Jesi Alas.\"      1440: Patient reports \"helpful.\"    Nursing will continue to monitor.    "

## 2020-06-24 PROCEDURE — 12400001 ZZH R&B MH UMMC

## 2020-06-24 PROCEDURE — 25000132 ZZH RX MED GY IP 250 OP 250 PS 637: Mod: GY | Performed by: CLINICAL NURSE SPECIALIST

## 2020-06-24 PROCEDURE — G0177 OPPS/PHP; TRAIN & EDUC SERV: HCPCS

## 2020-06-24 PROCEDURE — 99232 SBSQ HOSP IP/OBS MODERATE 35: CPT | Mod: 95 | Performed by: NURSE PRACTITIONER

## 2020-06-24 PROCEDURE — 25000132 ZZH RX MED GY IP 250 OP 250 PS 637: Mod: GY | Performed by: NURSE PRACTITIONER

## 2020-06-24 PROCEDURE — 25000132 ZZH RX MED GY IP 250 OP 250 PS 637: Mod: GY | Performed by: PHYSICIAN ASSISTANT

## 2020-06-24 PROCEDURE — 99207 ZZC CDG-MDM COMPONENT: MEETS MODERATE - DOWN CODED: CPT | Performed by: NURSE PRACTITIONER

## 2020-06-24 RX ORDER — NICOTINE 21 MG/24HR
1 PATCH, TRANSDERMAL 24 HOURS TRANSDERMAL DAILY
Qty: 30 PATCH | Refills: 0 | Status: SHIPPED | OUTPATIENT
Start: 2020-06-25 | End: 2020-07-15

## 2020-06-24 RX ORDER — DIVALPROEX SODIUM 500 MG/1
1500 TABLET, EXTENDED RELEASE ORAL AT BEDTIME
Qty: 90 TABLET | Refills: 0 | Status: SHIPPED | OUTPATIENT
Start: 2020-06-24 | End: 2020-07-24

## 2020-06-24 RX ORDER — IBUPROFEN 600 MG/1
600 TABLET, FILM COATED ORAL EVERY 6 HOURS PRN
Qty: 60 TABLET | Refills: 0 | Status: SHIPPED | OUTPATIENT
Start: 2020-06-24 | End: 2020-07-24

## 2020-06-24 RX ORDER — SODIUM PHOSPHATE,MONO-DIBASIC 19G-7G/118
500 ENEMA (ML) RECTAL DAILY
Qty: 30 CAPSULE | Refills: 0 | Status: SHIPPED | OUTPATIENT
Start: 2020-06-25 | End: 2020-07-15

## 2020-06-24 RX ORDER — FLUCONAZOLE 100 MG/1
100 TABLET ORAL DAILY
Qty: 7 TABLET | Refills: 0 | Status: SHIPPED | OUTPATIENT
Start: 2020-06-25 | End: 2020-07-15

## 2020-06-24 RX ORDER — CLONAZEPAM 0.5 MG/1
0.5 TABLET ORAL 3 TIMES DAILY
Qty: 90 TABLET | Refills: 0 | Status: SHIPPED | OUTPATIENT
Start: 2020-06-24 | End: 2020-07-24

## 2020-06-24 RX ORDER — MULTIVITAMIN,THERAPEUTIC
1 TABLET ORAL DAILY
Qty: 30 TABLET | Refills: 0 | Status: SHIPPED | OUTPATIENT
Start: 2020-06-25 | End: 2020-07-25

## 2020-06-24 RX ORDER — ZIPRASIDONE HYDROCHLORIDE 80 MG/1
80 CAPSULE ORAL 2 TIMES DAILY WITH MEALS
Qty: 60 CAPSULE | Refills: 0 | Status: SHIPPED | OUTPATIENT
Start: 2020-06-24 | End: 2020-07-24

## 2020-06-24 RX ORDER — METHOCARBAMOL 500 MG/1
500 TABLET, FILM COATED ORAL EVERY 6 HOURS PRN
Qty: 30 TABLET | Refills: 0 | Status: SHIPPED | OUTPATIENT
Start: 2020-06-24 | End: 2020-06-25

## 2020-06-24 RX ORDER — DIPHENHYDRAMINE HCL 25 MG
25 CAPSULE ORAL 2 TIMES DAILY
Qty: 60 CAPSULE | Refills: 0 | Status: SHIPPED | OUTPATIENT
Start: 2020-06-24 | End: 2020-07-15

## 2020-06-24 RX ADMIN — NICOTINE 1 PATCH: 21 PATCH, EXTENDED RELEASE TRANSDERMAL at 07:52

## 2020-06-24 RX ADMIN — CLONAZEPAM 0.5 MG: 0.5 TABLET ORAL at 13:26

## 2020-06-24 RX ADMIN — NICOTINE POLACRILEX 2 MG: 2 GUM, CHEWING BUCCAL at 16:14

## 2020-06-24 RX ADMIN — IBUPROFEN 600 MG: 600 TABLET ORAL at 18:26

## 2020-06-24 RX ADMIN — ZIPRASIDONE HCL 80 MG: 60 CAPSULE ORAL at 18:26

## 2020-06-24 RX ADMIN — ZIPRASIDONE HCL 80 MG: 60 CAPSULE ORAL at 07:55

## 2020-06-24 RX ADMIN — NICOTINE POLACRILEX 2 MG: 2 GUM, CHEWING BUCCAL at 18:27

## 2020-06-24 RX ADMIN — FLUTICASONE FUROATE AND VILANTEROL TRIFENATATE 1 PUFF: 100; 25 POWDER RESPIRATORY (INHALATION) at 07:53

## 2020-06-24 RX ADMIN — CLONAZEPAM 0.5 MG: 0.5 TABLET ORAL at 07:52

## 2020-06-24 RX ADMIN — DIPHENHYDRAMINE HYDROCHLORIDE 25 MG: 25 CAPSULE ORAL at 07:55

## 2020-06-24 RX ADMIN — Medication 500 MG: at 07:52

## 2020-06-24 RX ADMIN — DIVALPROEX SODIUM 1500 MG: 500 TABLET, EXTENDED RELEASE ORAL at 21:52

## 2020-06-24 RX ADMIN — Medication 125 MCG: at 07:52

## 2020-06-24 RX ADMIN — B-COMPLEX W/ C & FOLIC ACID TAB 1 TABLET: TAB at 07:52

## 2020-06-24 RX ADMIN — FLUCONAZOLE 100 MG: 100 TABLET ORAL at 07:52

## 2020-06-24 RX ADMIN — CLONAZEPAM 0.5 MG: 0.5 TABLET ORAL at 21:52

## 2020-06-24 RX ADMIN — THERA TABS 1 TABLET: TAB at 07:52

## 2020-06-24 RX ADMIN — DIPHENHYDRAMINE HYDROCHLORIDE 25 MG: 25 CAPSULE ORAL at 18:26

## 2020-06-24 RX ADMIN — NICOTINE POLACRILEX 2 MG: 2 GUM, CHEWING BUCCAL at 10:06

## 2020-06-24 ASSESSMENT — ACTIVITIES OF DAILY LIVING (ADL)
ORAL_HYGIENE: INDEPENDENT
LAUNDRY: WITH SUPERVISION
DRESS: STREET CLOTHES
ORAL_HYGIENE: INDEPENDENT
DRESS: SCRUBS (BEHAVIORAL HEALTH);STREET CLOTHES
HYGIENE/GROOMING: INDEPENDENT
HYGIENE/GROOMING: INDEPENDENT
LAUNDRY: WITH SUPERVISION

## 2020-06-24 NOTE — PROGRESS NOTES
06/24/20 1400   Behavioral Health   Hallucinations denies / not responding to hallucinations   Thinking poor concentration   Orientation person: oriented;date: oriented;place: oriented;time: oriented   Memory baseline memory   Insight insight appropriate to situation   Judgement impaired   Affect blunted, flat   Mood mood is calm   Physical Appearance/Attire attire appropriate to age and situation   Hygiene well groomed   1. Wish to be Dead (Recent) No   Wish to be Dead Description (Recent)   (none stated)   2. Non-Specific Active Suicidal Thoughts (Recent) No   Non-Specific Active Suicidal Thought Description (Recent)   (none stated)   Activities of Daily Living   Hygiene/Grooming independent   Oral Hygiene independent   Dress scrubs (behavioral health);street clothes   Laundry with supervision   Room Organization independent     Pt was visible in the milieu for this shift. She watched TV, and socialized with peers in the lounge. Pt denies depression, anxiety, SI/SIB thoughts and HI.   Pt attended groups today and ate all meals. Pt is super excited about her discharge plans and had a great day. No further concerns.
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
## 2020-06-24 NOTE — PLAN OF CARE
Patient was irritable at the start of the shift but otherwise required no redirection.  Delusions re celebrities remain intact but conversation was otherwise reality-based.  She denied depression, anxiety, SI, physical problems, and medication side effects.  Patient requested her meds and knew what they were.

## 2020-06-24 NOTE — DISCHARGE INSTRUCTIONS
Behavioral Discharge Planning and Instructions      Summary:  You were admitted on 6/9/2020  due to cyndi and psychosis.  You were treated by Zabrina Riggins NP and Cornelius Cifuentes NP and discharged on 6/24/2020 from [facility]      Principal Diagnosis: Schizoaffective disorder bipolar type      Health Care Follow-up Appointments:    Psychiatry Appointment Date/Time: Tuesday 7/14/20 @ 10am:  This will be a telephone appointment  Therapy Appointment Date/Time: Friday 6/26 at 2:30 pm. This is a telephone appointment  Psychiatrist: Nayely Anderson PA-C   Therapist: Rola Anderson  [facility]  [address] [place]  [phone] fax: [phone]    , Oliva Lay from [facility] [phone]  Fax: [phone]     PCP- Dr. Vikas Acharya - [facility]    Attend all scheduled appointments with your outpatient providers. Call at least 24 hours in advance if you need to reschedule an appointment to ensure continued access to your outpatient providers.   Major Treatments, Procedures and Findings:  You were provided with: a psychiatric assessment, assessed for medical stability, medication evaluation and/or management and milieu management    Symptoms to Report: increased confusion, losing more sleep, mood getting worse or thoughts of suicide    Early warning signs can include: increased depression or anxiety sleep disturbances increased thoughts or behaviors of suicide or self-harm  increased unusual thinking, such as paranoia or hearing voices    Safety and Wellness:  Take all medicines as directed.  Make no changes unless your doctor suggests them.   Follow treatment recommendations.  Refrain from alcohol and non-prescribed drugs.  If there is a concern for safety, call 911.    Resources:   Crisis Intervention: 212.989.4446 or 813-270-4087 (TTY: 624.386.5693).  Call anytime for help.  [place] [employer] [place]ness (www.[place].rula.org): 425.617.1110 or 946-715-1622.  Suicide  "Awareness Voices of Education (SAVE) (www.save.org): 888-511-SAVE (5809)  [facility] Crisis (COPE) Response - Adult 766 927-9805  Text 4 Life: txt \"LIFE\" to 65218 for immediate support and crisis intervention  Crisis Intervention: 793.909.9408 or 369-405-2479. Call anytime for help.       The treatment team has appreciated the opportunity to work with you.     If you have any questions or concerns our unit number is [phone]    "

## 2020-06-24 NOTE — PROGRESS NOTES
"Attended 1 OT group. Prior to signing up for group noted, \"I did not attend groups yesterday because I was frustration and angry I needed to complete task rather than just take it as is. I was having a temper tantrum\". Discussed the goals of group participation for personal skill development and as an assessment tool for writer. Eventually agreed the purpose of groups was important and beneficial to her health and well being. Was attentive to tasks, asked for assistance in planning and organizing and noted benefits of participation in group/task completion for future self management and productive/purposeful  use of time. Pleasant and social. Did need structure and cuing to move forward with tasks.   "

## 2020-06-24 NOTE — PROGRESS NOTES
[facility] called the  at pt's building, [phone]. Advised concerning pt's discharge.    [facility] called pt's , Oliva, [phone]. Left message that pt is discharging tomorrow.

## 2020-06-25 VITALS
RESPIRATION RATE: 16 BRPM | HEART RATE: 65 BPM | SYSTOLIC BLOOD PRESSURE: 113 MMHG | TEMPERATURE: 98.5 F | DIASTOLIC BLOOD PRESSURE: 78 MMHG | WEIGHT: 186.1 LBS | OXYGEN SATURATION: 94 % | BODY MASS INDEX: 30.04 KG/M2

## 2020-06-25 PROCEDURE — 99238 HOSP IP/OBS DSCHRG MGMT 30/<: CPT | Mod: 95 | Performed by: NURSE PRACTITIONER

## 2020-06-25 PROCEDURE — G0177 OPPS/PHP; TRAIN & EDUC SERV: HCPCS

## 2020-06-25 PROCEDURE — 25000132 ZZH RX MED GY IP 250 OP 250 PS 637: Mod: GY | Performed by: PHYSICIAN ASSISTANT

## 2020-06-25 PROCEDURE — 25000132 ZZH RX MED GY IP 250 OP 250 PS 637: Mod: GY | Performed by: NURSE PRACTITIONER

## 2020-06-25 RX ADMIN — Medication 125 MCG: at 08:43

## 2020-06-25 RX ADMIN — Medication 500 MG: at 08:42

## 2020-06-25 RX ADMIN — CLONAZEPAM 0.5 MG: 0.5 TABLET ORAL at 08:43

## 2020-06-25 RX ADMIN — THERA TABS 1 TABLET: TAB at 08:43

## 2020-06-25 RX ADMIN — NICOTINE POLACRILEX 2 MG: 2 GUM, CHEWING BUCCAL at 09:03

## 2020-06-25 RX ADMIN — NICOTINE 1 PATCH: 21 PATCH, EXTENDED RELEASE TRANSDERMAL at 08:42

## 2020-06-25 RX ADMIN — B-COMPLEX W/ C & FOLIC ACID TAB 1 TABLET: TAB at 08:43

## 2020-06-25 RX ADMIN — FLUTICASONE FUROATE AND VILANTEROL TRIFENATATE 1 PUFF: 100; 25 POWDER RESPIRATORY (INHALATION) at 08:44

## 2020-06-25 RX ADMIN — CLONAZEPAM 0.5 MG: 0.5 TABLET ORAL at 13:53

## 2020-06-25 RX ADMIN — ZIPRASIDONE HCL 80 MG: 60 CAPSULE ORAL at 08:43

## 2020-06-25 RX ADMIN — FLUCONAZOLE 100 MG: 100 TABLET ORAL at 08:43

## 2020-06-25 RX ADMIN — DIPHENHYDRAMINE HYDROCHLORIDE 25 MG: 25 CAPSULE ORAL at 08:42

## 2020-06-25 RX ADMIN — NICOTINE POLACRILEX 2 MG: 2 GUM, CHEWING BUCCAL at 13:53

## 2020-06-25 ASSESSMENT — ACTIVITIES OF DAILY LIVING (ADL)
ORAL_HYGIENE: INDEPENDENT
HYGIENE/GROOMING: HANDWASHING;INDEPENDENT
DRESS: STREET CLOTHES;INDEPENDENT
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
## 2020-06-25 NOTE — PROGRESS NOTES
Pt denies SI/SIB. Pt denies AH/VH. Pt's mood is calm. Pt was not social with peers this evening. Pt was concerned about her ride tomorrow and asked writer to find the number for metro transit. Pt ate dinner and snacks. [place] [facility] [place].        06/24/20 2200   Behavioral Health   Hallucinations denies / not responding to hallucinations   Thinking poor concentration   Orientation person: oriented;place: oriented;date: oriented   Memory baseline memory   Insight insight appropriate to situation   Judgement impaired   Eye Contact at examiner   Affect blunted, flat   Mood mood is calm   Physical Appearance/Attire attire appropriate to age and situation   Hygiene well groomed   Suicidality other (see comments)  (Pt denies )   1. Wish to be Dead (Recent) No   2. Non-Specific Active Suicidal Thoughts (Recent) No   3. Active Sucidal Ideation with any Methods (Not Plan) Without Intent to Act (Recent) No   4. Active Suicidal Ideation with Some Intent to Act, Without Specific Plan (Recent) No   5. Active Suicidal Ideation with Specific Plan and Intent (Recent) No   Self Injury other (see comment)  (None stated/observed)   Elopement   (None stated/observed)   Activity isolative;withdrawn   Speech coherent;clear   Medication Sensitivity no observed side effects;no stated side effects   Psychomotor / Gait steady;balanced   Psycho Education   Type of Intervention 1:1 intervention   Response participates, initiates socially appropriate   Hours 0.5   Treatment Detail Check In   Activities of Daily Living   Hygiene/Grooming independent   Oral Hygiene independent   Dress street clothes   Laundry with supervision   Room Organization independent

## 2020-06-25 NOTE — PROGRESS NOTES
"Pt is discharged as per avs, via taxi; she has discharge and home meds, and all belongings. She verbalizes understanding of discharge instructions, is calm and in good spirits. Pt is \"hopefully optimistic,\" and denies all MH sx; no somatic concerns, vss. See discharge serena, also avs.  "

## 2020-06-25 NOTE — PLAN OF CARE
"Problem: OT General Care Plan  Goal: OT Goal 1    Pt attended 1 out of 3 OT groups offered. Pt attended occupational therapy clinic. She politely requested to just listen to music and socialize throughout group, as she was not sure when she would be discharging. She shared that she is looking forward to \"smoking a cigarette and seeing my cat\" upon discharge. Pt actively participated in about x15 minutes (no charge) of a structured occupational therapy group with a focus on coping through movement via Puneet Chi as a strategy to facilitate therapeutic exercise, calming, and stress management. Pt actively followed 100% of the movements, and remained attentive and engaged throughout group. Pt verbalized feeling \"good\" at the end of group, and asked writer for information about the exercises so that she can try it at home. Calm, pleasant, and cooperative throughout groups today.    "

## 2020-06-29 ENCOUNTER — TELEPHONE (OUTPATIENT)
Dept: FAMILY MEDICINE | Facility: CLINIC | Age: 55
End: 2020-06-29

## 2020-07-03 ENCOUNTER — MYC MEDICAL ADVICE (OUTPATIENT)
Dept: FAMILY MEDICINE | Facility: CLINIC | Age: 55
End: 2020-07-03

## 2020-07-03 NOTE — TELEPHONE ENCOUNTER
Called and LVM - patient is currently schedule on Monday with Dr. Chen. If symptoms are worsening where she is unable to walk, she should go to ED. Call back if questions.   Glendy Kat RN  07/03/20  3:09 PM

## 2020-07-06 ENCOUNTER — MYC MEDICAL ADVICE (OUTPATIENT)
Dept: FAMILY MEDICINE | Facility: CLINIC | Age: 55
End: 2020-07-06

## 2020-07-06 ENCOUNTER — OFFICE VISIT (OUTPATIENT)
Dept: FAMILY MEDICINE | Facility: CLINIC | Age: 55
End: 2020-07-06
Payer: MEDICARE

## 2020-07-06 VITALS
SYSTOLIC BLOOD PRESSURE: 99 MMHG | HEIGHT: 66 IN | HEART RATE: 89 BPM | WEIGHT: 185.5 LBS | TEMPERATURE: 97.7 F | DIASTOLIC BLOOD PRESSURE: 70 MMHG | BODY MASS INDEX: 29.81 KG/M2 | OXYGEN SATURATION: 93 %

## 2020-07-06 DIAGNOSIS — F40.240 CLAUSTROPHOBIA: Primary | ICD-10-CM

## 2020-07-06 DIAGNOSIS — M84.350A STRESS FRACTURE, PELVIS, INITIAL ENCOUNTER FOR FRACTURE: ICD-10-CM

## 2020-07-06 DIAGNOSIS — M54.50 ACUTE BILATERAL LOW BACK PAIN, UNSPECIFIED WHETHER SCIATICA PRESENT: Primary | ICD-10-CM

## 2020-07-06 RX ORDER — MENTHOL TOPICAL ANALGESIC 210 MG/1
1 PATCH TOPICAL 2 TIMES DAILY
Qty: 30 PATCH | Refills: 1 | Status: SHIPPED | OUTPATIENT
Start: 2020-07-06

## 2020-07-06 RX ORDER — LORAZEPAM 1 MG/1
TABLET ORAL
Qty: 2 TABLET | Refills: 0 | Status: SHIPPED | OUTPATIENT
Start: 2020-07-06

## 2020-07-06 ASSESSMENT — PAIN SCALES - GENERAL: PAINLEVEL: WORST PAIN (10)

## 2020-07-06 ASSESSMENT — MIFFLIN-ST. JEOR: SCORE: 1445.23

## 2020-07-06 NOTE — PROGRESS NOTES
"Fannie Jeter is a 54 year old female who presents to [facility] today for evaluation of severe low back pain primarily on her right side.    Was in hospital for 2 weeks in June. Discharge on June 25, 2020. Was in for \"psychosis.\"     On June 19, fell onto her back side and had x-rays on June 20.   Now, states severe pain that limits her ability to ambulate.  Has started using a rolling walker.  At times states that the pain is nearly 10 out of 10 and then at other times states that she feels she simply needs a massage.    Reports some involuntary stool output and also urination.  Reports that this has occurred prior to the injury but it is more frequent now.  She is unable to tell if this is due to the pain and her inability to get to the bathroom because of the severe pain.      Review Of Systems:  Reports involuntarily defication and urination.   Has had occasional episodes prior to this fall, but states they are worse now.   Cant' tell if it's due to her pain.    Has otherwise been in usual state of health, e.g.   Cardiovascular: negative  Respiratory: No shortness of breath, dyspnea on exertion, cough, or hemoptysis        Problem list per EMR:  Patient Active Problem List   Diagnosis     Scoliosis     Dysfunctional uterine bleeding     Central serous retinopathy     Autism spectrum disorder     Adult ADHD     Fibromyalgia     Psychosis (H)     Schizoaffective disorder (H)     ADHD (attention deficit hyperactivity disorder)     Arthritis     Asthma     Borderline diabetes     Chronic constipation     De Quervain's tenosynovitis, left     Hemorrhoids without complication     Nicotine dependence     Nonspecific reaction to tuberculin skin test without active tuberculosis     Phalanx of the foot fracture     PTSD (post-traumatic stress disorder)     Severe manic bipolar I disorder with psychotic features (H)     Thyroid disease     Aixa (H)       Current Outpatient Medications   Medication Sig Dispense " Refill     acetaminophen (TYLENOL) 325 MG tablet Take 2 tablets (650 mg) by mouth every 4 hours as needed for mild pain       cholecalciferol (VITAMIN D3) 125 mcg (5000 units) capsule Take 1 capsule (125 mcg) by mouth daily 30 capsule 0     clonazePAM (KLONOPIN) 0.5 MG tablet Take 1 tablet (0.5 mg) by mouth 3 times daily 90 tablet 0     divalproex sodium extended-release (DEPAKOTE ER) 500 MG 24 hr tablet Take 3 tablets (1,500 mg) by mouth At Bedtime 90 tablet 0     fluticasone-vilanterol (BREO ELLIPTA) 100-25 MCG/INH inhaler Inhale 1 puff into the lungs daily 1 Inhaler 0     ibuprofen (ADVIL/MOTRIN) 600 MG tablet Take 1 tablet (600 mg) by mouth every 6 hours as needed for moderate pain 60 tablet 0     magnesium chloride 535 (64 Mg) MG TBEC CR tablet Take 1 tablet (535 mg) by mouth daily as needed (leg cramps) 30 tablet 0     Menthol (ICY HOT ADVANCED RELIEF) 7.5 % PTCH Externally apply 1 patch topically 2 times daily 30 patch 1     multivitamin, therapeutic (THERA-VIT) TABS tablet Take 1 tablet by mouth daily 30 tablet 0     tiZANidine (ZANAFLEX) 4 MG tablet Take 1 tablet (4 mg) by mouth every 6 hours as needed for muscle spasms 120 tablet 0     ziprasidone (GEODON) 80 MG capsule Take 1 capsule (80 mg) by mouth 2 times daily (with meals) 60 capsule 0     albuterol (PROAIR HFA/PROVENTIL HFA/VENTOLIN HFA) 108 (90 Base) MCG/ACT inhaler Inhale 2 puffs into the lungs every 6 hours as needed for wheezing (Patient not taking: Reported on 7/6/2020) 1 Inhaler 0     diphenhydrAMINE (BENADRYL) 25 MG capsule Take 1 capsule (25 mg) by mouth 2 times daily With scheduled Geodon. (Patient not taking: Reported on 7/6/2020) 60 capsule 0     glucosamine 500 MG CAPS capsule Take 1 capsule (500 mg) by mouth daily (Patient not taking: Reported on 7/6/2020) 30 capsule 0     melatonin 3 MG tablet Take 3 mg by mouth nightly as needed        nicotine (NICODERM CQ) 21 MG/24HR 24 hr patch Place 1 patch onto the skin daily (Patient not taking:  "Reported on 7/6/2020) 30 patch 0     nicotine (NICORETTE) 2 MG gum Place 1 each (2 mg) inside cheek every hour as needed for smoking cessation (Patient not taking: Reported on 7/6/2020) 100 each 0     vitamin B complex with vitamin C (STRESS TAB) tablet Take 1 tablet by mouth daily (Patient not taking: Reported on 7/6/2020) 30 tablet 0       Allergies   Allergen Reactions     Haldol Difficulty breathing     Penicillins      Unsure of what happens. Has been told she has allergies since she was a kid.     Seasonal Allergies         Social:   Lives independently by herself with her Cat.    About to move [place] on July 31. 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     EXAM    Vitals: BP 99/70   Pulse 89   Temp 97.7  F (36.5  C) (Temporal)   Ht 1.664 m (5' 5.5\")   Wt 84.1 kg (185 lb 8 oz)   LMP 07/03/2014   SpO2 93%   BMI 30.40 kg/m    BMI= Body mass index is 30.4 kg/m .     Repeat 95/68 and 99/70.   States chronic low bp    Pain in RIGHT low back and across low back.   Able to stand up slowly.   Can do heel raises.   Hip on both sides has normal range of motion.  The legs and knees are normal.  Able to do a straight leg raise without any worsening back pain.  Reflexes are bilaterally diminished at the patella and the Achilles.  Sensation appears intact distally.    Pulses are intact distally.  Skin is intact distally and throughout the legs.    X-rays of the low back and pelvis are reviewed.  These were taken on June 20.  No fracture seen.    ASSESSMENT/PLAN:    Jessica is a 55 yo with 2 weeks of severe low back pain.   Reports some incontinence.   History and exam difficult due to schizoaffective disorder and PTSD.     PLAN:  -Given the reports of incontinence, will obtain MRI and call Fannie with results [phone].    Continue Advil and Naprosyn for pain.   Stop muscle relaxant  Start Icy hot patches. Rx given.   Consider Physical therapy if pain persists and MRI doesn't necessitate Ortho " involvement      --Erick Chen MD  [facility], Department of Family Medicine and Community Health

## 2020-07-06 NOTE — NURSING NOTE
"54 year old  Chief Complaint   Patient presents with     Musculoskeletal Problem     injury fell out of bed onto a hard floor neck and low back pian , coccyx area   x 3 week ago        Blood pressure (!) 80/56, pulse 89, temperature 97.7  F (36.5  C), temperature source Temporal, height 1.664 m (5' 5.5\"), weight 84.1 kg (185 lb 8 oz), last menstrual period 07/03/2014, SpO2 93 %. Body mass index is 30.4 kg/m .  Patient Active Problem List   Diagnosis     Scoliosis     Dysfunctional uterine bleeding     Central serous retinopathy     Autism spectrum disorder     Adult ADHD     Fibromyalgia     Psychosis (H)     Schizoaffective disorder (H)     ADHD (attention deficit hyperactivity disorder)     Arthritis     Asthma     Borderline diabetes     Chronic constipation     De Quervain's tenosynovitis, left     Hemorrhoids without complication     Nicotine dependence     Nonspecific reaction to tuberculin skin test without active tuberculosis     Phalanx of the foot fracture     PTSD (post-traumatic stress disorder)     Severe manic bipolar I disorder with psychotic features (H)     Thyroid disease     Aixa (H)       Wt Readings from Last 2 Encounters:   07/06/20 84.1 kg (185 lb 8 oz)   06/21/20 84.4 kg (186 lb 1.6 oz)     BP Readings from Last 3 Encounters:   07/06/20 (!) 80/56   06/25/20 113/78   06/09/20 (!) 127/92         Current Outpatient Medications   Medication     acetaminophen (TYLENOL) 325 MG tablet     cholecalciferol (VITAMIN D3) 125 mcg (5000 units) capsule     clonazePAM (KLONOPIN) 0.5 MG tablet     divalproex sodium extended-release (DEPAKOTE ER) 500 MG 24 hr tablet     fluticasone-vilanterol (BREO ELLIPTA) 100-25 MCG/INH inhaler     ibuprofen (ADVIL/MOTRIN) 600 MG tablet     magnesium chloride 535 (64 Mg) MG TBEC CR tablet     multivitamin, therapeutic (THERA-VIT) TABS tablet     tiZANidine (ZANAFLEX) 4 MG tablet     ziprasidone (GEODON) 80 MG capsule     albuterol (PROAIR HFA/PROVENTIL HFA/VENTOLIN HFA) " 108 (90 Base) MCG/ACT inhaler     diphenhydrAMINE (BENADRYL) 25 MG capsule     glucosamine 500 MG CAPS capsule     melatonin 3 MG tablet     nicotine (NICODERM CQ) 21 MG/24HR 24 hr patch     nicotine (NICORETTE) 2 MG gum     vitamin B complex with vitamin C (STRESS TAB) tablet     No current facility-administered medications for this visit.        Social History     Tobacco Use     Smoking status: Current Every Day Smoker     Packs/day: 1.50     Years: 35.00     Pack years: 52.50     Types: Cigarettes     Last attempt to quit: 8/11/2009     Years since quitting: 10.9     Smokeless tobacco: Current User   Substance Use Topics     Alcohol use: No     Drug use: No       Health Maintenance Due   Topic Date Due     ASTHMA ACTION PLAN  1965     MEDICARE ANNUAL WELLNESS VISIT  10/25/1982     PNEUMOCOCCAL IMMUNIZATION 19-64 MEDIUM RISK (1 of 1 - PPSV23) 10/25/1983     ZOSTER IMMUNIZATION (1 of 2) 10/25/2014     MAMMO SCREENING  02/02/2018     COLORECTAL CANCER SCREENING  02/02/2018     DTAP/TDAP/TD IMMUNIZATION (2 - Td) 04/02/2018     PHQ-2  01/01/2020     ASTHMA CONTROL TEST  03/11/2020       Lab Results   Component Value Date    PAP NIL 08/19/2019 July 6, 2020 11:32 AM

## 2020-07-06 NOTE — NURSING NOTE
"54 year old  Chief Complaint   Patient presents with     Musculoskeletal Problem     injury fell out of bed onto a hard floor neck and low back pian , coccyx area   x 3 week ago       Hospital F/U     discharge from hospital on 6/25/2020.  see note.        Blood pressure (!) 80/56, pulse 89, temperature 97.7  F (36.5  C), temperature source Temporal, height 1.664 m (5' 5.5\"), weight 84.1 kg (185 lb 8 oz), last menstrual period 07/03/2014, SpO2 93 %. Body mass index is 30.4 kg/m .  Patient Active Problem List   Diagnosis     Scoliosis     Dysfunctional uterine bleeding     Central serous retinopathy     Autism spectrum disorder     Adult ADHD     Fibromyalgia     Psychosis (H)     Schizoaffective disorder (H)     ADHD (attention deficit hyperactivity disorder)     Arthritis     Asthma     Borderline diabetes     Chronic constipation     De Quervain's tenosynovitis, left     Hemorrhoids without complication     Nicotine dependence     Nonspecific reaction to tuberculin skin test without active tuberculosis     Phalanx of the foot fracture     PTSD (post-traumatic stress disorder)     Severe manic bipolar I disorder with psychotic features (H)     Thyroid disease     Aixa (H)       Wt Readings from Last 2 Encounters:   07/06/20 84.1 kg (185 lb 8 oz)   06/21/20 84.4 kg (186 lb 1.6 oz)     BP Readings from Last 3 Encounters:   07/06/20 (!) 80/56   06/25/20 113/78   06/09/20 (!) 127/92         Current Outpatient Medications   Medication     acetaminophen (TYLENOL) 325 MG tablet     cholecalciferol (VITAMIN D3) 125 mcg (5000 units) capsule     clonazePAM (KLONOPIN) 0.5 MG tablet     divalproex sodium extended-release (DEPAKOTE ER) 500 MG 24 hr tablet     fluticasone-vilanterol (BREO ELLIPTA) 100-25 MCG/INH inhaler     ibuprofen (ADVIL/MOTRIN) 600 MG tablet     magnesium chloride 535 (64 Mg) MG TBEC CR tablet     multivitamin, therapeutic (THERA-VIT) TABS tablet     tiZANidine (ZANAFLEX) 4 MG tablet     ziprasidone " (GEODON) 80 MG capsule     albuterol (PROAIR HFA/PROVENTIL HFA/VENTOLIN HFA) 108 (90 Base) MCG/ACT inhaler     diphenhydrAMINE (BENADRYL) 25 MG capsule     glucosamine 500 MG CAPS capsule     melatonin 3 MG tablet     nicotine (NICODERM CQ) 21 MG/24HR 24 hr patch     nicotine (NICORETTE) 2 MG gum     vitamin B complex with vitamin C (STRESS TAB) tablet     No current facility-administered medications for this visit.        Social History     Tobacco Use     Smoking status: Current Every Day Smoker     Packs/day: 1.50     Years: 35.00     Pack years: 52.50     Types: Cigarettes     Last attempt to quit: 8/11/2009     Years since quitting: 10.9     Smokeless tobacco: Current User   Substance Use Topics     Alcohol use: No     Drug use: No       Health Maintenance Due   Topic Date Due     ASTHMA ACTION PLAN  1965     MEDICARE ANNUAL WELLNESS VISIT  10/25/1982     PNEUMOCOCCAL IMMUNIZATION 19-64 MEDIUM RISK (1 of 1 - PPSV23) 10/25/1983     ZOSTER IMMUNIZATION (1 of 2) 10/25/2014     MAMMO SCREENING  02/02/2018     COLORECTAL CANCER SCREENING  02/02/2018     DTAP/TDAP/TD IMMUNIZATION (2 - Td) 04/02/2018     PHQ-2  01/01/2020     ASTHMA CONTROL TEST  03/11/2020       Lab Results   Component Value Date    PAP NIL 08/19/2019 July 6, 2020 11:35 AM

## 2020-07-06 NOTE — PATIENT INSTRUCTIONS
ASSESSMENT/PLAN:    Jessica is a 55 yo with 2 weeks of severe low back pain.   Reports some incontinence.   History and exam difficult due to schizoaffective disorder and PTSD.     -Will obtain MRI and call Fannie with results [phone].    Continue Advil and Naprosyn for pain.   Stop muscle relaxant  Start Icy hot patches. Rx given.   Consider Physical therapy if pain persists and MRI doesn't necessitate Ortho involvement      --Erick Chen MD  [facility], Department of Family Medicine and Community Health

## 2020-07-08 ENCOUNTER — HOSPITAL ENCOUNTER (OUTPATIENT)
Dept: MRI IMAGING | Facility: CLINIC | Age: 55
Discharge: HOME | End: 2020-07-08
Attending: FAMILY MEDICINE | Admitting: FAMILY MEDICINE
Payer: MEDICARE

## 2020-07-08 DIAGNOSIS — M54.50 ACUTE BILATERAL LOW BACK PAIN, UNSPECIFIED WHETHER SCIATICA PRESENT: ICD-10-CM

## 2020-07-08 LAB — RADIOLOGIST FLAGS: ABNORMAL

## 2020-07-08 PROCEDURE — 72148 MRI LUMBAR SPINE W/O DYE: CPT

## 2020-07-09 ENCOUNTER — MYC MEDICAL ADVICE (OUTPATIENT)
Dept: FAMILY MEDICINE | Facility: CLINIC | Age: 55
End: 2020-07-09

## 2020-07-10 NOTE — TELEPHONE ENCOUNTER
Pt now asking for crutches - will ask Dr Chen for order on Monday.  Marium Warren RN  [facility]

## 2020-07-13 DIAGNOSIS — M54.50 ACUTE BILATERAL LOW BACK PAIN, UNSPECIFIED WHETHER SCIATICA PRESENT: Primary | ICD-10-CM

## 2020-07-13 NOTE — TELEPHONE ENCOUNTER
Script faxed to [facility] - pt notified on Mychart  Marium Warren RN  [facility]

## 2020-07-15 ENCOUNTER — MYC MEDICAL ADVICE (OUTPATIENT)
Dept: FAMILY MEDICINE | Facility: CLINIC | Age: 55
End: 2020-07-15

## 2020-07-15 DIAGNOSIS — J45.909 UNCOMPLICATED ASTHMA, UNSPECIFIED ASTHMA SEVERITY, UNSPECIFIED WHETHER PERSISTENT: ICD-10-CM

## 2020-07-17 ENCOUNTER — HOSPITAL ENCOUNTER (OUTPATIENT)
Dept: MRI IMAGING | Facility: CLINIC | Age: 55
Discharge: HOME | End: 2020-07-17
Attending: FAMILY MEDICINE | Admitting: FAMILY MEDICINE
Payer: MEDICARE

## 2020-07-17 DIAGNOSIS — M84.350A STRESS FRACTURE, PELVIS, INITIAL ENCOUNTER FOR FRACTURE: ICD-10-CM

## 2020-07-17 PROCEDURE — 72195 MRI PELVIS W/O DYE: CPT

## 2020-07-20 DIAGNOSIS — Z72.0 TOBACCO USER: ICD-10-CM

## 2020-07-20 DIAGNOSIS — F17.200 SMOKER: Primary | ICD-10-CM

## 2020-07-20 RX ORDER — NICOTINE 21 MG/24HR
1 PATCH, TRANSDERMAL 24 HOURS TRANSDERMAL EVERY 24 HOURS
Qty: 30 PATCH | Refills: 0 | Status: SHIPPED | OUTPATIENT
Start: 2020-07-20

## 2020-07-20 NOTE — TELEPHONE ENCOUNTER
Refill nicotine patches to [facility] - pt did not fill at previous pharmacy.   Marium Warren RN  [facility]

## 2020-09-21 ENCOUNTER — TELEPHONE (OUTPATIENT)
Dept: FAMILY MEDICINE | Facility: CLINIC | Age: 55
End: 2020-09-21

## 2020-11-08 ENCOUNTER — HEALTH MAINTENANCE LETTER (OUTPATIENT)
Age: 55
End: 2020-11-08

## 2021-03-28 ENCOUNTER — HEALTH MAINTENANCE LETTER (OUTPATIENT)
Age: 56
End: 2021-03-28

## 2021-04-15 NOTE — PROGRESS NOTES
03/11/18 1508   Patient Belongings   Patient Belongings other (see comments)   Disposition of Belongings Locker;Sent to security per site process   Belongings Search Yes   Clothing Search Yes   Second Staff Lexy       Passport  Gray wallet  2 Glasses  Panties  Black pants with strings  White socks  sandals  purple shift  Grey shirt longsleave  White t-shirt  Jacket   Green Scarff  Black gloves  Clock  Flute  Neck Pillow  Eye mask  Hand warmer  2 bottles of perfume in a black case  3 aloitos  3 packs of cigarettes   Garage    5 lighters  2 cell phones  Spiral notebook  Calender  Nail clipper  3 sets of keys  1 loose keys  3 ear buds  Pink necklace  Grey braclet  6 ear rings  Tin cup  Antimicrobial hydrogel  miscellaneous papers  $20.92cash/coins  Insurance Cards    Security Envelope #[id number]  Olanzapine 20 mg tabs (17 pills)  Lorazepam (Empty bottle)  Divalproex (35 pills)  Fluocinonide ointment (1 pill)  Ventolin Inhaler    Security Envelope #[phone]  U.S. Passport  EBT #[id number]  [employer] card  VISA #[id number]  [employer] card  2 ToGo Card  2 SS Cards #[id number]  VISA #[id number]  [employer] Icon Card  HenneAdmission:  I am responsible for any personal items that are not sent to the safe or pharmacy. [facility] is not responsible for loss, theft or damage of any property in my possession.        Patient Signature: ___________________________________________      Date/Time:__________________________     Staff Signature: __________________________________      Date/Time:__________________________     2nd Staff person, if patient is unable/unwilling to sign:      __________________________________________________________      Date/Time: __________________________        Discharge:  [facility] has returned all of my personal belongings:     Patient Signature: ________________________________________     Date/Time: ____________________________________     Staff Signature:  Assessment and Plan:    Celiac disease; Adverse reaction to food (gluten/wheat, peanut, cow milk); dysphagia; constipation  Comment: Celiac disease panel had completely normalized 4/2021 while off gluten strictly for ~4 months. RAST to cow milk, peanut, tree nuts were negative; CBC, antibody levels, ESR were normal/reassuring; celiac disease panel (while consuming gluten) was significantly elevated (higher than 11/2019) to TTG IgA, TTG IgG, and deamidated IgG 11/2020. TTG IgA on the celiac panel was highly elevated while consuming gluten; CMP, CBC, ESR, and abd X ray were normal 11/2019.     History of GI upset (twice daily) described as \"squeezing\" sensation and occasional diarrhea. Gluten avoidance for 1 month was somewhat helpful, but not currently avoiding wheat or gluten. Has not had EGD, but is followed with Dr. Villa ([facility]). Peanut butter (usually eaten in a sandwich), purer forms of cow milk (cheese), lemonade, and wheat/gluten has caused GI upset; tolerates cashew but doesn't really eat other tree nuts. Occasional dysphagia (more frequent with \"sticky\" foods). Some evidence of acid reflux.     Father with confirmed celiac disease (diagnosis now being reexamined by [facility] GI specialist) and paternal grandmother with eosinophilic esophagitis, possible gluten sensitivity.     Based on clinically significant GI symptoms, significantly elevated TTG IgA (highly sensitive/specific), and confirmed paternal history of celiac disease, there's a >90% chance that August has celiac disease.    79% for weight and 83% for height as of 4/2021.     Essential resolution of GI symptoms with strict avoidance of gluten.     · Continue strict gluten avoidance. Recheck celiac disease panel 10/1/2021 or later.   · Recheck celiac disease panel 10/2021.  · Continue good water and fiber intake to diminish constipation.  · May use Pepcid Complete (famotidine + CaCO3) 1 tablet once daily as needed for acid reflux-related  ______________________________________     Date/Time:_____________________________________[id number]         symptoms.  · Follow up with Dr. Villa (outside GI) summer 2021.   · Consider checking genetic testing (HLA-DQ2/8) for celiac disease if warranted. His father is heterozygous per maternal report.    Nasal congestion; epistaxis (nose bleeds)  Comment: RAST to the complete environmental aeroallergen panel was negative; total IgE (allergic antibody) was normal 11/2020.     Occasional nasal congestion. Tendency for nosebleeds possibly associated with wrestling activity.    Follow up 6 months

## 2021-09-12 ENCOUNTER — HEALTH MAINTENANCE LETTER (OUTPATIENT)
Age: 56
End: 2021-09-12

## 2021-10-08 NOTE — PLAN OF CARE
Problem: Adult Behavioral Health Plan of Care  Goal: Optimized Coping Skills in Response to Life Stressors  Outcome: No Change     Problem: Gratification/Engagement Impairment (Psychotic Signs/Symptoms)  Goal: Increased Interaction and Expression (Psychotic Signs/Symptoms)  7/30/2019 1935 by Leigh Ann Valerio, RN  Outcome: No Change  7/30/2019 1119 by Vernell Kee, RN  Outcome: No Change      surgery

## 2022-01-02 ENCOUNTER — HEALTH MAINTENANCE LETTER (OUTPATIENT)
Age: 57
End: 2022-01-02

## 2022-04-23 ENCOUNTER — HEALTH MAINTENANCE LETTER (OUTPATIENT)
Age: 57
End: 2022-04-23

## 2022-10-30 ENCOUNTER — HEALTH MAINTENANCE LETTER (OUTPATIENT)
Age: 57
End: 2022-10-30

## 2022-11-25 NOTE — PROGRESS NOTES
Pt awake most of the shift.  Pt ate meals, attended groups when  allowed it, social with others.  Pt observed paces hallway and making strange gestures.  Pt was seen by pre-petition and pt had a minor breakdown with some crying hysterics.  Pt is disorganized and delusional.  Poor insight and judgement.       07/19/19 1330   Sleep/Rest/Relaxation   Day/Evening Time Hours napping   Number of hours napping 2 hours   Behavioral Health   Hallucinations appears responding   Thinking delusional;poor concentration   Orientation person: oriented;place: oriented   Memory baseline memory   Insight denial of illness;poor   Judgement impaired   Eye Contact at examiner   Affect full range affect   Mood labile   Physical Appearance/Attire untidy   Hygiene neglected grooming - unclean body, hair, teeth   Suicidality other (see comments)  (denies)   1. Wish to be Dead No   2. Non-Specific Active Suicidal Thoughts  No   Activity hyperactive (agitated, impulsive);restless   Speech clear   Psychomotor / Gait paces;balanced;steady   Coping/Psychosocial   Verbalized Emotional State disbelief;frustration;powerlessness   Plan of Care Reviewed With patient   Psycho Education   Type of Intervention 1:1 intervention   Response participates with cues/redirection   Activities of Daily Living   Hygiene/Grooming handwashing;independent   Oral Hygiene independent   Dress scrubs (behavioral health);independent   Activity   Activity Assistance Provided independent      Pt presented with SOB, ERENDIRA, EKG changes, and her  BP elevated in the 246/129    Plan  Pt placed on nitro drip. Pt's BP responded and pt has been in the lower range. Nitro drip D/C. Holding her home antihypertensives until BP stabilizes  Placed on lasix 40mg IV  Given her home dose amlodipine 10mg   Pt started isosorbide mononitrate due to her BP started to become elevated.

## 2023-04-08 ENCOUNTER — HEALTH MAINTENANCE LETTER (OUTPATIENT)
Age: 58
End: 2023-04-08

## 2023-06-01 ENCOUNTER — HEALTH MAINTENANCE LETTER (OUTPATIENT)
Age: 58
End: 2023-06-01

## 2023-06-21 NOTE — PROGRESS NOTES
Pt asked to speak with writer early in the shift.  She was very anxious as she was worried about being served with an eviction notice from her apartment complex. She had done some research and found a place which would take her cat, temporarily.  She also networked and got a phone number for [employer] to look into getting some legal help in dealing with her eviction.  Pt attended and participated in all groups today.  She was appropriately social with her peers and was pleasant and cooperative with staff.  She denies any SI/SIB.     03/28/18 8900   Behavioral Health   Hallucinations denies / not responding to hallucinations   Thinking distractable;poor concentration   Orientation person: oriented;place: oriented;date: oriented;time: oriented   Memory baseline memory   Insight poor   Judgement impaired   Eye Contact at examiner   Affect full range affect   Mood mood is calm   Physical Appearance/Attire attire appropriate to age and situation;neat   Hygiene well groomed   Suicidality other (see comments)  (pt denies)   1. Wish to be Dead No   2. Non-Specific Active Suicidal Thoughts  No   Self Injury other (see comment)  (pt denies)   Elopement (nothing stated or observed)   Activity other (see comment)  (attending groups, active in the milieu)   Speech clear;coherent   Medication Sensitivity no stated side effects;no observed side effects   Psychomotor / Gait balanced;steady   Activities of Daily Living   Hygiene/Grooming independent   Oral Hygiene independent   Dress scrubs (behavioral health)   Room Organization independent       [TextBox_4] : Here for FLORY followup. Reports no current respiratory symptoms or sleep symptoms. Using PAP on a nightly basis without difficulty. Reports no symptoms of daytime sleepiness. Getting supplies regularly. \par \par Device: ResMed S11\par \par Vendor: [employer]\par \par Settin-15\par \par Mask: ResMed hybrid fullface\par \par Issues: Wants to change the mask because the holes are not well aligned with his nostrils.  Also feels the pressure is occasionally too high\par

## 2025-01-28 NOTE — TELEPHONE ENCOUNTER
Wants to make appointment with provider at [facility].  Will call scheduling in the morning.    Tamara Lan RN  [facility]      Reason for Disposition    Requesting regular office appointment    Protocols used: INFORMATION ONLY CALL-A-AH      
104